# Patient Record
Sex: FEMALE | Race: BLACK OR AFRICAN AMERICAN | NOT HISPANIC OR LATINO | Employment: PART TIME | URBAN - METROPOLITAN AREA
[De-identification: names, ages, dates, MRNs, and addresses within clinical notes are randomized per-mention and may not be internally consistent; named-entity substitution may affect disease eponyms.]

---

## 2023-04-19 ENCOUNTER — LAB (OUTPATIENT)
Dept: LAB | Facility: LAB | Age: 53
End: 2023-04-19
Payer: COMMERCIAL

## 2023-04-19 LAB — TOBACCO SCREEN, URINE: NEGATIVE

## 2023-07-13 ENCOUNTER — TELEPHONE (OUTPATIENT)
Dept: PRIMARY CARE | Facility: CLINIC | Age: 53
End: 2023-07-13

## 2023-07-13 ENCOUNTER — OFFICE VISIT (OUTPATIENT)
Dept: PRIMARY CARE | Facility: CLINIC | Age: 53
End: 2023-07-13
Payer: COMMERCIAL

## 2023-07-13 VITALS
BODY MASS INDEX: 30.56 KG/M2 | OXYGEN SATURATION: 98 % | HEIGHT: 64 IN | RESPIRATION RATE: 14 BRPM | WEIGHT: 179 LBS | TEMPERATURE: 97 F | DIASTOLIC BLOOD PRESSURE: 70 MMHG | SYSTOLIC BLOOD PRESSURE: 130 MMHG | HEART RATE: 99 BPM

## 2023-07-13 DIAGNOSIS — Z12.31 ENCOUNTER FOR SCREENING MAMMOGRAM FOR MALIGNANT NEOPLASM OF BREAST: ICD-10-CM

## 2023-07-13 DIAGNOSIS — N60.99 ATYPICAL DUCTAL HYPERPLASIA OF BREAST: Primary | ICD-10-CM

## 2023-07-13 DIAGNOSIS — E04.1 THYROID NODULE: ICD-10-CM

## 2023-07-13 DIAGNOSIS — Z86.69 HX OF MIGRAINES: ICD-10-CM

## 2023-07-13 DIAGNOSIS — D64.9 ANEMIA, UNSPECIFIED TYPE: ICD-10-CM

## 2023-07-13 DIAGNOSIS — D56.9 THALASSEMIA, UNSPECIFIED TYPE: ICD-10-CM

## 2023-07-13 DIAGNOSIS — M31.10 THROMBOTIC MICROANGIOPATHY (MULTI): ICD-10-CM

## 2023-07-13 DIAGNOSIS — F41.9 ANXIETY: ICD-10-CM

## 2023-07-13 DIAGNOSIS — R00.2 PALPITATION: ICD-10-CM

## 2023-07-13 PROBLEM — G43.909 MIGRAINES: Status: ACTIVE | Noted: 2023-07-13

## 2023-07-13 PROBLEM — D57.3 SICKLE CELL TRAIT (CMS-HCC): Status: ACTIVE | Noted: 2023-07-13

## 2023-07-13 PROCEDURE — 99204 OFFICE O/P NEW MOD 45 MIN: CPT | Performed by: INTERNAL MEDICINE

## 2023-07-13 PROCEDURE — 1036F TOBACCO NON-USER: CPT | Performed by: INTERNAL MEDICINE

## 2023-07-13 RX ORDER — METOPROLOL SUCCINATE 25 MG/1
25 TABLET, EXTENDED RELEASE ORAL DAILY
Qty: 90 TABLET | Refills: 3 | Status: SHIPPED | OUTPATIENT
Start: 2023-07-13 | End: 2024-04-18 | Stop reason: SDUPTHER

## 2023-07-13 RX ORDER — SERTRALINE HYDROCHLORIDE 50 MG/1
50 TABLET, FILM COATED ORAL DAILY
Qty: 90 TABLET | Refills: 3 | Status: SHIPPED | OUTPATIENT
Start: 2023-07-13 | End: 2024-07-12

## 2023-07-13 NOTE — PROGRESS NOTES
"Subjective    Tere Carrion is a 53 y.o. female who presents for New Patient Visit.  HPI  To establish, new insurance   Wants to restart zoloft she has hx of anxiety .she has perimenopausal sx   She will laugh then cry.   Mammogram 1/2022  Colonoscopy 2020 ([facility]) she gets one every 5 years.   Pap 2020 ([facility])  She was having regular periods. She was on ocp and she stopped the pill in December of 2022. Her last period was February. She has hot flashes.   She has a hx of palpitations she saw a cardiologist. Had  sinus tachy.   She was on toprol xl 25 and that helped.       Review of Systems   All other systems reviewed and are negative.        Objective     /70 (BP Location: Left arm, Patient Position: Sitting, BP Cuff Size: Adult)   Pulse 99   Temp 36.1 °C (97 °F) (Skin)   Resp 14   Ht 1.626 m (5' 4\")   Wt 81.2 kg (179 lb)   LMP 02/16/2023   SpO2 98%   BMI 30.73 kg/m²    Physical Exam  Vitals reviewed.   Constitutional:       General: She is not in acute distress.     Appearance: Normal appearance.   Cardiovascular:      Rate and Rhythm: Normal rate and regular rhythm.      Pulses: Normal pulses.      Heart sounds: Normal heart sounds.   Pulmonary:      Effort: Pulmonary effort is normal.      Breath sounds: Normal breath sounds.   Abdominal:      Tenderness: There is no abdominal tenderness.   Musculoskeletal:         General: No swelling.   Skin:     General: Skin is warm and dry.   Neurological:      Mental Status: She is alert.       Health Maintenance Due   Topic Date Due    Yearly Adult Physical  Never done    Lipid Panel  Never done    HIV Screening  Never done    Colorectal Cancer Screening  Never done    HIB Vaccines (1 of 1 - Risk 1-dose series) Never done    Meningococcal Vaccine (1 - Risk 2-dose series) Never done    Meningococcal B Vaccine (1 of 4 - Increased Risk) Never done    Hepatitis C Screening  Never done    Cervical Cancer Screening  Never done    Hepatitis B Vaccines (3 of 3 - " 19+ 3-dose series) 12/12/2016    COVID-19 Vaccine (3 - Booster for Pfizer series) 02/02/2022    Zoster Vaccines (2 of 2) 05/12/2022    Mammogram  01/13/2023    Diabetes Screening  04/25/2023          Assessment/Plan   Problem List Items Addressed This Visit       Hx of migraines    Palpitation    Relevant Medications    metoprolol succinate XL (Toprol-XL) 25 mg 24 hr tablet    Other Relevant Orders    Comprehensive Metabolic Panel    Lipid Panel    Anxiety    Relevant Medications    sertraline (Zoloft) 50 mg tablet    Atypical ductal hyperplasia of breast - Primary    Relevant Orders    Referral to Breast Surgery    Anemia    Relevant Orders    CBC    Thalassemia    Relevant Orders    CBC    Iron and TIBC    Lactate dehydrogenase    Thrombotic microangiopathy (CMS/HCC)    Relevant Orders    CBC     Other Visit Diagnoses       Encounter for screening mammogram for malignant neoplasm of breast        Relevant Orders    BI mammo bilateral screening tomosynthesis    Thyroid nodule        Relevant Orders    TSH with reflex to Free T4 if abnormal    US thyroid        Has hx of thyroid nodule.   Check mamm and labs  Restart metoprolol and sertraline. Pt has been on these in the past and tolerated will  She need high risk breast cancer screening. Refer to breast surgeon

## 2023-07-17 ENCOUNTER — TELEPHONE (OUTPATIENT)
Dept: PRIMARY CARE | Facility: CLINIC | Age: 53
End: 2023-07-17
Payer: COMMERCIAL

## 2023-07-17 NOTE — RESULT ENCOUNTER NOTE
Her us shows multiple thyroid nodules the largest is 1 cm. They recommend continue yearly us for 5 years. Please make a reminder card.

## 2023-07-17 NOTE — TELEPHONE ENCOUNTER
----- Message from Modesta Gallego DO sent at 7/17/2023 12:29 PM EDT -----  Her us shows multiple thyroid nodules the largest is 1 cm. They recommend continue yearly us for 5 years. Please make a reminder card.

## 2023-07-18 ENCOUNTER — LAB (OUTPATIENT)
Dept: LAB | Facility: LAB | Age: 53
End: 2023-07-18
Payer: COMMERCIAL

## 2023-07-18 DIAGNOSIS — E04.1 THYROID NODULE: ICD-10-CM

## 2023-07-18 DIAGNOSIS — R00.2 PALPITATION: ICD-10-CM

## 2023-07-18 DIAGNOSIS — M31.10 THROMBOTIC MICROANGIOPATHY (MULTI): ICD-10-CM

## 2023-07-18 DIAGNOSIS — D56.9 THALASSEMIA, UNSPECIFIED TYPE: ICD-10-CM

## 2023-07-18 DIAGNOSIS — D64.9 ANEMIA, UNSPECIFIED TYPE: ICD-10-CM

## 2023-07-18 LAB
ALANINE AMINOTRANSFERASE (SGPT) (U/L) IN SER/PLAS: 11 U/L (ref 7–45)
ALBUMIN (G/DL) IN SER/PLAS: 4 G/DL (ref 3.4–5)
ALKALINE PHOSPHATASE (U/L) IN SER/PLAS: 55 U/L (ref 33–110)
ANION GAP IN SER/PLAS: 12 MMOL/L (ref 10–20)
ASPARTATE AMINOTRANSFERASE (SGOT) (U/L) IN SER/PLAS: 13 U/L (ref 9–39)
BILIRUBIN TOTAL (MG/DL) IN SER/PLAS: 0.5 MG/DL (ref 0–1.2)
CALCIUM (MG/DL) IN SER/PLAS: 9.1 MG/DL (ref 8.6–10.3)
CARBON DIOXIDE, TOTAL (MMOL/L) IN SER/PLAS: 26 MMOL/L (ref 21–32)
CHLORIDE (MMOL/L) IN SER/PLAS: 105 MMOL/L (ref 98–107)
CHOLESTEROL (MG/DL) IN SER/PLAS: 225 MG/DL (ref 0–199)
CHOLESTEROL IN HDL (MG/DL) IN SER/PLAS: 65.1 MG/DL
CHOLESTEROL/HDL RATIO: 3.5
CREATININE (MG/DL) IN SER/PLAS: 0.86 MG/DL (ref 0.5–1.05)
ERYTHROCYTE DISTRIBUTION WIDTH (RATIO) BY AUTOMATED COUNT: 13.2 % (ref 11.5–14.5)
ERYTHROCYTE MEAN CORPUSCULAR HEMOGLOBIN CONCENTRATION (G/DL) BY AUTOMATED: 33.1 G/DL (ref 32–36)
ERYTHROCYTE MEAN CORPUSCULAR VOLUME (FL) BY AUTOMATED COUNT: 75 FL (ref 80–100)
ERYTHROCYTES (10*6/UL) IN BLOOD BY AUTOMATED COUNT: 4.2 X10E12/L (ref 4–5.2)
GFR FEMALE: 80 ML/MIN/1.73M2
GLUCOSE (MG/DL) IN SER/PLAS: 82 MG/DL (ref 74–99)
HEMATOCRIT (%) IN BLOOD BY AUTOMATED COUNT: 31.7 % (ref 36–46)
HEMOGLOBIN (G/DL) IN BLOOD: 10.5 G/DL (ref 12–16)
IRON (UG/DL) IN SER/PLAS: 101 UG/DL (ref 35–150)
IRON BINDING CAPACITY (UG/DL) IN SER/PLAS: 361 UG/DL (ref 240–445)
IRON SATURATION (%) IN SER/PLAS: 28 % (ref 25–45)
LACTATE DEHYDROGENASE (U/L) IN SER/PLAS BY LAC->PYR RXN: 139 U/L (ref 84–246)
LDL: 147 MG/DL (ref 0–99)
LEUKOCYTES (10*3/UL) IN BLOOD BY AUTOMATED COUNT: 7 X10E9/L (ref 4.4–11.3)
NRBC (PER 100 WBCS) BY AUTOMATED COUNT: 0 /100 WBC (ref 0–0)
PLATELETS (10*3/UL) IN BLOOD AUTOMATED COUNT: 336 X10E9/L (ref 150–450)
POTASSIUM (MMOL/L) IN SER/PLAS: 4.1 MMOL/L (ref 3.5–5.3)
PROTEIN TOTAL: 7.3 G/DL (ref 6.4–8.2)
SODIUM (MMOL/L) IN SER/PLAS: 139 MMOL/L (ref 136–145)
THYROTROPIN (MIU/L) IN SER/PLAS BY DETECTION LIMIT <= 0.05 MIU/L: 1.44 MIU/L (ref 0.44–3.98)
TRIGLYCERIDE (MG/DL) IN SER/PLAS: 67 MG/DL (ref 0–149)
UREA NITROGEN (MG/DL) IN SER/PLAS: 11 MG/DL (ref 6–23)
VLDL: 13 MG/DL (ref 0–40)

## 2023-07-18 PROCEDURE — 80053 COMPREHEN METABOLIC PANEL: CPT

## 2023-07-18 PROCEDURE — 83540 ASSAY OF IRON: CPT

## 2023-07-18 PROCEDURE — 36415 COLL VENOUS BLD VENIPUNCTURE: CPT

## 2023-07-18 PROCEDURE — 83615 LACTATE (LD) (LDH) ENZYME: CPT

## 2023-07-18 PROCEDURE — 85027 COMPLETE CBC AUTOMATED: CPT

## 2023-07-18 PROCEDURE — 83550 IRON BINDING TEST: CPT

## 2023-07-18 PROCEDURE — 80061 LIPID PANEL: CPT

## 2023-07-18 PROCEDURE — 84443 ASSAY THYROID STIM HORMONE: CPT

## 2023-11-25 ENCOUNTER — HOSPITAL ENCOUNTER (OUTPATIENT)
Dept: RADIOLOGY | Facility: HOSPITAL | Age: 53
Discharge: HOME | End: 2023-11-25
Payer: COMMERCIAL

## 2023-11-25 VITALS — BODY MASS INDEX: 30.56 KG/M2 | WEIGHT: 179.01 LBS | HEIGHT: 64 IN

## 2023-11-25 DIAGNOSIS — Z12.39 ENCOUNTER FOR OTHER SCREENING FOR MALIGNANT NEOPLASM OF BREAST: ICD-10-CM

## 2023-11-25 PROCEDURE — A9575 INJ GADOTERATE MEGLUMI 0.1ML: HCPCS | Performed by: NURSE PRACTITIONER

## 2023-11-25 PROCEDURE — 2550000001 HC RX 255 CONTRASTS: Performed by: NURSE PRACTITIONER

## 2023-11-25 PROCEDURE — 77049 MRI BREAST C-+ W/CAD BI: CPT | Mod: BILATERAL PROCEDURE | Performed by: STUDENT IN AN ORGANIZED HEALTH CARE EDUCATION/TRAINING PROGRAM

## 2023-11-25 PROCEDURE — 77049 MRI BREAST C-+ W/CAD BI: CPT

## 2023-11-25 RX ORDER — GADOTERATE MEGLUMINE 376.9 MG/ML
14 INJECTION INTRAVENOUS
Status: COMPLETED | OUTPATIENT
Start: 2023-11-25 | End: 2023-11-25

## 2023-11-25 RX ADMIN — GADOTERATE MEGLUMINE 14 ML: 376.9 INJECTION INTRAVENOUS at 11:42

## 2024-01-18 ENCOUNTER — OFFICE VISIT (OUTPATIENT)
Dept: PRIMARY CARE | Facility: CLINIC | Age: 54
End: 2024-01-18
Payer: COMMERCIAL

## 2024-01-18 VITALS
TEMPERATURE: 97.3 F | DIASTOLIC BLOOD PRESSURE: 82 MMHG | HEART RATE: 72 BPM | RESPIRATION RATE: 14 BRPM | WEIGHT: 175 LBS | OXYGEN SATURATION: 99 % | HEIGHT: 64 IN | BODY MASS INDEX: 29.88 KG/M2 | SYSTOLIC BLOOD PRESSURE: 120 MMHG

## 2024-01-18 DIAGNOSIS — M31.19 TTP (THROMBOTIC THROMBOCYTOPENIC PURPURA) (MULTI): ICD-10-CM

## 2024-01-18 DIAGNOSIS — F41.9 ANXIETY: ICD-10-CM

## 2024-01-18 DIAGNOSIS — M77.8 TENDONITIS OF WRIST, RIGHT: ICD-10-CM

## 2024-01-18 DIAGNOSIS — N60.99 ATYPICAL DUCTAL HYPERPLASIA OF BREAST: ICD-10-CM

## 2024-01-18 DIAGNOSIS — N92.6 ABNORMAL MENSTRUAL PERIODS: Primary | ICD-10-CM

## 2024-01-18 DIAGNOSIS — R00.2 PALPITATION: ICD-10-CM

## 2024-01-18 PROBLEM — Z86.69 HX OF MIGRAINES: Status: RESOLVED | Noted: 2023-07-13 | Resolved: 2024-01-18

## 2024-01-18 PROCEDURE — 99214 OFFICE O/P EST MOD 30 MIN: CPT | Performed by: INTERNAL MEDICINE

## 2024-01-18 PROCEDURE — 1036F TOBACCO NON-USER: CPT | Performed by: INTERNAL MEDICINE

## 2024-01-18 RX ORDER — TAMOXIFEN CITRATE 10 MG/1
10 TABLET ORAL DAILY
COMMUNITY
Start: 2023-12-22

## 2024-01-18 NOTE — PROGRESS NOTES
"Subjective    Tere Carrion is a 53 y.o. female who presents for Follow-up.  HPI  6 month follow up   Utd on pap, colonoscopy, mammogram  Doing well on Sertraline.   Has been dropping objects  Went to [facility] 1/2-1/8/2024, has had left ear pain since.   She had a period last year for 3 months. Then stopped for 3 months.   She has a strong family hx  of colon cancer. In all her mothers sibs and  her mothers father  Had colonoscopy 202 and she is due 2025  Review of Systems   All other systems reviewed and are negative.        Objective     /82 (BP Location: Left arm, Patient Position: Sitting, BP Cuff Size: Adult)   Pulse 72   Temp 36.3 °C (97.3 °F) (Skin)   Resp 14   Ht 1.626 m (5' 4\")   Wt 79.4 kg (175 lb)   LMP 12/31/2023   SpO2 99%   BMI 30.04 kg/m²    Physical Exam  Vitals reviewed.   Constitutional:       General: She is not in acute distress.     Appearance: Normal appearance.   Cardiovascular:      Rate and Rhythm: Normal rate and regular rhythm.      Pulses: Normal pulses.      Heart sounds: Normal heart sounds.   Pulmonary:      Effort: Pulmonary effort is normal.      Breath sounds: Normal breath sounds.   Abdominal:      Tenderness: There is no abdominal tenderness.   Musculoskeletal:         General: No swelling or tenderness.      Comments: Neg tinnels  No laxity of tendons   Skin:     General: Skin is warm and dry.   Neurological:      Mental Status: She is alert.      Motor: No weakness.       Health Maintenance Due   Topic Date Due    Yearly Adult Physical  Never done    HIV Screening  Never done    HIB Vaccines (1 of 1 - Risk 1-dose series) Never done    Meningococcal Vaccine (1 - Risk 2-dose series) Never done    Meningococcal B Vaccine (1 of 4 - Increased Risk) Never done    Hepatitis C Screening  Never done    Cervical Cancer Screening  Never done    Hepatitis B Vaccines (3 of 3 - 19+ 3-dose series) 12/12/2016    COVID-19 Vaccine (3 - Pfizer series) 02/02/2022    Zoster Vaccines " (2 of 2) 05/12/2022    Diabetes Screening  04/25/2023    Influenza Vaccine (1) 09/01/2023          Assessment/Plan   Problem List Items Addressed This Visit       Palpitation    Anxiety    Atypical ductal hyperplasia of breast    Relevant Orders    Referral to Gynecology    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 Continue established treatment plan and follow up at least yearly    She is on tamoxifen and  is still having periods. Has scanned her abdomen and lining is getting thicker (from the tamoxifen)  Her exam of her right arm is normal. Likely developing mild tendonitis . Recommend splint   Call  with any problems or questions.   Follow up in 6 months or sooner if her sx get worse

## 2024-01-19 DIAGNOSIS — F41.9 ANXIETY: ICD-10-CM

## 2024-01-19 DIAGNOSIS — J30.2 SEASONAL ALLERGIES: ICD-10-CM

## 2024-01-19 RX ORDER — FLUTICASONE PROPIONATE 50 MCG
1 SPRAY, SUSPENSION (ML) NASAL DAILY
COMMUNITY
End: 2024-01-19 | Stop reason: SDUPTHER

## 2024-01-19 RX ORDER — FLUTICASONE PROPIONATE 50 MCG
1 SPRAY, SUSPENSION (ML) NASAL DAILY
Qty: 16 G | Refills: 3 | Status: SHIPPED | OUTPATIENT
Start: 2024-01-19

## 2024-02-05 ENCOUNTER — OFFICE VISIT (OUTPATIENT)
Dept: OBSTETRICS AND GYNECOLOGY | Facility: CLINIC | Age: 54
End: 2024-02-05
Payer: COMMERCIAL

## 2024-02-05 VITALS
SYSTOLIC BLOOD PRESSURE: 108 MMHG | WEIGHT: 176.2 LBS | BODY MASS INDEX: 30.08 KG/M2 | DIASTOLIC BLOOD PRESSURE: 60 MMHG | HEIGHT: 64 IN

## 2024-02-05 DIAGNOSIS — N92.6 ABNORMAL MENSTRUAL PERIODS: ICD-10-CM

## 2024-02-05 DIAGNOSIS — Z01.419 WELL WOMAN EXAM WITH ROUTINE GYNECOLOGICAL EXAM: Primary | ICD-10-CM

## 2024-02-05 DIAGNOSIS — N60.99 ATYPICAL DUCTAL HYPERPLASIA OF BREAST: ICD-10-CM

## 2024-02-05 PROCEDURE — 1036F TOBACCO NON-USER: CPT | Performed by: ADVANCED PRACTICE MIDWIFE

## 2024-02-05 PROCEDURE — 88142 CYTOPATH C/V THIN LAYER: CPT

## 2024-02-05 PROCEDURE — 88141 CYTOPATH C/V INTERPRET: CPT | Performed by: PATHOLOGY

## 2024-02-05 PROCEDURE — 99386 PREV VISIT NEW AGE 40-64: CPT | Performed by: ADVANCED PRACTICE MIDWIFE

## 2024-02-05 PROCEDURE — 87624 HPV HI-RISK TYP POOLED RSLT: CPT

## 2024-02-05 NOTE — PROGRESS NOTES
"GYNECOLOGY PROGRESS NOTE    CC:  see below. Patient gets mammogram order from . Patient on Tamoxifen. 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 Patient's menses are not regular all the time. Last pap 2020 wnl   Chief Complaint   Patient presents with    Annual Exam     Patient presents for Annual Exam.  Pap:12/31/2020:normal Never has had an abnormal pap  Amanda:07/26/2023 Cat 2 Benign  Is on Tamoifen and wants to dicuss        HPI:  Tere Carrion is here for a routine GYN examination.  No GYN c/o, no AUB.        Depression screen:  negative      ROS:  GI - no blood in BMs  URO - no hematuria  GYN - no AUB or vaginal discharge  PSYCH - mood OK        PHYSICAL EXAM:  /60 (BP Location: Left arm, Patient Position: Sitting, BP Cuff Size: Large adult)   Ht 1.626 m (5' 4\")   Wt 79.9 kg (176 lb 3.2 oz)   LMP 12/31/2023   BMI 30.24 kg/m²   GEN:  A&O, NAD  ABD:  NT/ND, soft, no palpable masses  URO:  normal urethra, no bladder TTP  GYN:  normal vulva and perineum w/o lesions or ulcers, normal vagina without discharge or lesions, normal cervix without lesions or discharge or CMT, uterus NT/NE, adnexa mobile and NT/NE  BREAST:  deferred  DERM:  no hirsutism or acne   PSYCH:  normal affect, non-anxious      IMPRESSION/PLAN:    A: normal exam.   Plan: 1. Pap if wnl repeat in 1-3 years. 2. Pelvic sonogram to check endometrial lining. - to view films-   Problem List Items Addressed This Visit       Atypical ductal hyperplasia of breast     Other Visit Diagnoses       Abnormal menstrual periods        Well woman exam with routine gynecological exam               ANGELIQUE Lewis-ROSEANNEM  "

## 2024-02-16 LAB
CYTOLOGY CMNT CVX/VAG CYTO-IMP: NORMAL
HPV HR 12 DNA GENITAL QL NAA+PROBE: POSITIVE
HPV HR GENOTYPES PNL CVX NAA+PROBE: POSITIVE
HPV16 DNA SPEC QL NAA+PROBE: NEGATIVE
HPV18 DNA SPEC QL NAA+PROBE: NEGATIVE
LAB AP HPV GENOTYPE QUESTION: YES
LAB AP HPV HR: NORMAL
LABORATORY COMMENT REPORT: NORMAL
LABORATORY COMMENT REPORT: NORMAL
PATH REPORT.TOTAL CANCER: NORMAL

## 2024-02-21 ENCOUNTER — HOSPITAL ENCOUNTER (OUTPATIENT)
Dept: RADIOLOGY | Facility: HOSPITAL | Age: 54
Discharge: HOME | End: 2024-02-21
Payer: COMMERCIAL

## 2024-02-21 DIAGNOSIS — N92.6 ABNORMAL MENSTRUAL PERIODS: ICD-10-CM

## 2024-02-21 DIAGNOSIS — Z01.419 WELL WOMAN EXAM WITH ROUTINE GYNECOLOGICAL EXAM: ICD-10-CM

## 2024-02-21 PROCEDURE — 76856 US EXAM PELVIC COMPLETE: CPT | Performed by: RADIOLOGY

## 2024-02-21 PROCEDURE — 76856 US EXAM PELVIC COMPLETE: CPT

## 2024-02-21 PROCEDURE — 76830 TRANSVAGINAL US NON-OB: CPT | Performed by: RADIOLOGY

## 2024-02-27 ENCOUNTER — TELEPHONE (OUTPATIENT)
Dept: OBSTETRICS AND GYNECOLOGY | Facility: CLINIC | Age: 54
End: 2024-02-27
Payer: COMMERCIAL

## 2024-02-27 NOTE — TELEPHONE ENCOUNTER
----- Message from AGUSTO Lewis sent at 2/27/2024  2:30 PM EST -----  Hello  I had  look at her sonogram and he recommended a hysteroscopic and Bx.  Due to multiple fibroids and thickened lining .92    Thanks  Nenita  ----- Message -----  From: Interface, Radiology Results In  Sent: 2/23/2024   3:04 AM EST  To: AGUSTO Lewis

## 2024-02-29 ENCOUNTER — TELEPHONE (OUTPATIENT)
Dept: OBSTETRICS AND GYNECOLOGY | Facility: CLINIC | Age: 54
End: 2024-02-29
Payer: COMMERCIAL

## 2024-02-29 NOTE — TELEPHONE ENCOUNTER
----- Message from AGUSTO Lewis sent at 2/27/2024  2:30 PM EST -----  Hello  I had  look at her sonogram and he recommended a hysteroscopic and Bx.  Due to multiple fibroids and thickened lining .92    Thanks  Nenita  ----- Message -----  From: Interface, Radiology Results In  Sent: 2/23/2024   3:04 AM EST  To: AGUSTO Lewis    I spoke with this patient and explained the procedures (colpo/hysteroscopy). Both were scheduled for the same day.   Results reviewed and questions were answered to the patients satisfaction.  Reviewed and approved by FUNMI AVERY on 2/29/24 at 1:36 PM.

## 2024-02-29 NOTE — TELEPHONE ENCOUNTER
----- Message from AGUSTO Lewis sent at 2/19/2024  8:57 AM EST -----  Her pap is ASCUS +HPV, she needs a colpo.   Her last pap was wnl.    Thanks  Nenita  ----- Message -----  From: Lab, Background User  Sent: 2/16/2024  10:20 AM EST  To: AGUSTO Lewis    Procedure scheduled; results given See other phone note for more details.  Reviewed and approved by FUNMI AVERY on 2/29/24 at 1:37 PM.

## 2024-04-18 ENCOUNTER — PROCEDURE VISIT (OUTPATIENT)
Dept: OBSTETRICS AND GYNECOLOGY | Facility: CLINIC | Age: 54
End: 2024-04-18
Payer: COMMERCIAL

## 2024-04-18 VITALS — SYSTOLIC BLOOD PRESSURE: 100 MMHG | DIASTOLIC BLOOD PRESSURE: 60 MMHG | WEIGHT: 174 LBS | BODY MASS INDEX: 29.87 KG/M2

## 2024-04-18 DIAGNOSIS — R93.89 ENDOMETRIAL THICKENING ON ULTRASOUND: ICD-10-CM

## 2024-04-18 DIAGNOSIS — R87.610 ASCUS WITH POSITIVE HIGH RISK HPV CERVICAL: ICD-10-CM

## 2024-04-18 DIAGNOSIS — R87.810 ASCUS WITH POSITIVE HIGH RISK HPV CERVICAL: ICD-10-CM

## 2024-04-18 DIAGNOSIS — R00.2 PALPITATION: ICD-10-CM

## 2024-04-18 PROCEDURE — 57454 BX/CURETT OF CERVIX W/SCOPE: CPT | Performed by: OBSTETRICS & GYNECOLOGY

## 2024-04-18 PROCEDURE — 58558 HYSTEROSCOPY BIOPSY: CPT | Performed by: OBSTETRICS & GYNECOLOGY

## 2024-04-18 PROCEDURE — 88305 TISSUE EXAM BY PATHOLOGIST: CPT | Performed by: PATHOLOGY

## 2024-04-18 PROCEDURE — 88305 TISSUE EXAM BY PATHOLOGIST: CPT

## 2024-04-18 PROCEDURE — RXMED WILLOW AMBULATORY MEDICATION CHARGE

## 2024-04-18 RX ORDER — METOPROLOL SUCCINATE 25 MG/1
25 TABLET, EXTENDED RELEASE ORAL DAILY
Qty: 90 TABLET | Refills: 3 | Status: SHIPPED | OUTPATIENT
Start: 2024-04-18 | End: 2025-04-18

## 2024-04-18 RX ORDER — BUPIVACAINE HYDROCHLORIDE 2.5 MG/ML
10 INJECTION, SOLUTION INFILTRATION; PERINEURAL ONCE
Status: COMPLETED | OUTPATIENT
Start: 2024-04-18 | End: 2024-04-18

## 2024-04-18 RX ADMIN — BUPIVACAINE HYDROCHLORIDE 25 MG: 2.5 INJECTION, SOLUTION INFILTRATION; PERINEURAL at 11:52

## 2024-04-18 ASSESSMENT — PAIN SCALES - GENERAL: PAINLEVEL: 0-NO PAIN

## 2024-04-18 NOTE — TELEPHONE ENCOUNTER
----- Message from Tere Carrion sent at 4/18/2024  3:33 PM EDT -----  Regarding: Prescription refill  Contact: [phone]  Hello, I have been trying to get my prescription for metoporol xl  from [facility]  without any luck. Finally I was informed that it is a maintenance medication and my insurance won't I'll it. So can my prescription be sent  to [facility]?

## 2024-04-19 ENCOUNTER — PHARMACY VISIT (OUTPATIENT)
Dept: PHARMACY | Facility: CLINIC | Age: 54
End: 2024-04-19
Payer: COMMERCIAL

## 2024-04-22 NOTE — PROGRESS NOTES
GYN PROGRESS NOTE          CC:   AUB    HPI:  Patient answers are not available for this visit.  HPI    Tere is here today for both a hysteroscopy and colposcopy.  Her last pap smear was ASCUS HPV OTHER POS.  She is also perimenopausal, and had an US that showed fibroids with a thickened lining.   Today, the procedure was explained by both the RN and MD.  Time Out was completed and verified; (2 patient identifiers, Correct procedure, Correct site, Correct position, Correct equipment)  Pathology obtained.  The patients questions were answered to her satisfaction.  She has no concerns at this time.  Discharge instructions were given.  The patient was encouraged to call the office with any questions or concerns.  Chaperone: MAICOL Lorenzo  Last edited by Liv Quintero RN on 2024 10:40 AM.        Abnormal uterine bleeding and dysmenorrhea    Discussed evaluation including hysteroscopy and biopsy.    Discussed management including IUD ablation or hysterectomy.    Discussed risk of each procedure all questions answered to the best of my ability    ROS:  GEN - no fevers or chills  RESP - no SOB or cough  GYN - see HPI      HISTORY:  Past Medical History:   Diagnosis Date    Anemia     Anxiety     Clotting disorder (Multi)     Headache     Sickle cell anemia (Multi) Sickle cell trait    Urinary tract infection      Past Surgical History:   Procedure Laterality Date    BREAST SURGERY       SECTION, LOW TRANSVERSE  2004    TUBAL LIGATION       Social History     Socioeconomic History    Marital status:      Spouse name: Not on file    Number of children: Not on file    Years of education: Not on file    Highest education level: Not on file   Occupational History    Not on file   Tobacco Use    Smoking status: Never    Smokeless tobacco: Never   Vaping Use    Vaping status: Never Used   Substance and Sexual Activity    Alcohol use: Never    Drug use: Never    Sexual activity: Yes     Partners: Male      Birth control/protection: Female Sterilization   Other Topics Concern    Not on file   Social History Narrative    Not on file     Social Determinants of Health     Financial Resource Strain: Unknown (1/13/2022)    Received from [employer]    Overall Financial Resource Strain (CARDIA)     Difficulty of Paying Living Expenses: Patient refused   Food Insecurity: Unknown (1/13/2022)    Received from [employer]    Hunger Vital Sign     Worried About Running Out of Food in the Last Year: Patient refused     Ran Out of Food in the Last Year: Patient refused   Transportation Needs: Unknown (1/13/2022)    Received from [employer]    PRAPARE - Transportation     Lack of Transportation (Medical): Patient refused     Lack of Transportation (Non-Medical): Patient refused   Physical Activity: Unknown (1/13/2022)    Received from [employer]    Exercise Vital Sign     Days of Exercise per Week: Patient refused     Minutes of Exercise per Session: Patient refused   Stress: Unknown (1/13/2022)    Received from [employer]    [employer] [place] [employer] - Occupational Stress Questionnaire     Feeling of Stress : Patient refused   Social Connections: Unknown (1/13/2022)    Received from [employer]    Social Connection and Isolation Panel [NHANES]     Frequency of Communication with Friends and Family: Patient refused     Frequency of Social Gatherings with Friends and Family: Patient refused     Attends Nondenominational Services: Patient refused     Active Member of Clubs or Organizations: Patient refused     Attends Club or Organization Meetings: Patient refused     Marital Status: Patient refused   Intimate Partner Violence: Unknown (1/13/2022)    Received from [employer]    Humiliation, Afraid, Rape, and Kick questionnaire     Fear of Current or Ex-Partner: Patient refused     Emotionally Abused: Patient refused     Physically Abused: Patient refused     Sexually Abused: Patient refused   Housing Stability: Unknown  (1/13/2022)    Received from [employer]    Housing Stability Vital Sign     Unable to Pay for Housing in the Last Year: Patient refused     Number of Places Lived in the Last Year: Not on file     Unstable Housing in the Last Year: Patient refused     Cancer-related family history includes Cancer in her maternal grandfather, mother's brother, mother's brother, mother's sister, and mother's sister; Colon cancer in her maternal grandfather.       PHYSICAL EXAM:  /60 (BP Location: Left arm, Patient Position: Sitting, BP Cuff Size: Adult)   Wt 78.9 kg (174 lb)   LMP 12/31/2023   BMI 29.87 kg/m²     General: No distress  Neck: No masses  Respiratory: No respiratory distress  Abdomen: soft nontender no hernias  GYN: Normal vulvar skin normal clitoral [place] and clitoris labia majora and minora normal pink vaginal mucosa no lesions cervix normal uterine body not enlarged no enlargement or pain and bilateral adnexa   perianal area: without lesions    Procedure  Procedure office hysteroscopy  Dx: Uterine bleeding  Risks benefits alternatives discussed with the patient possible complications including bleeding pain and cramping infection perforation.  Consent was obtained.  Prior to the start of the procedure a timeout was performed.  The patient was confirmed using her name and date of birth.  The correct procedure to be performed was confirmed.  Positioning and equipment was verified.  Pregnancy test if under 55 was performed and found to be negative.  Patient was placed in dorsolithotomy position a bimanual exam was performed.  A speculum was placed in the vagina and the anterior cervix is identified this was grasped with a single-tooth tenaculum.  Paracervical block was placed injection of 10 mL quarter percent Marcaine without epinephrine.  After preparation with Betadine the cervix was dilated  a hysteroscope was placed under direct visualization through the endocervical and endometrial canal to the fundus  survey visually was performed upon exit all surfaces were systematically inspected.     Resectoscope was placed to the identified anatomy.  Tissue including endocervix and endometrium was sampled complete resection was performed.    The instrumentation was removed bleeding was noted to be scant patient tolerated the procedure well patient instructions postoperatively were given    Colposcopy procedure colposcopy with ECC  Patient was consented, timeout was performed. Patient was placed in lithotomy position.  Cervix was fully visualized visual inspection was performed squamocolumnar junction was fully visualized acetic acid placed findings include minimal change acetowhite no atypical lesions.  Strong iodine solution placed no nonstaining in atypical areas. Biopsy performed at 3 6 and 9 ECC performed cytology collected with Cytobrush.  No hemostasis required patient tolerated the procedure well no complications  IMPRESSION/PLAN:    54 y.o. abnormal uterine bleeding status post hysteroscopy endometrial biopsy and colposcopy    Follow-up to discuss results and planning      Risks benefits alternatives discussed with the patient risks including bleeding infection or damage to surrounding tissues.  Bleeding requiring blood transfusion transfusion reaction or infection.  Infection of the superficial or deep spaces.  Damage to bladder bowel ureters vascular structures abdominal wall.  Temporary or severe complications are possible. requiring further surgery acutely or with prolonged hospitalization including a return to the operating room.  All questions answered to the best my ability.    Osmar Chandra MD

## 2024-04-25 LAB
LABORATORY COMMENT REPORT: NORMAL
PATH REPORT.FINAL DX SPEC: NORMAL
PATH REPORT.GROSS SPEC: NORMAL
PATH REPORT.RELEVANT HX SPEC: NORMAL
PATH REPORT.TOTAL CANCER: NORMAL

## 2024-05-09 ENCOUNTER — TELEMEDICINE (OUTPATIENT)
Dept: OBSTETRICS AND GYNECOLOGY | Facility: CLINIC | Age: 54
End: 2024-05-09
Payer: COMMERCIAL

## 2024-05-09 DIAGNOSIS — N93.9 ABNORMAL UTERINE BLEEDING (AUB): Primary | ICD-10-CM

## 2024-05-09 PROCEDURE — 99442 PR PHYS/QHP TELEPHONE EVALUATION 11-20 MIN: CPT | Performed by: OBSTETRICS & GYNECOLOGY

## 2024-05-09 NOTE — PROGRESS NOTES
GYN PROGRESS NOTE    Virtual or Telephone Consent    A telephone visit (audio only) between the patient (at the originating site) and the provider (at the distant site) was utilized to provide this telehealth service.   Verbal consent was requested and obtained from Tere Carrion on this date, 24 for a telehealth visit.    11 minute visit    Chief complaint: follow up    HPI:  Patient answers are not available for this visit.  - Her spotting has discontinued.    Reviewed case with patient, reviewed plans.    Discussed biopsy results which were benign.    Recommend follow up pap in 1 year.    Reviewed treatment options which include endometrial ablation and hysterectomy.      HISTORY:  Past Medical History:   Diagnosis Date    Anemia     Anxiety     Clotting disorder (Multi)     Headache     Sickle cell anemia (Multi) Sickle cell trait    Urinary tract infection      Past Surgical History:   Procedure Laterality Date    BREAST SURGERY       SECTION, LOW TRANSVERSE  2004    TUBAL LIGATION       Social History     Socioeconomic History    Marital status:      Spouse name: Not on file    Number of children: Not on file    Years of education: Not on file    Highest education level: Not on file   Occupational History    Not on file   Tobacco Use    Smoking status: Never    Smokeless tobacco: Never   Vaping Use    Vaping status: Never Used   Substance and Sexual Activity    Alcohol use: Never    Drug use: Never    Sexual activity: Yes     Partners: Male     Birth control/protection: Female Sterilization   Other Topics Concern    Not on file   Social History Narrative    Not on file     Social Determinants of Health     Financial Resource Strain: Unknown (2022)    Received from [employer]    Overall Financial Resource Strain (CARDIA)     Difficulty of Paying Living Expenses: Patient refused   Food Insecurity: Unknown (2022)    Received from [employer]    Hunger Vital Sign     Worried  About Running Out of Food in the Last Year: Patient refused     Ran Out of Food in the Last Year: Patient refused   Transportation Needs: Unknown (1/13/2022)    Received from [employer]    PRAPARE - Transportation     Lack of Transportation (Medical): Patient refused     Lack of Transportation (Non-Medical): Patient refused   Physical Activity: Unknown (1/13/2022)    Received from [employer]    Exercise Vital Sign     Days of Exercise per Week: Patient refused     Minutes of Exercise per Session: Patient refused   Stress: Unknown (1/13/2022)    Received from [employer]    [employer] [place] [employer] - Occupational Stress Questionnaire     Feeling of Stress : Patient refused   Social Connections: Unknown (1/13/2022)    Received from [employer]    Social Connection and Isolation Panel [NHANES]     Frequency of Communication with Friends and Family: Patient refused     Frequency of Social Gatherings with Friends and Family: Patient refused     Attends Scientologist Services: Patient refused     Active Member of Clubs or Organizations: Patient refused     Attends Club or Organization Meetings: Patient refused     Marital Status: Patient refused   Intimate Partner Violence: Unknown (1/13/2022)    Received from [employer]    Humiliation, Afraid, Rape, and Kick questionnaire     Fear of Current or Ex-Partner: Patient refused     Emotionally Abused: Patient refused     Physically Abused: Patient refused     Sexually Abused: Patient refused   Housing Stability: Unknown (1/13/2022)    Received from [employer]    Housing Stability Vital Sign     Unable to Pay for Housing in the Last Year: Patient refused     Number of Places Lived in the Last Year: Not on file     Unstable Housing in the Last Year: Patient refused     Cancer-related family history includes Cancer in her maternal grandfather, mother's brother, mother's brother, mother's sister, and mother's sister; Colon cancer in her maternal grandfather.        ESSION/PLAN:  54-year-old abnormal uterine bleeding and HPV positive.    - Follow up pap in 1 year    Follow up as scheduled    Librado ULRICH am scribing for virtually, and in the presence of Dr. Osmar Chandra on  05/09/24 at 3:53 PM  Osmar Chandra MD

## 2024-06-18 ENCOUNTER — CLINICAL SUPPORT (OUTPATIENT)
Dept: EMERGENCY MEDICINE | Facility: HOSPITAL | Age: 54
End: 2024-06-18
Payer: COMMERCIAL

## 2024-06-18 ENCOUNTER — APPOINTMENT (OUTPATIENT)
Dept: RADIOLOGY | Facility: HOSPITAL | Age: 54
End: 2024-06-18
Payer: COMMERCIAL

## 2024-06-18 ENCOUNTER — APPOINTMENT (OUTPATIENT)
Dept: CARDIOLOGY | Facility: HOSPITAL | Age: 54
End: 2024-06-18
Payer: COMMERCIAL

## 2024-06-18 ENCOUNTER — HOSPITAL ENCOUNTER (INPATIENT)
Facility: HOSPITAL | Age: 54
End: 2024-06-18
Attending: EMERGENCY MEDICINE | Admitting: INTERNAL MEDICINE
Payer: COMMERCIAL

## 2024-06-18 ENCOUNTER — APPOINTMENT (OUTPATIENT)
Dept: PRIMARY CARE | Facility: CLINIC | Age: 54
End: 2024-06-18
Payer: COMMERCIAL

## 2024-06-18 ENCOUNTER — HOSPITAL ENCOUNTER (EMERGENCY)
Facility: HOSPITAL | Age: 54
Discharge: OTHER INSTITUTION | End: 2024-06-18
Attending: STUDENT IN AN ORGANIZED HEALTH CARE EDUCATION/TRAINING PROGRAM
Payer: COMMERCIAL

## 2024-06-18 VITALS
WEIGHT: 175 LBS | HEIGHT: 64 IN | SYSTOLIC BLOOD PRESSURE: 105 MMHG | RESPIRATION RATE: 18 BRPM | BODY MASS INDEX: 29.88 KG/M2 | HEART RATE: 103 BPM | TEMPERATURE: 97.3 F | DIASTOLIC BLOOD PRESSURE: 60 MMHG | OXYGEN SATURATION: 100 %

## 2024-06-18 VITALS
TEMPERATURE: 97.9 F | SYSTOLIC BLOOD PRESSURE: 120 MMHG | WEIGHT: 175 LBS | DIASTOLIC BLOOD PRESSURE: 60 MMHG | RESPIRATION RATE: 14 BRPM | BODY MASS INDEX: 29.88 KG/M2 | HEART RATE: 86 BPM | OXYGEN SATURATION: 95 % | HEIGHT: 64 IN

## 2024-06-18 DIAGNOSIS — M31.19 ACQUIRED TTP (MULTI): ICD-10-CM

## 2024-06-18 DIAGNOSIS — Z86.2 HISTORY OF TTP (THROMBOTIC THROMBOCYTOPENIC PURPURA): Primary | ICD-10-CM

## 2024-06-18 DIAGNOSIS — M31.19 TTP (THROMBOTIC THROMBOCYTOPENIC PURPURA) (MULTI): Primary | ICD-10-CM

## 2024-06-18 DIAGNOSIS — D64.9 ANEMIA, UNSPECIFIED TYPE: ICD-10-CM

## 2024-06-18 DIAGNOSIS — R31.9 HEMATURIA, UNSPECIFIED TYPE: ICD-10-CM

## 2024-06-18 DIAGNOSIS — R30.0 DYSURIA: ICD-10-CM

## 2024-06-18 DIAGNOSIS — D56.9 THALASSEMIA, UNSPECIFIED TYPE: ICD-10-CM

## 2024-06-18 LAB
ABO GROUP (TYPE) IN BLOOD: NORMAL
ABO GROUP (TYPE) IN BLOOD: NORMAL
ALBUMIN SERPL BCP-MCNC: 3.7 G/DL (ref 3.4–5)
ALP SERPL-CCNC: 50 U/L (ref 33–110)
ALT SERPL W P-5'-P-CCNC: 13 U/L (ref 7–45)
ANION GAP SERPL CALC-SCNC: 11 MMOL/L (ref 10–20)
ANTIBODY SCREEN: NORMAL
APTT PPP: 29 SECONDS (ref 27–38)
AST SERPL W P-5'-P-CCNC: 34 U/L (ref 9–39)
BASOPHILS # BLD AUTO: 0.04 X10*3/UL (ref 0–0.1)
BASOPHILS NFR BLD AUTO: 0.3 %
BILIRUB SERPL-MCNC: 1.1 MG/DL (ref 0–1.2)
BUN SERPL-MCNC: 13 MG/DL (ref 6–23)
CALCIUM SERPL-MCNC: 8.5 MG/DL (ref 8.6–10.3)
CARDIAC TROPONIN I PNL SERPL HS: 256 NG/L (ref 0–13)
CARDIAC TROPONIN I PNL SERPL HS: 257 NG/L (ref 0–13)
CHLORIDE SERPL-SCNC: 102 MMOL/L (ref 98–107)
CO2 SERPL-SCNC: 28 MMOL/L (ref 21–32)
CREAT SERPL-MCNC: 0.99 MG/DL (ref 0.5–1.05)
D DIMER PPP FEU-MCNC: 6631 NG/ML FEU
EGFRCR SERPLBLD CKD-EPI 2021: 67 ML/MIN/1.73M*2
EOSINOPHIL # BLD AUTO: 0.13 X10*3/UL (ref 0–0.7)
EOSINOPHIL NFR BLD AUTO: 1.1 %
ERYTHROCYTE [DISTWIDTH] IN BLOOD BY AUTOMATED COUNT: 23.6 % (ref 11.5–14.5)
FIBRINOGEN PPP-MCNC: 226 MG/DL (ref 200–400)
GLUCOSE SERPL-MCNC: 94 MG/DL (ref 74–99)
HCT VFR BLD AUTO: 18.5 % (ref 36–46)
HGB BLD-MCNC: 5.8 G/DL (ref 12–16)
HYPOCHROMIA BLD QL SMEAR: NORMAL
IMM GRANULOCYTES # BLD AUTO: 0.12 X10*3/UL (ref 0–0.7)
IMM GRANULOCYTES NFR BLD AUTO: 1 % (ref 0–0.9)
INR PPP: 1.1 (ref 0.9–1.1)
LDH SERPL L TO P-CCNC: 690 U/L (ref 84–246)
LYMPHOCYTES # BLD AUTO: 2.27 X10*3/UL (ref 1.2–4.8)
LYMPHOCYTES NFR BLD AUTO: 18.6 %
MAGNESIUM SERPL-MCNC: 1.86 MG/DL (ref 1.6–2.4)
MCH RBC QN AUTO: 25.1 PG (ref 26–34)
MCHC RBC AUTO-ENTMCNC: 31.4 G/DL (ref 32–36)
MCV RBC AUTO: 80 FL (ref 80–100)
MONOCYTES # BLD AUTO: 0.92 X10*3/UL (ref 0.1–1)
MONOCYTES NFR BLD AUTO: 7.5 %
NEUTROPHILS # BLD AUTO: 8.75 X10*3/UL (ref 1.2–7.7)
NEUTROPHILS NFR BLD AUTO: 71.5 %
NRBC BLD-RTO: 35.1 /100 WBCS (ref 0–0)
OVALOCYTES BLD QL SMEAR: NORMAL
PLATELET # BLD AUTO: 21 X10*3/UL (ref 150–450)
POC APPEARANCE, URINE: ABNORMAL
POC BILIRUBIN, URINE: NEGATIVE
POC BLOOD, URINE: ABNORMAL
POC COLOR, URINE: YELLOW
POC GLUCOSE, URINE: NEGATIVE MG/DL
POC HEMOGLOBIN: 6.2 G/DL (ref 12–16)
POC KETONES, URINE: NEGATIVE MG/DL
POC LEUKOCYTES, URINE: ABNORMAL
POC NITRITE,URINE: NEGATIVE
POC PH, URINE: 5 PH
POC PROTEIN, URINE: ABNORMAL MG/DL
POC SPECIFIC GRAVITY, URINE: 1.01
POC UROBILINOGEN, URINE: 1 EU/DL
POLYCHROMASIA BLD QL SMEAR: NORMAL
POTASSIUM SERPL-SCNC: 3.5 MMOL/L (ref 3.5–5.3)
PROT SERPL-MCNC: 7.2 G/DL (ref 6.4–8.2)
PROTHROMBIN TIME: 12.7 SECONDS (ref 9.8–12.8)
RBC # BLD AUTO: 2.31 X10*6/UL (ref 4–5.2)
RBC MORPH BLD: NORMAL
RH FACTOR (ANTIGEN D): NORMAL
RH FACTOR (ANTIGEN D): NORMAL
SCHISTOCYTES BLD QL SMEAR: NORMAL
SODIUM SERPL-SCNC: 137 MMOL/L (ref 136–145)
SPHEROCYTES BLD QL SMEAR: NORMAL
TARGETS BLD QL SMEAR: NORMAL
WBC # BLD AUTO: 12.2 X10*3/UL (ref 4.4–11.3)

## 2024-06-18 PROCEDURE — 85610 PROTHROMBIN TIME: CPT | Performed by: STUDENT IN AN ORGANIZED HEALTH CARE EDUCATION/TRAINING PROGRAM

## 2024-06-18 PROCEDURE — 84484 ASSAY OF TROPONIN QUANT: CPT | Performed by: STUDENT IN AN ORGANIZED HEALTH CARE EDUCATION/TRAINING PROGRAM

## 2024-06-18 PROCEDURE — 85018 HEMOGLOBIN: CPT | Performed by: INTERNAL MEDICINE

## 2024-06-18 PROCEDURE — 36415 COLL VENOUS BLD VENIPUNCTURE: CPT | Performed by: STUDENT IN AN ORGANIZED HEALTH CARE EDUCATION/TRAINING PROGRAM

## 2024-06-18 PROCEDURE — 85384 FIBRINOGEN ACTIVITY: CPT | Performed by: STUDENT IN AN ORGANIZED HEALTH CARE EDUCATION/TRAINING PROGRAM

## 2024-06-18 PROCEDURE — 70450 CT HEAD/BRAIN W/O DYE: CPT | Performed by: RADIOLOGY

## 2024-06-18 PROCEDURE — 85379 FIBRIN DEGRADATION QUANT: CPT | Performed by: STUDENT IN AN ORGANIZED HEALTH CARE EDUCATION/TRAINING PROGRAM

## 2024-06-18 PROCEDURE — 2500000004 HC RX 250 GENERAL PHARMACY W/ HCPCS (ALT 636 FOR OP/ED): Performed by: STUDENT IN AN ORGANIZED HEALTH CARE EDUCATION/TRAINING PROGRAM

## 2024-06-18 PROCEDURE — 84075 ASSAY ALKALINE PHOSPHATASE: CPT | Performed by: STUDENT IN AN ORGANIZED HEALTH CARE EDUCATION/TRAINING PROGRAM

## 2024-06-18 PROCEDURE — 2500000004 HC RX 250 GENERAL PHARMACY W/ HCPCS (ALT 636 FOR OP/ED)

## 2024-06-18 PROCEDURE — 96366 THER/PROPH/DIAG IV INF ADDON: CPT | Mod: 59

## 2024-06-18 PROCEDURE — 83735 ASSAY OF MAGNESIUM: CPT | Performed by: STUDENT IN AN ORGANIZED HEALTH CARE EDUCATION/TRAINING PROGRAM

## 2024-06-18 PROCEDURE — 85025 COMPLETE CBC W/AUTO DIFF WBC: CPT | Performed by: STUDENT IN AN ORGANIZED HEALTH CARE EDUCATION/TRAINING PROGRAM

## 2024-06-18 PROCEDURE — 99285 EMERGENCY DEPT VISIT HI MDM: CPT

## 2024-06-18 PROCEDURE — 70450 CT HEAD/BRAIN W/O DYE: CPT

## 2024-06-18 PROCEDURE — 93005 ELECTROCARDIOGRAM TRACING: CPT | Mod: 59

## 2024-06-18 PROCEDURE — 99285 EMERGENCY DEPT VISIT HI MDM: CPT | Mod: 25

## 2024-06-18 PROCEDURE — 86901 BLOOD TYPING SEROLOGIC RH(D): CPT | Performed by: STUDENT IN AN ORGANIZED HEALTH CARE EDUCATION/TRAINING PROGRAM

## 2024-06-18 PROCEDURE — 36556 INSERT NON-TUNNEL CV CATH: CPT

## 2024-06-18 PROCEDURE — 99213 OFFICE O/P EST LOW 20 MIN: CPT | Performed by: INTERNAL MEDICINE

## 2024-06-18 PROCEDURE — 83615 LACTATE (LD) (LDH) ENZYME: CPT | Performed by: STUDENT IN AN ORGANIZED HEALTH CARE EDUCATION/TRAINING PROGRAM

## 2024-06-18 PROCEDURE — 96365 THER/PROPH/DIAG IV INF INIT: CPT | Mod: 59

## 2024-06-18 PROCEDURE — 93005 ELECTROCARDIOGRAM TRACING: CPT

## 2024-06-18 PROCEDURE — 86920 COMPATIBILITY TEST SPIN: CPT

## 2024-06-18 PROCEDURE — 1036F TOBACCO NON-USER: CPT | Performed by: INTERNAL MEDICINE

## 2024-06-18 PROCEDURE — 81002 URINALYSIS NONAUTO W/O SCOPE: CPT | Performed by: INTERNAL MEDICINE

## 2024-06-18 PROCEDURE — 83010 ASSAY OF HAPTOGLOBIN QUANT: CPT | Performed by: STUDENT IN AN ORGANIZED HEALTH CARE EDUCATION/TRAINING PROGRAM

## 2024-06-18 PROCEDURE — 96375 TX/PRO/DX INJ NEW DRUG ADDON: CPT | Mod: 59

## 2024-06-18 RX ORDER — MAGNESIUM SULFATE HEPTAHYDRATE 40 MG/ML
2 INJECTION, SOLUTION INTRAVENOUS ONCE
Status: COMPLETED | OUTPATIENT
Start: 2024-06-18 | End: 2024-06-18

## 2024-06-18 RX ORDER — MORPHINE SULFATE 4 MG/ML
INJECTION, SOLUTION INTRAMUSCULAR; INTRAVENOUS
Status: COMPLETED
Start: 2024-06-18 | End: 2024-06-18

## 2024-06-18 RX ORDER — MORPHINE SULFATE 4 MG/ML
4 INJECTION, SOLUTION INTRAMUSCULAR; INTRAVENOUS ONCE
Status: COMPLETED | OUTPATIENT
Start: 2024-06-18 | End: 2024-06-18

## 2024-06-18 RX ORDER — METOCLOPRAMIDE HYDROCHLORIDE 5 MG/ML
5 INJECTION INTRAMUSCULAR; INTRAVENOUS ONCE
Status: COMPLETED | OUTPATIENT
Start: 2024-06-18 | End: 2024-06-18

## 2024-06-18 RX ORDER — NITROFURANTOIN 25; 75 MG/1; MG/1
100 CAPSULE ORAL 2 TIMES DAILY
Qty: 14 CAPSULE | Refills: 0 | Status: ON HOLD | OUTPATIENT
Start: 2024-06-18 | End: 2024-06-25

## 2024-06-18 RX ORDER — DIPHENHYDRAMINE HYDROCHLORIDE 50 MG/ML
25 INJECTION INTRAMUSCULAR; INTRAVENOUS ONCE
Status: COMPLETED | OUTPATIENT
Start: 2024-06-18 | End: 2024-06-18

## 2024-06-18 ASSESSMENT — LIFESTYLE VARIABLES
HAVE YOU EVER FELT YOU SHOULD CUT DOWN ON YOUR DRINKING: NO
EVER HAD A DRINK FIRST THING IN THE MORNING TO STEADY YOUR NERVES TO GET RID OF A HANGOVER: NO
TOTAL SCORE: 0
HAVE PEOPLE ANNOYED YOU BY CRITICIZING YOUR DRINKING: NO
EVER FELT BAD OR GUILTY ABOUT YOUR DRINKING: NO

## 2024-06-18 ASSESSMENT — COLUMBIA-SUICIDE SEVERITY RATING SCALE - C-SSRS
2. HAVE YOU ACTUALLY HAD ANY THOUGHTS OF KILLING YOURSELF?: NO
6. HAVE YOU EVER DONE ANYTHING, STARTED TO DO ANYTHING, OR PREPARED TO DO ANYTHING TO END YOUR LIFE?: NO
2. HAVE YOU ACTUALLY HAD ANY THOUGHTS OF KILLING YOURSELF?: NO
6. HAVE YOU EVER DONE ANYTHING, STARTED TO DO ANYTHING, OR PREPARED TO DO ANYTHING TO END YOUR LIFE?: NO
1. IN THE PAST MONTH, HAVE YOU WISHED YOU WERE DEAD OR WISHED YOU COULD GO TO SLEEP AND NOT WAKE UP?: NO
1. IN THE PAST MONTH, HAVE YOU WISHED YOU WERE DEAD OR WISHED YOU COULD GO TO SLEEP AND NOT WAKE UP?: NO

## 2024-06-18 ASSESSMENT — PAIN DESCRIPTION - LOCATION: LOCATION: HEAD

## 2024-06-18 ASSESSMENT — PAIN SCALES - GENERAL
PAINLEVEL_OUTOF10: 4
PAINLEVEL_OUTOF10: 0 - NO PAIN

## 2024-06-18 ASSESSMENT — PAIN - FUNCTIONAL ASSESSMENT
PAIN_FUNCTIONAL_ASSESSMENT: 0-10
PAIN_FUNCTIONAL_ASSESSMENT: 0-10

## 2024-06-18 NOTE — ED PROVIDER NOTES
HPI   Chief Complaint   Patient presents with    hgb 6       54-year-old female with past medical history of TTP presenting to the ED for anemia.  Patient states for the past 2 weeks she has had progressively worsening generalized weakness and fatigue.  She has been experiencing persistent migraines despite taking Excedrin and Motrin, dyspnea with exertion, chest pain with exertion, and new bruising with petechiae.  She also noticed large amounts of hematuria without bloody or tarry stools.  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  She had an appointment with her PCP today and POCT hemoglobin was 6 so they sent her to the ED for management.                          [place] Coma Scale Score: 15                     Patient History   Past Medical History:   Diagnosis Date    Anemia     Anxiety     Clotting disorder (Multi)     Headache     Sickle cell anemia (Multi) Sickle cell trait    Urinary tract infection      Past Surgical History:   Procedure Laterality Date    BREAST SURGERY       SECTION, LOW TRANSVERSE  2004    TUBAL LIGATION       Family History   Problem Relation Name Age of Onset    Asthma Mother Bruna     Diabetes Mother Bruna     Hypertension Mother Bruna     Cancer Maternal Grandfather Gavin     Colon cancer Maternal Grandfather Gavin     Hypertension Maternal Grandmother Ana     Cancer Mother's Sister Stacey     Cancer Mother's Brother Javier     Cancer Mother's Sister Pat     Cancer Mother's Brother Naeem     Diabetes Brother Teneuss     Hypertension Brother Teneuss     Hypertension Brother Martin     Learning disabilities Son Sanchez      Social History     Tobacco Use    Smoking status: Never    Smokeless tobacco: Never   Vaping Use    Vaping status: Never Used   Substance Use Topics    Alcohol use: Never    Drug use: Never       Physical Exam   ED Triage Vitals [24 1517]   Temperature Heart Rate Respirations BP    36.3 °C (97.3 °F) 89 14 113/58      Pulse Ox Temp Source Heart Rate Source Patient Position   100 % Temporal -- --      BP Location FiO2 (%)     -- --       Physical Exam  Vitals and nursing note reviewed.   Constitutional:       General: She is not in acute distress.     Appearance: She is not ill-appearing or toxic-appearing.   HENT:      Head: Normocephalic and atraumatic.      Nose: Nose normal.      Mouth/Throat:      Mouth: Mucous membranes are moist.   Eyes:      Extraocular Movements: Extraocular movements intact.      Comments: Conjunctiva pale   Cardiovascular:      Rate and Rhythm: Normal rate and regular rhythm.      Pulses: Normal pulses.      Heart sounds: Normal heart sounds.   Pulmonary:      Effort: Pulmonary effort is normal.      Breath sounds: Normal breath sounds.   Abdominal:      General: There is no distension.      Palpations: Abdomen is soft.      Tenderness: There is no abdominal tenderness.   Musculoskeletal:         General: Normal range of motion.      Cervical back: Normal range of motion and neck supple.      Right lower leg: No edema.      Left lower leg: No edema.   Skin:     General: Skin is warm and dry.      Capillary Refill: Capillary refill takes less than 2 seconds.      Findings: Bruising and rash (small amount of scattered petechiae) present.   Neurological:      General: No focal deficit present.      Mental Status: She is alert and oriented to person, place, and time. Mental status is at baseline.      Sensory: No sensory deficit.      Motor: No weakness.         ED Course & MDM   Diagnoses as of 06/18/24 5570   TTP (thrombotic thrombocytopenic purpura) (Multi)       Medical Decision Making  54-year-old female with history of TTP presenting to the ED for anemia.  Patient does have bruising on exam with a few scattered areas of petechiae.  No active signs of bleeding.  She is hemodynamically stable.  She was treated for her headache with IV fluids, Reglan, Benadryl and  magnesium.  Given that she has had a persistent migraine for 10 days and has platelets of 21, head CT was obtained to rule out intracranial bleeding and this was unremarkable.  Does have anemia of 5.8 and PRBCs were ordered however will hold on administration until case is discussed with hematology due to concerns for TTP with elevated LDH.  No renal dysfunction.  She does also have elevated troponin of 200s without injury pattern on EKG.    Case Discussed with Hematology, Dr. Castellanos.  Recommendation for Transfer to [facility] as we do not have plasmapheresis capabilities at our facility.  Patient was accepted by hematology to the [facility] ED by Dr. Du.  Requested to place a trialysis line to start plasmapheresis upon arrival to the ED.  Patient was consented on risks and benefits including increased risk of bleeding with her thrombocytopenia.  She is agreeable with trialysis line.  We will place a femoral line due to high risk of bleeding and this is a compressible site.    Patient transferred to [facility] for plasmapheresis and management of TTP.    Amount and/or Complexity of Data Reviewed  ECG/medicine tests: ordered and independent interpretation performed. Decision-making details documented in ED Course.     Details: Normal sinus rhythm with rate of 89.  QTc 423.  Normal axis.  No STEMI pattern.        Procedure  Central Line    Performed by: Lotus Anton DO  Authorized by: Carlos Norris DO    Consent:     Consent obtained:  Written    Consent given by:  Patient    Risks discussed:  Arterial puncture, incorrect placement, infection and bleeding    Alternatives discussed:  No treatment  Universal protocol:     Patient identity confirmed:  Arm band  Pre-procedure details:     Indication(s): central venous access      Hand hygiene: Hand hygiene performed prior to insertion      Sterile barrier technique: All elements of maximal sterile technique followed      Skin preparation:  Chlorhexidine    Skin preparation agent: Skin  preparation agent completely dried prior to procedure    Sedation:     Sedation type:  None  Anesthesia:     Anesthesia method:  Local infiltration    Local anesthetic:  Lidocaine 1% w/o epi  Procedure details:     Location:  L femoral    Site selection rationale:  Compressible with high risk of bleeding    Patient position:  Supine    Procedural supplies:  Triple lumen    Catheter size:  13 Fr    Landmarks identified: yes      Ultrasound guidance: yes      Ultrasound guidance timing: prior to insertion and real time      Sterile ultrasound techniques: Sterile gel and sterile probe covers were used      Number of attempts:  1    Successful placement: yes    Post-procedure details:     Post-procedure:  Dressing applied and line sutured    Procedure completion:  Tolerated well, no immediate complications  Comments:      No blood return via purple port, but blood return from side ports.        Lotus Anton DO  Resident  06/18/24 9868

## 2024-06-18 NOTE — PROGRESS NOTES
"cSubjective    Tere Carrion is a 54 y.o. female who presents for Blood in Urine.  HPI  Reports hematuria, urgency, frequency over the last couple weeks.   Lmp 12/31/2023  Had bad smell, she had chills, dis  She had some petechia in and she has noticed a lot of brusing. She does not have any bleeding when she brushes her teeth. \  Sob and lightheaded with standing or walking.  No black stool or blood in stool    Review of Systems   All other systems reviewed and are negative.        Objective     /60 (BP Location: Left arm, Patient Position: Sitting, BP Cuff Size: Adult)   Pulse 86   Temp 36.6 °C (97.9 °F) (Skin)   Resp 14   Ht 1.626 m (5' 4\")   Wt 79.4 kg (175 lb)   LMP 12/31/2023   SpO2 95%   BMI 30.04 kg/m²    Physical Exam  Vitals reviewed.   Constitutional:       General: She is not in acute distress.     Appearance: Normal appearance.   HENT:      Head:      Comments: Pale conjunciva.   Cardiovascular:      Rate and Rhythm: Normal rate and regular rhythm.      Pulses: Normal pulses.      Heart sounds: Normal heart sounds.   Pulmonary:      Effort: Pulmonary effort is normal.      Breath sounds: Normal breath sounds.   Abdominal:      General: Abdomen is flat.      Palpations: Abdomen is soft.      Tenderness: There is no abdominal tenderness. There is no right CVA tenderness or left CVA tenderness.   Musculoskeletal:         General: No swelling.   Skin:     General: Skin is warm and dry.   Neurological:      Mental Status: She is alert.       Health Maintenance Due   Topic Date Due    HIB Vaccines (1 of 1 - Risk 1-dose series) Never done    Meningococcal Vaccine (1 - Risk 2-dose series) Never done    Meningococcal B Vaccine (1 of 4 - Increased Risk) Never done    Hepatitis B Vaccines (3 of 3 - 19+ 3-dose series) 12/12/2016    Zoster Vaccines (2 of 2) 05/12/2022    Diabetes Screening  04/25/2023    COVID-19 Vaccine (3 - 2023-24 season) 09/01/2023    Mammogram  07/26/2024    "       Assessment/Plan   Problem List Items Addressed This Visit       Anemia    Relevant Orders    POCT hemoglobin manually resulted (Completed)    Thalassemia     Other Visit Diagnoses       History of TTP (thrombotic thrombocytopenic purpura)    -  Primary    Hematuria, unspecified type        Relevant Orders    POCT UA (nonautomated) manually resulted (Completed)    CBC    Comprehensive Metabolic Panel    Iron and TIBC    Urine Culture    Dysuria        Relevant Medications    nitrofurantoin, macrocrystal-monohydrate, (Macrobid) 100 mg capsule        Anemia with sx of lightheaded upon standing and sob with walking.  Her vitals are stable but she does need to go to er for poss transfusion. She feels well enough to drive.  Declines ambulance  Call  with any problems or questions.   Follow up after dc.

## 2024-06-19 ENCOUNTER — APPOINTMENT (OUTPATIENT)
Dept: OTHER | Facility: HOSPITAL | Age: 54
End: 2024-06-19
Payer: COMMERCIAL

## 2024-06-19 LAB
ABO GROUP (TYPE) IN BLOOD: NORMAL
ABO GROUP (TYPE) IN BLOOD: NORMAL
ALBUMIN SERPL BCP-MCNC: 3.6 G/DL (ref 3.4–5)
ALP SERPL-CCNC: 55 U/L (ref 33–110)
ALT SERPL W P-5'-P-CCNC: 11 U/L (ref 7–45)
ANION GAP SERPL CALC-SCNC: 12 MMOL/L (ref 10–20)
ANION GAP SERPL CALC-SCNC: 13 MMOL/L (ref 10–20)
ANTIBODY SCREEN: NORMAL
APPEARANCE UR: ABNORMAL
APTT PPP: 21 SECONDS (ref 27–38)
APTT PPP: 26 SECONDS (ref 27–38)
AST SERPL W P-5'-P-CCNC: 27 U/L (ref 9–39)
BACTERIA UR CULT: NORMAL
BASOPHILS # BLD AUTO: 0.06 X10*3/UL (ref 0–0.1)
BASOPHILS NFR BLD AUTO: 0.6 %
BILIRUB DIRECT SERPL-MCNC: 0.2 MG/DL (ref 0–0.3)
BILIRUB SERPL-MCNC: 0.9 MG/DL (ref 0–1.2)
BILIRUB UR STRIP.AUTO-MCNC: NEGATIVE MG/DL
BLOOD EXPIRATION DATE: NORMAL
BUN SERPL-MCNC: 11 MG/DL (ref 6–23)
BUN SERPL-MCNC: 9 MG/DL (ref 6–23)
CA-I BLD-SCNC: 1.02 MMOL/L (ref 1.1–1.33)
CA-I BLD-SCNC: 1.17 MMOL/L (ref 1.1–1.33)
CALCIUM SERPL-MCNC: 10.2 MG/DL (ref 8.6–10.6)
CALCIUM SERPL-MCNC: 8.6 MG/DL (ref 8.6–10.6)
CARDIAC TROPONIN I PNL SERPL HS: 255 NG/L (ref 0–34)
CARDIAC TROPONIN I PNL SERPL HS: 410 NG/L (ref 0–34)
CARDIAC TROPONIN I PNL SERPL HS: 428 NG/L (ref 0–34)
CHLORIDE SERPL-SCNC: 101 MMOL/L (ref 98–107)
CHLORIDE SERPL-SCNC: 106 MMOL/L (ref 98–107)
CO2 SERPL-SCNC: 27 MMOL/L (ref 21–32)
CO2 SERPL-SCNC: 32 MMOL/L (ref 21–32)
COLOR UR: YELLOW
COMPONENT CODE: NORMAL
CREAT SERPL-MCNC: 0.9 MG/DL (ref 0.5–1.05)
CREAT SERPL-MCNC: 1.03 MG/DL (ref 0.5–1.05)
D DIMER PPP FEU-MCNC: 5962 NG/ML FEU
DAT-POLYSPECIFIC: NORMAL
DISPENSE STATUS: NORMAL
EGFRCR SERPLBLD CKD-EPI 2021: 64 ML/MIN/1.73M*2
EGFRCR SERPLBLD CKD-EPI 2021: 76 ML/MIN/1.73M*2
EOSINOPHIL # BLD AUTO: 0.14 X10*3/UL (ref 0–0.7)
EOSINOPHIL NFR BLD AUTO: 1.4 %
ERYTHROCYTE [DISTWIDTH] IN BLOOD BY AUTOMATED COUNT: 21 % (ref 11.5–14.5)
ERYTHROCYTE [DISTWIDTH] IN BLOOD BY AUTOMATED COUNT: 22.7 % (ref 11.5–14.5)
FERRITIN SERPL-MCNC: 686 NG/ML (ref 8–150)
FIBRINOGEN PPP-MCNC: 212 MG/DL (ref 200–400)
FIBRINOGEN PPP-MCNC: 254 MG/DL (ref 200–400)
FOLATE SERPL-MCNC: 20.9 NG/ML
G6PD RBC QL: NORMAL
GLUCOSE SERPL-MCNC: 106 MG/DL (ref 74–99)
GLUCOSE SERPL-MCNC: 97 MG/DL (ref 74–99)
GLUCOSE UR STRIP.AUTO-MCNC: NORMAL MG/DL
HAV IGM SER QL: NONREACTIVE
HBV CORE IGM SER QL: NONREACTIVE
HBV SURFACE AG SERPL QL IA: NONREACTIVE
HCT VFR BLD AUTO: 17.4 % (ref 36–46)
HCT VFR BLD AUTO: 19.2 % (ref 36–46)
HCV AB SER QL: NONREACTIVE
HGB BLD-MCNC: 5.9 G/DL (ref 12–16)
HGB BLD-MCNC: 6.3 G/DL (ref 12–16)
HGB RETIC QN: 30 PG (ref 28–38)
HIV 1+2 AB+HIV1 P24 AG SERPL QL IA: NONREACTIVE
HYPOCHROMIA BLD QL SMEAR: NORMAL
IGA SERPL-MCNC: 325 MG/DL (ref 70–400)
IGG SERPL-MCNC: 1850 MG/DL (ref 700–1600)
IGM SERPL-MCNC: 84 MG/DL (ref 40–230)
IMM GRANULOCYTES # BLD AUTO: 0.12 X10*3/UL (ref 0–0.7)
IMM GRANULOCYTES NFR BLD AUTO: 1.2 % (ref 0–0.9)
IMMATURE RETIC FRACTION: 51.7 %
INR PPP: 1 (ref 0.9–1.1)
INR PPP: 1 (ref 0.9–1.1)
IRON SATN MFR SERPL: 41 % (ref 25–45)
IRON SERPL-MCNC: 117 UG/DL (ref 35–150)
KAPPA LC SERPL-MCNC: 2.91 MG/DL (ref 0.33–1.94)
KAPPA LC/LAMBDA SER: 1.29 {RATIO} (ref 0.26–1.65)
KETONES UR STRIP.AUTO-MCNC: NEGATIVE MG/DL
LAMBDA LC SERPL-MCNC: 2.26 MG/DL (ref 0.57–2.63)
LDH SERPL L TO P-CCNC: 622 U/L (ref 84–246)
LEUKOCYTE ESTERASE UR QL STRIP.AUTO: ABNORMAL
LYMPHOCYTES # BLD AUTO: 2.25 X10*3/UL (ref 1.2–4.8)
LYMPHOCYTES NFR BLD AUTO: 22.2 %
MAGNESIUM SERPL-MCNC: 2 MG/DL (ref 1.6–2.4)
MCH RBC QN AUTO: 25.8 PG (ref 26–34)
MCH RBC QN AUTO: 25.8 PG (ref 26–34)
MCHC RBC AUTO-ENTMCNC: 32.8 G/DL (ref 32–36)
MCHC RBC AUTO-ENTMCNC: 33.9 G/DL (ref 32–36)
MCV RBC AUTO: 76 FL (ref 80–100)
MCV RBC AUTO: 79 FL (ref 80–100)
MONOCYTES # BLD AUTO: 0.82 X10*3/UL (ref 0.1–1)
MONOCYTES NFR BLD AUTO: 8.1 %
MUCOUS THREADS #/AREA URNS AUTO: ABNORMAL /LPF
NEUTROPHILS # BLD AUTO: 6.75 X10*3/UL (ref 1.2–7.7)
NEUTROPHILS NFR BLD AUTO: 66.5 %
NITRITE UR QL STRIP.AUTO: NEGATIVE
NRBC BLD-RTO: 38.9 /100 WBCS (ref 0–0)
NRBC BLD-RTO: 43.2 /100 WBCS (ref 0–0)
PATH REVIEW-CBC DIFFERENTIAL: NORMAL
PH UR STRIP.AUTO: 6 [PH]
PHOSPHATE SERPL-MCNC: 3.7 MG/DL (ref 2.5–4.9)
PLATELET # BLD AUTO: 18 X10*3/UL (ref 150–450)
PLATELET # BLD AUTO: 21 X10*3/UL (ref 150–450)
PLATELETS.RETICULATED NFR BLD AUTO: 9.3 % (ref 1–6)
POLYCHROMASIA BLD QL SMEAR: NORMAL
POTASSIUM SERPL-SCNC: 3.3 MMOL/L (ref 3.5–5.3)
POTASSIUM SERPL-SCNC: 3.4 MMOL/L (ref 3.5–5.3)
PRODUCT BLOOD TYPE: 5100
PRODUCT BLOOD TYPE: 7300
PRODUCT BLOOD TYPE: 9500
PRODUCT CODE: NORMAL
PROT SERPL-MCNC: 7 G/DL (ref 6.4–8.2)
PROT SERPL-MCNC: 7.2 G/DL (ref 6.4–8.2)
PROT UR STRIP.AUTO-MCNC: ABNORMAL MG/DL
PROT UR-ACNC: 28 MG/DL (ref 5–25)
PROTHROMBIN TIME: 10.9 SECONDS (ref 9.8–12.8)
PROTHROMBIN TIME: 11.6 SECONDS (ref 9.8–12.8)
RBC # BLD AUTO: 2.29 X10*6/UL (ref 4–5.2)
RBC # BLD AUTO: 2.44 X10*6/UL (ref 4–5.2)
RBC # UR STRIP.AUTO: ABNORMAL /UL
RBC #/AREA URNS AUTO: ABNORMAL /HPF
RBC MORPH BLD: NORMAL
RETICS #: 0.3 X10*6/UL (ref 0.02–0.08)
RETICS/RBC NFR AUTO: 13 % (ref 0.5–2)
RH FACTOR (ANTIGEN D): NORMAL
RH FACTOR (ANTIGEN D): NORMAL
SCHISTOCYTES BLD QL SMEAR: NORMAL
SODIUM SERPL-SCNC: 142 MMOL/L (ref 136–145)
SODIUM SERPL-SCNC: 143 MMOL/L (ref 136–145)
SP GR UR STRIP.AUTO: 1.01
SQUAMOUS #/AREA URNS AUTO: ABNORMAL /HPF
TARGETS BLD QL SMEAR: NORMAL
TIBC SERPL-MCNC: 286 UG/DL (ref 240–445)
TRANS CELLS #/AREA UR COMP ASSIST: ABNORMAL /HPF
UIBC SERPL-MCNC: 169 UG/DL (ref 110–370)
UNIT ABO: NORMAL
UNIT NUMBER: NORMAL
UNIT RH: NORMAL
UNIT VOLUME: 201
UNIT VOLUME: 202
UNIT VOLUME: 203
UNIT VOLUME: 204
UNIT VOLUME: 207
UNIT VOLUME: 286
UNIT VOLUME: 305
UNIT VOLUME: 311
UNIT VOLUME: 316
UNIT VOLUME: 323
UNIT VOLUME: 324
UNIT VOLUME: 327
UNIT VOLUME: 330
UNIT VOLUME: 331
UNIT VOLUME: 346
UNIT VOLUME: 350
UROBILINOGEN UR STRIP.AUTO-MCNC: ABNORMAL MG/DL
VIT B12 SERPL-MCNC: 404 PG/ML (ref 211–911)
WBC # BLD AUTO: 10.1 X10*3/UL (ref 4.4–11.3)
WBC # BLD AUTO: 9.4 X10*3/UL (ref 4.4–11.3)
WBC #/AREA URNS AUTO: ABNORMAL /HPF
XM INTEP: NORMAL

## 2024-06-19 PROCEDURE — P9017 PLASMA 1 DONOR FRZ W/IN 8 HR: HCPCS

## 2024-06-19 PROCEDURE — P9016 RBC LEUKOCYTES REDUCED: HCPCS

## 2024-06-19 PROCEDURE — 82374 ASSAY BLOOD CARBON DIOXIDE: CPT | Performed by: STUDENT IN AN ORGANIZED HEALTH CARE EDUCATION/TRAINING PROGRAM

## 2024-06-19 PROCEDURE — 36415 COLL VENOUS BLD VENIPUNCTURE: CPT | Performed by: EMERGENCY MEDICINE

## 2024-06-19 PROCEDURE — 83735 ASSAY OF MAGNESIUM: CPT

## 2024-06-19 PROCEDURE — 82330 ASSAY OF CALCIUM: CPT

## 2024-06-19 PROCEDURE — 85384 FIBRINOGEN ACTIVITY: CPT | Performed by: STUDENT IN AN ORGANIZED HEALTH CARE EDUCATION/TRAINING PROGRAM

## 2024-06-19 PROCEDURE — 2500000001 HC RX 250 WO HCPCS SELF ADMINISTERED DRUGS (ALT 637 FOR MEDICARE OP)

## 2024-06-19 PROCEDURE — 85055 RETICULATED PLATELET ASSAY: CPT

## 2024-06-19 PROCEDURE — 82728 ASSAY OF FERRITIN: CPT | Performed by: STUDENT IN AN ORGANIZED HEALTH CARE EDUCATION/TRAINING PROGRAM

## 2024-06-19 PROCEDURE — 82960 TEST FOR G6PD ENZYME: CPT

## 2024-06-19 PROCEDURE — 2500000004 HC RX 250 GENERAL PHARMACY W/ HCPCS (ALT 636 FOR OP/ED): Performed by: STUDENT IN AN ORGANIZED HEALTH CARE EDUCATION/TRAINING PROGRAM

## 2024-06-19 PROCEDURE — 36415 COLL VENOUS BLD VENIPUNCTURE: CPT | Performed by: STUDENT IN AN ORGANIZED HEALTH CARE EDUCATION/TRAINING PROGRAM

## 2024-06-19 PROCEDURE — 84155 ASSAY OF PROTEIN SERUM: CPT | Performed by: EMERGENCY MEDICINE

## 2024-06-19 PROCEDURE — 84484 ASSAY OF TROPONIN QUANT: CPT | Performed by: STUDENT IN AN ORGANIZED HEALTH CARE EDUCATION/TRAINING PROGRAM

## 2024-06-19 PROCEDURE — 6A551Z3 PHERESIS OF PLASMA, MULTIPLE: ICD-10-PCS

## 2024-06-19 PROCEDURE — 2500000004 HC RX 250 GENERAL PHARMACY W/ HCPCS (ALT 636 FOR OP/ED)

## 2024-06-19 PROCEDURE — 86901 BLOOD TYPING SEROLOGIC RH(D): CPT | Performed by: STUDENT IN AN ORGANIZED HEALTH CARE EDUCATION/TRAINING PROGRAM

## 2024-06-19 PROCEDURE — 87086 URINE CULTURE/COLONY COUNT: CPT

## 2024-06-19 PROCEDURE — 82374 ASSAY BLOOD CARBON DIOXIDE: CPT

## 2024-06-19 PROCEDURE — 2500000002 HC RX 250 W HCPCS SELF ADMINISTERED DRUGS (ALT 637 FOR MEDICARE OP, ALT 636 FOR OP/ED)

## 2024-06-19 PROCEDURE — 2500000001 HC RX 250 WO HCPCS SELF ADMINISTERED DRUGS (ALT 637 FOR MEDICARE OP): Performed by: STUDENT IN AN ORGANIZED HEALTH CARE EDUCATION/TRAINING PROGRAM

## 2024-06-19 PROCEDURE — 86078 PHYS BLOOD BANK SERV REACTJ: CPT

## 2024-06-19 PROCEDURE — 85384 FIBRINOGEN ACTIVITY: CPT

## 2024-06-19 PROCEDURE — 85025 COMPLETE CBC W/AUTO DIFF WBC: CPT | Performed by: STUDENT IN AN ORGANIZED HEALTH CARE EDUCATION/TRAINING PROGRAM

## 2024-06-19 PROCEDURE — 85610 PROTHROMBIN TIME: CPT | Performed by: STUDENT IN AN ORGANIZED HEALTH CARE EDUCATION/TRAINING PROGRAM

## 2024-06-19 PROCEDURE — 85055 RETICULATED PLATELET ASSAY: CPT | Performed by: STUDENT IN AN ORGANIZED HEALTH CARE EDUCATION/TRAINING PROGRAM

## 2024-06-19 PROCEDURE — 86481 TB AG RESPONSE T-CELL SUSP: CPT | Performed by: STUDENT IN AN ORGANIZED HEALTH CARE EDUCATION/TRAINING PROGRAM

## 2024-06-19 PROCEDURE — 85379 FIBRIN DEGRADATION QUANT: CPT | Performed by: STUDENT IN AN ORGANIZED HEALTH CARE EDUCATION/TRAINING PROGRAM

## 2024-06-19 PROCEDURE — 83021 HEMOGLOBIN CHROMOTOGRAPHY: CPT | Performed by: STUDENT IN AN ORGANIZED HEALTH CARE EDUCATION/TRAINING PROGRAM

## 2024-06-19 PROCEDURE — 85730 THROMBOPLASTIN TIME PARTIAL: CPT | Performed by: STUDENT IN AN ORGANIZED HEALTH CARE EDUCATION/TRAINING PROGRAM

## 2024-06-19 PROCEDURE — 85027 COMPLETE CBC AUTOMATED: CPT

## 2024-06-19 PROCEDURE — 85045 AUTOMATED RETICULOCYTE COUNT: CPT | Performed by: STUDENT IN AN ORGANIZED HEALTH CARE EDUCATION/TRAINING PROGRAM

## 2024-06-19 PROCEDURE — 85610 PROTHROMBIN TIME: CPT

## 2024-06-19 PROCEDURE — 82746 ASSAY OF FOLIC ACID SERUM: CPT | Performed by: STUDENT IN AN ORGANIZED HEALTH CARE EDUCATION/TRAINING PROGRAM

## 2024-06-19 PROCEDURE — 82607 VITAMIN B-12: CPT | Performed by: STUDENT IN AN ORGANIZED HEALTH CARE EDUCATION/TRAINING PROGRAM

## 2024-06-19 PROCEDURE — 86923 COMPATIBILITY TEST ELECTRIC: CPT

## 2024-06-19 PROCEDURE — 84166 PROTEIN E-PHORESIS/URINE/CSF: CPT | Performed by: STUDENT IN AN ORGANIZED HEALTH CARE EDUCATION/TRAINING PROGRAM

## 2024-06-19 PROCEDURE — 84100 ASSAY OF PHOSPHORUS: CPT

## 2024-06-19 PROCEDURE — 83521 IG LIGHT CHAINS FREE EACH: CPT | Performed by: EMERGENCY MEDICINE

## 2024-06-19 PROCEDURE — 83615 LACTATE (LD) (LDH) ENZYME: CPT | Performed by: STUDENT IN AN ORGANIZED HEALTH CARE EDUCATION/TRAINING PROGRAM

## 2024-06-19 PROCEDURE — 80074 ACUTE HEPATITIS PANEL: CPT | Performed by: STUDENT IN AN ORGANIZED HEALTH CARE EDUCATION/TRAINING PROGRAM

## 2024-06-19 PROCEDURE — 85397 CLOTTING FUNCT ACTIVITY: CPT | Performed by: STUDENT IN AN ORGANIZED HEALTH CARE EDUCATION/TRAINING PROGRAM

## 2024-06-19 PROCEDURE — 86880 COOMBS TEST DIRECT: CPT

## 2024-06-19 PROCEDURE — 86334 IMMUNOFIX E-PHORESIS SERUM: CPT | Performed by: EMERGENCY MEDICINE

## 2024-06-19 PROCEDURE — 82330 ASSAY OF CALCIUM: CPT | Performed by: STUDENT IN AN ORGANIZED HEALTH CARE EDUCATION/TRAINING PROGRAM

## 2024-06-19 PROCEDURE — 1210000001 HC SEMI-PRIVATE ROOM DAILY

## 2024-06-19 PROCEDURE — 87389 HIV-1 AG W/HIV-1&-2 AB AG IA: CPT | Performed by: STUDENT IN AN ORGANIZED HEALTH CARE EDUCATION/TRAINING PROGRAM

## 2024-06-19 PROCEDURE — 36514 APHERESIS PLASMA: CPT

## 2024-06-19 PROCEDURE — 86038 ANTINUCLEAR ANTIBODIES: CPT | Performed by: EMERGENCY MEDICINE

## 2024-06-19 PROCEDURE — 84075 ASSAY ALKALINE PHOSPHATASE: CPT | Performed by: STUDENT IN AN ORGANIZED HEALTH CARE EDUCATION/TRAINING PROGRAM

## 2024-06-19 PROCEDURE — 81003 URINALYSIS AUTO W/O SCOPE: CPT

## 2024-06-19 PROCEDURE — 83540 ASSAY OF IRON: CPT | Performed by: STUDENT IN AN ORGANIZED HEALTH CARE EDUCATION/TRAINING PROGRAM

## 2024-06-19 PROCEDURE — 36430 TRANSFUSION BLD/BLD COMPNT: CPT

## 2024-06-19 PROCEDURE — 84484 ASSAY OF TROPONIN QUANT: CPT

## 2024-06-19 PROCEDURE — 82565 ASSAY OF CREATININE: CPT

## 2024-06-19 PROCEDURE — 83020 HEMOGLOBIN ELECTROPHORESIS: CPT | Performed by: STUDENT IN AN ORGANIZED HEALTH CARE EDUCATION/TRAINING PROGRAM

## 2024-06-19 PROCEDURE — 82784 ASSAY IGA/IGD/IGG/IGM EACH: CPT | Performed by: EMERGENCY MEDICINE

## 2024-06-19 RX ORDER — CALCIUM GLUCONATE 20 MG/ML
5379 INJECTION, SOLUTION INTRAVENOUS ONCE
Status: COMPLETED | OUTPATIENT
Start: 2024-06-19 | End: 2024-06-19

## 2024-06-19 RX ORDER — PREDNISONE 20 MG/1
80 TABLET ORAL DAILY
Status: DISCONTINUED | OUTPATIENT
Start: 2024-06-19 | End: 2024-06-22

## 2024-06-19 RX ORDER — ACETAMINOPHEN 325 MG/1
650 TABLET ORAL ONCE
Status: COMPLETED | OUTPATIENT
Start: 2024-06-19 | End: 2024-06-19

## 2024-06-19 RX ORDER — PANTOPRAZOLE SODIUM 40 MG/1
40 TABLET, DELAYED RELEASE ORAL
Status: DISCONTINUED | OUTPATIENT
Start: 2024-06-19 | End: 2024-06-26

## 2024-06-19 RX ORDER — DIPHENHYDRAMINE HYDROCHLORIDE 50 MG/ML
25 INJECTION INTRAMUSCULAR; INTRAVENOUS EVERY 5 MIN PRN
Status: COMPLETED | OUTPATIENT
Start: 2024-06-19 | End: 2024-06-19

## 2024-06-19 RX ORDER — DIPHENHYDRAMINE HCL 25 MG
25 CAPSULE ORAL ONCE
Status: DISCONTINUED | OUTPATIENT
Start: 2024-06-19 | End: 2024-06-19 | Stop reason: HOSPADM

## 2024-06-19 RX ORDER — IBUPROFEN 200 MG
600 TABLET ORAL EVERY 6 HOURS PRN
COMMUNITY

## 2024-06-19 RX ORDER — HEPARIN SODIUM 1000 [USP'U]/ML
1400 INJECTION, SOLUTION INTRAVENOUS; SUBCUTANEOUS ONCE
Status: COMPLETED | OUTPATIENT
Start: 2024-06-19 | End: 2024-06-19

## 2024-06-19 RX ORDER — SERTRALINE HYDROCHLORIDE 50 MG/1
50 TABLET, FILM COATED ORAL DAILY
Status: DISCONTINUED | OUTPATIENT
Start: 2024-06-19 | End: 2024-06-28 | Stop reason: HOSPADM

## 2024-06-19 RX ORDER — DIPHENHYDRAMINE HCL 25 MG
50 CAPSULE ORAL ONCE
Status: COMPLETED | OUTPATIENT
Start: 2024-06-19 | End: 2024-06-19

## 2024-06-19 RX ORDER — CEFTRIAXONE 1 G/50ML
1 INJECTION, SOLUTION INTRAVENOUS EVERY 24 HOURS
Status: DISCONTINUED | OUTPATIENT
Start: 2024-06-19 | End: 2024-06-20

## 2024-06-19 RX ORDER — CALCIUM CARBONATE 200(500)MG
1500 TABLET,CHEWABLE ORAL EVERY 5 MIN PRN
Status: DISCONTINUED | OUTPATIENT
Start: 2024-06-19 | End: 2024-06-19 | Stop reason: HOSPADM

## 2024-06-19 NOTE — POST-PROCEDURE NOTE
This is a 54 y.o. female with suspected Thrombotic Thrombocytopenic Purpura (TTP) who underwent therapeutic plasma exchange (TPE) with  1.1 volume exchange with  100% plasma as replacement fluid.     I saw and evaluated the patient during the TPE.  The patient was resting in bed.  The vascular access functioned well.     Pre-procedure labs:  WBC   Date/Time Value Ref Range Status   06/19/2024 12:24 AM 10.1 4.4 - 11.3 x10*3/uL Final     POC Hemoglobin   Date/Time Value Ref Range Status   06/18/2024 01:49 PM 6.2 (A) 12 - 16 g/dL Final     Hemoglobin   Date/Time Value Ref Range Status   06/19/2024 12:24 AM 5.9 (LL) 12.0 - 16.0 g/dL Final     Hematocrit   Date/Time Value Ref Range Status   06/19/2024 12:24 AM 17.4 (L) 36.0 - 46.0 % Final     Platelets   Date/Time Value Ref Range Status   06/19/2024 12:24 AM 21 (LL) 150 - 450 x10*3/uL Final     Comment:     Previous result verified on 6/18/2024 1652 on specimen/case [id number] called with component PLT for procedure CBC and Auto Differential with value 21 x10*3/uL.        POCT Calcium, Ionized   Date/Time Value Ref Range Status   06/19/2024 12:24 AM 1.17 1.1 - 1.33 mmol/L Final     Comment:     The performance characteristics of ionized calcium tested  in heparinized plasma or serum have been validated by the  individual  laboratory site where testing is performed.   Testing on heparinized plasma or serum is not approved by   the FDA; however, such approval is not necessary.        Protime   Date/Time Value Ref Range Status   06/19/2024 12:24 AM 10.9 9.8 - 12.8 seconds Final     INR   Date/Time Value Ref Range Status   06/19/2024 12:24 AM 1.0 0.9 - 1.1 Final     aPTT   Date/Time Value Ref Range Status   06/19/2024 12:24 AM 26 (L) 27 - 38 seconds Final     Fibrinogen   Date/Time Value Ref Range Status   06/19/2024 12:24  200 - 400 mg/dL Final        Pre-procedure vital signs at 6:50:  T: 37 C, HR: 80, RR: 18, /68  Pulse oximetry: 98% on room  air    Post-procedure vital signs at 10:59:  T: 36.3 C, HR: 75, RR: 16, BP: 106/72     Pulse oximetry:  99 % on room air    Outcome: TPE completed.   Adverse Reaction: Yes. Patient developed hives during the transfusion of 2nd and 8th unit of plasma at 8:00 and 9:40 am. Procedure was paused. 25 mg IV benadryl was given each time and symptoms subsided. Procedure was resumed.    Assessment and Plan:  54 y.o. female with suspected Thrombotic Thrombocytopenic Purpura (TTP) who underwent TPE #1.  Draw post-procedure labs.  Vascular access to be managed by clinical team.  Next TPE #2 is schedule for 6/20/2024.

## 2024-06-19 NOTE — POST-PROCEDURE NOTE
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    HPI:   Tere Carrion is a 54 y.o. female with with PMH of TTP in  (treated with prolonged plasmapheresis, steroids, and immunosuppressants ), sickle cell trait, alpha thalassemia trait, presented on 24 for 2 weeks of worsening generalized weakness, fatigue, migraine, hematuria, and new bruising with petechiae. She is transferred from [facility] and currently in [facility] ED.     Transfusion Medicine was consulted for a series of TPE procedures to help with further management.      Past medical history:   Past Medical History:   Diagnosis Date    Anemia     Anxiety     Clotting disorder (Multi)     Headache     Sickle cell anemia (Multi) Sickle cell trait    Urinary tract infection         Past surgical history:  Past Surgical History:   Procedure Laterality Date    BREAST SURGERY       SECTION, LOW TRANSVERSE      TUBAL LIGATION          Allergies:   Allergies   Allergen Reactions    Shellfish Derived Anaphylaxis    Latex Hives    Vancomycin Hives       ROS (limited):  ROS per chart.    Medications:    Current Facility-Administered Medications:     pantoprazole (ProtoNix) EC tablet 40 mg, 40 mg, oral, Daily before breakfast, Rico Brady MD    predniSONE (Deltasone) tablet 80 mg, 80 mg, oral, Daily, Rico Brady MD    Current Outpatient Medications:     aspirin-acetaminophen-caffeine (Excedrin Migraine) 250-250-65 mg tablet, Take 2 tablets by mouth every 6 hours if needed (migraines)., Disp: , Rfl:     fluticasone (Flonase) 50 mcg/actuation nasal spray, Administer 1 spray into each nostril once daily. Shake gently. Before first use, prime pump. After use, clean tip and replace cap. (Patient taking differently: Administer 1 spray into each nostril if needed. Shake gently. Before first use, prime  "pump. After use, clean tip and replace cap.), Disp: 16 g, Rfl: 3    ibuprofen 200 mg tablet, Take 3 tablets (600 mg) by mouth every 6 hours if needed (migraines)., Disp: , Rfl:     metoprolol succinate XL (Toprol-XL) 25 mg 24 hr tablet, Take 1 tablet (25 mg) by mouth once daily. Do not crush or chew., Disp: 90 tablet, Rfl: 3    nitrofurantoin, macrocrystal-monohydrate, (Macrobid) 100 mg capsule, Take 1 capsule (100 mg) by mouth 2 times a day for 7 days., Disp: 14 capsule, Rfl: 0    sertraline (Zoloft) 50 mg tablet, Take 1 tablet (50 mg) by mouth once daily., Disp: 90 tablet, Rfl: 3    tamoxifen (Nolvadex) 10 mg tablet, Take 1 tablet (10 mg total) by mouth once daily., Disp: , Rfl:      Vitals:  Visit Vitals  BP 98/59   Pulse 89   Temp 36.6 °C (97.8 °F)   Resp 16   Ht 1.626 m (5' 4\")   Wt 79.4 kg (175 lb)   LMP 12/31/2023   SpO2 98%   BMI 30.04 kg/m²   OB Status Postmenopausal   Smoking Status Never   BSA 1.89 m²        Labs:  WBC   Date/Time Value Ref Range Status   06/19/2024 12:24 AM 10.1 4.4 - 11.3 x10*3/uL Final     POC Hemoglobin   Date/Time Value Ref Range Status   06/18/2024 01:49 PM 6.2 (A) 12 - 16 g/dL Final     Hemoglobin   Date/Time Value Ref Range Status   06/19/2024 12:24 AM 5.9 (LL) 12.0 - 16.0 g/dL Final     Hematocrit   Date/Time Value Ref Range Status   06/19/2024 12:24 AM 17.4 (L) 36.0 - 46.0 % Final     Platelets   Date/Time Value Ref Range Status   06/19/2024 12:24 AM 21 (LL) 150 - 450 x10*3/uL Final     Comment:     Previous result verified on 6/18/2024 1652 on specimen/case [id number] called with component PLT for procedure CBC and Auto Differential with value 21 x10*3/uL.        Protime   Date/Time Value Ref Range Status   06/19/2024 12:24 AM 10.9 9.8 - 12.8 seconds Final     INR   Date/Time Value Ref Range Status   06/19/2024 12:24 AM 1.0 0.9 - 1.1 Final     aPTT   Date/Time Value Ref Range Status   06/19/2024 12:24 AM 26 (L) 27 - 38 seconds Final     Fibrinogen   Date/Time Value Ref " Range Status   06/19/2024 12:24  200 - 400 mg/dL Final        POCT Calcium, Ionized   Date/Time Value Ref Range Status   06/19/2024 12:24 AM 1.17 1.1 - 1.33 mmol/L Final     Comment:     The performance characteristics of ionized calcium tested  in heparinized plasma or serum have been validated by the  individual  laboratory site where testing is performed.   Testing on heparinized plasma or serum is not approved by   the FDA; however, such approval is not necessary.        Assessment/Plan:    54 y.o. 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 Explained the procedure and obtained consent from the patient.  2. Vascular access to be maintained by clinical team.  3. First TPE 1 tentatively scheduled for 6/19/2024.

## 2024-06-19 NOTE — SIGNIFICANT EVENT
DAILY MED[facility] UPDATE    Tere Carrion is a 54 y.o. 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 6/19 plasmapheresis was completed in the ED. She is on daily prednisone 80 mg. Plan for PLEX #2 6/20. She was having urinary frequency, urgency, and hematuria prior to admission. UA 6/19 (+) leuk and blood, holding off on Abx until urine culture is resulted as urinary symptoms have improved. She has CP and SOB with activity continue to trend troponin.     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  -heme on board, they recommended hemoglobin electrophoresis, HIV, HCV, HBV labs, stool pathogen PCR, B12, iron, ferritin, plasma cell dyscrasia workup  -Prednisone 1mg/kg (80mg daily)   -Hgb on admission 5.8 (baseline unknown); s/p 2u pRBCs 6/19  -PPI Ppx   -G6PD screen pending  -HIV, HCV, HBV not detected (6/19).  -maintain left femoral CVC, if having back pain, consider RP bleed   -s/p PLEX 6/19, during transfusion developed hives and was given 2 doses of benadryl.  -Plan for PLEX 6/20  -avoid platelet transfusion, in case of emergency, contact hematology fellow     #Hematuria on POC UA   #Possible UTI  -maybe  related to underlying hemolysis   -UA (6/19) + Leuk and blood; - nitrite  -continue to monitor, consider urology consult if not resolving   -holding off on Abx, patient currently asx follow urine culture    #Troponin elevation  #Type II MI   -non specific TWI in V1 and V3  -likely 2/2 anemia, no chest pain at rest  -keep Hgb >8  -trend troponin: (6/18) 256 --> 257 --> (6/19) 410 --> 428  -monitor patient's symptoms     #Headaches  - Hx of headaches, at home takes motrin and Excedrin; will hold in setting of thrombocytopenia  -CT head (6/18): No evidence of acute cortical infarct or intracranial hemorrhage.   - s/p Mg and morphine at [facility]    #Reported Hx of Sinus tachycardia   -reports being on home metoprolol 25 succ daily   -hold with anemia      #Anxiety  -continue home zoloft 50mg daily     #High risk for breast cancer  -hold tamoxifen as inpatient     Dispo:  - Code status: full code  - DC pending anemia w/up   - Surrogate decision maker: Daughter Zara [phone]  - FUV: 7/23 Modesta Gallego (PCP); 7/31 Hillary Johnson (surg onc)    Assessment and plan were discussed with attending physician Dr. Caleb Monte PA-C

## 2024-06-19 NOTE — ED TRIAGE NOTES
Patient presents to the ED as a transfer from [facility] for anemia. Patient has had worsening generalized weakness, fatigue, dyspnea on exertion, chest pain with exertion, and new bruising throughout her body. Patient was seen by her PCP today and her hemoglobin was 6. She has previously been diagnosed with TTP and she was transferred here to see hematology and receive plasmapheresis.    PROVIDER:[TOKEN:[[id number]],SCHEDULEDAPPT:[05/14/2021],SCHEDULEDAPPTTIME:[11:00 AM],ESTABLISHEDPATIENT:[T]]

## 2024-06-19 NOTE — H&P
NIGHT FLOAT ADMISSION NOTE      Subjective   HPI:  Tere Carrion is a 54 y.o. 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 for potential plasmapheresis     Patient reports that her initial symptoms were blood in urine when she wiped for a few weeks, ignored it. Then noticed petechiae on her arms. On Friday, she noticed bruises on her leg. She was on a flight, and she would notice that she would become short of breath and have chest pain (radiating to the neck) when walking in the airport. Her HR was also elevated to the 130s with exertion (on her FitBit). Her symptoms have been progressively worse, mainly bruises. Also reports inability to finish her meals due to fatigue, belly pain, nausea, and loss of appetite with oral intake. On Monday, her appetite went back to normal.     Reports having frothy urine, urinary urgency & frequency . Over the counter UTI test kit was negative, so patient felt reassured and did not seek medical attention at the time     No falls, no loss of consciousness. No recent Abx use (was prescribed macrobid, but has not yet taken). No chest pain at rest. No SOB at rest. No fevers, chills. Has been having issues maintaining a warm body temperature. No recent diarrheal illness. In fact has chronic constipation. No recent flu like illness. No blood in the stool. No tarry stools. Denies herbal medications. Takes excedrin and motrin for migraines, last use 2 days ago.     No Hx of heart failure. No Hx of blood clots. No unintentional weight loss.     Reports baseline SBP in the high 90s, low 100s     Travel History:   [place], arrived back to [place] 2-3 days ago  January went to the  [facility] [place]     Reports having a C-scope every 5 years, last in , no concerns per patient    TTP Hx:   Please see Hematology consult note from 2024       ED Course:  Vitals:   T 37.1 °C (98.8 °F)  HR 97  /71  RR 18  O2 96 % None (Room air)    Relevant studies:  CBC: WBC 10.1 , HGB 5.9, (baseline 10 ~11 months ago) PLT 21  BMP: , K 3.4, Cl 106, HCO3 27, BUN 11, CR 1.03, Glu 106  LFTS: AST 27 , ALT 11, ALKPHOS 55 , TBILI 0.9 , DBILI 0.2  TROP: 410  COAGS: PT 10.9 , PTT 26  , INR 1.0  UA:   Results from last 7 days   Lab Units 24  1330   POC COLOR, URINE ME  Yellow   POC PH, URINE ME PH 5.0   POC SPECIFIC GRAVITY, URINE ME  1.010   POC PROTEIN, URINE ME mg/dl >=300 (3+)*   POC BLOOD, URINE ME  LARGE (3+)*   POC NITRITE, URINE ME  NEGATIVE     CALCIUM 8.6 ALB 3.6     CT head: No evidence of acute cortical infarct or intracranial hemorrhage     Interventions:  Magnesium 2g, Benadryl 25mg IV, Reglan 5mg IV, Morphine 4mg IV, T+S, 2U of blood (pRBCs) ordered       Medical History:  PMH: as above    Medications prior to admission:   Did not take any medicine today   Metoprolol succinate 25mg daily   Tamoxifen 10 mg oral   Flonase as needed  Zoloft 50mg daily   Excedrin Migraine aspirin-acetaminophen-caffeine 250-250-65 mg 2 tablets q6hr PRN  Ibuprofen 600mg q6hr PRN     Allergies:  Allergies   Allergen Reactions    Shellfish Derived Anaphylaxis    Latex Hives    Vancomycin Hives       PSH:   Past Surgical History:   Procedure Laterality Date    BREAST SURGERY       SECTION, LOW TRANSVERSE  2004    TUBAL LIGATION         FamHx:   No FHx of TTP  Colon cancer in Grandfather and Two uncles and 1 uncle     SocHx:  Home: lives with 3 sons   Functional status: independent in ADLs and iADLs   Education/work: US technician    Substance use:   -Alcohol: denies  -Tobacco: never  -Recreational drugs: denies      Objective   General: alert, oriented to time, place, self, daughter, not in  acute distress  Eyes: no jaundice  Chest: CTAB   CVS: ?systolic murmur, regular rate and rhythm   Abdomen: soft, non-tender   MSK: left femoral line with evidence of prior minimal bleeding on dressing, no active bleeding   LE: no edema        Assessment/Plan   Tere Carrion is a 54 y.o. 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     DDX includes TTP but given subacute symptoms, other causes of anemia should be considered. Patient planned for PLEX in the AM     Peripheral smear (from heme note):   WBC: no circulating blasts seen, mature lymphocytes, mature neutrophils  RBC: several schistocytes, no dacrocytes, no spherocytes, RBCs with increased pallor, microcytosis, several target cells, some reticulocytes, occasional nucleated RBC  Plt: no platelet clumping, no enlarged platelets, true thrombocytopenia    #Anemia & Thrombocytopenia   -heme on board, appreciate recs, they recommended hemoglobin electrophoresis, HIV, HCV, HBV labs, stool pathogen PCR, B12, iron, ferritin, plasma cell dyscrasia workup  -Prednisone 1mg/kg (80mg daily)   -PPI Ppx   -G6PD screen  -maintain left femoral CVC, if having back pain, consider RP bleed   -check post transfusion CBC  -Transfusion medicine to initiate PLEX in the AM once hemoglobin above 7  -avoid platelet transfusion, in case of emergency, contact hematology fellow    #Hematuria on POC UA   #Possible UTI  ::maybe related to underlying hemolysis   -repeat formal UA with microscopy   -continue to monitor, consider urology consult if not resolving   -holding off on Abx, consider in the AM, follow urine culture    #Troponin elevation  #Type II MI   ::non  specific TWI in V1 and V3  ::likely 2/2 anemia, no chest pain at rest  -keep Hgb >8  -trend troponin  -monitor patient's symptoms     #Reported Hx of Sinus tachycardia   ::reports being on home metoprolol 25 succ daily   -hold with anemia     #Anxiety  -continue home zoloft 50mg daily     #High risk for breast cancer  -hold tamoxifen as inpatient     F: PRN  E: PRN  Diet: regular  DVT PPx: None, thrombocytopenic, reassess daily   CODE STATUS: FULL (confirmed with patient on admission)  Surrogate decision maker: Daughter Zara [phone]    Patient was admitted by the  Night Float service and is to be staffed with the attending physician in the AM

## 2024-06-19 NOTE — ED PROVIDER NOTES
"HPI   Chief Complaint   Patient presents with    Abnormal Labs       HPI     Patient is a 54-year-old female with a past medical history of TTP in , sickle cell trait, alpha thalassemia trait, prior splenectomy presenting to the emergency department with abnormal labs.  Patient has been complaining of around 2 weeks of subacute hematuria as well as new bruising especially over her tibias and petechiae on all 4 of her extremities which have improved somewhat over the past couple of days.  Had outpatient labs that showed a decreased hemoglobin level.  Initially presented to the outside hospital, see their note for further details.  In brief, showed thrombocytopenia, anemia, elevated LDH and stably elevated troponin all consistent with TTP.  Did have a transient headache treated with morphine with subsequent resolution.  CTA head at the outside hospital showed no evidence of intracranial hemorrhage.  After outside hospital contacted hematology, a trialysis line was placed in the left femoral vein and she was [place] [facility] [place].  At presentation, patient states that she is asymptomatic denies any headache, chest pain, abdominal pain, back pain, numbness, tingling while at rest.  Does state that when she \"moves too much\" she gets whole body pain including chest, abdomen, arms, and legs, and feels generally achy and fatigued.  Denies any fever, chills, nausea, vomiting, pain with urination.               Puneet Coma Scale Score: 15                     Patient History   Past Medical History:   Diagnosis Date    Anemia     Anxiety     Clotting disorder (Multi)     Headache     Sickle cell anemia (Multi) Sickle cell trait    Urinary tract infection      Past Surgical History:   Procedure Laterality Date    BREAST SURGERY       SECTION, LOW TRANSVERSE      TUBAL LIGATION       Family History   Problem Relation Name Age of Onset    Asthma Mother Bruna     Diabetes Mother Bruna     " Hypertension Mother Bruna     Cancer Maternal Grandfather Gavin     Colon cancer Maternal Grandfather Gavin     Hypertension Maternal Grandmother Ana     Cancer Mother's Sister Stacey     Cancer Mother's Brother Javier     Cancer Mother's Sister Pat     Cancer Mother's Brother Naeem     Diabetes Brother Teneubrayan     Hypertension Brother Teneubrayan     Hypertension Brother Martin     Learning disabilities Son Sanchez      Social History     Tobacco Use    Smoking status: Never    Smokeless tobacco: Never   Vaping Use    Vaping status: Never Used   Substance Use Topics    Alcohol use: Never    Drug use: Never       Physical Exam   ED Triage Vitals [06/18/24 2357]   Temperature Heart Rate Respirations BP   37.1 °C (98.8 °F) 97 18 123/71      Pulse Ox Temp Source Heart Rate Source Patient Position   96 % Oral -- Sitting      BP Location FiO2 (%)     -- --       Physical Exam  Constitutional:       Appearance: She is not ill-appearing, toxic-appearing or diaphoretic.   HENT:      Head: Normocephalic and atraumatic.      Nose: Nose normal.   Eyes:      General: No scleral icterus.        Right eye: No discharge.         Left eye: No discharge.      Conjunctiva/sclera: Conjunctivae normal.   Cardiovascular:      Rate and Rhythm: Normal rate and regular rhythm.      Heart sounds: No murmur heard.     No friction rub. No gallop.   Pulmonary:      Effort: No respiratory distress.      Breath sounds: Normal breath sounds.   Abdominal:      General: There is no distension.      Palpations: Abdomen is soft.      Comments: Nontender to palpation in all 4 quadrants, surgical scar in the left upper quadrant consistent with known prior splenectomy   Musculoskeletal:         General: No deformity or signs of injury.      Cervical back: Neck supple. No rigidity.   Skin:     General: Skin is warm and dry.      Comments: Petechiae over the bilateral upper and lower extremities, bruising over the right tibia present   Neurological:       Mental Status: She is alert.   Psychiatric:         Mood and Affect: Mood normal.         Behavior: Behavior normal.         ED Course & MDM   Diagnoses as of 06/19/24 0125   TTP (thrombotic thrombocytopenic purpura) (Multi)   EKG showing normal sinus rate and rhythm, normal axis, normal intervals, no signs of acute ST elevation or depression    Medical Decision Making  Patient is a 54-year-old female presenting to the emergency department with abnormal lab values.  Clinical signs and symptoms as well as lab values are consistent with TTP.  Consulted hematology who recommended Plavix.  Consulted transfusion medicine to arrange for Plex.  After extensive discussion with both hematology and transplant medicine, given patient's overall hemodynamic stability and current asymptomatic status with no active or ongoing pain or signs of new hypotension or tachycardia, recommended admission to the floor.  I did extensively discuss the indications with hematology and ordered the first round of steroids as well as a PPI.  Discussed with transplant medicine and ordered all of the labs necessary to allow for plasmapheresis to occur.  Order to the first unit of packed red blood cells per request from hematology which was transfused prior to initiation of apheresis.  Transfusion medicine asked that we hold the second unit of packed red blood cells to allow for apheresis to happen on a more expeditious basis.  Patient admitted in stable condition for further Plex and treatment of TTP.    Procedure  Procedures     Rico Brady MD  Resident  06/19/24 4917

## 2024-06-19 NOTE — PROGRESS NOTES
Pharmacy Medication History Review    Tere Carrion is a 54 y.o. female admitted for No Principal Problem currently listed. Pharmacy reviewed the patient's lyleh-vy-bygxyptwj medications and allergies for accuracy.    The list below reflects the updated PTA list. Comments regarding how patient may be taking medications differently can be found in the Admit Orders Activity  Prior to Admission Medications   Prescriptions Informant Patient Reported? Taking?   aspirin-acetaminophen-caffeine (Excedrin Migraine) 250-250-65 mg tablet  Sig: Take 2 tablets by mouth every 6 hours if needed (migraines) Self Yes PRN         fluticasone (Flonase) 50 mcg/actuation nasal spray Self Yes PRN   Sig: Administer 1 spray into each nostril once daily if needed.  Shake gently. Before first use, prime pump. After use, clean   tip and replace cap.         Ibuprofen 200 mg tablet Self Yes  PRN   Sig: Take 3 tablets (600 mg) by mouth every 6 hours if needed (migraines)            metoprolol succinate XL (Toprol-XL) 25 mg 24 hr tablet Self Yes Yes   Sig: Take 1 tablet (25 mg) by mouth once daily.   Do not crush or chew.   nitrofurantoin, macrocrystal-monohydrate, (Macrobid) 100 mg capsule Self Yes No   Sig: Take 1 capsule (100 mg) by mouth 2 times a day for 7 days.  **Patient states she hasn't started this medication yet,   Last Filled 6/18/24 x 7 DS**   sertraline (Zoloft) 50 mg tablet Self Yes Yes   Sig: Take 1 tablet (50 mg) by mouth once daily.   tamoxifen (Nolvadex) 10 mg tablet Self Yes Yes   Sig: Take 1 tablet (10 mg total) by mouth once daily.      Facility-Administered Medications: None        The list below reflects the updated allergy list. Please review each documented allergy for additional clarification and justification.  Allergies  Reviewed by Josh Basurto on 6/19/2024        Severity Reactions Comments    Shellfish Derived High Anaphylaxis     Latex Not Specified Hives     Vancomycin Not Specified Hives             Patient  accepts M2B at discharge. Pharmacy has been updated to [facility].    Sources used to complete the med history include:  Patient interviewed about medications with visitor at bedside. Patient alert, cooperative, pleasant, knew her medications and doses  Epic Dispense Report reviewed  Care Everywhere, OHIP Summarization of Episode Note reviewed  6/18/24  Office Visit Progress Notes & AVS reviewed      Below are additional concerns with the patient's PTA list.  **Patient request that all medications less than 90 DS be sent to [facility] [address] [phone] in the case that the prescription is not paid for at [facility]**    ---------------------------------  Josh Basurto CPhT  Transitions of Care Technician  Medication reconciliation complete  Please reach out via [employer] Secure Chat for questions,   or if no response call [employer] or [employer].   Meds Ambulatory and Retail Services

## 2024-06-19 NOTE — PROGRESS NOTES
Tere Carrion [id number]       Procedure type: Plasma Exchange    Replacement Fluids: 100% Plasma     Procedure Completed Transfusion reaction and/or adverse event noted.    Appropriate actions taken.   Attending notified of completion status. See flow sheet(s) for additional details.  Post-Vitals listed below.    Post-procedure vitals:  Vitals @ 1059  BP: 106/72  Temperature: 36.3 °C (97.3 °F)  Heart Rate: 75  Respirations: 16  Pulse Ox: 99 %  on RA       Rosie Delgado RN

## 2024-06-20 ENCOUNTER — APPOINTMENT (OUTPATIENT)
Dept: OTHER | Facility: HOSPITAL | Age: 54
End: 2024-06-20
Payer: COMMERCIAL

## 2024-06-20 LAB
ALBUMIN MFR UR ELPH: 44.7 %
ALBUMIN SERPL BCP-MCNC: 3.5 G/DL (ref 3.4–5)
ALBUMIN: 3.6 G/DL (ref 3.4–5)
ALP SERPL-CCNC: 58 U/L (ref 33–110)
ALPHA 1 GLOBULIN: 0.4 G/DL (ref 0.2–0.6)
ALPHA 2 GLOBULIN: 0.5 G/DL (ref 0.4–1.1)
ALPHA1 GLOB MFR UR ELPH: 4.7 %
ALPHA2 GLOB MFR UR ELPH: 11.5 %
ALT SERPL W P-5'-P-CCNC: 13 U/L (ref 7–45)
ANA SER QL HEP2 SUBST: NEGATIVE
ANION GAP SERPL CALC-SCNC: 15 MMOL/L (ref 10–20)
APTT PPP: 20 SECONDS (ref 27–38)
AST SERPL W P-5'-P-CCNC: 22 U/L (ref 9–39)
ATRIAL RATE: 93 BPM
B-GLOBULIN MFR UR ELPH: 20.2 %
BACTERIA UR CULT: NORMAL
BASOPHILS # BLD AUTO: 0.03 X10*3/UL (ref 0–0.1)
BASOPHILS NFR BLD AUTO: 0.2 %
BETA GLOBULIN: 1 G/DL (ref 0.5–1.2)
BILIRUB SERPL-MCNC: 0.4 MG/DL (ref 0–1.2)
BLOOD EXPIRATION DATE: NORMAL
BUN SERPL-MCNC: 11 MG/DL (ref 6–23)
CA-I BLD-SCNC: 1.21 MMOL/L (ref 1.1–1.33)
CA-I BLD-SCNC: 1.23 MMOL/L (ref 1.1–1.33)
CALCIUM SERPL-MCNC: 8.8 MG/DL (ref 8.6–10.6)
CHLORIDE SERPL-SCNC: 105 MMOL/L (ref 98–107)
CO2 SERPL-SCNC: 26 MMOL/L (ref 21–32)
CREAT SERPL-MCNC: 0.93 MG/DL (ref 0.5–1.05)
D DIMER PPP FEU-MCNC: 6130 NG/ML FEU
DISPENSE STATUS: NORMAL
EGFRCR SERPLBLD CKD-EPI 2021: 73 ML/MIN/1.73M*2
EOSINOPHIL # BLD AUTO: 0 X10*3/UL (ref 0–0.7)
EOSINOPHIL NFR BLD AUTO: 0 %
ERYTHROCYTE [DISTWIDTH] IN BLOOD BY AUTOMATED COUNT: 19.9 % (ref 11.5–14.5)
ERYTHROCYTE [DISTWIDTH] IN BLOOD BY AUTOMATED COUNT: 21.4 % (ref 11.5–14.5)
FIBRINOGEN PPP-MCNC: 216 MG/DL (ref 200–400)
GAMMA GLOB MFR UR ELPH: 18.9 %
GAMMA GLOBULIN: 1.6 G/DL (ref 0.5–1.4)
GLUCOSE SERPL-MCNC: 106 MG/DL (ref 74–99)
HAPTOGLOB SERPL-MCNC: <10 MG/DL (ref 37–246)
HCT VFR BLD AUTO: 21.9 % (ref 36–46)
HCT VFR BLD AUTO: 24.9 % (ref 36–46)
HGB BLD-MCNC: 7.6 G/DL (ref 12–16)
HGB BLD-MCNC: 7.9 G/DL (ref 12–16)
HGB RETIC QN: 29 PG (ref 28–38)
HYPOCHROMIA BLD QL SMEAR: NORMAL
IMM GRANULOCYTES # BLD AUTO: 0.13 X10*3/UL (ref 0–0.7)
IMM GRANULOCYTES NFR BLD AUTO: 1 % (ref 0–0.9)
IMMATURE RETIC FRACTION: 47 %
IMMUNOFIXATION COMMENT: ABNORMAL
INR PPP: 1 (ref 0.9–1.1)
LDH SERPL L TO P-CCNC: 320 U/L (ref 84–246)
LYMPHOCYTES # BLD AUTO: 0.85 X10*3/UL (ref 1.2–4.8)
LYMPHOCYTES NFR BLD AUTO: 6.3 %
MCH RBC QN AUTO: 26.1 PG (ref 26–34)
MCH RBC QN AUTO: 26.6 PG (ref 26–34)
MCHC RBC AUTO-ENTMCNC: 31.7 G/DL (ref 32–36)
MCHC RBC AUTO-ENTMCNC: 34.7 G/DL (ref 32–36)
MCV RBC AUTO: 77 FL (ref 80–100)
MCV RBC AUTO: 82 FL (ref 80–100)
MONOCYTES # BLD AUTO: 1.09 X10*3/UL (ref 0.1–1)
MONOCYTES NFR BLD AUTO: 8 %
NEUTROPHILS # BLD AUTO: 11.46 X10*3/UL (ref 1.2–7.7)
NEUTROPHILS NFR BLD AUTO: 84.5 %
NRBC BLD-RTO: 17 /100 WBCS (ref 0–0)
NRBC BLD-RTO: 19.9 /100 WBCS (ref 0–0)
P AXIS: 49 DEGREES
P OFFSET: 197 MS
P ONSET: 152 MS
PATH REVIEW - SERUM IMMUNOFIXATION: ABNORMAL
PATH REVIEW-SERUM PROTEIN ELECTROPHORESIS: ABNORMAL
PATH REVIEW-URINE PROTEIN ELECTROPHORESIS: NORMAL
PLATELET # BLD AUTO: 53 X10*3/UL (ref 150–450)
PLATELET # BLD AUTO: 85 X10*3/UL (ref 150–450)
PLATELETS.RETICULATED NFR BLD AUTO: 14.9 % (ref 1–6)
POLYCHROMASIA BLD QL SMEAR: NORMAL
POTASSIUM SERPL-SCNC: 3.7 MMOL/L (ref 3.5–5.3)
PR INTERVAL: 146 MS
PRODUCT BLOOD TYPE: 5100
PRODUCT BLOOD TYPE: 600
PRODUCT BLOOD TYPE: 600
PRODUCT BLOOD TYPE: 6200
PRODUCT CODE: NORMAL
PROT SERPL-MCNC: 6.1 G/DL (ref 6.4–8.2)
PROTEIN ELECTROPHORESIS COMMENT: ABNORMAL
PROTHROMBIN TIME: 11 SECONDS (ref 9.8–12.8)
Q ONSET: 225 MS
QRS COUNT: 15 BEATS
QRS DURATION: 64 MS
QT INTERVAL: 360 MS
QTC CALCULATION(BAZETT): 447 MS
QTC FREDERICIA: 417 MS
R AXIS: 27 DEGREES
RBC # BLD AUTO: 2.86 X10*6/UL (ref 4–5.2)
RBC # BLD AUTO: 3.03 X10*6/UL (ref 4–5.2)
RBC MORPH BLD: NORMAL
RETICS #: 0.28 X10*6/UL (ref 0.02–0.08)
RETICS/RBC NFR AUTO: 9.8 % (ref 0.5–2)
SCHISTOCYTES BLD QL SMEAR: NORMAL
SODIUM SERPL-SCNC: 142 MMOL/L (ref 136–145)
T AXIS: 30 DEGREES
T OFFSET: 405 MS
TARGETS BLD QL SMEAR: NORMAL
UNIT ABO: NORMAL
UNIT NUMBER: NORMAL
UNIT RH: NORMAL
UNIT VOLUME: 196
UNIT VOLUME: 209
UNIT VOLUME: 214
UNIT VOLUME: 218
UNIT VOLUME: 225
UNIT VOLUME: 262
UNIT VOLUME: 268
UNIT VOLUME: 268
UNIT VOLUME: 271
UNIT VOLUME: 274
UNIT VOLUME: 275
UNIT VOLUME: 284
UNIT VOLUME: 295
UNIT VOLUME: 311
UNIT VOLUME: 311
UNIT VOLUME: 319
UNIT VOLUME: 322
URINE ELECTROPHORESIS COMMENT: NORMAL
VENTRICULAR RATE: 93 BPM
WBC # BLD AUTO: 13.6 X10*3/UL (ref 4.4–11.3)
WBC # BLD AUTO: 13.7 X10*3/UL (ref 4.4–11.3)

## 2024-06-20 PROCEDURE — 84443 ASSAY THYROID STIM HORMONE: CPT | Performed by: INTERNAL MEDICINE

## 2024-06-20 PROCEDURE — 1200000003 HC ONCOLOGY  ROOM WITH TELEMETRY DAILY

## 2024-06-20 PROCEDURE — 82330 ASSAY OF CALCIUM: CPT

## 2024-06-20 PROCEDURE — 36415 COLL VENOUS BLD VENIPUNCTURE: CPT

## 2024-06-20 PROCEDURE — 2500000004 HC RX 250 GENERAL PHARMACY W/ HCPCS (ALT 636 FOR OP/ED): Performed by: STUDENT IN AN ORGANIZED HEALTH CARE EDUCATION/TRAINING PROGRAM

## 2024-06-20 PROCEDURE — 83615 LACTATE (LD) (LDH) ENZYME: CPT

## 2024-06-20 PROCEDURE — 36514 APHERESIS PLASMA: CPT

## 2024-06-20 PROCEDURE — 86078 PHYS BLOOD BANK SERV REACTJ: CPT

## 2024-06-20 PROCEDURE — 84075 ASSAY ALKALINE PHOSPHATASE: CPT | Performed by: PHYSICIAN ASSISTANT

## 2024-06-20 PROCEDURE — 2500000004 HC RX 250 GENERAL PHARMACY W/ HCPCS (ALT 636 FOR OP/ED)

## 2024-06-20 PROCEDURE — 85045 AUTOMATED RETICULOCYTE COUNT: CPT

## 2024-06-20 PROCEDURE — P9017 PLASMA 1 DONOR FRZ W/IN 8 HR: HCPCS

## 2024-06-20 PROCEDURE — 2500000001 HC RX 250 WO HCPCS SELF ADMINISTERED DRUGS (ALT 637 FOR MEDICARE OP)

## 2024-06-20 PROCEDURE — 85384 FIBRINOGEN ACTIVITY: CPT

## 2024-06-20 PROCEDURE — 85025 COMPLETE CBC W/AUTO DIFF WBC: CPT

## 2024-06-20 PROCEDURE — 99232 SBSQ HOSP IP/OBS MODERATE 35: CPT | Performed by: INTERNAL MEDICINE

## 2024-06-20 PROCEDURE — 85027 COMPLETE CBC AUTOMATED: CPT

## 2024-06-20 PROCEDURE — 2500000002 HC RX 250 W HCPCS SELF ADMINISTERED DRUGS (ALT 637 FOR MEDICARE OP, ALT 636 FOR OP/ED)

## 2024-06-20 PROCEDURE — 85610 PROTHROMBIN TIME: CPT

## 2024-06-20 PROCEDURE — 85379 FIBRIN DEGRADATION QUANT: CPT

## 2024-06-20 PROCEDURE — 2500000001 HC RX 250 WO HCPCS SELF ADMINISTERED DRUGS (ALT 637 FOR MEDICARE OP): Performed by: STUDENT IN AN ORGANIZED HEALTH CARE EDUCATION/TRAINING PROGRAM

## 2024-06-20 RX ORDER — HEPARIN SODIUM 1000 [USP'U]/ML
1400 INJECTION, SOLUTION INTRAVENOUS; SUBCUTANEOUS ONCE
Status: COMPLETED | OUTPATIENT
Start: 2024-06-20 | End: 2024-06-20

## 2024-06-20 RX ORDER — DIPHENHYDRAMINE HCL 25 MG
50 CAPSULE ORAL ONCE
Status: COMPLETED | OUTPATIENT
Start: 2024-06-20 | End: 2024-06-20

## 2024-06-20 RX ORDER — DIPHENHYDRAMINE HYDROCHLORIDE 50 MG/ML
25 INJECTION INTRAMUSCULAR; INTRAVENOUS EVERY 5 MIN PRN
Status: COMPLETED | OUTPATIENT
Start: 2024-06-20 | End: 2024-06-20

## 2024-06-20 RX ORDER — FAMOTIDINE 10 MG/ML
20 INJECTION INTRAVENOUS ONCE
Status: COMPLETED | OUTPATIENT
Start: 2024-06-20 | End: 2024-06-20

## 2024-06-20 RX ORDER — ACETAMINOPHEN 325 MG/1
650 TABLET ORAL ONCE
Status: COMPLETED | OUTPATIENT
Start: 2024-06-20 | End: 2024-06-20

## 2024-06-20 RX ORDER — CALCIUM CARBONATE 200(500)MG
1500 TABLET,CHEWABLE ORAL EVERY 5 MIN PRN
Status: DISCONTINUED | OUTPATIENT
Start: 2024-06-20 | End: 2024-06-21 | Stop reason: HOSPADM

## 2024-06-20 RX ORDER — CALCIUM GLUCONATE 20 MG/ML
5086 INJECTION, SOLUTION INTRAVENOUS ONCE
Status: COMPLETED | OUTPATIENT
Start: 2024-06-20 | End: 2024-06-20

## 2024-06-20 RX ORDER — DIPHENHYDRAMINE HCL 25 MG
25 CAPSULE ORAL EVERY 6 HOURS PRN
Status: DISCONTINUED | OUTPATIENT
Start: 2024-06-20 | End: 2024-06-28 | Stop reason: HOSPADM

## 2024-06-20 SDOH — SOCIAL STABILITY: SOCIAL INSECURITY: DOES ANYONE TRY TO KEEP YOU FROM HAVING/CONTACTING OTHER FRIENDS OR DOING THINGS OUTSIDE YOUR HOME?: NO

## 2024-06-20 SDOH — SOCIAL STABILITY: SOCIAL INSECURITY: ABUSE: ADULT

## 2024-06-20 SDOH — SOCIAL STABILITY: SOCIAL INSECURITY: HAVE YOU HAD THOUGHTS OF HARMING ANYONE ELSE?: NO

## 2024-06-20 SDOH — SOCIAL STABILITY: SOCIAL INSECURITY: ARE THERE ANY APPARENT SIGNS OF INJURIES/BEHAVIORS THAT COULD BE RELATED TO ABUSE/NEGLECT?: NO

## 2024-06-20 SDOH — SOCIAL STABILITY: SOCIAL INSECURITY: ARE YOU OR HAVE YOU BEEN THREATENED OR ABUSED PHYSICALLY, EMOTIONALLY, OR SEXUALLY BY ANYONE?: NO

## 2024-06-20 SDOH — SOCIAL STABILITY: SOCIAL INSECURITY: DO YOU FEEL ANYONE HAS EXPLOITED OR TAKEN ADVANTAGE OF YOU FINANCIALLY OR OF YOUR PERSONAL PROPERTY?: NO

## 2024-06-20 SDOH — SOCIAL STABILITY: SOCIAL INSECURITY: DO YOU FEEL UNSAFE GOING BACK TO THE PLACE WHERE YOU ARE LIVING?: NO

## 2024-06-20 SDOH — SOCIAL STABILITY: SOCIAL INSECURITY: HAVE YOU HAD ANY THOUGHTS OF HARMING ANYONE ELSE?: NO

## 2024-06-20 SDOH — SOCIAL STABILITY: SOCIAL INSECURITY: HAS ANYONE EVER THREATENED TO HURT YOUR FAMILY OR YOUR PETS?: NO

## 2024-06-20 SDOH — SOCIAL STABILITY: SOCIAL INSECURITY: WERE YOU ABLE TO COMPLETE ALL THE BEHAVIORAL HEALTH SCREENINGS?: YES

## 2024-06-20 ASSESSMENT — PAIN SCALES - GENERAL
PAINLEVEL_OUTOF10: 2
PAINLEVEL_OUTOF10: 3

## 2024-06-20 ASSESSMENT — COGNITIVE AND FUNCTIONAL STATUS - GENERAL
DAILY ACTIVITIY SCORE: 24
PATIENT BASELINE BEDBOUND: NO
MOBILITY SCORE: 24

## 2024-06-20 ASSESSMENT — ACTIVITIES OF DAILY LIVING (ADL)
JUDGMENT_ADEQUATE_SAFELY_COMPLETE_DAILY_ACTIVITIES: YES
TOILETING: INDEPENDENT
LACK_OF_TRANSPORTATION: NO
GROOMING: INDEPENDENT
BATHING: INDEPENDENT
HEARING - RIGHT EAR: FUNCTIONAL
FEEDING YOURSELF: INDEPENDENT
HEARING - LEFT EAR: FUNCTIONAL
ADEQUATE_TO_COMPLETE_ADL: YES
WALKS IN HOME: INDEPENDENT
PATIENT'S MEMORY ADEQUATE TO SAFELY COMPLETE DAILY ACTIVITIES?: YES
DRESSING YOURSELF: INDEPENDENT

## 2024-06-20 ASSESSMENT — LIFESTYLE VARIABLES
AUDIT-C TOTAL SCORE: 0
HOW OFTEN DO YOU HAVE A DRINK CONTAINING ALCOHOL: NEVER
SKIP TO QUESTIONS 9-10: 1
HOW MANY STANDARD DRINKS CONTAINING ALCOHOL DO YOU HAVE ON A TYPICAL DAY: PATIENT DOES NOT DRINK
HOW OFTEN DO YOU HAVE 6 OR MORE DRINKS ON ONE OCCASION: NEVER
AUDIT-C TOTAL SCORE: 0

## 2024-06-20 ASSESSMENT — PATIENT HEALTH QUESTIONNAIRE - PHQ9
SUM OF ALL RESPONSES TO PHQ9 QUESTIONS 1 & 2: 0
1. LITTLE INTEREST OR PLEASURE IN DOING THINGS: NOT AT ALL
2. FEELING DOWN, DEPRESSED OR HOPELESS: NOT AT ALL

## 2024-06-20 ASSESSMENT — PAIN DESCRIPTION - LOCATION: LOCATION: BACK

## 2024-06-20 ASSESSMENT — PAIN DESCRIPTION - PAIN TYPE: TYPE: ACUTE PAIN

## 2024-06-20 ASSESSMENT — PAIN - FUNCTIONAL ASSESSMENT: PAIN_FUNCTIONAL_ASSESSMENT: 0-10

## 2024-06-20 NOTE — PROGRESS NOTES
"Tere Carrion is a 54 y.o. female on day 1 of admission presenting with TTP (thrombotic thrombocytopenic purpura) (Multi).    Subjective   Completed exchange day 2 today. No signs of bleeding.     Objective     Physical Exam  General: very pleasant, awake, alert, no acute distress  CV: tachycardic rate, regular rhythm, no MRG  Resp: CTAB, no labored breathing  Abdominal: soft, non-tender, non-distended, active bowel sounds  Extremities: full ROM, no lower extremity swelling    Last Recorded Vitals  Blood pressure 107/67, pulse 80, temperature 36.3 °C (97.3 °F), temperature source Temporal, resp. rate 18, height 1.626 m (5' 4\"), weight 79.4 kg (175 lb), last menstrual period 12/31/2023, SpO2 98%.  Intake/Output last 3 Shifts:  I/O last 3 completed shifts:  In: 5704 (71.9 mL/kg) [Blood:1622; Other:4082]  Out: 3833 (48.3 mL/kg) [Other:3833]  Weight: 79.4 kg     Relevant Results  Results for orders placed or performed during the hospital encounter of 06/18/24 (from the past 24 hour(s))   Calcium, Ionized   Result Value Ref Range    POCT Calcium, Ionized 1.21 1.1 - 1.33 mmol/L   CBC and Auto Differential   Result Value Ref Range    WBC 13.6 (H) 4.4 - 11.3 x10*3/uL    nRBC 19.9 (H) 0.0 - 0.0 /100 WBCs    RBC 2.86 (L) 4.00 - 5.20 x10*6/uL    Hemoglobin 7.6 (L) 12.0 - 16.0 g/dL    Hematocrit 21.9 (L) 36.0 - 46.0 %    MCV 77 (L) 80 - 100 fL    MCH 26.6 26.0 - 34.0 pg    MCHC 34.7 32.0 - 36.0 g/dL    RDW 19.9 (H) 11.5 - 14.5 %    Platelets 53 (L) 150 - 450 x10*3/uL    Neutrophils % 84.5 40.0 - 80.0 %    Immature Granulocytes %, Automated 1.0 (H) 0.0 - 0.9 %    Lymphocytes % 6.3 13.0 - 44.0 %    Monocytes % 8.0 2.0 - 10.0 %    Eosinophils % 0.0 0.0 - 6.0 %    Basophils % 0.2 0.0 - 2.0 %    Neutrophils Absolute 11.46 (H) 1.20 - 7.70 x10*3/uL    Immature Granulocytes Absolute, Automated 0.13 0.00 - 0.70 x10*3/uL    Lymphocytes Absolute 0.85 (L) 1.20 - 4.80 x10*3/uL    Monocytes Absolute 1.09 (H) 0.10 - 1.00 x10*3/uL    " Eosinophils Absolute 0.00 0.00 - 0.70 x10*3/uL    Basophils Absolute 0.03 0.00 - 0.10 x10*3/uL   Coagulation Screen   Result Value Ref Range    Protime 11.0 9.8 - 12.8 seconds    INR 1.0 0.9 - 1.1    aPTT 20 (L) 27 - 38 seconds   D-Dimer, VTE Exclusion   Result Value Ref Range    D-Dimer, Quantitative VTE Exclusion 6,130 (H) <=500 ng/mL FEU   Fibrinogen   Result Value Ref Range    Fibrinogen 216 200 - 400 mg/dL   Reticulocytes   Result Value Ref Range    Retic % 9.8 (H) 0.5 - 2.0 %    Retic Absolute 0.279 (H) 0.018 - 0.083 x10*6/uL    Reticulocyte Hemoglobin 29 28 - 38 pg    Immature Retic fraction 47.0 (H) <=16.0 %   Lactate dehydrogenase   Result Value Ref Range     (H) 84 - 246 U/L   Comprehensive Metabolic Panel   Result Value Ref Range    Glucose 106 (H) 74 - 99 mg/dL    Sodium 142 136 - 145 mmol/L    Potassium 3.7 3.5 - 5.3 mmol/L    Chloride 105 98 - 107 mmol/L    Bicarbonate 26 21 - 32 mmol/L    Anion Gap 15 10 - 20 mmol/L    Urea Nitrogen 11 6 - 23 mg/dL    Creatinine 0.93 0.50 - 1.05 mg/dL    eGFR 73 >60 mL/min/1.73m*2    Calcium 8.8 8.6 - 10.6 mg/dL    Albumin 3.5 3.4 - 5.0 g/dL    Alkaline Phosphatase 58 33 - 110 U/L    Total Protein 6.1 (L) 6.4 - 8.2 g/dL    AST 22 9 - 39 U/L    Bilirubin, Total 0.4 0.0 - 1.2 mg/dL    ALT 13 7 - 45 U/L   Morphology   Result Value Ref Range    RBC Morphology See Below     Polychromasia Mild     Hypochromia Mild     RBC Fragments Many     Target Cells Few           Assessment/Plan   Principal Problem:    TTP (thrombotic thrombocytopenic purpura) (Multi)      Tere Carrion is a 54 y.o. female with a notable history of TTP in 1998/1999 (treated with prolonged plasmapheresis, steroids, and immunosuppressants including vincristine and azathioprine, and splenectomy March 1999), reported sickle cell trait and alpha thalassemia trait presented on 6/18/24 for 2 weeks of worsening generalized weakness, fatigue, migraines, hematuria, and new bruising with petechiae, with  significant concern for TTP.     Although we have high suspicion for TTP given the patient's history, she has been having hematuria and is at high risk for bleeding with caplacizumab. Therefore, we will not prescribe caplacizumab just yet.     Transfusion medicine has been consulted by hematology for urgent plasma exchange. PLEX will begin after patient's hematocrit has incremented appropriately from transfusion of RBCs.     Patient's anemia is multi-factorial at this time - could be a component of the TMA, bleeding, iron deficiency, sickle cell, alpha thalassemia. Because sickle cell traits and alpha thalassemia traits are reported and have no records, repeat testing is appropriate.    HIV, Hepatitis panel: Non-reactive  B12, folate, iron profile: Normal     Recommendations:  Please follow up [id number] send out lab.  Please continue prednisone 80 mg (1 mg/kg) daily (and steroid precautions with PPI, glucose control).  Please trend daily CBC, coags (PT/PTT/INR/D-dimer/fibrinogen), LDH, and reticulocyte count daily.  Please do NOT transfuse platelets - this increases risk of stroke and myocardial infarction. Please discuss with hematology beforehand if considering (such as active bleeding).  Please follow up T-spot (required for potential rituximab).  Please follow up stool PCR (Shiga toxin) for hemolytic uremic syndrome.  For anemia workup, please follow up plasma cell dyscrasia workup, hemoglobin electrophoresis.     The patient was seen, examined and discussed with Dr. Walker.    Suni Ramírez MD  Hematology-Oncology Fellow, PGY IV

## 2024-06-20 NOTE — POST-PROCEDURE NOTE
This is a 54 y.o. female with suspected Thrombotic Thrombocytopenic Purpura (TTP) who underwent therapeutic plasma exchange (TPE) with 100% plasma as replacement fluid.     I saw and evaluated the patient during the TPE.  The patient was resting in bed.  The vascular access functioned well.     Pre-procedure labs:  WBC   Date/Time Value Ref Range Status   06/20/2024 05:16 AM 13.6 (H) 4.4 - 11.3 x10*3/uL Final     POC Hemoglobin   Date/Time Value Ref Range Status   06/18/2024 01:49 PM 6.2 (A) 12 - 16 g/dL Final     Hemoglobin   Date/Time Value Ref Range Status   06/20/2024 05:16 AM 7.6 (L) 12.0 - 16.0 g/dL Final     Hematocrit   Date/Time Value Ref Range Status   06/20/2024 05:16 AM 21.9 (L) 36.0 - 46.0 % Final     Platelets   Date/Time Value Ref Range Status   06/20/2024 05:16 AM 53 (L) 150 - 450 x10*3/uL Final     Comment:     Platelet count verified by smear review.        POCT Calcium, Ionized   Date/Time Value Ref Range Status   06/20/2024 05:16 AM 1.21 1.1 - 1.33 mmol/L Final     Comment:     The performance characteristics of ionized calcium tested  in heparinized plasma or serum have been validated by the  individual  laboratory site where testing is performed.   Testing on heparinized plasma or serum is not approved by   the FDA; however, such approval is not necessary.        Protime   Date/Time Value Ref Range Status   06/20/2024 05:16 AM 11.0 9.8 - 12.8 seconds Final     INR   Date/Time Value Ref Range Status   06/20/2024 05:16 AM 1.0 0.9 - 1.1 Final     aPTT   Date/Time Value Ref Range Status   06/20/2024 05:16 AM 20 (L) 27 - 38 seconds Final     Fibrinogen   Date/Time Value Ref Range Status   06/20/2024 05:16  200 - 400 mg/dL Final        Pre-procedure vital signs at 09:12:  T: 36.2 C, HR: 89, RR: 18, /70  Pulse oximetry: 100% on room air    Post-procedure vital signs at 14:57:  T: 36.5 C, HR: 75, RR: 18, BP: 117/73     Pulse oximetry: 100% on room air    Outcome: TPE completed.   Adverse  Reaction: Yes  patient developed hives and itching during the transfusion of 8th and 9th units of plasma. 25 mg IV benadryl twice was given for 1st reaction and 125 mg IV solumedrol was given for the  2nd reaction. Symptoms subsided.  Pulse oximetry: 100% on room air.    Assessment and Plan:  54 y.o. female with suspected Thrombotic Thrombocytopenic Purpura (TTP) who underwent TPE #2.  Draw post-procedure labs.  Vascular access to be managed by clinical team.  Next TPE #3 is schedule for 6/21/2024.    - - -

## 2024-06-20 NOTE — POST-PROCEDURE NOTE
Tere Carrion [id number]       Procedure type: Plasma Exchange    Replacement Fluids: 100% Plasma     Procedure Completed Transfusion reaction and/or adverse event noted.    Attending notified of completion status. See flow sheet(s) for additional details.  Post-Vitals listed below.    Post-procedure vitals:  Vitals  BP: 117/73  Temperature: 36.5 °C (97.7 °F)  Temp Source: Temporal [Temporal]  Heart Rate: 75  Respirations: 18  Pulse Ox: 100 %        Giovanna Shepherd RN

## 2024-06-20 NOTE — PROGRESS NOTES
Tere Carrion is a 54 y.o. female on day 1 of admission presenting with TTP (thrombotic thrombocytopenic purpura) (Multi).    Subjective   Afebrile. VSS. She is overall feeling much better today. She is no longer having chest pain or shortness of breath with exertion. She does states that when she's been up and walking and when she sits back down she has some pain on her left side. She hasn't noticed any new bruising. She is happy her labs are improving.  All other ROS otherwise negative.     Objective   Physical Exam  Vitals reviewed.   Constitutional:       General: She is not in acute distress.     Appearance: Normal appearance. She is not ill-appearing.   HENT:      Head: Normocephalic and atraumatic.      Nose: Nose normal.      Mouth/Throat:      Mouth: Mucous membranes are moist.   Eyes:      General: No scleral icterus.     Extraocular Movements: Extraocular movements intact.      Conjunctiva/sclera: Conjunctivae normal.      Pupils: Pupils are equal, round, and reactive to light.   Cardiovascular:      Rate and Rhythm: Normal rate and regular rhythm.      Pulses: Normal pulses.      Heart sounds: Normal heart sounds.   Pulmonary:      Effort: Pulmonary effort is normal. No respiratory distress.      Breath sounds: Normal breath sounds. No wheezing.   Abdominal:      General: Abdomen is flat. Bowel sounds are normal. There is no distension.      Palpations: Abdomen is soft.   Musculoskeletal:         General: Normal range of motion.      Cervical back: Normal range of motion.   Skin:     General: Skin is warm.      Coloration: Skin is not jaundiced or pale.      Findings: Bruising present.   Neurological:      General: No focal deficit present.      Mental Status: She is alert and oriented to person, place, and time.      Cranial Nerves: No cranial nerve deficit.      Sensory: No sensory deficit.      Motor: No weakness.   Psychiatric:         Mood and Affect: Mood normal.         Behavior: Behavior  "normal.         Thought Content: Thought content normal.         Judgment: Judgment normal.     Last Recorded Vitals  Blood pressure 107/70, pulse 89, temperature 36.2 °C (97.2 °F), resp. rate 18, height 1.626 m (5' 4\"), weight 79.4 kg (175 lb), last menstrual period 12/31/2023, SpO2 100%.  Intake/Output last 3 Shifts:  I/O last 3 completed shifts:  In: 5704 (71.9 mL/kg) [Blood:1622; Other:4082]  Out: 3833 (48.3 mL/kg) [Other:3833]  Weight: 79.4 kg     Relevant Results  Scheduled medications  calcium gluconate in NS, 5,086 mg, intravenous, Once  cefTRIAXone, 1 g, intravenous, q24h  heparin, 1,400 Units, intravenous, Once  heparin, 1,400 Units, intravenous, Once  pantoprazole, 40 mg, oral, Daily before breakfast  predniSONE, 80 mg, oral, Daily  sertraline, 50 mg, oral, Daily    Continuous medications     PRN medications  PRN medications: calcium carbonate, diphenhydrAMINE, methylPREDNISolone sodium succinate (PF), sodium chloride  ECG 12 lead  Result Date: 6/20/2024  Normal sinus rhythm Normal ECG No previous ECGs available    ECG 12 lead  Result Date: 6/19/2024  Normal sinus rhythm Nonspecific T wave abnormality Abnormal ECG When compared with ECG of 18-JUN-2024 15:40, (unconfirmed) T wave inversion now evident in Anterior leads    CT head wo IV contrast  Result Date: 6/18/2024  No evidence of acute cortical infarct or intracranial hemorrhage.           Results for orders placed or performed during the hospital encounter of 06/18/24 (from the past 24 hour(s))   Prepare Plasma Exchange: 14 Units   Result Value Ref Range    PRODUCT CODE E6463H18     Unit Number [id number]     Unit ABO O     Unit RH POS     Dispense Status IS     Blood Expiration Date June 24, 2024 11:59 EDT     PRODUCT BLOOD TYPE 5100     UNIT VOLUME 214     PRODUCT CODE [id number]     Unit Number [id number]     Unit ABO O     Unit RH POS     Dispense Status IS     Blood Expiration Date June 24, 2024 11:59 EDT     PRODUCT BLOOD TYPE 5100     " UNIT VOLUME 271     PRODUCT CODE [id number]     Unit Number [id number]     Unit ABO O     Unit RH POS     Dispense Status IS     Blood Expiration Date June 24, 2024 11:59 EDT     PRODUCT BLOOD TYPE 5100     UNIT VOLUME 274     PRODUCT CODE [id number]     Unit Number [id number]     Unit ABO O     Unit RH POS     Dispense Status IS     Blood Expiration Date June 24, 2024 11:59 EDT     PRODUCT BLOOD TYPE 5100     UNIT VOLUME 225     PRODUCT CODE [id number]     Unit Number [id number]     Unit ABO O     Unit RH POS     Dispense Status IS     Blood Expiration Date June 24, 2024 11:59 EDT     PRODUCT BLOOD TYPE 5100     UNIT VOLUME 295     PRODUCT CODE [id number]     Unit Number [id number]     Unit ABO A     Unit RH POS     Dispense Status IS     Blood Expiration Date June 22, 2024 10:44 EDT     PRODUCT BLOOD TYPE 6200     UNIT VOLUME 218     PRODUCT CODE [id number]     Unit Number [id number]     Unit ABO A     Unit RH POS     Dispense Status IS     Blood Expiration Date June 23, 2024 17:22 EDT     PRODUCT BLOOD TYPE 6200     UNIT VOLUME 209     PRODUCT CODE X0739R91     Unit Number [id number]     Unit ABO A     Unit RH POS     Dispense Status IS     Blood Expiration Date June 24, 2024 01:06 EDT     PRODUCT BLOOD TYPE 6200     UNIT VOLUME 196     PRODUCT CODE J8544Q54     Unit Number [id number]     Unit ABO A     Unit RH POS     Dispense Status IS     Blood Expiration Date June 23, 2024 17:22 EDT     PRODUCT BLOOD TYPE 6200     UNIT VOLUME 262     PRODUCT CODE [id number]     Unit Number [id number]     Unit ABO A     Unit RH POS     Dispense Status IS     Blood Expiration Date June 24, 2024 01:06 EDT     PRODUCT BLOOD TYPE 6200     UNIT VOLUME 268     PRODUCT CODE P2141V18     Unit Number [id number]     Unit ABO A     Unit RH NEG     Dispense Status IS     Blood Expiration Date June 23, 2024 17:22 EDT     PRODUCT BLOOD TYPE 0600     UNIT VOLUME 311     PRODUCT CODE O1513N19     Unit Number  [id number]     Unit ABO A     Unit RH NEG     Dispense Status IS     Blood Expiration Date June 24, 2024 01:06 EDT     PRODUCT BLOOD TYPE 0600     UNIT VOLUME 322     PRODUCT CODE D9216N63     Unit Number [id number]     Unit ABO A     Unit RH POS     Dispense Status IS     Blood Expiration Date June 24, 2024 01:06 EDT     PRODUCT BLOOD TYPE 6200     UNIT VOLUME 311     PRODUCT CODE U5732Y14     Unit Number [id number]     Unit ABO A     Unit RH POS     Dispense Status IS     Blood Expiration Date June 24, 2024 01:06 EDT     PRODUCT BLOOD TYPE 6200     UNIT VOLUME 268    Coagulation Screen   Result Value Ref Range    Protime 11.6 9.8 - 12.8 seconds    INR 1.0 0.9 - 1.1    aPTT 21 (L) 27 - 38 seconds   Magnesium   Result Value Ref Range    Magnesium 2.00 1.60 - 2.40 mg/dL   Troponin I, High Sensitivity   Result Value Ref Range    Troponin I, High Sensitivity 255 (HH) 0 - 34 ng/L   Fibrinogen   Result Value Ref Range    Fibrinogen 212 200 - 400 mg/dL   Phosphorus   Result Value Ref Range    Phosphorus 3.7 2.5 - 4.9 mg/dL   Basic Metabolic Panel   Result Value Ref Range    Glucose 97 74 - 99 mg/dL    Sodium 143 136 - 145 mmol/L    Potassium 3.3 (L) 3.5 - 5.3 mmol/L    Chloride 101 98 - 107 mmol/L    Bicarbonate 32 21 - 32 mmol/L    Anion Gap 13 10 - 20 mmol/L    Urea Nitrogen 9 6 - 23 mg/dL    Creatinine 0.90 0.50 - 1.05 mg/dL    eGFR 76 >60 mL/min/1.73m*2    Calcium 10.2 8.6 - 10.6 mg/dL   Urticaria Transfusion Reaction Evaluation   Result Value Ref Range    Unit Number [id number]/ [id number]     COMPONENT CODE FFP    CBC   Result Value Ref Range    WBC 9.4 4.4 - 11.3 x10*3/uL    nRBC 38.9 (H) 0.0 - 0.0 /100 WBCs    RBC 2.44 (L) 4.00 - 5.20 x10*6/uL    Hemoglobin 6.3 (LL) 12.0 - 16.0 g/dL    Hematocrit 19.2 (L) 36.0 - 46.0 %    MCV 79 (L) 80 - 100 fL    MCH 25.8 (L) 26.0 - 34.0 pg    MCHC 32.8 32.0 - 36.0 g/dL    RDW 21.0 (H) 11.5 - 14.5 %    Platelets 18 (LL) 150 - 450 x10*3/uL   Calcium,  Ionized   Result Value Ref Range    POCT Calcium, Ionized 1.02 (L) 1.1 - 1.33 mmol/L   Calcium, Ionized   Result Value Ref Range    POCT Calcium, Ionized 1.21 1.1 - 1.33 mmol/L   CBC and Auto Differential   Result Value Ref Range    WBC 13.6 (H) 4.4 - 11.3 x10*3/uL    nRBC 19.9 (H) 0.0 - 0.0 /100 WBCs    RBC 2.86 (L) 4.00 - 5.20 x10*6/uL    Hemoglobin 7.6 (L) 12.0 - 16.0 g/dL    Hematocrit 21.9 (L) 36.0 - 46.0 %    MCV 77 (L) 80 - 100 fL    MCH 26.6 26.0 - 34.0 pg    MCHC 34.7 32.0 - 36.0 g/dL    RDW 19.9 (H) 11.5 - 14.5 %    Platelets 53 (L) 150 - 450 x10*3/uL    Neutrophils % 84.5 40.0 - 80.0 %    Immature Granulocytes %, Automated 1.0 (H) 0.0 - 0.9 %    Lymphocytes % 6.3 13.0 - 44.0 %    Monocytes % 8.0 2.0 - 10.0 %    Eosinophils % 0.0 0.0 - 6.0 %    Basophils % 0.2 0.0 - 2.0 %    Neutrophils Absolute 11.46 (H) 1.20 - 7.70 x10*3/uL    Immature Granulocytes Absolute, Automated 0.13 0.00 - 0.70 x10*3/uL    Lymphocytes Absolute 0.85 (L) 1.20 - 4.80 x10*3/uL    Monocytes Absolute 1.09 (H) 0.10 - 1.00 x10*3/uL    Eosinophils Absolute 0.00 0.00 - 0.70 x10*3/uL    Basophils Absolute 0.03 0.00 - 0.10 x10*3/uL   Coagulation Screen   Result Value Ref Range    Protime 11.0 9.8 - 12.8 seconds    INR 1.0 0.9 - 1.1    aPTT 20 (L) 27 - 38 seconds   D-Dimer, VTE Exclusion   Result Value Ref Range    D-Dimer, Quantitative VTE Exclusion 6,130 (H) <=500 ng/mL FEU   Fibrinogen   Result Value Ref Range    Fibrinogen 216 200 - 400 mg/dL   Reticulocytes   Result Value Ref Range    Retic % 9.8 (H) 0.5 - 2.0 %    Retic Absolute 0.279 (H) 0.018 - 0.083 x10*6/uL    Reticulocyte Hemoglobin 29 28 - 38 pg    Immature Retic fraction 47.0 (H) <=16.0 %   Lactate dehydrogenase   Result Value Ref Range     (H) 84 - 246 U/L   Comprehensive Metabolic Panel   Result Value Ref Range    Glucose 106 (H) 74 - 99 mg/dL    Sodium 142 136 - 145 mmol/L    Potassium 3.7 3.5 - 5.3 mmol/L    Chloride 105 98 - 107 mmol/L    Bicarbonate 26 21 - 32 mmol/L     Anion Gap 15 10 - 20 mmol/L    Urea Nitrogen 11 6 - 23 mg/dL    Creatinine 0.93 0.50 - 1.05 mg/dL    eGFR 73 >60 mL/min/1.73m*2    Calcium 8.8 8.6 - 10.6 mg/dL    Albumin 3.5 3.4 - 5.0 g/dL    Alkaline Phosphatase 58 33 - 110 U/L    Total Protein 6.1 (L) 6.4 - 8.2 g/dL    AST 22 9 - 39 U/L    Bilirubin, Total 0.4 0.0 - 1.2 mg/dL    ALT 13 7 - 45 U/L   Morphology   Result Value Ref Range    RBC Morphology See Below     Polychromasia Mild     Hypochromia Mild     RBC Fragments Many     Target Cells Few      Assessment/Plan   Principal Problem:    TTP (thrombotic thrombocytopenic purpura) (Multi)    Tere Carrion is a 54 y.o. female with a PMHx significant for TTP in 1999 ((treated with prolonged plasmapheresis, steroids, and immunosuppressants including vincristine and azathioprine, splenectomy March 1999), reported sickle cell trait, alpha thalassemia trait, who presented to [facility] ED after POC hemoglobin was 6 at her PCP's office and was transferred to [facility] for potential plasmapheresis. She is on daily prednisone 80 mg. She is s/p plasmapheresis (6/19, 6/20). She was having urinary frequency, urgency, and hematuria prior to admission. UA 6/19 (+) leuk and blood, s/p ceftriaxone 6/19; urine cultures showed now growth and symptoms resolved so antibiotics were discontinued. She initially had CP and SOB with activity, troponin were trended and began to down trend on 6/20 from 428 to 255 so troponin trending was discontinued. She had a resolution to this CP and SOB as her blood counts improved. Discharge is pending heme recs and anemia w/up.    Peripheral smear (from heme note):   WBC: no circulating blasts seen, mature lymphocytes, mature neutrophils  RBC: several schistocytes, no dacrocytes, no spherocytes, RBCs with increased pallor, microcytosis, several target cells, some reticulocytes, occasional nucleated RBC  Plt: no platelet clumping, no enlarged platelets, true thrombocytopenia     #Thrombocytopenia    #TTP  - Hx of TTP in 1999 s/p prolonged plasmapheresis, steroids, and immunosuppressants including vincristine and azathioprine, splenectomy March 1999.  - heme on board, they recommended hemoglobin electrophoresis, HIV, HCV, HBV labs, stool pathogen PCR, plasma cell dyscrasia workup  - Prednisone 1mg/kg (80mg daily)   - PPI Ppx   - HIV, HCV, HBV not detected (6/19).  - 6/19 IgG 1,850; IgM 84; IgA 325; kappa FLC 2.91; ryanne FLC 2.26; K/L 1.29  - [id number] pending  - maintain left femoral CVC, if having back pain, consider RP bleed   - s/p PLEX 6/19, during transfusion developed hives and was given 2 doses of benadryl.  - s/p PLEX 6/20  - avoid platelet transfusion, in case of emergency, contact hematology fellow     #Anemia  - Likely secondary to TTP  - anemia w/up (6/19): iron 117, ferritin 686, B12 404  - Hgb on admission 5.8 (baseline unknown); s/p 2u pRBCs 6/19; 6/20 hgb 7.6  - G6PD screen negative    #Hematuria on POC UA   #Possible UTI  - Maybe related to underlying hemolysis   - UA (6/19) + Leuk and blood; - nitrite  - Ucx (6/19) NGTD  - s/p Ceftriaxone (6/19), discontinued with resolution of symptoms and no growth on urine culture.    #Troponin elevation  #Type II MI   - non specific TWI in V1 and V3  - likely 2/2 anemia, no chest pain at rest  - keep Hgb >8  - trend troponin: (6/18) 256 --> 257 --> (6/19) 410 --> 428 -->255  - resolution of chest pain and shortness of breath with count incrementing.    #Headaches  - Hx of headaches, at home takes motrin and Excedrin; will hold in setting of thrombocytopenia  - CT head (6/18): No evidence of acute cortical infarct or intracranial hemorrhage.   - s/p Mg and morphine at [facility]     #Reported Hx of Sinus tachycardia   - reports being on home metoprolol 25 succ daily   - hold with anemia      #Anxiety  - continue home zoloft 50mg daily     #High risk for breast cancer  - s/p right breast excisional biopsy (2013) showing atypical ductal hyperplasia  - s/p left  breast core biopsy (2013) which showed benign sclerosing adenosis and apocrine metaplasia.  - Due to high risk of breast cancer due to personal and family history was started on Tamoxifen in 9/2023 and plan to continue for 3 years for risk reduction.  - hold tamoxifen as inpatient     Dispo:  - Code status: full code  - DC pending anemia w/up   - Surrogate decision maker: Daughter Zara [phone]  - FUV: 7/23 Modesta Gallego (PCP); 7/31 Hillary Johnson (surg onc)     Assessment and plan were discussed with attending physician Dr. Caleb Monte PA-C

## 2024-06-21 ENCOUNTER — APPOINTMENT (OUTPATIENT)
Dept: OTHER | Facility: HOSPITAL | Age: 54
End: 2024-06-21
Payer: COMMERCIAL

## 2024-06-21 LAB
ALBUMIN SERPL BCP-MCNC: 3.6 G/DL (ref 3.4–5)
ALP SERPL-CCNC: 59 U/L (ref 33–110)
ALT SERPL W P-5'-P-CCNC: 20 U/L (ref 7–45)
ANION GAP SERPL CALC-SCNC: 14 MMOL/L (ref 10–20)
APTT PPP: 23 SECONDS (ref 27–38)
AST SERPL W P-5'-P-CCNC: 17 U/L (ref 9–39)
ATRIAL RATE: 89 BPM
BASOPHILS # BLD AUTO: 0.03 X10*3/UL (ref 0–0.1)
BASOPHILS NFR BLD AUTO: 0.2 %
BILIRUB SERPL-MCNC: 0.3 MG/DL (ref 0–1.2)
BLOOD EXPIRATION DATE: NORMAL
BUN SERPL-MCNC: 14 MG/DL (ref 6–23)
CA-I BLD-SCNC: 0.99 MMOL/L (ref 1.1–1.33)
CA-I BLD-SCNC: 1.21 MMOL/L (ref 1.1–1.33)
CALCIUM SERPL-MCNC: 9.3 MG/DL (ref 8.6–10.6)
CHLORIDE SERPL-SCNC: 103 MMOL/L (ref 98–107)
CO2 SERPL-SCNC: 31 MMOL/L (ref 21–32)
COMPONENT CODE: NORMAL
COMPONENT CODE: NORMAL
CREAT SERPL-MCNC: 0.92 MG/DL (ref 0.5–1.05)
D DIMER PPP FEU-MCNC: 3392 NG/ML FEU
DIAGNOSIS-BB-PR33: NORMAL
DIAGNOSIS-BB-PR33: NORMAL
DISPENSE STATUS: NORMAL
EGFRCR SERPLBLD CKD-EPI 2021: 74 ML/MIN/1.73M*2
EOSINOPHIL # BLD AUTO: 0 X10*3/UL (ref 0–0.7)
EOSINOPHIL NFR BLD AUTO: 0 %
ERYTHROCYTE [DISTWIDTH] IN BLOOD BY AUTOMATED COUNT: 21.7 % (ref 11.5–14.5)
ERYTHROCYTE [DISTWIDTH] IN BLOOD BY AUTOMATED COUNT: 21.9 % (ref 11.5–14.5)
FIBRINOGEN PPP-MCNC: 236 MG/DL (ref 200–400)
GLUCOSE SERPL-MCNC: 110 MG/DL (ref 74–99)
HCT VFR BLD AUTO: 24.8 % (ref 36–46)
HCT VFR BLD AUTO: 26.4 % (ref 36–46)
HGB A MFR BLD ELPH: 69.4 % (ref 95.8–98)
HGB A2 MFR BLD ELPH: 3 % (ref 2–3.3)
HGB BLD-MCNC: 7.8 G/DL (ref 12–16)
HGB BLD-MCNC: 8.1 G/DL (ref 12–16)
HGB F MFR BLD ELPH: 0 % (ref 0–0.9)
HGB FRACT BLD CE-IMP: ABNORMAL
HGB RETIC QN: 28 PG (ref 28–38)
HGB XXX MFR BLD ELPH: ABNORMAL %
HOWELL-JOLLY BOD BLD QL SMEAR: PRESENT
IMM GRANULOCYTES # BLD AUTO: 0.15 X10*3/UL (ref 0–0.7)
IMM GRANULOCYTES NFR BLD AUTO: 0.9 % (ref 0–0.9)
IMMATURE RETIC FRACTION: 42.8 %
INR PPP: 0.9 (ref 0.9–1.1)
LDH SERPL L TO P-CCNC: 191 U/L (ref 84–246)
LYMPHOCYTES # BLD AUTO: 0.29 X10*3/UL (ref 1.2–4.8)
LYMPHOCYTES NFR BLD AUTO: 1.7 %
MCH RBC QN AUTO: 26.4 PG (ref 26–34)
MCH RBC QN AUTO: 26.8 PG (ref 26–34)
MCHC RBC AUTO-ENTMCNC: 30.7 G/DL (ref 32–36)
MCHC RBC AUTO-ENTMCNC: 31.5 G/DL (ref 32–36)
MCV RBC AUTO: 85 FL (ref 80–100)
MCV RBC AUTO: 86 FL (ref 80–100)
MONOCYTES # BLD AUTO: 0.8 X10*3/UL (ref 0.1–1)
MONOCYTES NFR BLD AUTO: 4.7 %
NEUTROPHILS # BLD AUTO: 15.85 X10*3/UL (ref 1.2–7.7)
NEUTROPHILS NFR BLD AUTO: 92.5 %
NIL(NEG) CONTROL SPOT COUNT: NORMAL
NRBC BLD-RTO: 5.1 /100 WBCS (ref 0–0)
NRBC BLD-RTO: 9.4 /100 WBCS (ref 0–0)
P AXIS: 45 DEGREES
P OFFSET: 199 MS
P ONSET: 153 MS
PANEL A SPOT COUNT: 1
PANEL B SPOT COUNT: 0
PAPPENHEIMER BOD BLD QL SMEAR: PRESENT
PATH REVIEW-TRANSFUSION REACTION: NORMAL
PATH REVIEW-TRANSFUSION REACTION: NORMAL
PLATELET # BLD AUTO: 148 X10*3/UL (ref 150–450)
PLATELET # BLD AUTO: 206 X10*3/UL (ref 150–450)
PLATELETS.RETICULATED NFR BLD AUTO: 9.3 % (ref 1–6)
POLYCHROMASIA BLD QL SMEAR: NORMAL
POS CONTROL SPOT COUNT: NORMAL
POTASSIUM SERPL-SCNC: 3.6 MMOL/L (ref 3.5–5.3)
PR INTERVAL: 144 MS
PRODUCT BLOOD TYPE: 5100
PRODUCT BLOOD TYPE: 9500
PRODUCT CODE: NORMAL
PROT SERPL-MCNC: 6.3 G/DL (ref 6.4–8.2)
PROTHROMBIN TIME: 10.5 SECONDS (ref 9.8–12.8)
Q ONSET: 225 MS
QRS COUNT: 15 BEATS
QRS DURATION: 64 MS
QT INTERVAL: 348 MS
QTC CALCULATION(BAZETT): 423 MS
QTC FREDERICIA: 396 MS
R AXIS: 20 DEGREES
RBC # BLD AUTO: 2.91 X10*6/UL (ref 4–5.2)
RBC # BLD AUTO: 3.07 X10*6/UL (ref 4–5.2)
RBC MORPH BLD: NORMAL
RESIDENT REVIEW-BB: NORMAL
RESIDENT REVIEW-BB: NORMAL
RETICS #: 0.32 X10*6/UL (ref 0.02–0.08)
RETICS/RBC NFR AUTO: 11.1 % (ref 0.5–2)
SCHISTOCYTES BLD QL SMEAR: NORMAL
SICKLE CELLS BLD QL SMEAR: NORMAL
SODIUM SERPL-SCNC: 144 MMOL/L (ref 136–145)
T AXIS: 35 DEGREES
T OFFSET: 399 MS
T-SPOT. TB INTERPRETATION: NEGATIVE
TARGETS BLD QL SMEAR: NORMAL
TYPE OF REACTION: NORMAL
TYPE OF REACTION: NORMAL
UNIT ABO: NORMAL
UNIT NUMBER: NORMAL
UNIT RH: NORMAL
UNIT VOLUME: 206
UNIT VOLUME: 210
UNIT VOLUME: 290
UNIT VOLUME: 298
UNIT VOLUME: 302
UNIT VOLUME: 315
UNIT VOLUME: 326
UNIT VOLUME: 334
UNIT VOLUME: 341
UNIT VOLUME: 352
UNIT VOLUME: 369
VENTRICULAR RATE: 89 BPM
WBC # BLD AUTO: 17.1 X10*3/UL (ref 4.4–11.3)
WBC # BLD AUTO: 18.1 X10*3/UL (ref 4.4–11.3)

## 2024-06-21 PROCEDURE — 2500000001 HC RX 250 WO HCPCS SELF ADMINISTERED DRUGS (ALT 637 FOR MEDICARE OP): Performed by: STUDENT IN AN ORGANIZED HEALTH CARE EDUCATION/TRAINING PROGRAM

## 2024-06-21 PROCEDURE — 85025 COMPLETE CBC W/AUTO DIFF WBC: CPT

## 2024-06-21 PROCEDURE — 99233 SBSQ HOSP IP/OBS HIGH 50: CPT | Performed by: INTERNAL MEDICINE

## 2024-06-21 PROCEDURE — 82330 ASSAY OF CALCIUM: CPT

## 2024-06-21 PROCEDURE — 1200000003 HC ONCOLOGY  ROOM WITH TELEMETRY DAILY

## 2024-06-21 PROCEDURE — 2500000004 HC RX 250 GENERAL PHARMACY W/ HCPCS (ALT 636 FOR OP/ED): Performed by: STUDENT IN AN ORGANIZED HEALTH CARE EDUCATION/TRAINING PROGRAM

## 2024-06-21 PROCEDURE — 2500000002 HC RX 250 W HCPCS SELF ADMINISTERED DRUGS (ALT 637 FOR MEDICARE OP, ALT 636 FOR OP/ED)

## 2024-06-21 PROCEDURE — 36415 COLL VENOUS BLD VENIPUNCTURE: CPT

## 2024-06-21 PROCEDURE — C9047 INJECTION, CAPLACIZUMAB-YHDP: HCPCS | Mod: JZ | Performed by: INTERNAL MEDICINE

## 2024-06-21 PROCEDURE — 85379 FIBRIN DEGRADATION QUANT: CPT

## 2024-06-21 PROCEDURE — 2500000004 HC RX 250 GENERAL PHARMACY W/ HCPCS (ALT 636 FOR OP/ED)

## 2024-06-21 PROCEDURE — 36514 APHERESIS PLASMA: CPT

## 2024-06-21 PROCEDURE — 85384 FIBRINOGEN ACTIVITY: CPT

## 2024-06-21 PROCEDURE — 80053 COMPREHEN METABOLIC PANEL: CPT | Performed by: PHYSICIAN ASSISTANT

## 2024-06-21 PROCEDURE — 85055 RETICULATED PLATELET ASSAY: CPT

## 2024-06-21 PROCEDURE — 85027 COMPLETE CBC AUTOMATED: CPT

## 2024-06-21 PROCEDURE — 85610 PROTHROMBIN TIME: CPT

## 2024-06-21 PROCEDURE — 2500000004 HC RX 250 GENERAL PHARMACY W/ HCPCS (ALT 636 FOR OP/ED): Mod: JZ | Performed by: INTERNAL MEDICINE

## 2024-06-21 PROCEDURE — 83615 LACTATE (LD) (LDH) ENZYME: CPT

## 2024-06-21 PROCEDURE — 86078 PHYS BLOOD BANK SERV REACTJ: CPT

## 2024-06-21 PROCEDURE — 85045 AUTOMATED RETICULOCYTE COUNT: CPT

## 2024-06-21 PROCEDURE — P9017 PLASMA 1 DONOR FRZ W/IN 8 HR: HCPCS

## 2024-06-21 RX ORDER — DIPHENHYDRAMINE HYDROCHLORIDE 50 MG/ML
25 INJECTION INTRAMUSCULAR; INTRAVENOUS EVERY 5 MIN PRN
Status: DISCONTINUED | OUTPATIENT
Start: 2024-06-21 | End: 2024-06-21 | Stop reason: HOSPADM

## 2024-06-21 RX ORDER — ONDANSETRON HYDROCHLORIDE 2 MG/ML
4 INJECTION, SOLUTION INTRAVENOUS ONCE
Status: COMPLETED | OUTPATIENT
Start: 2024-06-21 | End: 2024-06-21

## 2024-06-21 RX ORDER — ALBUTEROL SULFATE 0.83 MG/ML
2.5 SOLUTION RESPIRATORY (INHALATION) ONCE AS NEEDED
Status: DISCONTINUED | OUTPATIENT
Start: 2024-06-21 | End: 2024-06-28 | Stop reason: HOSPADM

## 2024-06-21 RX ORDER — CALCIUM CARBONATE 200(500)MG
1500 TABLET,CHEWABLE ORAL EVERY 5 MIN PRN
Status: DISCONTINUED | OUTPATIENT
Start: 2024-06-21 | End: 2024-06-21 | Stop reason: HOSPADM

## 2024-06-21 RX ORDER — DIPHENHYDRAMINE HYDROCHLORIDE 50 MG/ML
50 INJECTION INTRAMUSCULAR; INTRAVENOUS ONCE AS NEEDED
Status: DISCONTINUED | OUTPATIENT
Start: 2024-06-21 | End: 2024-06-28 | Stop reason: HOSPADM

## 2024-06-21 RX ORDER — CALCIUM GLUCONATE 20 MG/ML
4897 INJECTION, SOLUTION INTRAVENOUS ONCE
Status: COMPLETED | OUTPATIENT
Start: 2024-06-21 | End: 2024-06-21

## 2024-06-21 RX ORDER — HEPARIN SODIUM 1000 [USP'U]/ML
1400 INJECTION, SOLUTION INTRAVENOUS; SUBCUTANEOUS ONCE
Status: COMPLETED | OUTPATIENT
Start: 2024-06-21 | End: 2024-06-21

## 2024-06-21 RX ORDER — DIPHENHYDRAMINE HYDROCHLORIDE 50 MG/ML
50 INJECTION INTRAMUSCULAR; INTRAVENOUS ONCE
Status: COMPLETED | OUTPATIENT
Start: 2024-06-21 | End: 2024-06-21

## 2024-06-21 RX ORDER — DIPHENHYDRAMINE HCL 25 MG
25 CAPSULE ORAL ONCE
Status: COMPLETED | OUTPATIENT
Start: 2024-06-21 | End: 2024-06-21

## 2024-06-21 RX ORDER — DIPHENHYDRAMINE HCL 25 MG
25 CAPSULE ORAL ONCE
Status: DISCONTINUED | OUTPATIENT
Start: 2024-06-21 | End: 2024-06-21 | Stop reason: HOSPADM

## 2024-06-21 RX ORDER — EPINEPHRINE 1 MG/ML
0.3 INJECTION, SOLUTION, CONCENTRATE INTRAVENOUS EVERY 5 MIN PRN
Status: DISCONTINUED | OUTPATIENT
Start: 2024-06-21 | End: 2024-06-28 | Stop reason: HOSPADM

## 2024-06-21 RX ORDER — ACETAMINOPHEN 325 MG/1
650 TABLET ORAL ONCE
Status: COMPLETED | OUTPATIENT
Start: 2024-06-21 | End: 2024-06-21

## 2024-06-21 RX ORDER — FAMOTIDINE 10 MG/ML
20 INJECTION INTRAVENOUS ONCE AS NEEDED
Status: DISCONTINUED | OUTPATIENT
Start: 2024-06-21 | End: 2024-06-28 | Stop reason: HOSPADM

## 2024-06-21 NOTE — CONSULTS
"Tere Carrion is a 54 y.o. female on day 1 of admission presenting with TTP (thrombotic thrombocytopenic purpura) (Multi).     Subjective   Completed exchange day 3 today. No signs of bleeding.      Objective      Physical Exam  General: very pleasant, awake, alert, no acute distress  CV: tachycardic rate, regular rhythm, no MRG  Resp: CTAB, no labored breathing  Abdominal: soft, non-tender, non-distended, active bowel sounds  Extremities: full ROM, no lower extremity swelling     Last Recorded Vitals  Blood pressure 107/67, pulse 80, temperature 36.3 °C (97.3 °F), temperature source Temporal, resp. rate 18, height 1.626 m (5' 4\"), weight 79.4 kg (175 lb), last menstrual period 12/31/2023, SpO2 98%.  Intake/Output last 3 Shifts:  I/O last 3 completed shifts:  In: 5704 (71.9 mL/kg) [Blood:1622; Other:4082]  Out: 3833 (48.3 mL/kg) [Other:3833]  Weight: 79.4 kg      Relevant Results        Results for orders placed or performed during the hospital encounter of 06/18/24 (from the past 24 hour(s))   Calcium, Ionized   Result Value Ref Range     POCT Calcium, Ionized 1.21 1.1 - 1.33 mmol/L   CBC and Auto Differential   Result Value Ref Range     WBC 13.6 (H) 4.4 - 11.3 x10*3/uL     nRBC 19.9 (H) 0.0 - 0.0 /100 WBCs     RBC 2.86 (L) 4.00 - 5.20 x10*6/uL     Hemoglobin 7.6 (L) 12.0 - 16.0 g/dL     Hematocrit 21.9 (L) 36.0 - 46.0 %     MCV 77 (L) 80 - 100 fL     MCH 26.6 26.0 - 34.0 pg     MCHC 34.7 32.0 - 36.0 g/dL     RDW 19.9 (H) 11.5 - 14.5 %     Platelets 53 (L) 150 - 450 x10*3/uL     Neutrophils % 84.5 40.0 - 80.0 %     Immature Granulocytes %, Automated 1.0 (H) 0.0 - 0.9 %     Lymphocytes % 6.3 13.0 - 44.0 %     Monocytes % 8.0 2.0 - 10.0 %     Eosinophils % 0.0 0.0 - 6.0 %     Basophils % 0.2 0.0 - 2.0 %     Neutrophils Absolute 11.46 (H) 1.20 - 7.70 x10*3/uL     Immature Granulocytes Absolute, Automated 0.13 0.00 - 0.70 x10*3/uL     Lymphocytes Absolute 0.85 (L) 1.20 - 4.80 x10*3/uL     Monocytes Absolute 1.09 (H) " 0.10 - 1.00 x10*3/uL     Eosinophils Absolute 0.00 0.00 - 0.70 x10*3/uL     Basophils Absolute 0.03 0.00 - 0.10 x10*3/uL   Coagulation Screen   Result Value Ref Range     Protime 11.0 9.8 - 12.8 seconds     INR 1.0 0.9 - 1.1     aPTT 20 (L) 27 - 38 seconds   D-Dimer, VTE Exclusion   Result Value Ref Range     D-Dimer, Quantitative VTE Exclusion 6,130 (H) <=500 ng/mL FEU   Fibrinogen   Result Value Ref Range     Fibrinogen 216 200 - 400 mg/dL   Reticulocytes   Result Value Ref Range     Retic % 9.8 (H) 0.5 - 2.0 %     Retic Absolute 0.279 (H) 0.018 - 0.083 x10*6/uL     Reticulocyte Hemoglobin 29 28 - 38 pg     Immature Retic fraction 47.0 (H) <=16.0 %   Lactate dehydrogenase   Result Value Ref Range      (H) 84 - 246 U/L   Comprehensive Metabolic Panel   Result Value Ref Range     Glucose 106 (H) 74 - 99 mg/dL     Sodium 142 136 - 145 mmol/L     Potassium 3.7 3.5 - 5.3 mmol/L     Chloride 105 98 - 107 mmol/L     Bicarbonate 26 21 - 32 mmol/L     Anion Gap 15 10 - 20 mmol/L     Urea Nitrogen 11 6 - 23 mg/dL     Creatinine 0.93 0.50 - 1.05 mg/dL     eGFR 73 >60 mL/min/1.73m*2     Calcium 8.8 8.6 - 10.6 mg/dL     Albumin 3.5 3.4 - 5.0 g/dL     Alkaline Phosphatase 58 33 - 110 U/L     Total Protein 6.1 (L) 6.4 - 8.2 g/dL     AST 22 9 - 39 U/L     Bilirubin, Total 0.4 0.0 - 1.2 mg/dL     ALT 13 7 - 45 U/L   Morphology   Result Value Ref Range     RBC Morphology See Below       Polychromasia Mild       Hypochromia Mild       RBC Fragments Many       Target Cells Few              Assessment/Plan   Principal Problem:    TTP (thrombotic thrombocytopenic purpura) (Multi)        Tere Carrion is a 54 y.o. female with a notable history of TTP in 1998/1999 (treated with prolonged plasmapheresis, steroids, and immunosuppressants including vincristine and azathioprine, and splenectomy March 1999), reported sickle cell trait and alpha thalassemia trait presented on 6/18/24 for 2 weeks of worsening generalized weakness,  fatigue, migraines, hematuria, and new bruising with petechiae, with TTP.     Hematuria has completely resolved over the last 3 days.     Transfusion medicine was consulted and she received PLEX x 3 (last on 6/21/24).     HIV, Hepatitis panel: Non-reactive  B12, folate, iron profile: Normal  T spot: Negative  ADAMTS <5     Recommendations:  -Please follow up [id number] inhibitor level  -Please continue prednisone 80 mg (1 mg/kg) daily (and steroid precautions with PPI, glucose control) to complete a total of 7 days and then start taper  -Please trend daily CBC, coags (PT/PTT/INR/D-dimer/fibrinogen), LDH, and reticulocyte count daily.  -Please do NOT transfuse platelets - this increases risk of stroke and myocardial infarction. Please discuss with hematology beforehand if considering (such as active bleeding).  -Please follow up stool PCR (Shiga toxin) for hemolytic uremic syndrome.  -Consented for rituximab, to be started 6/21/24  -Paperwork completed for caplacizumab to be started today on 6/21/24     The patient was seen, examined and discussed with Dr. Walker.     Suni Ramírez MD  Hematology-Oncology Fellow, PGY IV

## 2024-06-21 NOTE — PROGRESS NOTES
"Tere Carrion is a 54 y.o. female on day 1 of admission presenting with TTP (thrombotic thrombocytopenic purpura) (Multi).     Subjective   Completed exchange day 3 today. No signs of bleeding.      Objective      Physical Exam  General: very pleasant, awake, alert, no acute distress  CV: tachycardic rate, regular rhythm, no MRG  Resp: CTAB, no labored breathing  Abdominal: soft, non-tender, non-distended, active bowel sounds  Extremities: full ROM, no lower extremity swelling     Last Recorded Vitals  Blood pressure 107/67, pulse 80, temperature 36.3 °C (97.3 °F), temperature source Temporal, resp. rate 18, height 1.626 m (5' 4\"), weight 79.4 kg (175 lb), last menstrual period 12/31/2023, SpO2 98%.  Intake/Output last 3 Shifts:  I/O last 3 completed shifts:  In: 5704 (71.9 mL/kg) [Blood:1622; Other:4082]  Out: 3833 (48.3 mL/kg) [Other:3833]  Weight: 79.4 kg      Relevant Results        Results for orders placed or performed during the hospital encounter of 06/18/24 (from the past 24 hour(s))   Calcium, Ionized   Result Value Ref Range     POCT Calcium, Ionized 1.21 1.1 - 1.33 mmol/L   CBC and Auto Differential   Result Value Ref Range     WBC 13.6 (H) 4.4 - 11.3 x10*3/uL     nRBC 19.9 (H) 0.0 - 0.0 /100 WBCs     RBC 2.86 (L) 4.00 - 5.20 x10*6/uL     Hemoglobin 7.6 (L) 12.0 - 16.0 g/dL     Hematocrit 21.9 (L) 36.0 - 46.0 %     MCV 77 (L) 80 - 100 fL     MCH 26.6 26.0 - 34.0 pg     MCHC 34.7 32.0 - 36.0 g/dL     RDW 19.9 (H) 11.5 - 14.5 %     Platelets 53 (L) 150 - 450 x10*3/uL     Neutrophils % 84.5 40.0 - 80.0 %     Immature Granulocytes %, Automated 1.0 (H) 0.0 - 0.9 %     Lymphocytes % 6.3 13.0 - 44.0 %     Monocytes % 8.0 2.0 - 10.0 %     Eosinophils % 0.0 0.0 - 6.0 %     Basophils % 0.2 0.0 - 2.0 %     Neutrophils Absolute 11.46 (H) 1.20 - 7.70 x10*3/uL     Immature Granulocytes Absolute, Automated 0.13 0.00 - 0.70 x10*3/uL     Lymphocytes Absolute 0.85 (L) 1.20 - 4.80 x10*3/uL     Monocytes Absolute 1.09 (H) " 0.10 - 1.00 x10*3/uL     Eosinophils Absolute 0.00 0.00 - 0.70 x10*3/uL     Basophils Absolute 0.03 0.00 - 0.10 x10*3/uL   Coagulation Screen   Result Value Ref Range     Protime 11.0 9.8 - 12.8 seconds     INR 1.0 0.9 - 1.1     aPTT 20 (L) 27 - 38 seconds   D-Dimer, VTE Exclusion   Result Value Ref Range     D-Dimer, Quantitative VTE Exclusion 6,130 (H) <=500 ng/mL FEU   Fibrinogen   Result Value Ref Range     Fibrinogen 216 200 - 400 mg/dL   Reticulocytes   Result Value Ref Range     Retic % 9.8 (H) 0.5 - 2.0 %     Retic Absolute 0.279 (H) 0.018 - 0.083 x10*6/uL     Reticulocyte Hemoglobin 29 28 - 38 pg     Immature Retic fraction 47.0 (H) <=16.0 %   Lactate dehydrogenase   Result Value Ref Range      (H) 84 - 246 U/L   Comprehensive Metabolic Panel   Result Value Ref Range     Glucose 106 (H) 74 - 99 mg/dL     Sodium 142 136 - 145 mmol/L     Potassium 3.7 3.5 - 5.3 mmol/L     Chloride 105 98 - 107 mmol/L     Bicarbonate 26 21 - 32 mmol/L     Anion Gap 15 10 - 20 mmol/L     Urea Nitrogen 11 6 - 23 mg/dL     Creatinine 0.93 0.50 - 1.05 mg/dL     eGFR 73 >60 mL/min/1.73m*2     Calcium 8.8 8.6 - 10.6 mg/dL     Albumin 3.5 3.4 - 5.0 g/dL     Alkaline Phosphatase 58 33 - 110 U/L     Total Protein 6.1 (L) 6.4 - 8.2 g/dL     AST 22 9 - 39 U/L     Bilirubin, Total 0.4 0.0 - 1.2 mg/dL     ALT 13 7 - 45 U/L   Morphology   Result Value Ref Range     RBC Morphology See Below       Polychromasia Mild       Hypochromia Mild       RBC Fragments Many       Target Cells Few              Assessment/Plan   Principal Problem:    TTP (thrombotic thrombocytopenic purpura) (Multi)        Tere Carrion is a 54 y.o. female with a notable history of TTP in 1998/1999 (treated with prolonged plasmapheresis, steroids, and immunosuppressants including vincristine and azathioprine, and splenectomy March 1999), reported sickle cell trait and alpha thalassemia trait presented on 6/18/24 for 2 weeks of worsening generalized weakness,  fatigue, migraines, hematuria, and new bruising with petechiae, with TTP.     Hematuria has completely resolved over the last 3 days.     Transfusion medicine was consulted and she received PLEX x 3 (last on 6/21/24).     HIV, Hepatitis panel: Non-reactive  B12, folate, iron profile: Normal  T spot: Negative  ADAMTS <5     Recommendations:  -Please follow up [id number] inhibitor level  -Please continue prednisone 80 mg (1 mg/kg) daily (and steroid precautions with PPI, glucose control) to complete a total of 7 days and then start taper, 10mg every 5 days until 20mg, then hold at that dose.   -Please trend daily CBC, coags (PT/PTT/INR/D-dimer/fibrinogen), LDH, and reticulocyte count daily.  -Please do NOT transfuse platelets - this increases risk of stroke and myocardial infarction. Please discuss with hematology beforehand if considering (such as active bleeding).  -Please follow up stool PCR (Shiga toxin) for hemolytic uremic syndrome.  -Consented for rituximab, to be started 6/21/24  -Paperwork completed for caplacizumab to be started today on 6/21/24     The patient was seen, examined and discussed with Dr. Walker.     Suni Ramírez MD  Hematology-Oncology Fellow, PGY IV

## 2024-06-21 NOTE — PROGRESS NOTES
Tere Carrion is a 54 y.o. female on day 2 of admission presenting with TTP (thrombotic thrombocytopenic purpura) (Multi).    Subjective   Afebrile. VSS. She is continuing to feel much better. She is having more energy and her hands are feeling more warm. She denies any CP, SOB, DENISE, palpitations, or tachycardia. She is having some left flank pain when she is laying in bed. She denies any urinary symptoms. She is no longer noticing any blood when she is wiping. She hasn't noticed any new bruises, besides around where her BP cuff was yesterday.  All other ROS otherwise negative.     Objective   Physical Exam  Vitals reviewed.   Constitutional:       General: She is not in acute distress.     Appearance: Normal appearance. She is not ill-appearing.   HENT:      Head: Normocephalic and atraumatic.      Nose: Nose normal.      Mouth/Throat:      Mouth: Mucous membranes are moist.   Eyes:      General: No scleral icterus.     Extraocular Movements: Extraocular movements intact.      Conjunctiva/sclera: Conjunctivae normal.      Pupils: Pupils are equal, round, and reactive to light.   Cardiovascular:      Rate and Rhythm: Normal rate and regular rhythm.      Pulses: Normal pulses.      Heart sounds: Normal heart sounds.   Pulmonary:      Effort: Pulmonary effort is normal. No respiratory distress.      Breath sounds: Normal breath sounds. No wheezing.   Abdominal:      General: Abdomen is flat. There is no distension.      Palpations: Abdomen is soft.   Musculoskeletal:         General: Normal range of motion.      Cervical back: Normal range of motion.   Skin:     General: Skin is warm.      Coloration: Skin is not jaundiced or pale.      Findings: Bruising present.   Neurological:      General: No focal deficit present.      Mental Status: She is alert and oriented to person, place, and time.      Cranial Nerves: No cranial nerve deficit.      Sensory: No sensory deficit.      Motor: No weakness.   Psychiatric:        "  Mood and Affect: Mood normal.         Behavior: Behavior normal.         Thought Content: Thought content normal.         Judgment: Judgment normal.     Last Recorded Vitals  Blood pressure 114/71, pulse 82, temperature 36.4 °C (97.5 °F), temperature source Temporal, resp. rate 18, height 1.626 m (5' 4\"), weight 79.4 kg (175 lb), last menstrual period 12/31/2023, SpO2 97%.  Intake/Output last 3 Shifts:  I/O last 3 completed shifts:  In: 3734 (47 mL/kg) [P.O.:240; Other:3494]  Out: 3247 (40.9 mL/kg) [Other:3247]  Weight: 79.4 kg     Relevant Results  Scheduled medications  calcium gluconate in NS, , intravenous, Once  pantoprazole, 40 mg, oral, Daily before breakfast  predniSONE, 80 mg, oral, Daily  sertraline, 50 mg, oral, Daily    Continuous medications     PRN medications  PRN medications: calcium carbonate, diphenhydrAMINE, sodium chloride  ECG 12 lead  Result Date: 6/20/2024  Normal sinus rhythm Normal ECG No previous ECGs available    ECG 12 lead  Result Date: 6/19/2024  Normal sinus rhythm Nonspecific T wave abnormality Abnormal ECG When compared with ECG of 18-JUN-2024 15:40, (unconfirmed) T wave inversion now evident in Anterior leads    CT head wo IV contrast  Result Date: 6/18/2024  No evidence of acute cortical infarct or intracranial hemorrhage.           Results for orders placed or performed during the hospital encounter of 06/18/24 (from the past 24 hour(s))   Prepare Plasma Exchange: 13 Units   Result Value Ref Range    PRODUCT CODE I6306KW8     Unit Number [id number]     Unit ABO O     Unit RH POS     Dispense Status IS     Blood Expiration Date June 25, 2024 12:49 EDT     PRODUCT BLOOD TYPE 5100     UNIT VOLUME 210     PRODUCT CODE E0185OI0     Unit Number [id number]     Unit ABO O     Unit RH POS     Dispense Status IS     Blood Expiration Date June 25, 2024 12:49 EDT     PRODUCT BLOOD TYPE 5100     UNIT VOLUME 206     PRODUCT CODE L6342E71     Unit Number [id number]     Unit ABO " O     Unit RH POS     Dispense Status IS     Blood Expiration Date June 25, 2024 12:49 EDT     PRODUCT BLOOD TYPE 5100     UNIT VOLUME 315     PRODUCT CODE E6989M37     Unit Number [id number]     Unit ABO O     Unit RH POS     Dispense Status IS     Blood Expiration Date June 25, 2024 12:49 EDT     PRODUCT BLOOD TYPE 5100     UNIT VOLUME 302     PRODUCT CODE X5989I71     Unit Number [id number]     Unit ABO O     Unit RH POS     Dispense Status IS     Blood Expiration Date June 25, 2024 12:49 EDT     PRODUCT BLOOD TYPE 5100     UNIT VOLUME 369     PRODUCT CODE R7508N98     Unit Number [id number]     Unit ABO O     Unit RH POS     Dispense Status IS     Blood Expiration Date June 25, 2024 12:49 EDT     PRODUCT BLOOD TYPE 5100     UNIT VOLUME 326     PRODUCT CODE J7670Q03     Unit Number [id number]     Unit ABO O     Unit RH POS     Dispense Status IS     Blood Expiration Date June 25, 2024 12:49 EDT     PRODUCT BLOOD TYPE 5100     UNIT VOLUME 341     PRODUCT CODE B8870D95     Unit Number [id number]     Unit ABO O     Unit RH POS     Dispense Status IS     Blood Expiration Date June 25, 2024 12:49 EDT     PRODUCT BLOOD TYPE 5100     UNIT VOLUME 352     PRODUCT CODE Y0089N84     Unit Number [id number]     Unit ABO O     Unit RH POS     Dispense Status IS     Blood Expiration Date June 25, 2024 12:49 EDT     PRODUCT BLOOD TYPE 5100     UNIT VOLUME 334     PRODUCT CODE Y1391D18     Unit Number [id number]     Unit ABO O     Unit RH POS     Dispense Status IS     Blood Expiration Date June 25, 2024 12:49 EDT     PRODUCT BLOOD TYPE 5100     UNIT VOLUME 290     PRODUCT CODE U4919N89     Unit Number [id number]     Unit ABO O     Unit RH NEG     Dispense Status IS     Blood Expiration Date June 25, 2024 12:49 EDT     PRODUCT BLOOD TYPE 9500     UNIT VOLUME 298    Urticaria Transfusion Reaction Evaluation   Result Value Ref Range    Unit Number Plasma Exchange     COMPONENT CODE Plasma     CBC   Result Value Ref Range    WBC 13.7 (H) 4.4 - 11.3 x10*3/uL    nRBC 17.0 (H) 0.0 - 0.0 /100 WBCs    RBC 3.03 (L) 4.00 - 5.20 x10*6/uL    Hemoglobin 7.9 (L) 12.0 - 16.0 g/dL    Hematocrit 24.9 (L) 36.0 - 46.0 %    MCV 82 80 - 100 fL    MCH 26.1 26.0 - 34.0 pg    MCHC 31.7 (L) 32.0 - 36.0 g/dL    RDW 21.4 (H) 11.5 - 14.5 %    Platelets 85 (L) 150 - 450 x10*3/uL   Calcium, Ionized   Result Value Ref Range    POCT Calcium, Ionized 1.23 1.1 - 1.33 mmol/L   CBC and Auto Differential   Result Value Ref Range    WBC 17.1 (H) 4.4 - 11.3 x10*3/uL    nRBC 9.4 (H) 0.0 - 0.0 /100 WBCs    RBC 2.91 (L) 4.00 - 5.20 x10*6/uL    Hemoglobin 7.8 (L) 12.0 - 16.0 g/dL    Hematocrit 24.8 (L) 36.0 - 46.0 %    MCV 85 80 - 100 fL    MCH 26.8 26.0 - 34.0 pg    MCHC 31.5 (L) 32.0 - 36.0 g/dL    RDW 21.7 (H) 11.5 - 14.5 %    Platelets 148 (L) 150 - 450 x10*3/uL    Neutrophils % 92.5 40.0 - 80.0 %    Immature Granulocytes %, Automated 0.9 0.0 - 0.9 %    Lymphocytes % 1.7 13.0 - 44.0 %    Monocytes % 4.7 2.0 - 10.0 %    Eosinophils % 0.0 0.0 - 6.0 %    Basophils % 0.2 0.0 - 2.0 %    Neutrophils Absolute 15.85 (H) 1.20 - 7.70 x10*3/uL    Immature Granulocytes Absolute, Automated 0.15 0.00 - 0.70 x10*3/uL    Lymphocytes Absolute 0.29 (L) 1.20 - 4.80 x10*3/uL    Monocytes Absolute 0.80 0.10 - 1.00 x10*3/uL    Eosinophils Absolute 0.00 0.00 - 0.70 x10*3/uL    Basophils Absolute 0.03 0.00 - 0.10 x10*3/uL   Coagulation Screen   Result Value Ref Range    Protime 10.5 9.8 - 12.8 seconds    INR 0.9 0.9 - 1.1    aPTT 23 (L) 27 - 38 seconds   D-Dimer, VTE Exclusion   Result Value Ref Range    D-Dimer, Quantitative VTE Exclusion 3,392 (H) <=500 ng/mL FEU   Fibrinogen   Result Value Ref Range    Fibrinogen 236 200 - 400 mg/dL   Reticulocytes   Result Value Ref Range    Retic % 11.1 (H) 0.5 - 2.0 %    Retic Absolute 0.323 (H) 0.018 - 0.083 x10*6/uL    Reticulocyte Hemoglobin 28 28 - 38 pg    Immature Retic fraction 42.8 (H) <=16.0 %   Lactate  dehydrogenase   Result Value Ref Range     84 - 246 U/L   Comprehensive Metabolic Panel   Result Value Ref Range    Glucose 110 (H) 74 - 99 mg/dL    Sodium 144 136 - 145 mmol/L    Potassium 3.6 3.5 - 5.3 mmol/L    Chloride 103 98 - 107 mmol/L    Bicarbonate 31 21 - 32 mmol/L    Anion Gap 14 10 - 20 mmol/L    Urea Nitrogen 14 6 - 23 mg/dL    Creatinine 0.92 0.50 - 1.05 mg/dL    eGFR 74 >60 mL/min/1.73m*2    Calcium 9.3 8.6 - 10.6 mg/dL    Albumin 3.6 3.4 - 5.0 g/dL    Alkaline Phosphatase 59 33 - 110 U/L    Total Protein 6.3 (L) 6.4 - 8.2 g/dL    AST 17 9 - 39 U/L    Bilirubin, Total 0.3 0.0 - 1.2 mg/dL    ALT 20 7 - 45 U/L   Calcium, Ionized   Result Value Ref Range    POCT Calcium, Ionized 1.21 1.1 - 1.33 mmol/L   Platelet Fraction, Immature   Result Value Ref Range    Platelet Fraction, Immature 9.3 (H) 1.0 - 6.0 %   Morphology   Result Value Ref Range    RBC Morphology See Below     Polychromasia Marked     RBC Fragments Few     Sickle Cells Few     Target Cells Few     Hernandez-Madeline Bodies Present     Pappenheimer Bodies Present      Assessment/Plan   Principal Problem:    TTP (thrombotic thrombocytopenic purpura) (Multi)    Tere Carrion is a 54 y.o. female with a PMHx significant for TTP in 1999 ((treated with prolonged plasmapheresis, steroids, and immunosuppressants including vincristine and azathioprine, splenectomy March 1999), reported sickle cell trait, alpha thalassemia trait, who presented to [facility] ED after POC hemoglobin was 6 at her PCP's office and was transferred to [facility] for potential plasmapheresis. She is on daily prednisone 80 mg. She is s/p plasmapheresis (6/19, 6/20, 6/21). She was having urinary frequency, urgency, and hematuria prior to admission. UA 6/19 (+) leuk and blood, s/p ceftriaxone 6/19; urine cultures showed now growth and symptoms resolved so antibiotics were discontinued. She initially had CP and SOB with activity, troponin were trended and began to down trend on  6/20 from 428 to 255 so troponin trending was discontinued. She had a resolution to this CP and SOB as her blood counts improved. Discharge is pending heme recs and anemia w/up.    Peripheral smear (from heme note):   WBC: no circulating blasts seen, mature lymphocytes, mature neutrophils  RBC: several schistocytes, no dacrocytes, no spherocytes, RBCs with increased pallor, microcytosis, several target cells, some reticulocytes, occasional nucleated RBC  Plt: no platelet clumping, no enlarged platelets, true thrombocytopenia     #Thrombocytopenia   #TTP  - Hx of TTP in 1999 s/p prolonged plasmapheresis, steroids, and immunosuppressants including vincristine and azathioprine, splenectomy March 1999.  - heme on board, they recommended hemoglobin electrophoresis, HIV, HCV, HBV labs, stool pathogen PCR, plasma cell dyscrasia workup  - Prednisone 1mg/kg (80mg daily)   - PPI Ppx   - HIV, HCV, HBV not detected (6/19).  - 6/19 IgG 1,850; IgM 84; IgA 325; kappa FLC 2.91; ryanne FLC 2.26; K/L 1.29  - [id number] <5  - maintain left femoral CVC, if having back pain, consider RP bleed   - s/p PLEX 6/19, during transfusion developed hives and was given 2 doses of benadryl.  - s/p PLEX 6/20, during transfusion developed hives and was given 2 doses of benadryl, 125 mg solumedrol, and Pepcid.  - s/p PLEX 6/21, plt prior to PLEX was 148 so plan to hold PLEX and observe counts to ensure plt stays about 150 per transfusion medicine.  - Heme recommended to start Rituximab and Caplacizumab 6/21/24  - avoid platelet transfusion, in case of emergency, contact hematology fellow     #Anemia  - Likely secondary to TTP  - anemia w/up (6/19): iron 117, ferritin 686, B12 404  - Hgb on admission 5.8 (baseline unknown); s/p 2u pRBCs 6/19; 6/20 hgb 7.6; 6/21 7.8  - G6PD screen negative    #Hematuria on POC UA   #Possible UTI  - Maybe related to underlying hemolysis   - UA (6/19) + Leuk and blood; - nitrite  - Ucx (6/19) NGTD  - s/p Ceftriaxone (6/19),  discontinued with resolution of symptoms and no growth on urine culture.    #Troponin elevation  #Type II MI   - non specific TWI in V1 and V3  - likely 2/2 anemia, no chest pain at rest  - keep Hgb >8  - trend troponin: (6/18) 256 --> 257 --> (6/19) 410 --> 428 -->255  - resolution of chest pain and shortness of breath with count incrementing.    #Headaches  - Hx of headaches, at home takes motrin and Excedrin; will hold in setting of thrombocytopenia  - CT head (6/18): No evidence of acute cortical infarct or intracranial hemorrhage.   - s/p Mg and morphine at [facility]     #Reported Hx of Sinus tachycardia   - reports being on home metoprolol 25 succ daily   - hold with anemia      #Anxiety  - continue home zoloft 50mg daily     #High risk for breast cancer  - s/p right breast excisional biopsy (2013) showing atypical ductal hyperplasia  - s/p left breast core biopsy (2013) which showed benign sclerosing adenosis and apocrine metaplasia.  - Due to high risk of breast cancer due to personal and family history was started on Tamoxifen in 9/2023 and plan to continue for 3 years for risk reduction.  - hold tamoxifen as inpatient     Dispo:  - Code status: full code  - DC pending anemia w/up   - Surrogate decision maker: Daughter Zara [phone]  - FUV: 7/23 Modesta Gallego (PCP); 7/31 Hillary Johnson (surg onc)     Assessment and plan were discussed with attending physician Dr. Caleb Monte PA-C

## 2024-06-21 NOTE — CARE PLAN
The patient's goals for the shift include      Problem: Pain - Adult  Goal: Verbalizes/displays adequate comfort level or baseline comfort level  Outcome: Progressing     Problem: Safety - Adult  Goal: Free from fall injury  Outcome: Progressing     Problem: Skin  Goal: Decreased wound size/increased tissue granulation at next dressing change  Outcome: Progressing  Goal: Participates in plan/prevention/treatment measures  Outcome: Progressing  Goal: Prevent/manage excess moisture  Outcome: Progressing  Goal: Prevent/minimize sheer/friction injuries  Outcome: Progressing  Goal: Promote/optimize nutrition  Outcome: Progressing  Goal: Promote skin healing  Outcome: Progressing       The clinical goals for the shift include Patient will remain HDS and free of falls/injury through end of shift 6/21 0700

## 2024-06-21 NOTE — POST-PROCEDURE NOTE
This is a 54 y.o. female with relapsed Thrombotic Thrombocytopenic Purpura (TTP) who underwent therapeutic plasma exchange (TPE) with 100% plasma as replacement fluid.     I saw and evaluated the patient during the TPE.  The patient was resting in bed.  The vascular access functioned well.     Pre-procedure labs:  WBC   Date/Time Value Ref Range Status   06/21/2024 04:59 AM 17.1 (H) 4.4 - 11.3 x10*3/uL Final     POC Hemoglobin   Date/Time Value Ref Range Status   06/18/2024 01:49 PM 6.2 (A) 12 - 16 g/dL Final     Hemoglobin   Date/Time Value Ref Range Status   06/21/2024 04:59 AM 7.8 (L) 12.0 - 16.0 g/dL Final     Hematocrit   Date/Time Value Ref Range Status   06/21/2024 04:59 AM 24.8 (L) 36.0 - 46.0 % Final     Platelets   Date/Time Value Ref Range Status   06/21/2024 04:59  (L) 150 - 450 x10*3/uL Final        POCT Calcium, Ionized   Date/Time Value Ref Range Status   06/21/2024 04:59 AM 1.21 1.1 - 1.33 mmol/L Final     Comment:     The performance characteristics of ionized calcium tested  in heparinized plasma or serum have been validated by the  individual  laboratory site where testing is performed.   Testing on heparinized plasma or serum is not approved by   the FDA; however, such approval is not necessary.        Protime   Date/Time Value Ref Range Status   06/21/2024 04:59 AM 10.5 9.8 - 12.8 seconds Final     INR   Date/Time Value Ref Range Status   06/21/2024 04:59 AM 0.9 0.9 - 1.1 Final     aPTT   Date/Time Value Ref Range Status   06/21/2024 04:59 AM 23 (L) 27 - 38 seconds Final     Fibrinogen   Date/Time Value Ref Range Status   06/21/2024 04:59  200 - 400 mg/dL Final        Pre-procedure vital signs at 09:13:  T: 36.9 C, HR: 67, RR: 16, BP: 122/79  Pulse oximetry: 97% on room air    Post-procedure vital signs at 12:48:  T: 36.5 C, HR: 62, RR: 16, BP: 121/75     Pulse oximetry: 97% on room air    Outcome: TPE completed.   Adverse Reaction: Yes Patient developed pruritic reaction custodial  through procedure during the transfusion of  4th unit of plasma. 25 mg IV diphenhydramine and procedure paused for 5 min and restarted once reaction resolved without any further complications. This qualified as a mild allergic reaction.    Assessment and Plan:  54 y.o. female with Thrombotic Thrombocytopenic Purpura (TTP) who underwent TPE #3.  Draw post-procedure labs  Vascular access to be managed by clinical team.  3.   TPE is paused after today and planned to observe the patient during weekend. Patient will receive first dose of caplacizumab after today's TPEr  4.   No further TPE is scheduled at this time.

## 2024-06-21 NOTE — PROGRESS NOTES
Tere Carrion [id number]       Procedure type: Plasma Exchange    Replacement Fluids: 100% Plasma     Procedure Completed Transfusion reaction and/or adverse event noted.    Appropriate actions taken.   Attending notified of completion status. See flow sheet(s) for additional details.  Post-Vitals listed below.    Post-procedure vitals:  Vitals @ 1248  BP: (P) 121/75  Temp: (P) 36.5 °C (97.7 °F)  Heart Rate: (P) 62  Resp: (P) 16  SpO2: (P) 97 % on RA       Rosie Delgado RN

## 2024-06-22 LAB
ALBUMIN SERPL BCP-MCNC: 3.3 G/DL (ref 3.4–5)
ALP SERPL-CCNC: 64 U/L (ref 33–110)
ALT SERPL W P-5'-P-CCNC: 14 U/L (ref 7–45)
ANION GAP SERPL CALC-SCNC: 14 MMOL/L (ref 10–20)
APTT PPP: 25 SECONDS (ref 27–38)
AST SERPL W P-5'-P-CCNC: 16 U/L (ref 9–39)
BASOPHILS # BLD AUTO: 0.03 X10*3/UL (ref 0–0.1)
BASOPHILS NFR BLD AUTO: 0.2 %
BILIRUB SERPL-MCNC: 0.3 MG/DL (ref 0–1.2)
BUN SERPL-MCNC: 16 MG/DL (ref 6–23)
CALCIUM SERPL-MCNC: 8.7 MG/DL (ref 8.6–10.6)
CHLORIDE SERPL-SCNC: 103 MMOL/L (ref 98–107)
CO2 SERPL-SCNC: 29 MMOL/L (ref 21–32)
CREAT SERPL-MCNC: 0.91 MG/DL (ref 0.5–1.05)
D DIMER PPP FEU-MCNC: 4707 NG/ML FEU
EGFRCR SERPLBLD CKD-EPI 2021: 75 ML/MIN/1.73M*2
EOSINOPHIL # BLD AUTO: 0 X10*3/UL (ref 0–0.7)
EOSINOPHIL NFR BLD AUTO: 0 %
ERYTHROCYTE [DISTWIDTH] IN BLOOD BY AUTOMATED COUNT: 20.5 % (ref 11.5–14.5)
FIBRINOGEN PPP-MCNC: 231 MG/DL (ref 200–400)
GLUCOSE SERPL-MCNC: 91 MG/DL (ref 74–99)
HCT VFR BLD AUTO: 27.4 % (ref 36–46)
HGB BLD-MCNC: 8.5 G/DL (ref 12–16)
HGB RETIC QN: 28 PG (ref 28–38)
IMM GRANULOCYTES # BLD AUTO: 0.25 X10*3/UL (ref 0–0.7)
IMM GRANULOCYTES NFR BLD AUTO: 1.3 % (ref 0–0.9)
IMMATURE RETIC FRACTION: 42.5 %
INR PPP: 1 (ref 0.9–1.1)
LDH SERPL L TO P-CCNC: 201 U/L (ref 84–246)
LYMPHOCYTES # BLD AUTO: 0.66 X10*3/UL (ref 1.2–4.8)
LYMPHOCYTES NFR BLD AUTO: 3.5 %
MCH RBC QN AUTO: 26.5 PG (ref 26–34)
MCHC RBC AUTO-ENTMCNC: 31 G/DL (ref 32–36)
MCV RBC AUTO: 85 FL (ref 80–100)
MONOCYTES # BLD AUTO: 0.84 X10*3/UL (ref 0.1–1)
MONOCYTES NFR BLD AUTO: 4.4 %
NEUTROPHILS # BLD AUTO: 17.33 X10*3/UL (ref 1.2–7.7)
NEUTROPHILS NFR BLD AUTO: 90.6 %
NRBC BLD-RTO: 3.7 /100 WBCS (ref 0–0)
PLATELET # BLD AUTO: 221 X10*3/UL (ref 150–450)
POLYCHROMASIA BLD QL SMEAR: NORMAL
POTASSIUM SERPL-SCNC: 3.4 MMOL/L (ref 3.5–5.3)
PROT SERPL-MCNC: 5.8 G/DL (ref 6.4–8.2)
PROTHROMBIN TIME: 10.8 SECONDS (ref 9.8–12.8)
RBC # BLD AUTO: 3.21 X10*6/UL (ref 4–5.2)
RBC MORPH BLD: NORMAL
RETICS #: 0.39 X10*6/UL (ref 0.02–0.08)
RETICS/RBC NFR AUTO: 12.1 % (ref 0.5–2)
SCAN RESULT: ABNORMAL
SCHISTOCYTES BLD QL SMEAR: NORMAL
SODIUM SERPL-SCNC: 143 MMOL/L (ref 136–145)
TARGETS BLD QL SMEAR: NORMAL
VWF CP ACT/NOR PPP CHRO: <5 %
VWF CP INHIB PPP-ACNC: 2.4 INHIBITOR UNITS
WBC # BLD AUTO: 19.1 X10*3/UL (ref 4.4–11.3)

## 2024-06-22 PROCEDURE — C9047 INJECTION, CAPLACIZUMAB-YHDP: HCPCS | Mod: JZ | Performed by: INTERNAL MEDICINE

## 2024-06-22 PROCEDURE — 85045 AUTOMATED RETICULOCYTE COUNT: CPT

## 2024-06-22 PROCEDURE — 85379 FIBRIN DEGRADATION QUANT: CPT

## 2024-06-22 PROCEDURE — 2500000002 HC RX 250 W HCPCS SELF ADMINISTERED DRUGS (ALT 637 FOR MEDICARE OP, ALT 636 FOR OP/ED)

## 2024-06-22 PROCEDURE — 2500000004 HC RX 250 GENERAL PHARMACY W/ HCPCS (ALT 636 FOR OP/ED): Performed by: STUDENT IN AN ORGANIZED HEALTH CARE EDUCATION/TRAINING PROGRAM

## 2024-06-22 PROCEDURE — 2500000004 HC RX 250 GENERAL PHARMACY W/ HCPCS (ALT 636 FOR OP/ED): Mod: JZ | Performed by: INTERNAL MEDICINE

## 2024-06-22 PROCEDURE — 83615 LACTATE (LD) (LDH) ENZYME: CPT

## 2024-06-22 PROCEDURE — 80053 COMPREHEN METABOLIC PANEL: CPT | Performed by: PHYSICIAN ASSISTANT

## 2024-06-22 PROCEDURE — 85025 COMPLETE CBC W/AUTO DIFF WBC: CPT

## 2024-06-22 PROCEDURE — 1200000003 HC ONCOLOGY  ROOM WITH TELEMETRY DAILY

## 2024-06-22 PROCEDURE — 85610 PROTHROMBIN TIME: CPT

## 2024-06-22 PROCEDURE — 85384 FIBRINOGEN ACTIVITY: CPT

## 2024-06-22 PROCEDURE — 2500000001 HC RX 250 WO HCPCS SELF ADMINISTERED DRUGS (ALT 637 FOR MEDICARE OP)

## 2024-06-22 PROCEDURE — 2500000001 HC RX 250 WO HCPCS SELF ADMINISTERED DRUGS (ALT 637 FOR MEDICARE OP): Performed by: STUDENT IN AN ORGANIZED HEALTH CARE EDUCATION/TRAINING PROGRAM

## 2024-06-22 PROCEDURE — 36415 COLL VENOUS BLD VENIPUNCTURE: CPT

## 2024-06-22 PROCEDURE — RXMED WILLOW AMBULATORY MEDICATION CHARGE

## 2024-06-22 PROCEDURE — 99233 SBSQ HOSP IP/OBS HIGH 50: CPT

## 2024-06-22 RX ORDER — PREDNISONE 20 MG/1
80 TABLET ORAL DAILY
Status: DISCONTINUED | OUTPATIENT
Start: 2024-06-19 | End: 2024-06-24

## 2024-06-22 RX ORDER — PREDNISONE 10 MG/1
30 TABLET ORAL DAILY
Qty: 15 TABLET | Refills: 0 | Status: SHIPPED | OUTPATIENT
Start: 2024-07-16 | End: 2024-06-24 | Stop reason: HOSPADM

## 2024-06-22 RX ORDER — POTASSIUM CHLORIDE 1.5 G/1.58G
40 POWDER, FOR SOLUTION ORAL ONCE
Status: COMPLETED | OUTPATIENT
Start: 2024-06-22 | End: 2024-06-22

## 2024-06-22 RX ORDER — PREDNISONE 20 MG/1
TABLET ORAL
Qty: 55 TABLET | Refills: 0 | Status: SHIPPED | OUTPATIENT
Start: 2024-06-26 | End: 2024-06-24 | Stop reason: HOSPADM

## 2024-06-22 RX ORDER — PREDNISONE 20 MG/1
40 TABLET ORAL DAILY
Status: DISCONTINUED | OUTPATIENT
Start: 2024-07-11 | End: 2024-06-24

## 2024-06-22 RX ORDER — PANTOPRAZOLE SODIUM 40 MG/1
40 TABLET, DELAYED RELEASE ORAL
Qty: 30 TABLET | Refills: 11 | Status: SHIPPED | OUTPATIENT
Start: 2024-06-23 | End: 2025-06-23

## 2024-06-22 RX ORDER — PREDNISONE 20 MG/1
20 TABLET ORAL DAILY
Qty: 30 TABLET | Refills: 1 | Status: SHIPPED | OUTPATIENT
Start: 2024-07-21 | End: 2024-06-24 | Stop reason: HOSPADM

## 2024-06-22 RX ORDER — PREDNISONE 20 MG/1
60 TABLET ORAL DAILY
Status: DISCONTINUED | OUTPATIENT
Start: 2024-07-01 | End: 2024-06-24

## 2024-06-22 RX ORDER — PREDNISONE 20 MG/1
20 TABLET ORAL DAILY
Status: DISCONTINUED | OUTPATIENT
Start: 2024-07-21 | End: 2024-06-24

## 2024-06-22 ASSESSMENT — COGNITIVE AND FUNCTIONAL STATUS - GENERAL
DAILY ACTIVITIY SCORE: 24
MOBILITY SCORE: 24

## 2024-06-22 ASSESSMENT — PAIN SCALES - GENERAL: PAINLEVEL_OUTOF10: 0 - NO PAIN

## 2024-06-22 NOTE — CARE PLAN
The patient's goals for the shift include      The clinical goals for the shift include pt will remain HDs and VSS through end of shift 6/22/2024 1900      Problem: Pain - Adult  Goal: Verbalizes/displays adequate comfort level or baseline comfort level  Outcome: Progressing     Problem: Safety - Adult  Goal: Free from fall injury  Outcome: Progressing     Problem: Skin  Goal: Decreased wound size/increased tissue granulation at next dressing change  Outcome: Progressing  Goal: Participates in plan/prevention/treatment measures  Outcome: Progressing  Goal: Prevent/manage excess moisture  Outcome: Progressing  Goal: Prevent/minimize sheer/friction injuries  Outcome: Progressing  Goal: Promote/optimize nutrition  Outcome: Progressing  Goal: Promote skin healing  Outcome: Progressing

## 2024-06-22 NOTE — PROGRESS NOTES
"Tere Carrion is a 54 y.o. female on day 3 of admission presenting with TTP (thrombotic thrombocytopenic purpura) (Multi).    Subjective   Seen at bedside, reports feeling well. Discussed plan per Heme recs and plan to remove central line. Denies N/V/D/C, chest pain, cough, shortness of breath. Appetite is good and having BM per usual schedule.     Notified of infusion appointments. Plan to DC Tuesday after med delivery.        Objective     Physical Exam  Constitutional:       General: She is not in acute distress.     Appearance: She is not toxic-appearing.   HENT:      Head: Normocephalic and atraumatic.   Eyes:      General: No scleral icterus.     Extraocular Movements: Extraocular movements intact.   Cardiovascular:      Rate and Rhythm: Normal rate and regular rhythm.   Pulmonary:      Effort: No respiratory distress.   Abdominal:      General: Abdomen is flat.      Palpations: Abdomen is soft.      Tenderness: There is no abdominal tenderness.   Musculoskeletal:         General: No swelling or tenderness.   Skin:     Coloration: Skin is not jaundiced.      Findings: No bruising or erythema.   Neurological:      Mental Status: She is oriented to person, place, and time. Mental status is at baseline.         Last Recorded Vitals  Blood pressure 109/72, pulse 73, temperature 36.7 °C (98.1 °F), temperature source Temporal, resp. rate 16, height 1.626 m (5' 4\"), weight 79.4 kg (175 lb), last menstrual period 12/31/2023, SpO2 97%.  Intake/Output last 3 Shifts:  I/O last 3 completed shifts:  In: 4300 (54.2 mL/kg) [P.O.:240; Other:3683; IV Piggyback:377]  Out: 3450 (43.5 mL/kg) [Other:3450]  Weight: 79.4 kg            Assessment/Plan   Principal Problem:    TTP (thrombotic thrombocytopenic purpura) (Multi)    Tere Carrion is a 54 y.o. female with a PMHx significant for TTP in 1999 ((treated with prolonged plasmapheresis, steroids, and immunosuppressants including vincristine and azathioprine, splenectomy March " 1999), reported sickle cell trait, alpha thalassemia trait, who presented to [facility] ED after POC hemoglobin was 6 at her PCP's office and was transferred to [facility] for potential plasmapheresis. She is on daily prednisone 80 mg. She is s/p plasmapheresis (6/19, 6/20, 6/21). She was having urinary frequency, urgency, and hematuria prior to admission. UA 6/19 (+) leuk and blood, s/p ceftriaxone 6/19; urine cultures showed now growth and symptoms resolved so antibiotics were discontinued. She initially had CP and SOB with activity, troponin were trended and began to down trend on 6/20 from 428 to 255 so troponin trend was discontinued. She had resolution of CP and SOB as her blood counts improved. 6/21 heme rec Rituximab and Caplacizumab, s/p first doses 6/21/24. Plan for weekly rituximab infusions and daily caplacizumab subcutaneous, taper steroid by 10mg q5d until maintenance dose of 20mg daily. Plan DC after caplacizumab delievered to bedside on 6/25.     #Thrombocytopenia   #TTP  - Hx of TTP in 1999 s/p prolonged plasmapheresis, steroids, and immunosuppressants including vincristine and azathioprine, splenectomy March 1999.  - heme on board, they recommended hemoglobin electrophoresis, HIV, HCV, HBV labs, stool pathogen PCR, plasma cell dyscrasia workup  - Peripheral smear (from heme note):   WBC: no circulating blasts seen, mature lymphocytes, mature neutrophils  RBC: several schistocytes, no dacrocytes, no spherocytes, RBCs with increased pallor, microcytosis, several target cells, some reticulocytes, occasional nucleated RBC  Plt: no platelet clumping, no enlarged platelets, true thrombocytopenia  - HIV, HCV, HBV not detected (6/19)  - 6/19 IgG 1,850; IgM 84; IgA 325; kappa FLC 2.91; ryanne FLC 2.26; K/L 1.29  - [id number] <5  - s/p PLEX 6/19, during transfusion developed hives and was given 2 doses of benadryl.  - s/p PLEX 6/20, during transfusion developed hives and was given 2 doses of benadryl, 125 mg  solumedrol, and Pepcid.  - s/p PLEX 6/21, plt prior to PLEX was 148 so plan to hold PLEX and observe counts to ensure plt stays about 150 per transfusion medicine.  - Heme recs:   - start Rituximab and Caplacizumab, s/p first doses 6/21/24  - plan for weekly rituximab infusions and daily caplacizumab subcutaneous  - continue Prednisone 1mg/kg (80mg daily) x 7 days, then taper by 10mg every 5 days with a final maintenance dose of 20mg daily (ordered)  - PPI prophy while in steroids  - avoid platelet transfusion, in case of emergency, contact hematology fellow     #Anemia  - Likely secondary to TTP  - anemia w/up (6/19): iron 117, ferritin 686, B12 404  - Hgb on admission 5.8 (baseline unknown); s/p 2u pRBCs 6/19; 6/20 hgb 7.6; 6/21 7.8  - G6PD screen negative     #Hematuria on POC UA   #r/o UTI  - Maybe related to underlying hemolysis   - UA (6/19) + Leuk and blood; - nitrite  - Ucx (6/19) NGTD  - s/p Ceftriaxone (6/19), discontinued with resolution of symptoms and no growth on urine culture.    #Troponin elevation  #Type II MI   - non specific TWI in V1 and V3  - likely 2/2 anemia, no chest pain at rest  - keep Hgb >8  - trend troponin: (6/18) 256 --> 257 --> (6/19) 410 --> 428 -->255  - resolution of chest pain and shortness of breath with count incrementing.    #Headaches  - Hx of headaches, at home takes motrin and Excedrin; will hold in setting of thrombocytopenia  - CT head (6/18): No evidence of acute cortical infarct or intracranial hemorrhage.   - s/p Mg and morphine at [facility]     #Reported Hx of Sinus tachycardia   - reports being on home metoprolol 25 succ daily   - hold with anemia      #Anxiety  - continue home zoloft 50mg daily     #High risk for breast cancer  - s/p right breast excisional biopsy (2013) showing atypical ductal hyperplasia  - s/p left breast core biopsy (2013) which showed benign sclerosing adenosis and apocrine metaplasia.  - Due to high risk of breast cancer due to personal and family  history was started on Tamoxifen in 9/2023 and plan to continue for 3 years for risk reduction.  - hold tamoxifen as inpatient     Dispo:  - Code status: full code  - DC pending anemia w/up   - Surrogate decision maker: Daughter Zara [phone]  - FUV: 7/23 Modesta Gallego (PCP); 7/31 Hillary Johnson (surg onc)       I spent >60 minutes in the professional and overall care of this patient.      Susy Bernard, APRN-CNP       Length To Time In Minutes Device Was In Place: 10

## 2024-06-22 NOTE — PROGRESS NOTES
"Tere Carrion is a 54 y.o. female on day 1 of admission presenting with TTP (thrombotic thrombocytopenic purpura) (Multi).     Subjective   Received and tolerated Ritux yesterday. Tolerated first dose of caplacizumab. No signs of bleeding.      Objective      Physical Exam  General: very pleasant, awake, alert, no acute distress  CV: tachycardic rate, regular rhythm, no MRG  Resp: CTAB, no labored breathing  Abdominal: soft, non-tender, non-distended, active bowel sounds  Extremities: full ROM, no lower extremity swelling     Last Recorded Vitals  Blood pressure 107/67, pulse 80, temperature 36.3 °C (97.3 °F), temperature source Temporal, resp. rate 18, height 1.626 m (5' 4\"), weight 79.4 kg (175 lb), last menstrual period 12/31/2023, SpO2 98%.  Intake/Output last 3 Shifts:  I/O last 3 completed shifts:  In: 5704 (71.9 mL/kg) [Blood:1622; Other:4082]  Out: 3833 (48.3 mL/kg) [Other:3833]  Weight: 79.4 kg      Relevant Results        Results for orders placed or performed during the hospital encounter of 06/18/24 (from the past 24 hour(s))   Calcium, Ionized   Result Value Ref Range     POCT Calcium, Ionized 1.21 1.1 - 1.33 mmol/L   CBC and Auto Differential   Result Value Ref Range     WBC 13.6 (H) 4.4 - 11.3 x10*3/uL     nRBC 19.9 (H) 0.0 - 0.0 /100 WBCs     RBC 2.86 (L) 4.00 - 5.20 x10*6/uL     Hemoglobin 7.6 (L) 12.0 - 16.0 g/dL     Hematocrit 21.9 (L) 36.0 - 46.0 %     MCV 77 (L) 80 - 100 fL     MCH 26.6 26.0 - 34.0 pg     MCHC 34.7 32.0 - 36.0 g/dL     RDW 19.9 (H) 11.5 - 14.5 %     Platelets 53 (L) 150 - 450 x10*3/uL     Neutrophils % 84.5 40.0 - 80.0 %     Immature Granulocytes %, Automated 1.0 (H) 0.0 - 0.9 %     Lymphocytes % 6.3 13.0 - 44.0 %     Monocytes % 8.0 2.0 - 10.0 %     Eosinophils % 0.0 0.0 - 6.0 %     Basophils % 0.2 0.0 - 2.0 %     Neutrophils Absolute 11.46 (H) 1.20 - 7.70 x10*3/uL     Immature Granulocytes Absolute, Automated 0.13 0.00 - 0.70 x10*3/uL     Lymphocytes Absolute 0.85 (L) 1.20 - " 4.80 x10*3/uL     Monocytes Absolute 1.09 (H) 0.10 - 1.00 x10*3/uL     Eosinophils Absolute 0.00 0.00 - 0.70 x10*3/uL     Basophils Absolute 0.03 0.00 - 0.10 x10*3/uL   Coagulation Screen   Result Value Ref Range     Protime 11.0 9.8 - 12.8 seconds     INR 1.0 0.9 - 1.1     aPTT 20 (L) 27 - 38 seconds   D-Dimer, VTE Exclusion   Result Value Ref Range     D-Dimer, Quantitative VTE Exclusion 6,130 (H) <=500 ng/mL FEU   Fibrinogen   Result Value Ref Range     Fibrinogen 216 200 - 400 mg/dL   Reticulocytes   Result Value Ref Range     Retic % 9.8 (H) 0.5 - 2.0 %     Retic Absolute 0.279 (H) 0.018 - 0.083 x10*6/uL     Reticulocyte Hemoglobin 29 28 - 38 pg     Immature Retic fraction 47.0 (H) <=16.0 %   Lactate dehydrogenase   Result Value Ref Range      (H) 84 - 246 U/L   Comprehensive Metabolic Panel   Result Value Ref Range     Glucose 106 (H) 74 - 99 mg/dL     Sodium 142 136 - 145 mmol/L     Potassium 3.7 3.5 - 5.3 mmol/L     Chloride 105 98 - 107 mmol/L     Bicarbonate 26 21 - 32 mmol/L     Anion Gap 15 10 - 20 mmol/L     Urea Nitrogen 11 6 - 23 mg/dL     Creatinine 0.93 0.50 - 1.05 mg/dL     eGFR 73 >60 mL/min/1.73m*2     Calcium 8.8 8.6 - 10.6 mg/dL     Albumin 3.5 3.4 - 5.0 g/dL     Alkaline Phosphatase 58 33 - 110 U/L     Total Protein 6.1 (L) 6.4 - 8.2 g/dL     AST 22 9 - 39 U/L     Bilirubin, Total 0.4 0.0 - 1.2 mg/dL     ALT 13 7 - 45 U/L   Morphology   Result Value Ref Range     RBC Morphology See Below       Polychromasia Mild       Hypochromia Mild       RBC Fragments Many       Target Cells Few              Assessment/Plan   Principal Problem:    TTP (thrombotic thrombocytopenic purpura) (Multi)        Tere Carrion is a 54 y.o. female with a notable history of TTP in 1998/1999 (treated with prolonged plasmapheresis, steroids, and immunosuppressants including vincristine and azathioprine, and splenectomy March 1999), reported sickle cell trait and alpha thalassemia trait presented on 6/18/24 for 2  weeks of worsening generalized weakness, fatigue, migraines, hematuria, and new bruising with petechiae, with TTP.     Hematuria has completely resolved over the last 3 days.     Transfusion medicine was consulted and she received PLEX x 3 (last on 6/21/24).     HIV, Hepatitis panel: Non-reactive  B12, folate, iron profile: Normal  T spot: Negative  ADAMTS <5, inhibitor level 2.4     Recommendations:  -Please follow up ZBSDAC19 inhibitor level  -Please continue prednisone 80 mg (1 mg/kg) daily (and steroid precautions with PPI, glucose control) to complete a total of 7 days and then start taper, 10mg every 5 days until 20mg, then hold at that dose.   -Please trend daily CBC   -Please do NOT transfuse platelets - this increases risk of stroke and myocardial infarction. Please discuss with hematology beforehand if considering (such as active bleeding).  -Please follow up stool PCR (Shiga toxin) for hemolytic uremic syndrome.  -Received rituximab on 6/21/24, planned weekly doses  -Paperwork completed for caplacizumab outpatient, inpatient treatment started on 6/21/24  -Will receive home going caplacizumab on Tuesday, 6/25/24     The patient was seen, examined and discussed with Dr. Walker.     Suni Ramírez MD  Hematology-Oncology Fellow, PGY IV

## 2024-06-23 VITALS
BODY MASS INDEX: 29.88 KG/M2 | RESPIRATION RATE: 18 BRPM | HEIGHT: 64 IN | TEMPERATURE: 99.7 F | OXYGEN SATURATION: 99 % | WEIGHT: 175 LBS | SYSTOLIC BLOOD PRESSURE: 139 MMHG | DIASTOLIC BLOOD PRESSURE: 81 MMHG | HEART RATE: 111 BPM

## 2024-06-23 LAB
ALBUMIN SERPL BCP-MCNC: 3.3 G/DL (ref 3.4–5)
ALP SERPL-CCNC: 63 U/L (ref 33–110)
ALT SERPL W P-5'-P-CCNC: 18 U/L (ref 7–45)
ANION GAP SERPL CALC-SCNC: 12 MMOL/L (ref 10–20)
APTT PPP: 25 SECONDS (ref 27–38)
APTT PPP: >200 SECONDS (ref 27–38)
AST SERPL W P-5'-P-CCNC: 26 U/L (ref 9–39)
BASOPHILS # BLD AUTO: 0.06 X10*3/UL (ref 0–0.1)
BASOPHILS NFR BLD AUTO: 0.4 %
BILIRUB SERPL-MCNC: 0.3 MG/DL (ref 0–1.2)
BUN SERPL-MCNC: 18 MG/DL (ref 6–23)
CALCIUM SERPL-MCNC: 8.8 MG/DL (ref 8.6–10.6)
CHLORIDE SERPL-SCNC: 104 MMOL/L (ref 98–107)
CO2 SERPL-SCNC: 28 MMOL/L (ref 21–32)
CREAT SERPL-MCNC: 0.92 MG/DL (ref 0.5–1.05)
D DIMER PPP FEU-MCNC: 5885 NG/ML FEU
EGFRCR SERPLBLD CKD-EPI 2021: 74 ML/MIN/1.73M*2
EOSINOPHIL # BLD AUTO: 0 X10*3/UL (ref 0–0.7)
EOSINOPHIL NFR BLD AUTO: 0 %
ERYTHROCYTE [DISTWIDTH] IN BLOOD BY AUTOMATED COUNT: 19.7 % (ref 11.5–14.5)
FIBRINOGEN PPP-MCNC: 192 MG/DL (ref 200–400)
GLUCOSE SERPL-MCNC: 69 MG/DL (ref 74–99)
HCT VFR BLD AUTO: 29.3 % (ref 36–46)
HGB BLD-MCNC: 8.9 G/DL (ref 12–16)
HGB RETIC QN: 28 PG (ref 28–38)
IMM GRANULOCYTES # BLD AUTO: 0.31 X10*3/UL (ref 0–0.7)
IMM GRANULOCYTES NFR BLD AUTO: 2 % (ref 0–0.9)
IMMATURE RETIC FRACTION: 29.3 %
INR PPP: 0.9 (ref 0.9–1.1)
INR PPP: ABNORMAL
LDH SERPL L TO P-CCNC: 253 U/L (ref 84–246)
LYMPHOCYTES # BLD AUTO: 1.9 X10*3/UL (ref 1.2–4.8)
LYMPHOCYTES NFR BLD AUTO: 12 %
MCH RBC QN AUTO: 27.1 PG (ref 26–34)
MCHC RBC AUTO-ENTMCNC: 30.4 G/DL (ref 32–36)
MCV RBC AUTO: 89 FL (ref 80–100)
MONOCYTES # BLD AUTO: 1.01 X10*3/UL (ref 0.1–1)
MONOCYTES NFR BLD AUTO: 6.4 %
NEUTROPHILS # BLD AUTO: 12.53 X10*3/UL (ref 1.2–7.7)
NEUTROPHILS NFR BLD AUTO: 79.2 %
NRBC BLD-RTO: 2.7 /100 WBCS (ref 0–0)
PAPPENHEIMER BOD BLD QL SMEAR: PRESENT
PLATELET # BLD AUTO: 268 X10*3/UL (ref 150–450)
POLYCHROMASIA BLD QL SMEAR: NORMAL
POTASSIUM SERPL-SCNC: 4 MMOL/L (ref 3.5–5.3)
PROT SERPL-MCNC: 6 G/DL (ref 6.4–8.2)
PROTHROMBIN TIME: 10.6 SECONDS (ref 9.8–12.8)
PROTHROMBIN TIME: >100 SECONDS (ref 9.8–12.8)
RBC # BLD AUTO: 3.29 X10*6/UL (ref 4–5.2)
RBC MORPH BLD: NORMAL
RETICS #: 0.38 X10*6/UL (ref 0.02–0.08)
RETICS/RBC NFR AUTO: 11.4 % (ref 0.5–2)
SCHISTOCYTES BLD QL SMEAR: NORMAL
SODIUM SERPL-SCNC: 140 MMOL/L (ref 136–145)
WBC # BLD AUTO: 15.8 X10*3/UL (ref 4.4–11.3)

## 2024-06-23 PROCEDURE — 80053 COMPREHEN METABOLIC PANEL: CPT | Performed by: PHYSICIAN ASSISTANT

## 2024-06-23 PROCEDURE — 83615 LACTATE (LD) (LDH) ENZYME: CPT

## 2024-06-23 PROCEDURE — 36415 COLL VENOUS BLD VENIPUNCTURE: CPT

## 2024-06-23 PROCEDURE — C9047 INJECTION, CAPLACIZUMAB-YHDP: HCPCS | Mod: JZ | Performed by: INTERNAL MEDICINE

## 2024-06-23 PROCEDURE — 1200000003 HC ONCOLOGY  ROOM WITH TELEMETRY DAILY

## 2024-06-23 PROCEDURE — 85379 FIBRIN DEGRADATION QUANT: CPT

## 2024-06-23 PROCEDURE — 85384 FIBRINOGEN ACTIVITY: CPT

## 2024-06-23 PROCEDURE — 85610 PROTHROMBIN TIME: CPT

## 2024-06-23 PROCEDURE — 2500000004 HC RX 250 GENERAL PHARMACY W/ HCPCS (ALT 636 FOR OP/ED): Mod: JZ | Performed by: INTERNAL MEDICINE

## 2024-06-23 PROCEDURE — 2500000002 HC RX 250 W HCPCS SELF ADMINISTERED DRUGS (ALT 637 FOR MEDICARE OP, ALT 636 FOR OP/ED)

## 2024-06-23 PROCEDURE — 2500000001 HC RX 250 WO HCPCS SELF ADMINISTERED DRUGS (ALT 637 FOR MEDICARE OP)

## 2024-06-23 PROCEDURE — 99233 SBSQ HOSP IP/OBS HIGH 50: CPT

## 2024-06-23 PROCEDURE — 2500000004 HC RX 250 GENERAL PHARMACY W/ HCPCS (ALT 636 FOR OP/ED)

## 2024-06-23 PROCEDURE — 85045 AUTOMATED RETICULOCYTE COUNT: CPT

## 2024-06-23 PROCEDURE — 85025 COMPLETE CBC W/AUTO DIFF WBC: CPT

## 2024-06-23 PROCEDURE — 2500000001 HC RX 250 WO HCPCS SELF ADMINISTERED DRUGS (ALT 637 FOR MEDICARE OP): Performed by: STUDENT IN AN ORGANIZED HEALTH CARE EDUCATION/TRAINING PROGRAM

## 2024-06-23 RX ORDER — POTASSIUM CHLORIDE 1.5 G/1.58G
40 POWDER, FOR SOLUTION ORAL ONCE
Status: COMPLETED | OUTPATIENT
Start: 2024-06-23 | End: 2024-06-23

## 2024-06-23 ASSESSMENT — COGNITIVE AND FUNCTIONAL STATUS - GENERAL
MOBILITY SCORE: 24
DAILY ACTIVITIY SCORE: 24

## 2024-06-23 ASSESSMENT — PAIN SCALES - GENERAL
PAINLEVEL_OUTOF10: 0 - NO PAIN
PAINLEVEL_OUTOF10: 0 - NO PAIN

## 2024-06-23 ASSESSMENT — PAIN - FUNCTIONAL ASSESSMENT: PAIN_FUNCTIONAL_ASSESSMENT: 0-10

## 2024-06-23 NOTE — PROGRESS NOTES
"Tere Carrion is a 54 y.o. female on day 4 of admission presenting with TTP (thrombotic thrombocytopenic purpura) (Multi).    Subjective   Seen at bedside, central line removed. Denies N/V/D/C, chest pain, cough, shortness of breath.  Discussed plan for rituximab and prednisone, she would like her infusions on Monday's since she is off work. Will change in DC orders.        Objective     Physical Exam  Constitutional:       General: She is not in acute distress.     Appearance: She is not toxic-appearing.   HENT:      Head: Normocephalic and atraumatic.   Eyes:      General: No scleral icterus.     Extraocular Movements: Extraocular movements intact.   Cardiovascular:      Rate and Rhythm: Normal rate and regular rhythm.   Pulmonary:      Effort: No respiratory distress.   Abdominal:      General: Abdomen is flat.      Palpations: Abdomen is soft.      Tenderness: There is no abdominal tenderness.   Musculoskeletal:         General: No swelling or tenderness.   Skin:     Coloration: Skin is not jaundiced.      Findings: No bruising or erythema.   Neurological:      Mental Status: She is oriented to person, place, and time. Mental status is at baseline.         Last Recorded Vitals  Blood pressure 111/70, pulse 66, temperature 36.7 °C (98.1 °F), temperature source Temporal, resp. rate 18, height 1.626 m (5' 4\"), weight 79.4 kg (175 lb), last menstrual period 12/31/2023, SpO2 94%.  Intake/Output last 3 Shifts:  I/O last 3 completed shifts:  In: 377 (4.7 mL/kg) [IV Piggyback:377]  Out: - (0 mL/kg)   Weight: 79.4 kg         Assessment/Plan   Principal Problem:    TTP (thrombotic thrombocytopenic purpura) (Multi)    Tere Carrion is a 54 y.o. female with a PMHx significant for TTP in 1999 ((treated with prolonged plasmapheresis, steroids, and immunosuppressants including vincristine and azathioprine, splenectomy March 1999), reported sickle cell trait, alpha thalassemia trait, who presented to [facility] ED after POC " hemoglobin was 6 at her PCP's office and was transferred to [facility] for potential plasmapheresis. She is on daily prednisone 80 mg. She is s/p plasmapheresis (6/19, 6/20, 6/21). She was having urinary frequency, urgency, and hematuria prior to admission. UA 6/19 (+) leuk and blood, s/p ceftriaxone 6/19; urine cultures showed now growth and symptoms resolved so antibiotics were discontinued. She initially had CP and SOB with activity, troponin were trended and began to down trend on 6/20 from 428 to 255 so troponin trend was discontinued. She had resolution of CP and SOB as her blood counts improved. 6/21 heme rec Rituximab and Caplacizumab, s/p first doses 6/21/24. Plan for weekly rituximab infusions and daily caplacizumab subcutaneous, taper steroid by 10mg q5d until maintenance dose of 20mg daily. Plan DC after caplacizumab delievered to bedside on 6/25.      #Thrombocytopenia   #TTP  - Hx of TTP in 1999 s/p prolonged plasmapheresis, steroids, and immunosuppressants including vincristine and azathioprine, splenectomy March 1999.  - heme on board, they recommended hemoglobin electrophoresis, HIV, HCV, HBV labs, stool pathogen PCR, plasma cell dyscrasia workup  - Peripheral smear (from heme note):   WBC: no circulating blasts seen, mature lymphocytes, mature neutrophils  RBC: several schistocytes, no dacrocytes, no spherocytes, RBCs with increased pallor, microcytosis, several target cells, some reticulocytes, occasional nucleated RBC  Plt: no platelet clumping, no enlarged platelets, true thrombocytopenia  - HIV, HCV, HBV not detected (6/19)  - 6/19 IgG 1,850; IgM 84; IgA 325; kappa FLC 2.91; ryanne FLC 2.26; K/L 1.29  - [id number] <5  - s/p PLEX 6/19, during transfusion developed hives and was given 2 doses of benadryl.  - s/p PLEX 6/20, during transfusion developed hives and was given 2 doses of benadryl, 125 mg solumedrol, and Pepcid.  - s/p PLEX 6/21, plt prior to PLEX was 148 so plan to hold PLEX and observe counts  to ensure plt stays about 150 per transfusion medicine.  - Heme recs:   - start Rituximab and Caplacizumab, s/p first doses 6/21/24  - plan for weekly rituximab infusions (patient prefers Mondays) and daily caplacizumab subcutaneous  - continue Prednisone 1mg/kg (80mg daily) x 7 days, then taper by 10mg every 5 days with a final maintenance dose of 20mg daily (ordered)  - PPI prophy while in steroids  - avoid platelet transfusion, in case of emergency, contact hematology fellow     #Anemia  - Likely secondary to TTP  - anemia w/up (6/19): iron 117, ferritin 686, B12 404  - Hgb on admission 5.8 (baseline unknown); s/p 2u pRBCs 6/19; 6/20 hgb 7.6; 6/21 7.8  - G6PD screen negative     #Hematuria on POC UA   #r/o UTI  - Maybe related to underlying hemolysis   - UA (6/19) + Leuk and blood; - nitrite  - Ucx (6/19) NGTD  - s/p Ceftriaxone (6/19), discontinued with resolution of symptoms and no growth on urine culture.    #Troponin elevation  #Type II MI   - non specific TWI in V1 and V3  - likely 2/2 anemia, no chest pain at rest  - keep Hgb >8  - trend troponin: (6/18) 256 --> 257 --> (6/19) 410 --> 428 -->255  - resolution of chest pain and shortness of breath with count incrementing.    #Headaches  - Hx of headaches, at home takes motrin and Excedrin; will hold in setting of thrombocytopenia  - CT head (6/18): No evidence of acute cortical infarct or intracranial hemorrhage.   - s/p Mg and morphine at [facility]     #Reported Hx of Sinus tachycardia   - reports being on home metoprolol 25 succ daily   - hold with anemia      #Anxiety  - continue home zoloft 50mg daily     #High risk for breast cancer  - s/p right breast excisional biopsy (2013) showing atypical ductal hyperplasia  - s/p left breast core biopsy (2013) which showed benign sclerosing adenosis and apocrine metaplasia.  - Due to high risk of breast cancer due to personal and family history was started on Tamoxifen in 9/2023 and plan to continue for 3 years for  risk reduction.  - hold tamoxifen as inpatient     Dispo:  - Code status: full code  - DC pending anemia w/up   - Surrogate decision maker: Daughter Zara [phone]  - FUV: 7/23 Modesta Gallego (PCP); 7/31 Hillary Johnson (surg onc)  - 2nd dose rituximab 6/28, will schedule future infusions on Monday per request       I spent >60 minutes in the professional and overall care of this patient.      Susy Bernard, APRN-CNP

## 2024-06-23 NOTE — PROGRESS NOTES
"Tere Carrion is a 54 y.o. female on day 1 of admission presenting with TTP (thrombotic thrombocytopenic purpura) (Multi).     Subjective   No signs of bleeding. No complaints.      Objective      Physical Exam  General: very pleasant, awake, alert, no acute distress  CV: tachycardic rate, regular rhythm, no MRG  Resp: CTAB, no labored breathing  Abdominal: soft, non-tender, non-distended, active bowel sounds  Extremities: full ROM, no lower extremity swelling     Last Recorded Vitals  Blood pressure 107/67, pulse 80, temperature 36.3 °C (97.3 °F), temperature source Temporal, resp. rate 18, height 1.626 m (5' 4\"), weight 79.4 kg (175 lb), last menstrual period 12/31/2023, SpO2 98%.  Intake/Output last 3 Shifts:  I/O last 3 completed shifts:  In: 5704 (71.9 mL/kg) [Blood:1622; Other:4082]  Out: 3833 (48.3 mL/kg) [Other:3833]  Weight: 79.4 kg      Relevant Results        Results for orders placed or performed during the hospital encounter of 06/18/24 (from the past 24 hour(s))   Calcium, Ionized   Result Value Ref Range     POCT Calcium, Ionized 1.21 1.1 - 1.33 mmol/L   CBC and Auto Differential   Result Value Ref Range     WBC 13.6 (H) 4.4 - 11.3 x10*3/uL     nRBC 19.9 (H) 0.0 - 0.0 /100 WBCs     RBC 2.86 (L) 4.00 - 5.20 x10*6/uL     Hemoglobin 7.6 (L) 12.0 - 16.0 g/dL     Hematocrit 21.9 (L) 36.0 - 46.0 %     MCV 77 (L) 80 - 100 fL     MCH 26.6 26.0 - 34.0 pg     MCHC 34.7 32.0 - 36.0 g/dL     RDW 19.9 (H) 11.5 - 14.5 %     Platelets 53 (L) 150 - 450 x10*3/uL     Neutrophils % 84.5 40.0 - 80.0 %     Immature Granulocytes %, Automated 1.0 (H) 0.0 - 0.9 %     Lymphocytes % 6.3 13.0 - 44.0 %     Monocytes % 8.0 2.0 - 10.0 %     Eosinophils % 0.0 0.0 - 6.0 %     Basophils % 0.2 0.0 - 2.0 %     Neutrophils Absolute 11.46 (H) 1.20 - 7.70 x10*3/uL     Immature Granulocytes Absolute, Automated 0.13 0.00 - 0.70 x10*3/uL     Lymphocytes Absolute 0.85 (L) 1.20 - 4.80 x10*3/uL     Monocytes Absolute 1.09 (H) 0.10 - 1.00 " x10*3/uL     Eosinophils Absolute 0.00 0.00 - 0.70 x10*3/uL     Basophils Absolute 0.03 0.00 - 0.10 x10*3/uL   Coagulation Screen   Result Value Ref Range     Protime 11.0 9.8 - 12.8 seconds     INR 1.0 0.9 - 1.1     aPTT 20 (L) 27 - 38 seconds   D-Dimer, VTE Exclusion   Result Value Ref Range     D-Dimer, Quantitative VTE Exclusion 6,130 (H) <=500 ng/mL FEU   Fibrinogen   Result Value Ref Range     Fibrinogen 216 200 - 400 mg/dL   Reticulocytes   Result Value Ref Range     Retic % 9.8 (H) 0.5 - 2.0 %     Retic Absolute 0.279 (H) 0.018 - 0.083 x10*6/uL     Reticulocyte Hemoglobin 29 28 - 38 pg     Immature Retic fraction 47.0 (H) <=16.0 %   Lactate dehydrogenase   Result Value Ref Range      (H) 84 - 246 U/L   Comprehensive Metabolic Panel   Result Value Ref Range     Glucose 106 (H) 74 - 99 mg/dL     Sodium 142 136 - 145 mmol/L     Potassium 3.7 3.5 - 5.3 mmol/L     Chloride 105 98 - 107 mmol/L     Bicarbonate 26 21 - 32 mmol/L     Anion Gap 15 10 - 20 mmol/L     Urea Nitrogen 11 6 - 23 mg/dL     Creatinine 0.93 0.50 - 1.05 mg/dL     eGFR 73 >60 mL/min/1.73m*2     Calcium 8.8 8.6 - 10.6 mg/dL     Albumin 3.5 3.4 - 5.0 g/dL     Alkaline Phosphatase 58 33 - 110 U/L     Total Protein 6.1 (L) 6.4 - 8.2 g/dL     AST 22 9 - 39 U/L     Bilirubin, Total 0.4 0.0 - 1.2 mg/dL     ALT 13 7 - 45 U/L   Morphology   Result Value Ref Range     RBC Morphology See Below       Polychromasia Mild       Hypochromia Mild       RBC Fragments Many       Target Cells Few              Assessment/Plan   Principal Problem:    TTP (thrombotic thrombocytopenic purpura) (Multi)        Tere Carrion is a 54 y.o. female with a notable history of TTP in 1998/1999 (treated with prolonged plasmapheresis, steroids, and immunosuppressants including vincristine and azathioprine, and splenectomy March 1999), reported sickle cell trait and alpha thalassemia trait presented on 6/18/24 for 2 weeks of worsening generalized weakness, fatigue,  migraines, hematuria, and new bruising with petechiae, with TTP.     Hematuria has completely resolved over the last 3 days.     Transfusion medicine was consulted and she received PLEX x 3 (last on 6/21/24).     HIV, Hepatitis panel: Non-reactive  B12, folate, iron profile: Normal  T spot: Negative  ADAMTS <5, inhibitor level 2.4     Recommendations:  -Please continue prednisone 80 mg (1 mg/kg) for today (and steroid precautions with PPI, glucose control), start taper tomorrow with 60 mg, then taper 10mg every 5 days until 20mg, then hold at that dose.   -Please trend daily CBC   -Please do NOT transfuse platelets - this increases risk of stroke and myocardial infarction. Please discuss with hematology beforehand if considering (such as active bleeding).  -Please follow up stool PCR (Shiga toxin) for hemolytic uremic syndrome.  -Received rituximab on 6/21/24, planned weekly doses, infusion appts arranged by primary team  -Paperwork completed for caplacizumab outpatient, inpatient treatment started on 6/21/24  -Will receive home going caplacizumab on Tuesday, 6/25/24     The patient was discussed with Dr. Walker.     Suni Ramírez MD  Hematology-Oncology Fellow, PGY IV

## 2024-06-23 NOTE — CARE PLAN
The patient's goals for the shift include      The clinical goals for the shift include PT will remain HDS and VSS through end of shift 6/23/2024 1900      Problem: Pain - Adult  Goal: Verbalizes/displays adequate comfort level or baseline comfort level  Outcome: Progressing     Problem: Safety - Adult  Goal: Free from fall injury  Outcome: Progressing     Problem: Skin  Goal: Decreased wound size/increased tissue granulation at next dressing change  Outcome: Progressing  Goal: Participates in plan/prevention/treatment measures  Outcome: Progressing  Goal: Prevent/manage excess moisture  Outcome: Progressing  Goal: Prevent/minimize sheer/friction injuries  Outcome: Progressing  Goal: Promote/optimize nutrition  Outcome: Progressing  Goal: Promote skin healing  Outcome: Progressing

## 2024-06-24 ENCOUNTER — OFFICE VISIT (OUTPATIENT)
Dept: HEMATOLOGY/ONCOLOGY | Facility: HOSPITAL | Age: 54
End: 2024-06-24
Payer: COMMERCIAL

## 2024-06-24 LAB
ALBUMIN SERPL BCP-MCNC: 3.9 G/DL (ref 3.4–5)
ALP SERPL-CCNC: 63 U/L (ref 33–110)
ALT SERPL W P-5'-P-CCNC: 29 U/L (ref 7–45)
ANION GAP SERPL CALC-SCNC: 17 MMOL/L (ref 10–20)
APTT PPP: 26 SECONDS (ref 27–38)
AST SERPL W P-5'-P-CCNC: 26 U/L (ref 9–39)
BASOPHILS # BLD AUTO: 0.03 X10*3/UL (ref 0–0.1)
BASOPHILS NFR BLD AUTO: 0.2 %
BILIRUB SERPL-MCNC: 0.3 MG/DL (ref 0–1.2)
BUN SERPL-MCNC: 17 MG/DL (ref 6–23)
CALCIUM SERPL-MCNC: 9 MG/DL (ref 8.6–10.6)
CHLORIDE SERPL-SCNC: 104 MMOL/L (ref 98–107)
CO2 SERPL-SCNC: 24 MMOL/L (ref 21–32)
CREAT SERPL-MCNC: 0.87 MG/DL (ref 0.5–1.05)
D DIMER PPP FEU-MCNC: 4268 NG/ML FEU
EGFRCR SERPLBLD CKD-EPI 2021: 79 ML/MIN/1.73M*2
EOSINOPHIL # BLD AUTO: 0 X10*3/UL (ref 0–0.7)
EOSINOPHIL NFR BLD AUTO: 0 %
ERYTHROCYTE [DISTWIDTH] IN BLOOD BY AUTOMATED COUNT: 18.1 % (ref 11.5–14.5)
FIBRINOGEN PPP-MCNC: 229 MG/DL (ref 200–400)
GLUCOSE SERPL-MCNC: 162 MG/DL (ref 74–99)
HCT VFR BLD AUTO: 30.5 % (ref 36–46)
HEMOGLOBIN A2: 3.3 % (ref 2–3.5)
HEMOGLOBIN A: 66.9 % (ref 95.8–98)
HEMOGLOBIN F: 0.2 % (ref 0–2)
HEMOGLOBIN IDENTIFICATION INTERPRETATION: ABNORMAL
HEMOGLOBIN S: 29.6 %
HGB BLD-MCNC: 9.6 G/DL (ref 12–16)
HGB RETIC QN: 28 PG (ref 28–38)
IMM GRANULOCYTES # BLD AUTO: 0.13 X10*3/UL (ref 0–0.7)
IMM GRANULOCYTES NFR BLD AUTO: 0.8 % (ref 0–0.9)
IMMATURE RETIC FRACTION: 25.3 %
INR PPP: 0.9 (ref 0.9–1.1)
LDH SERPL L TO P-CCNC: 215 U/L (ref 84–246)
LYMPHOCYTES # BLD AUTO: 0.38 X10*3/UL (ref 1.2–4.8)
LYMPHOCYTES NFR BLD AUTO: 2.3 %
MCH RBC QN AUTO: 26.7 PG (ref 26–34)
MCHC RBC AUTO-ENTMCNC: 31.5 G/DL (ref 32–36)
MCV RBC AUTO: 85 FL (ref 80–100)
MONOCYTES # BLD AUTO: 0.22 X10*3/UL (ref 0.1–1)
MONOCYTES NFR BLD AUTO: 1.3 %
NEUTROPHILS # BLD AUTO: 15.82 X10*3/UL (ref 1.2–7.7)
NEUTROPHILS NFR BLD AUTO: 95.4 %
NRBC BLD-RTO: 1.4 /100 WBCS (ref 0–0)
PATH REVIEW-HGB IDENTIFICATION: ABNORMAL
PLATELET # BLD AUTO: 425 X10*3/UL (ref 150–450)
POTASSIUM SERPL-SCNC: 3.9 MMOL/L (ref 3.5–5.3)
PROT SERPL-MCNC: 6.8 G/DL (ref 6.4–8.2)
PROTHROMBIN TIME: 10.2 SECONDS (ref 9.8–12.8)
RBC # BLD AUTO: 3.6 X10*6/UL (ref 4–5.2)
RETICS #: 0.27 X10*6/UL (ref 0.02–0.08)
RETICS/RBC NFR AUTO: 7.6 % (ref 0.5–2)
SODIUM SERPL-SCNC: 141 MMOL/L (ref 136–145)
TSH SERPL-ACNC: 0.49 MIU/L (ref 0.44–3.98)
WBC # BLD AUTO: 16.6 X10*3/UL (ref 4.4–11.3)

## 2024-06-24 PROCEDURE — 85025 COMPLETE CBC W/AUTO DIFF WBC: CPT

## 2024-06-24 PROCEDURE — 85384 FIBRINOGEN ACTIVITY: CPT

## 2024-06-24 PROCEDURE — 99232 SBSQ HOSP IP/OBS MODERATE 35: CPT | Performed by: INTERNAL MEDICINE

## 2024-06-24 PROCEDURE — 2500000002 HC RX 250 W HCPCS SELF ADMINISTERED DRUGS (ALT 637 FOR MEDICARE OP, ALT 636 FOR OP/ED)

## 2024-06-24 PROCEDURE — 84075 ASSAY ALKALINE PHOSPHATASE: CPT | Performed by: PHYSICIAN ASSISTANT

## 2024-06-24 PROCEDURE — 83615 LACTATE (LD) (LDH) ENZYME: CPT

## 2024-06-24 PROCEDURE — 93005 ELECTROCARDIOGRAM TRACING: CPT

## 2024-06-24 PROCEDURE — 85045 AUTOMATED RETICULOCYTE COUNT: CPT

## 2024-06-24 PROCEDURE — RXMED WILLOW AMBULATORY MEDICATION CHARGE

## 2024-06-24 PROCEDURE — 2500000004 HC RX 250 GENERAL PHARMACY W/ HCPCS (ALT 636 FOR OP/ED): Mod: JZ | Performed by: INTERNAL MEDICINE

## 2024-06-24 PROCEDURE — 36415 COLL VENOUS BLD VENIPUNCTURE: CPT

## 2024-06-24 PROCEDURE — 2500000004 HC RX 250 GENERAL PHARMACY W/ HCPCS (ALT 636 FOR OP/ED)

## 2024-06-24 PROCEDURE — C9047 INJECTION, CAPLACIZUMAB-YHDP: HCPCS | Mod: JZ | Performed by: INTERNAL MEDICINE

## 2024-06-24 PROCEDURE — 85379 FIBRIN DEGRADATION QUANT: CPT

## 2024-06-24 PROCEDURE — 2500000001 HC RX 250 WO HCPCS SELF ADMINISTERED DRUGS (ALT 637 FOR MEDICARE OP): Performed by: STUDENT IN AN ORGANIZED HEALTH CARE EDUCATION/TRAINING PROGRAM

## 2024-06-24 PROCEDURE — 85610 PROTHROMBIN TIME: CPT

## 2024-06-24 PROCEDURE — 1200000003 HC ONCOLOGY  ROOM WITH TELEMETRY DAILY

## 2024-06-24 PROCEDURE — 87506 IADNA-DNA/RNA PROBE TQ 6-11: CPT | Performed by: STUDENT IN AN ORGANIZED HEALTH CARE EDUCATION/TRAINING PROGRAM

## 2024-06-24 RX ORDER — PREDNISONE 20 MG/1
20 TABLET ORAL DAILY
Status: DISCONTINUED | OUTPATIENT
Start: 2024-07-15 | End: 2024-06-28 | Stop reason: HOSPADM

## 2024-06-24 RX ORDER — PREDNISONE 20 MG/1
40 TABLET ORAL DAILY
Status: DISCONTINUED | OUTPATIENT
Start: 2024-07-05 | End: 2024-06-28 | Stop reason: HOSPADM

## 2024-06-24 RX ORDER — PREDNISONE 10 MG/1
TABLET ORAL
Qty: 100 TABLET | Refills: 0 | Status: SHIPPED | OUTPATIENT
Start: 2024-06-25 | End: 2024-07-20

## 2024-06-24 RX ORDER — PREDNISONE 20 MG/1
20 TABLET ORAL DAILY
Qty: 5 TABLET | Refills: 0 | Status: SHIPPED | OUTPATIENT
Start: 2024-07-15 | End: 2024-06-24

## 2024-06-24 RX ORDER — METOPROLOL SUCCINATE 25 MG/1
25 TABLET, EXTENDED RELEASE ORAL DAILY
Status: DISCONTINUED | OUTPATIENT
Start: 2024-06-24 | End: 2024-06-28 | Stop reason: HOSPADM

## 2024-06-24 RX ORDER — PREDNISONE 20 MG/1
60 TABLET ORAL DAILY
Status: DISCONTINUED | OUTPATIENT
Start: 2024-06-25 | End: 2024-06-28 | Stop reason: HOSPADM

## 2024-06-24 ASSESSMENT — COGNITIVE AND FUNCTIONAL STATUS - GENERAL
DAILY ACTIVITIY SCORE: 24
MOBILITY SCORE: 24
DAILY ACTIVITIY SCORE: 24
MOBILITY SCORE: 24

## 2024-06-24 ASSESSMENT — PAIN SCALES - GENERAL
PAINLEVEL_OUTOF10: 0 - NO PAIN
PAINLEVEL_OUTOF10: 0 - NO PAIN

## 2024-06-24 ASSESSMENT — PAIN - FUNCTIONAL ASSESSMENT: PAIN_FUNCTIONAL_ASSESSMENT: 0-10

## 2024-06-24 NOTE — PROGRESS NOTES
"Tere Carrion is a 54 y.o. female on day 5 of admission presenting with TTP (thrombotic thrombocytopenic purpura) (Multi).    Subjective   Afebrile. VSS. She is feeling okay today. We discussed her medications plans for home including her Rituximab infusions, caplacizumab, and prednisone taper. She is going to follow-up for her Rituximab infusion Friday and then will switch to Monday appointments. All questions were answered.  All other ROS otherwise negative.     Objective     Physical Exam  Constitutional:       General: She is not in acute distress.     Appearance: She is not toxic-appearing.   HENT:      Head: Normocephalic and atraumatic.   Eyes:      General: No scleral icterus.     Extraocular Movements: Extraocular movements intact.   Cardiovascular:      Rate and Rhythm: Normal rate and regular rhythm.   Pulmonary:      Effort: Pulmonary effort is normal. No respiratory distress.      Breath sounds: Normal breath sounds. No wheezing.   Abdominal:      General: Abdomen is flat.      Palpations: Abdomen is soft.      Tenderness: There is no abdominal tenderness.   Musculoskeletal:         General: No swelling or tenderness.   Skin:     Coloration: Skin is not jaundiced.      Findings: No bruising or erythema.   Neurological:      Mental Status: She is oriented to person, place, and time. Mental status is at baseline.         Last Recorded Vitals  Blood pressure 117/72, pulse 61, temperature 36 °C (96.8 °F), resp. rate 18, height 1.626 m (5' 4\"), weight 79.4 kg (175 lb), last menstrual period 12/31/2023, SpO2 95%.  Intake/Output last 3 Shifts:  No intake/output data recorded.    Assessment/Plan   Principal Problem:    TTP (thrombotic thrombocytopenic purpura) (Multi)    Tere Carrion is a 54 y.o. female with a PMHx significant for TTP in 1999 ((treated with prolonged plasmapheresis, steroids, and immunosuppressants including vincristine and azathioprine, splenectomy March 1999), reported sickle cell trait, " alpha thalassemia trait, who presented to [facility] ED after POC hemoglobin was 6 at her PCP's office and was transferred to [facility] for potential plasmapheresis. She is on daily prednisone 80 mg. She is s/p plasmapheresis (6/19, 6/20, 6/21). She was having urinary frequency, urgency, and hematuria prior to admission. UA 6/19 (+) leuk and blood, s/p ceftriaxone 6/19; urine cultures showed now growth and symptoms resolved so antibiotics were discontinued. She initially had CP and SOB with activity, troponin were trended and began to down trend on 6/20 from 428 to 255 so troponin trend was discontinued. She had resolution of CP and SOB as her blood counts improved. 6/21 heme rec Rituximab and Caplacizumab, s/p first doses 6/21/24. Plan for weekly rituximab infusions and daily caplacizumab subcutaneous, taper steroid by 10mg q5d until maintenance dose of 20mg daily. Plan DC after caplacizumab delievered to bedside on 6/25.      #Thrombocytopenia   #TTP  - Hx of TTP in 1999 s/p prolonged plasmapheresis, steroids, and immunosuppressants including vincristine and azathioprine, splenectomy March 1999.  - heme on board, they recommended hemoglobin electrophoresis, HIV, HCV, HBV labs, stool pathogen PCR, plasma cell dyscrasia workup  - Peripheral smear (from heme note):   WBC: no circulating blasts seen, mature lymphocytes, mature neutrophils  RBC: several schistocytes, no dacrocytes, no spherocytes, RBCs with increased pallor, microcytosis, several target cells, some reticulocytes, occasional nucleated RBC  Plt: no platelet clumping, no enlarged platelets, true thrombocytopenia  - HIV, HCV, HBV not detected (6/19)  - 6/19 IgG 1,850; IgM 84; IgA 325; kappa FLC 2.91; ryanne FLC 2.26; K/L 1.29  - [id number] <5  - s/p PLEX 6/19, during transfusion developed hives and was given 2 doses of benadryl.  - s/p PLEX 6/20, during transfusion developed hives and was given 2 doses of benadryl, 125 mg solumedrol, and Pepcid.  - s/p PLEX 6/21,  plt prior to PLEX was 148 so plan to hold PLEX and observe counts to ensure plt stays about 150 per transfusion medicine.  - Heme recs:   - start Rituximab and Caplacizumab, s/p first doses 6/21/24  - plan for weekly rituximab infusions (patient prefers Mondays) and daily caplacizumab subcutaneous  - Start prednisone taper at 60 mg (plan to start 6/25), then taper 10 mg every 5 days until 20 mg, then hold at that dose.  - PPI prophy while in steroids  - avoid platelet transfusion, in case of emergency, contact hematology fellow     #Anemia  - Likely secondary to TTP  - anemia w/up (6/19): iron 117, ferritin 686, B12 404  - Hgb on admission 5.8 (baseline unknown); s/p 2u pRBCs 6/19; 6/20 hgb 7.6; 6/21 7.8; 6/24 8.9  - G6PD screen negative     #Hematuria on POC UA   #r/o UTI  - Maybe related to underlying hemolysis   - UA (6/19) + Leuk and blood; - nitrite  - Ucx (6/19) NGTD  - s/p Ceftriaxone (6/19), discontinued with resolution of symptoms and no growth on urine culture.    #Troponin elevation  #Type II MI   - non specific TWI in V1 and V3  - likely 2/2 anemia, no chest pain at rest  - keep Hgb >8  - trend troponin: (6/18) 256 --> 257 --> (6/19) 410 --> 428 -->255  - resolution of chest pain and shortness of breath with count incrementing.    #Headaches  - Hx of headaches, at home takes motrin and Excedrin; will hold in setting of thrombocytopenia  - CT head (6/18): No evidence of acute cortical infarct or intracranial hemorrhage.   - s/p Mg and morphine at [facility]     #Reported Hx of Sinus tachycardia   - reports being on home metoprolol 25 succ daily   - hold with anemia      #Anxiety  - continue home zoloft 50mg daily     #High risk for breast cancer  - s/p right breast excisional biopsy (2013) showing atypical ductal hyperplasia  - s/p left breast core biopsy (2013) which showed benign sclerosing adenosis and apocrine metaplasia.  - Due to high risk of breast cancer due to personal and family history was started on  Tamoxifen in 9/2023 and plan to continue for 3 years for risk reduction.  - hold tamoxifen as inpatient     Dispo:  - Code status: full code  - DC pending anemia w/up   - Surrogate decision maker: Daughter Zara [phone]  - FUV: 7/23 Modesta Gallego (PCP); 7/31 Hillary Johnson (surg onc)  - 2nd dose rituximab 6/28, will schedule future infusions on Monday per request    Assessment and Plan discussed with attending physician Dr. Lavell PA-C

## 2024-06-24 NOTE — CARE PLAN
The clinical goals for the shift include Pt will remain HDS and VSS throughout shift 6/24/24 by 1900.      Problem: Pain - Adult  Goal: Verbalizes/displays adequate comfort level or baseline comfort level  Outcome: Progressing     Problem: Safety - Adult  Goal: Free from fall injury  Outcome: Progressing     Problem: Skin  Goal: Decreased wound size/increased tissue granulation at next dressing change  Outcome: Progressing  Goal: Participates in plan/prevention/treatment measures  Outcome: Progressing  Goal: Prevent/manage excess moisture  Outcome: Progressing  Goal: Prevent/minimize sheer/friction injuries  Outcome: Progressing  Goal: Promote/optimize nutrition  Outcome: Progressing  Goal: Promote skin healing  Outcome: Progressing

## 2024-06-25 ENCOUNTER — PHARMACY VISIT (OUTPATIENT)
Dept: PHARMACY | Facility: CLINIC | Age: 54
End: 2024-06-25
Payer: COMMERCIAL

## 2024-06-25 DIAGNOSIS — M31.19 ACQUIRED TTP (MULTI): Primary | ICD-10-CM

## 2024-06-25 LAB
ALBUMIN SERPL BCP-MCNC: 3.3 G/DL (ref 3.4–5)
ALP SERPL-CCNC: 55 U/L (ref 33–110)
ALT SERPL W P-5'-P-CCNC: 26 U/L (ref 7–45)
ANION GAP SERPL CALC-SCNC: 12 MMOL/L (ref 10–20)
APTT PPP: 26 SECONDS (ref 27–38)
AST SERPL W P-5'-P-CCNC: 24 U/L (ref 9–39)
BASOPHILS # BLD AUTO: 0.03 X10*3/UL (ref 0–0.1)
BASOPHILS NFR BLD AUTO: 0.2 %
BILIRUB SERPL-MCNC: 0.3 MG/DL (ref 0–1.2)
BUN SERPL-MCNC: 18 MG/DL (ref 6–23)
CALCIUM SERPL-MCNC: 8.8 MG/DL (ref 8.6–10.6)
CHLORIDE SERPL-SCNC: 103 MMOL/L (ref 98–107)
CO2 SERPL-SCNC: 30 MMOL/L (ref 21–32)
CREAT SERPL-MCNC: 0.73 MG/DL (ref 0.5–1.05)
D DIMER PPP FEU-MCNC: 5727 NG/ML FEU
DIAGNOSIS-BB-PR33: NORMAL
EGFRCR SERPLBLD CKD-EPI 2021: >90 ML/MIN/1.73M*2
EOSINOPHIL # BLD AUTO: 0 X10*3/UL (ref 0–0.7)
EOSINOPHIL NFR BLD AUTO: 0 %
ERYTHROCYTE [DISTWIDTH] IN BLOOD BY AUTOMATED COUNT: 17.4 % (ref 11.5–14.5)
FIBRINOGEN PPP-MCNC: 206 MG/DL (ref 200–400)
GLUCOSE SERPL-MCNC: 76 MG/DL (ref 74–99)
HCT VFR BLD AUTO: 27.8 % (ref 36–46)
HGB BLD-MCNC: 9.1 G/DL (ref 12–16)
HGB RETIC QN: 27 PG (ref 28–38)
IMM GRANULOCYTES # BLD AUTO: 0.1 X10*3/UL (ref 0–0.7)
IMM GRANULOCYTES NFR BLD AUTO: 0.8 % (ref 0–0.9)
IMMATURE RETIC FRACTION: 31.2 %
INR PPP: 0.9 (ref 0.9–1.1)
LDH SERPL L TO P-CCNC: 190 U/L (ref 84–246)
LYMPHOCYTES # BLD AUTO: 2 X10*3/UL (ref 1.2–4.8)
LYMPHOCYTES NFR BLD AUTO: 15.2 %
MCH RBC QN AUTO: 27.2 PG (ref 26–34)
MCHC RBC AUTO-ENTMCNC: 32.7 G/DL (ref 32–36)
MCV RBC AUTO: 83 FL (ref 80–100)
MONOCYTES # BLD AUTO: 0.93 X10*3/UL (ref 0.1–1)
MONOCYTES NFR BLD AUTO: 7.1 %
NEUTROPHILS # BLD AUTO: 10.1 X10*3/UL (ref 1.2–7.7)
NEUTROPHILS NFR BLD AUTO: 76.7 %
NRBC BLD-RTO: 1.6 /100 WBCS (ref 0–0)
PATH REVIEW-TRANSFUSION REACTION: NORMAL
PLATELET # BLD AUTO: 454 X10*3/UL (ref 150–450)
POTASSIUM SERPL-SCNC: 3.7 MMOL/L (ref 3.5–5.3)
PROT SERPL-MCNC: 6.2 G/DL (ref 6.4–8.2)
PROTHROMBIN TIME: 10.5 SECONDS (ref 9.8–12.8)
RBC # BLD AUTO: 3.35 X10*6/UL (ref 4–5.2)
RESIDENT REVIEW-BB: NORMAL
RETICS #: 0.25 X10*6/UL (ref 0.02–0.08)
RETICS/RBC NFR AUTO: 7.4 % (ref 0.5–2)
SODIUM SERPL-SCNC: 141 MMOL/L (ref 136–145)
TYPE OF REACTION: NORMAL
WBC # BLD AUTO: 13.2 X10*3/UL (ref 4.4–11.3)

## 2024-06-25 PROCEDURE — 2500000001 HC RX 250 WO HCPCS SELF ADMINISTERED DRUGS (ALT 637 FOR MEDICARE OP): Performed by: PHYSICIAN ASSISTANT

## 2024-06-25 PROCEDURE — 80053 COMPREHEN METABOLIC PANEL: CPT

## 2024-06-25 PROCEDURE — 99233 SBSQ HOSP IP/OBS HIGH 50: CPT | Performed by: PHYSICIAN ASSISTANT

## 2024-06-25 PROCEDURE — 85045 AUTOMATED RETICULOCYTE COUNT: CPT

## 2024-06-25 PROCEDURE — 2500000002 HC RX 250 W HCPCS SELF ADMINISTERED DRUGS (ALT 637 FOR MEDICARE OP, ALT 636 FOR OP/ED)

## 2024-06-25 PROCEDURE — 85025 COMPLETE CBC W/AUTO DIFF WBC: CPT

## 2024-06-25 PROCEDURE — 85379 FIBRIN DEGRADATION QUANT: CPT

## 2024-06-25 PROCEDURE — 36415 COLL VENOUS BLD VENIPUNCTURE: CPT

## 2024-06-25 PROCEDURE — 1170000001 HC PRIVATE ONCOLOGY ROOM DAILY

## 2024-06-25 PROCEDURE — 2500000004 HC RX 250 GENERAL PHARMACY W/ HCPCS (ALT 636 FOR OP/ED): Mod: JZ | Performed by: INTERNAL MEDICINE

## 2024-06-25 PROCEDURE — C9047 INJECTION, CAPLACIZUMAB-YHDP: HCPCS | Mod: JZ | Performed by: INTERNAL MEDICINE

## 2024-06-25 PROCEDURE — 83615 LACTATE (LD) (LDH) ENZYME: CPT

## 2024-06-25 PROCEDURE — 85384 FIBRINOGEN ACTIVITY: CPT

## 2024-06-25 PROCEDURE — 2500000004 HC RX 250 GENERAL PHARMACY W/ HCPCS (ALT 636 FOR OP/ED)

## 2024-06-25 PROCEDURE — 85610 PROTHROMBIN TIME: CPT

## 2024-06-25 PROCEDURE — 2500000001 HC RX 250 WO HCPCS SELF ADMINISTERED DRUGS (ALT 637 FOR MEDICARE OP): Performed by: STUDENT IN AN ORGANIZED HEALTH CARE EDUCATION/TRAINING PROGRAM

## 2024-06-25 RX ORDER — FAMOTIDINE 10 MG/ML
20 INJECTION INTRAVENOUS ONCE AS NEEDED
Status: CANCELLED | OUTPATIENT
Start: 2024-06-28

## 2024-06-25 RX ORDER — DIPHENHYDRAMINE HCL 50 MG
50 CAPSULE ORAL ONCE
Status: CANCELLED | OUTPATIENT
Start: 2024-06-28

## 2024-06-25 RX ORDER — HEPARIN 100 UNIT/ML
500 SYRINGE INTRAVENOUS AS NEEDED
OUTPATIENT
Start: 2024-06-28

## 2024-06-25 RX ORDER — EPINEPHRINE 0.3 MG/.3ML
0.3 INJECTION SUBCUTANEOUS EVERY 5 MIN PRN
Status: CANCELLED | OUTPATIENT
Start: 2024-06-28

## 2024-06-25 RX ORDER — HEPARIN SODIUM,PORCINE/PF 10 UNIT/ML
50 SYRINGE (ML) INTRAVENOUS AS NEEDED
OUTPATIENT
Start: 2024-06-28

## 2024-06-25 RX ORDER — PROCHLORPERAZINE MALEATE 10 MG
10 TABLET ORAL EVERY 6 HOURS PRN
Status: CANCELLED | OUTPATIENT
Start: 2024-06-28

## 2024-06-25 RX ORDER — DIPHENHYDRAMINE HYDROCHLORIDE 50 MG/ML
50 INJECTION INTRAMUSCULAR; INTRAVENOUS AS NEEDED
Status: CANCELLED | OUTPATIENT
Start: 2024-06-28

## 2024-06-25 RX ORDER — ACETAMINOPHEN 325 MG/1
650 TABLET ORAL ONCE
Status: CANCELLED | OUTPATIENT
Start: 2024-06-28

## 2024-06-25 RX ORDER — PROCHLORPERAZINE EDISYLATE 5 MG/ML
10 INJECTION INTRAMUSCULAR; INTRAVENOUS EVERY 6 HOURS PRN
Status: CANCELLED | OUTPATIENT
Start: 2024-06-28

## 2024-06-25 RX ORDER — ALBUTEROL SULFATE 0.83 MG/ML
3 SOLUTION RESPIRATORY (INHALATION) AS NEEDED
Status: CANCELLED | OUTPATIENT
Start: 2024-06-28

## 2024-06-25 ASSESSMENT — COGNITIVE AND FUNCTIONAL STATUS - GENERAL
DAILY ACTIVITIY SCORE: 24
MOBILITY SCORE: 24
MOBILITY SCORE: 24
DAILY ACTIVITIY SCORE: 24

## 2024-06-25 ASSESSMENT — PAIN SCALES - GENERAL
PAINLEVEL_OUTOF10: 0 - NO PAIN
PAINLEVEL_OUTOF10: 4

## 2024-06-25 ASSESSMENT — PAIN - FUNCTIONAL ASSESSMENT: PAIN_FUNCTIONAL_ASSESSMENT: 0-10

## 2024-06-25 NOTE — CARE PLAN
The patient's goals for the shift include      The clinical goals for the shift include pt will remain safe and free from injury through end of shift 6/25/24 @ 0700      Problem: Pain - Adult  Goal: Verbalizes/displays adequate comfort level or baseline comfort level  Outcome: Progressing     Problem: Safety - Adult  Goal: Free from fall injury  Outcome: Progressing

## 2024-06-25 NOTE — CARE PLAN
The clinical goals for the shift include pt will remain HDS and VSS throughout shift 6/25/24 by 1900.      Problem: Pain - Adult  Goal: Verbalizes/displays adequate comfort level or baseline comfort level  Outcome: Progressing     Problem: Safety - Adult  Goal: Free from fall injury  Outcome: Progressing     Problem: Skin  Goal: Decreased wound size/increased tissue granulation at next dressing change  Outcome: Progressing  Goal: Participates in plan/prevention/treatment measures  Outcome: Progressing  Goal: Prevent/manage excess moisture  Outcome: Progressing  Goal: Prevent/minimize sheer/friction injuries  Outcome: Progressing  Goal: Promote/optimize nutrition  Outcome: Progressing  Goal: Promote skin healing  Outcome: Progressing

## 2024-06-25 NOTE — PROGRESS NOTES
"Tere aCrrion is a 54 y.o. female on day 6 of admission presenting with TTP (thrombotic thrombocytopenic purpura) (Multi).    Subjective   Seen this morning at bedside, doing well, no active bleed. She is eating and drinking well. We discussed her medications plans for home including her Rituximab infusions, caplacizumab, and prednisone taper. She is going to follow-up for her Rituximab infusion Friday 6/28 and then will switch to Monday appointments.  Denies fever/chills, nausea/vomiting, abd pain, rectal bleed.        Objective     Physical Exam  Constitutional:       General: She is not in acute distress.     Appearance: She is not toxic-appearing.   HENT:      Head: Normocephalic and atraumatic.   Eyes:      General: No scleral icterus.     Extraocular Movements: Extraocular movements intact.   Cardiovascular:      Rate and Rhythm: Normal rate and regular rhythm.   Pulmonary:      Effort: Pulmonary effort is normal. No respiratory distress.      Breath sounds: Normal breath sounds. No wheezing.   Abdominal:      General: Abdomen is flat.      Palpations: Abdomen is soft.      Tenderness: There is no abdominal tenderness.   Musculoskeletal:         General: No swelling or tenderness.   Skin:     Coloration: Skin is not jaundiced.      Findings: No bruising or erythema.   Neurological:      Mental Status: She is oriented to person, place, and time. Mental status is at baseline.         Last Recorded Vitals  Blood pressure 112/66, pulse 71, temperature 36.1 °C (97 °F), temperature source Temporal, resp. rate 16, height 1.626 m (5' 4\"), weight 79.4 kg (175 lb), last menstrual period 12/31/2023, SpO2 97%.  Intake/Output last 3 Shifts:  No intake/output data recorded.    Assessment/Plan   Principal Problem:    TTP (thrombotic thrombocytopenic purpura) (Multi)    Tere Carrion is a 54 y.o. female with a PMHx significant for TTP in 1999 ((treated with prolonged plasmapheresis, steroids, and immunosuppressants " including vincristine and azathioprine, splenectomy March 1999), reported sickle cell trait, alpha thalassemia trait, who presented to [place]'s ED after POC hemoglobin was 6 at her PCP's office and was transferred to [facility] for potential plasmapheresis. She is on daily prednisone 80 mg. She is s/p plasmapheresis (6/19, 6/20, 6/21). She was having urinary frequency, urgency, and hematuria prior to admission. UA 6/19 (+) leuk and blood, s/p ceftriaxone 6/19; urine cultures showed now growth and symptoms resolved so antibiotics were discontinued. She initially had CP and SOB with activity, troponin were trended and began to down trend on 6/20 from 428 to 255 so troponin trend was discontinued. She had resolution of CP and SOB as her blood counts improved. 6/21 heme rec Rituximab and Caplacizumab, s/p first doses 6/21/24. Plan for weekly rituximab infusions and daily caplacizumab subcutaneous, taper steroid by 10mg q5d until maintenance dose of 20mg daily. Plan DC after caplacizumab delievered to bedside. If patient is still admitted on 6/28 then Rituxan next dose will have to be given inpatient.      # Thrombocytopenia   # TTP   - Hx of TTP in 1999 s/p prolonged plasmapheresis, steroids, and immunosuppressants including vincristine and azathioprine, splenectomy March 1999.  - heme on board, they recommended hemoglobin electrophoresis, HIV, HCV, HBV labs, stool pathogen PCR, plasma cell dyscrasia workup  - Peripheral smear (from heme note):   WBC: no circulating blasts seen, mature lymphocytes, mature neutrophils  RBC: several schistocytes, no dacrocytes, no spherocytes, RBCs with increased pallor, microcytosis, several target cells, some reticulocytes, occasional nucleated RBC  Plt: no platelet clumping, no enlarged platelets, true thrombocytopenia  - HIV, HCV, HBV not detected (6/19)  - 6/19 IgG 1,850; IgM 84; IgA 325; kappa FLC 2.91; ryanne FLC 2.26; K/L 1.29  - [id number] <5  - s/p PLEX 6/19, during transfusion  developed hives and was given 2 doses of benadryl.  - s/p PLEX 6/20, during transfusion developed hives and was given 2 doses of benadryl, 125 mg solumedrol, and Pepcid.  - s/p PLEX 6/21, plt prior to PLEX was 148 so plan to hold PLEX and observe counts to ensure plt stays about 150 per transfusion medicine.  - Heme recs:   - start Rituximab and Caplacizumab, s/p first doses 6/21/24  - plan for weekly rituximab infusions (patient prefers Mondays) and daily caplacizumab subcutaneous  - Start prednisone taper at 60 mg (plan to start 6/25), then taper 10 mg every 5 days until 20 mg, then hold at that dose.  - PPI prophy while in steroids  - avoid platelet transfusion, in case of emergency, contact hematology fellow     # Anemia  - Likely secondary to TTP  - anemia w/up (6/19): iron 117, ferritin 686, B12 404  - Hgb on admission 5.8 (baseline unknown); s/p 2u pRBCs 6/19; 6/20 hgb 7.6; 6/21 7.8; 6/24 8.9  - Hemoglobin ID: hgb A 66.9%, Hgb S 29.6%  - G6PD screen negative     # Hematuria on POC UA   # r/o UTI  - Maybe related to underlying hemolysis   - UA (6/19) + Leuk and blood; - nitrite  - Ucx (6/19) NGTD  - s/p Ceftriaxone (6/19), discontinued with resolution of symptoms and no growth on urine culture.    # Troponin elevation  # Type II MI   - non specific TWI in V1 and V3  - likely 2/2 anemia, no chest pain at rest  - keep Hgb >8  - trend troponin: (6/18) 256 --> 257 --> (6/19) 410 --> 428 -->255  - resolution of chest pain and shortness of breath with count incrementing.    # Headaches  - Hx of headaches, at home takes motrin and Excedrin; will hold in setting of thrombocytopenia  - CT head (6/18): No evidence of acute cortical infarct or intracranial hemorrhage.   - s/p Mg and morphine at [facility]     # Reported Hx of Sinus tachycardia   - reports being on home metoprolol 25 succ daily   - hold with anemia      # Anxiety  - continue home zoloft 50mg daily     # High risk for breast cancer  - s/p right breast excisional  biopsy (2013) showing atypical ductal hyperplasia  - s/p left breast core biopsy (2013) which showed benign sclerosing adenosis and apocrine metaplasia.  - Due to high risk of breast cancer due to personal and family history was started on Tamoxifen in 9/2023 and plan to continue for 3 years for risk reduction.  - hold tamoxifen as inpatient     # Dispo:  - Code status: full code  - DC pending anemia w/up   - Surrogate decision maker: Daughter Zara [phone]  - FUV: 7/23 Modesta Gallego (PCP); 7/31 Hillary Johnson (surg onc)  - 2nd dose rituximab 6/28, then 7/8, 7/15, 7/22 and 7/29. Rest to be determined by Dr. Walker. FUV requested for Dr. Walker on 7/8.     Yecenia Butts PA-C

## 2024-06-26 ENCOUNTER — SPECIALTY PHARMACY (OUTPATIENT)
Dept: PHARMACY | Facility: CLINIC | Age: 54
End: 2024-06-26

## 2024-06-26 LAB
ALBUMIN SERPL BCP-MCNC: 3.1 G/DL (ref 3.4–5)
ALP SERPL-CCNC: 48 U/L (ref 33–110)
ALT SERPL W P-5'-P-CCNC: 23 U/L (ref 7–45)
ANION GAP SERPL CALC-SCNC: 13 MMOL/L (ref 10–20)
APTT PPP: 25 SECONDS (ref 27–38)
AST SERPL W P-5'-P-CCNC: 18 U/L (ref 9–39)
ATRIAL RATE: 99 BPM
BASOPHILS # BLD AUTO: 0.03 X10*3/UL (ref 0–0.1)
BASOPHILS NFR BLD AUTO: 0.2 %
BILIRUB SERPL-MCNC: 0.3 MG/DL (ref 0–1.2)
BUN SERPL-MCNC: 16 MG/DL (ref 6–23)
C COLI+JEJ+UPSA DNA STL QL NAA+PROBE: NOT DETECTED
CALCIUM SERPL-MCNC: 8.4 MG/DL (ref 8.6–10.6)
CHLORIDE SERPL-SCNC: 104 MMOL/L (ref 98–107)
CO2 SERPL-SCNC: 28 MMOL/L (ref 21–32)
CREAT SERPL-MCNC: 0.71 MG/DL (ref 0.5–1.05)
D DIMER PPP FEU-MCNC: 4719 NG/ML FEU
EC STX1 GENE STL QL NAA+PROBE: NOT DETECTED
EC STX2 GENE STL QL NAA+PROBE: NOT DETECTED
EGFRCR SERPLBLD CKD-EPI 2021: >90 ML/MIN/1.73M*2
EOSINOPHIL # BLD AUTO: 0 X10*3/UL (ref 0–0.7)
EOSINOPHIL NFR BLD AUTO: 0 %
ERYTHROCYTE [DISTWIDTH] IN BLOOD BY AUTOMATED COUNT: 17.2 % (ref 11.5–14.5)
FERRITIN SERPL-MCNC: 214 NG/ML (ref 8–150)
FIBRINOGEN PPP-MCNC: 184 MG/DL (ref 200–400)
GLUCOSE SERPL-MCNC: 80 MG/DL (ref 74–99)
HCT VFR BLD AUTO: 27.1 % (ref 36–46)
HGB BLD-MCNC: 8.5 G/DL (ref 12–16)
HGB RETIC QN: 28 PG (ref 28–38)
IMM GRANULOCYTES # BLD AUTO: 0.08 X10*3/UL (ref 0–0.7)
IMM GRANULOCYTES NFR BLD AUTO: 0.6 % (ref 0–0.9)
IMMATURE RETIC FRACTION: 27.1 %
INR PPP: 0.9 (ref 0.9–1.1)
IRON SATN MFR SERPL: 23 % (ref 25–45)
IRON SERPL-MCNC: 60 UG/DL (ref 35–150)
LDH SERPL L TO P-CCNC: 172 U/L (ref 84–246)
LYMPHOCYTES # BLD AUTO: 2.17 X10*3/UL (ref 1.2–4.8)
LYMPHOCYTES NFR BLD AUTO: 16.4 %
MCH RBC QN AUTO: 26.3 PG (ref 26–34)
MCHC RBC AUTO-ENTMCNC: 31.4 G/DL (ref 32–36)
MCV RBC AUTO: 84 FL (ref 80–100)
MONOCYTES # BLD AUTO: 1.08 X10*3/UL (ref 0.1–1)
MONOCYTES NFR BLD AUTO: 8.2 %
NEUTROPHILS # BLD AUTO: 9.84 X10*3/UL (ref 1.2–7.7)
NEUTROPHILS NFR BLD AUTO: 74.6 %
NOROVIRUS GI + GII RNA STL NAA+PROBE: NOT DETECTED
NRBC BLD-RTO: 1.1 /100 WBCS (ref 0–0)
P AXIS: 41 DEGREES
P OFFSET: 204 MS
P ONSET: 150 MS
PLATELET # BLD AUTO: 473 X10*3/UL (ref 150–450)
POTASSIUM SERPL-SCNC: 3.8 MMOL/L (ref 3.5–5.3)
PR INTERVAL: 140 MS
PROT SERPL-MCNC: 5.7 G/DL (ref 6.4–8.2)
PROTHROMBIN TIME: 10.3 SECONDS (ref 9.8–12.8)
Q ONSET: 220 MS
QRS COUNT: 16 BEATS
QRS DURATION: 66 MS
QT INTERVAL: 320 MS
QTC CALCULATION(BAZETT): 410 MS
QTC FREDERICIA: 378 MS
R AXIS: 24 DEGREES
RBC # BLD AUTO: 3.23 X10*6/UL (ref 4–5.2)
RETICS #: 0.22 X10*6/UL (ref 0.02–0.08)
RETICS/RBC NFR AUTO: 6.8 % (ref 0.5–2)
RV RNA STL NAA+PROBE: NOT DETECTED
SALMONELLA DNA STL QL NAA+PROBE: NOT DETECTED
SHIGELLA DNA SPEC QL NAA+PROBE: NOT DETECTED
SODIUM SERPL-SCNC: 141 MMOL/L (ref 136–145)
T AXIS: 45 DEGREES
T OFFSET: 380 MS
TIBC SERPL-MCNC: 265 UG/DL (ref 240–445)
UIBC SERPL-MCNC: 205 UG/DL (ref 110–370)
V CHOLERAE DNA STL QL NAA+PROBE: NOT DETECTED
VENTRICULAR RATE: 99 BPM
WBC # BLD AUTO: 13.2 X10*3/UL (ref 4.4–11.3)
Y ENTEROCOL DNA STL QL NAA+PROBE: NOT DETECTED

## 2024-06-26 PROCEDURE — 83615 LACTATE (LD) (LDH) ENZYME: CPT

## 2024-06-26 PROCEDURE — 83540 ASSAY OF IRON: CPT | Performed by: STUDENT IN AN ORGANIZED HEALTH CARE EDUCATION/TRAINING PROGRAM

## 2024-06-26 PROCEDURE — 85045 AUTOMATED RETICULOCYTE COUNT: CPT

## 2024-06-26 PROCEDURE — 2500000002 HC RX 250 W HCPCS SELF ADMINISTERED DRUGS (ALT 637 FOR MEDICARE OP, ALT 636 FOR OP/ED)

## 2024-06-26 PROCEDURE — 2500000004 HC RX 250 GENERAL PHARMACY W/ HCPCS (ALT 636 FOR OP/ED)

## 2024-06-26 PROCEDURE — 2500000001 HC RX 250 WO HCPCS SELF ADMINISTERED DRUGS (ALT 637 FOR MEDICARE OP): Performed by: PHYSICIAN ASSISTANT

## 2024-06-26 PROCEDURE — 85379 FIBRIN DEGRADATION QUANT: CPT

## 2024-06-26 PROCEDURE — 85384 FIBRINOGEN ACTIVITY: CPT

## 2024-06-26 PROCEDURE — 85610 PROTHROMBIN TIME: CPT

## 2024-06-26 PROCEDURE — 36415 COLL VENOUS BLD VENIPUNCTURE: CPT

## 2024-06-26 PROCEDURE — 83010 ASSAY OF HAPTOGLOBIN QUANT: CPT | Performed by: STUDENT IN AN ORGANIZED HEALTH CARE EDUCATION/TRAINING PROGRAM

## 2024-06-26 PROCEDURE — 2500000001 HC RX 250 WO HCPCS SELF ADMINISTERED DRUGS (ALT 637 FOR MEDICARE OP): Performed by: STUDENT IN AN ORGANIZED HEALTH CARE EDUCATION/TRAINING PROGRAM

## 2024-06-26 PROCEDURE — 36415 COLL VENOUS BLD VENIPUNCTURE: CPT | Mod: WESLAB | Performed by: STUDENT IN AN ORGANIZED HEALTH CARE EDUCATION/TRAINING PROGRAM

## 2024-06-26 PROCEDURE — C9047 INJECTION, CAPLACIZUMAB-YHDP: HCPCS | Mod: JZ | Performed by: INTERNAL MEDICINE

## 2024-06-26 PROCEDURE — 84238 ASSAY NONENDOCRINE RECEPTOR: CPT | Performed by: STUDENT IN AN ORGANIZED HEALTH CARE EDUCATION/TRAINING PROGRAM

## 2024-06-26 PROCEDURE — 85025 COMPLETE CBC W/AUTO DIFF WBC: CPT

## 2024-06-26 PROCEDURE — 2500000004 HC RX 250 GENERAL PHARMACY W/ HCPCS (ALT 636 FOR OP/ED): Mod: JZ | Performed by: INTERNAL MEDICINE

## 2024-06-26 PROCEDURE — 84075 ASSAY ALKALINE PHOSPHATASE: CPT

## 2024-06-26 PROCEDURE — 1170000001 HC PRIVATE ONCOLOGY ROOM DAILY

## 2024-06-26 PROCEDURE — 82728 ASSAY OF FERRITIN: CPT | Performed by: STUDENT IN AN ORGANIZED HEALTH CARE EDUCATION/TRAINING PROGRAM

## 2024-06-26 RX ORDER — PANTOPRAZOLE SODIUM 40 MG/1
40 TABLET, DELAYED RELEASE ORAL
Status: DISCONTINUED | OUTPATIENT
Start: 2024-06-27 | End: 2024-06-28 | Stop reason: HOSPADM

## 2024-06-26 ASSESSMENT — COGNITIVE AND FUNCTIONAL STATUS - GENERAL
DAILY ACTIVITIY SCORE: 24
MOBILITY SCORE: 24
DAILY ACTIVITIY SCORE: 24
MOBILITY SCORE: 24

## 2024-06-26 ASSESSMENT — PAIN SCALES - GENERAL
PAINLEVEL_OUTOF10: 0 - NO PAIN
PAINLEVEL_OUTOF10: 0 - NO PAIN

## 2024-06-26 NOTE — CARE PLAN
Problem: Safety - Adult  Goal: Free from fall injury  6/26/2024 0627 by Patricia Echols RN  Outcome: Progressing  6/26/2024 0627 by Patricia Echols RN  Outcome: Progressing     Problem: Pain - Adult  Goal: Verbalizes/displays adequate comfort level or baseline comfort level  6/26/2024 0627 by Patricia Echols RN  Outcome: Progressing  6/26/2024 0627 by Patricia Echols RN  Outcome: Progressing         The clinical goals for the shift include pt will remain VSS throughout shift    Over the shift, the patient remained safe and free from injury. Minimal pain reported. Vitals stable throughout shift. No acute events overnight

## 2024-06-26 NOTE — PROGRESS NOTES
"Tere Carrion is a 54 y.o. female on day 7 of admission presenting with Acquired TTP (Multi).    Subjective   Afebrile. VSS overnight. She is still waiting on her caplacizumab to be delivered. She did hear an update on the medication and that they are waiting for the insurance company to approve it and then it will be delievered to her house. At this time there isn't an estimated delivery date. She is fine with staying until the medication is delivered, she wants to avoid having to be readmitted.  All other ROS are otherwise negative; no signs of bleeding.     Objective     Physical Exam  Constitutional:       General: She is not in acute distress.     Appearance: She is not toxic-appearing.   HENT:      Head: Normocephalic and atraumatic.   Eyes:      General: No scleral icterus.     Extraocular Movements: Extraocular movements intact.   Cardiovascular:      Rate and Rhythm: Normal rate and regular rhythm.   Pulmonary:      Effort: Pulmonary effort is normal. No respiratory distress.      Breath sounds: Normal breath sounds. No wheezing.   Abdominal:      General: Abdomen is flat.      Palpations: Abdomen is soft.      Tenderness: There is no abdominal tenderness.   Musculoskeletal:         General: No swelling or tenderness.   Skin:     Coloration: Skin is not jaundiced.      Findings: No bruising or erythema.   Neurological:      Mental Status: She is oriented to person, place, and time. Mental status is at baseline.       Last Recorded Vitals  Blood pressure 105/63, pulse 66, temperature 36.5 °C (97.7 °F), temperature source Temporal, resp. rate 18, height 1.626 m (5' 4\"), weight 79.4 kg (175 lb), last menstrual period 12/31/2023, SpO2 97%.  Intake/Output last 3 Shifts:  I/O last 3 completed shifts:  In: 480 (6 mL/kg) [P.O.:480]  Out: - (0 mL/kg)   Weight: 79.4 kg     Assessment/Plan   Principal Problem:    Acquired TTP (Multi)    Tere Carrion is a 54 y.o. female with a PMHx significant for TTP (1999 " (treated with prolonged plasmapheresis, steroids, and immunosuppressants including vincristine and azathioprine, s/p splenectomy March 1999)), reported sickle cell trait, alpha thalassemia trait, who presented to [place]'s ED 6/18 after POC hemoglobin was 6 at her PCP's office and was transferred to [facility] for potential plasmapheresis. She was on daily prednisone 80 mg (6/18-6/24). S/p plasmapheresis (6/19, 6/20, most recently 6/21). UA 6/19 (+) leuk and blood, s/p ceftriaxone 6/19; Ucx negative growth and symptoms resolved so antibiotics discontinued. 6/19 Hematology consulted, rec cont predinsone, avoid plt transfusions (must be cleared by Heme), discharge home with Caplacizumbab. Initially had CP and SOB with activity, troponin were trended and began to down trend, now resolved, of note she had resolution of CP and SOB as her blood counts improved. 6/21 s/p Rituximab and Caplacizumab, s/p first doses 6/21/24. Plan for weekly rituximab infusions and daily caplacizumab subcutaneous, taper steroid by 10mg q5d until maintenance dose of 20mg daily. (6/25- current) c/w 60mg Prednisone, remaining taper ordered. Plan DC after caplacizumab delievered to home (awaiting insurance approval). If patient is still admitted on 6/28 then Rituxan next dose will have to be given inpatient.      # TTP   - Hx of TTP in 1999 s/p prolonged plasmapheresis, steroids, and immunosuppressants including vincristine and azathioprine, splenectomy March 1999.  - heme on board, they recommended hemoglobin electrophoresis, HIV, HCV, HBV labs, stool pathogen PCR, plasma cell dyscrasia workup  - Peripheral smear (from heme note):   WBC: no circulating blasts seen, mature lymphocytes, mature neutrophils  RBC: several schistocytes, no dacrocytes, no spherocytes, RBCs with increased pallor, microcytosis, several target cells, some reticulocytes, occasional nucleated RBC  Plt: no platelet clumping, no enlarged platelets, true thrombocytopenia  - HIV,  HCV, HBV not detected (6/19)  - 6/19 IgG 1,850; IgM 84; IgA 325; kappa FLC 2.91; ryanne FLC 2.26; K/L 1.29  - [id number] <5  - s/p PLEX 6/19, during transfusion developed hives and was given 2 doses of benadryl.  - s/p PLEX 6/20, during transfusion developed hives and was given 2 doses of benadryl, 125 mg solumedrol, and Pepcid.  - s/p PLEX 6/21, plt prior to PLEX was 148 so plan to hold PLEX and observe counts to ensure plt stays about 150 per transfusion medicine.  - Heme recs:   - start Rituximab and Caplacizumab, s/p first doses 6/21/24  - plan for weekly rituximab infusions (patient prefers Mondays) and daily caplacizumab subcutaneous  - Start prednisone taper at 60 mg (6/25- current), then taper 10 mg every 5 days until 20 mg, then hold at that dose - taper already ordered inpt  - PPI prophy while in steroids  - Avoid platelet transfusion, in case of emergency, contact hematology fellow  - 6/25 Heme fellow was working on coordinating the caplacizumab delivery     # Anemia  - Likely secondary to TTP  - Anemia w/up (6/19): iron 117, ferritin 686, B12 404  - Hgb on admission 5.8 (baseline unknown); s/p 2u pRBCs 6/19; 6/20 hgb 7.6; 6/21 7.8; 6/24 8.9; 6/26 8.5  - Hemoglobin ID: hgb A 66.9%, Hgb S 29.6%  - G6PD screen negative     # Hematuria on POC UA   # r/o UTI  - Maybe related to underlying hemolysis   - UA (6/19) + Leuk and blood; - nitrite  - Ucx (6/19) NGTD  - s/p Ceftriaxone (6/19), discontinued with resolution of symptoms and no growth on urine culture.    # Troponin elevation  # Type II MI   # Reported Hx of Sinus tachycardia   - non specific TWI in V1 and V3  - likely 2/2 anemia, no chest pain at rest  - keep Hgb >8  - trend troponin: (6/18) 256 --> 257 --> (6/19) 410 --> 428 -->255  - resolution of chest pain and shortness of breath with count incrementing  - 6/24 Most recent EKG NSR  - c/w home metoprolol 25 succ daily (originally held on admit d/t anemia)     # Headaches, resolved   - Hx of headaches, at  home takes motrin and Excedrin; will hold in setting of thrombocytopenia  - CT head (6/18) No evidence of acute cortical infarct or intracranial hemorrhage  - s/p Mg and morphine at [facility]     # Anxiety  - continue home zoloft 50mg daily     # High risk for breast cancer  - Follows with Hillary Johnson CNP  - s/p right breast excisional biopsy (2013) showing atypical ductal hyperplasia  - s/p left breast core biopsy (2013) which showed benign sclerosing adenosis and apocrine metaplasia.  - Due to high risk of breast cancer due to personal and family history was started on Tamoxifen in 9/2023 and plan to continue for 3 years for risk reduction  - hold tamoxifen as inpatient     # Dispo:  - Code status: full code  - DC home pending delivery of caplacizumab to home   - Surrogate decision maker: Daughter Zara [phone] - updated bedside 6/19   - FUV 7/23 Modesta Gallego (PCP); 7/31 Hillary Johnson (surg onc), 2nd dose rituximab 6/28, then 7/8, 7/15, 7/22 and 7/29. Rest to be determined by Dr. Walker. FUV requested for Dr. Walker on 7/8    Assessment and Plan was discussed with Attending Physician Dr. Darrell Nunez PA-C

## 2024-06-26 NOTE — CARE PLAN
The patient's goals for the shift include      The clinical goals for the shift include Pt will remain Hds and VSS though end of shift 6/26/2024 1900    Problem: Pain - Adult  Goal: Verbalizes/displays adequate comfort level or baseline comfort level  Outcome: Progressing     Problem: Safety - Adult  Goal: Free from fall injury  Outcome: Progressing     Problem: Skin  Goal: Decreased wound size/increased tissue granulation at next dressing change  Outcome: Progressing  Goal: Participates in plan/prevention/treatment measures  Outcome: Progressing  Goal: Prevent/manage excess moisture  Outcome: Progressing  Goal: Prevent/minimize sheer/friction injuries  Outcome: Progressing  Goal: Promote/optimize nutrition  Outcome: Progressing  Goal: Promote skin healing  Outcome: Progressing

## 2024-06-27 LAB
ALBUMIN SERPL BCP-MCNC: 3.1 G/DL (ref 3.4–5)
ALP SERPL-CCNC: 47 U/L (ref 33–110)
ALT SERPL W P-5'-P-CCNC: 21 U/L (ref 7–45)
ANION GAP SERPL CALC-SCNC: 15 MMOL/L (ref 10–20)
APTT PPP: 25 SECONDS (ref 27–38)
AST SERPL W P-5'-P-CCNC: 20 U/L (ref 9–39)
BASOPHILS # BLD AUTO: 0.03 X10*3/UL (ref 0–0.1)
BASOPHILS NFR BLD AUTO: 0.2 %
BILIRUB SERPL-MCNC: 0.3 MG/DL (ref 0–1.2)
BUN SERPL-MCNC: 15 MG/DL (ref 6–23)
CALCIUM SERPL-MCNC: 8.9 MG/DL (ref 8.6–10.6)
CHLORIDE SERPL-SCNC: 104 MMOL/L (ref 98–107)
CO2 SERPL-SCNC: 25 MMOL/L (ref 21–32)
CREAT SERPL-MCNC: 0.75 MG/DL (ref 0.5–1.05)
D DIMER PPP FEU-MCNC: 5135 NG/ML FEU
EGFRCR SERPLBLD CKD-EPI 2021: >90 ML/MIN/1.73M*2
EOSINOPHIL # BLD AUTO: 0 X10*3/UL (ref 0–0.7)
EOSINOPHIL NFR BLD AUTO: 0 %
ERYTHROCYTE [DISTWIDTH] IN BLOOD BY AUTOMATED COUNT: 17 % (ref 11.5–14.5)
FIBRINOGEN PPP-MCNC: 204 MG/DL (ref 200–400)
GLUCOSE SERPL-MCNC: 77 MG/DL (ref 74–99)
HAPTOGLOB SERPL-MCNC: 109 MG/DL (ref 37–246)
HCT VFR BLD AUTO: 29.6 % (ref 36–46)
HGB BLD-MCNC: 9.1 G/DL (ref 12–16)
HGB RETIC QN: 28 PG (ref 28–38)
IMM GRANULOCYTES # BLD AUTO: 0.17 X10*3/UL (ref 0–0.7)
IMM GRANULOCYTES NFR BLD AUTO: 1.3 % (ref 0–0.9)
IMMATURE RETIC FRACTION: 21.8 %
INR PPP: 1 (ref 0.9–1.1)
LDH SERPL L TO P-CCNC: 219 U/L (ref 84–246)
LYMPHOCYTES # BLD AUTO: 2.14 X10*3/UL (ref 1.2–4.8)
LYMPHOCYTES NFR BLD AUTO: 16 %
MCH RBC QN AUTO: 26.1 PG (ref 26–34)
MCHC RBC AUTO-ENTMCNC: 30.7 G/DL (ref 32–36)
MCV RBC AUTO: 85 FL (ref 80–100)
MONOCYTES # BLD AUTO: 1.07 X10*3/UL (ref 0.1–1)
MONOCYTES NFR BLD AUTO: 8 %
NEUTROPHILS # BLD AUTO: 10 X10*3/UL (ref 1.2–7.7)
NEUTROPHILS NFR BLD AUTO: 74.5 %
NRBC BLD-RTO: 0.6 /100 WBCS (ref 0–0)
PLATELET # BLD AUTO: 471 X10*3/UL (ref 150–450)
POTASSIUM SERPL-SCNC: 3.9 MMOL/L (ref 3.5–5.3)
PROT SERPL-MCNC: 5.9 G/DL (ref 6.4–8.2)
PROTHROMBIN TIME: 10.8 SECONDS (ref 9.8–12.8)
RBC # BLD AUTO: 3.49 X10*6/UL (ref 4–5.2)
RETICS #: 0.23 X10*6/UL (ref 0.02–0.08)
RETICS/RBC NFR AUTO: 6.5 % (ref 0.5–2)
SODIUM SERPL-SCNC: 140 MMOL/L (ref 136–145)
WBC # BLD AUTO: 13.4 X10*3/UL (ref 4.4–11.3)

## 2024-06-27 PROCEDURE — 2500000004 HC RX 250 GENERAL PHARMACY W/ HCPCS (ALT 636 FOR OP/ED): Mod: JZ | Performed by: INTERNAL MEDICINE

## 2024-06-27 PROCEDURE — 2500000001 HC RX 250 WO HCPCS SELF ADMINISTERED DRUGS (ALT 637 FOR MEDICARE OP): Performed by: PHYSICIAN ASSISTANT

## 2024-06-27 PROCEDURE — 80053 COMPREHEN METABOLIC PANEL: CPT

## 2024-06-27 PROCEDURE — 2500000004 HC RX 250 GENERAL PHARMACY W/ HCPCS (ALT 636 FOR OP/ED)

## 2024-06-27 PROCEDURE — 1170000001 HC PRIVATE ONCOLOGY ROOM DAILY

## 2024-06-27 PROCEDURE — 2500000002 HC RX 250 W HCPCS SELF ADMINISTERED DRUGS (ALT 637 FOR MEDICARE OP, ALT 636 FOR OP/ED)

## 2024-06-27 PROCEDURE — 83615 LACTATE (LD) (LDH) ENZYME: CPT

## 2024-06-27 PROCEDURE — C9047 INJECTION, CAPLACIZUMAB-YHDP: HCPCS | Mod: JZ | Performed by: INTERNAL MEDICINE

## 2024-06-27 PROCEDURE — 85025 COMPLETE CBC W/AUTO DIFF WBC: CPT

## 2024-06-27 PROCEDURE — 36415 COLL VENOUS BLD VENIPUNCTURE: CPT

## 2024-06-27 PROCEDURE — 2500000001 HC RX 250 WO HCPCS SELF ADMINISTERED DRUGS (ALT 637 FOR MEDICARE OP)

## 2024-06-27 PROCEDURE — 99233 SBSQ HOSP IP/OBS HIGH 50: CPT

## 2024-06-27 PROCEDURE — 85610 PROTHROMBIN TIME: CPT

## 2024-06-27 PROCEDURE — 85379 FIBRIN DEGRADATION QUANT: CPT

## 2024-06-27 PROCEDURE — 85045 AUTOMATED RETICULOCYTE COUNT: CPT

## 2024-06-27 PROCEDURE — 85384 FIBRINOGEN ACTIVITY: CPT

## 2024-06-27 RX ORDER — CAPLACIZUMAB 11 MG
11 KIT INJECTION DAILY
Qty: 30 KIT | Refills: 0 | Status: SHIPPED | OUTPATIENT
Start: 2024-06-27

## 2024-06-27 ASSESSMENT — COGNITIVE AND FUNCTIONAL STATUS - GENERAL
MOBILITY SCORE: 24
DAILY ACTIVITIY SCORE: 24

## 2024-06-27 ASSESSMENT — PAIN SCALES - GENERAL
PAINLEVEL_OUTOF10: 0 - NO PAIN
PAINLEVEL_OUTOF10: 0 - NO PAIN

## 2024-06-27 NOTE — CARE PLAN
The patient's goals for the shift include      The clinical goals for the shift include Pt will remain Hds and VSS through end of shift 6/27/2024 1900      Problem: Pain - Adult  Goal: Verbalizes/displays adequate comfort level or baseline comfort level  Outcome: Progressing     Problem: Safety - Adult  Goal: Free from fall injury  Outcome: Progressing     Problem: Skin  Goal: Decreased wound size/increased tissue granulation at next dressing change  Outcome: Progressing  Goal: Participates in plan/prevention/treatment measures  Outcome: Progressing  Goal: Prevent/manage excess moisture  Outcome: Progressing  Goal: Prevent/minimize sheer/friction injuries  Outcome: Progressing  Goal: Promote/optimize nutrition  Outcome: Progressing  Goal: Promote skin healing  Outcome: Progressing

## 2024-06-27 NOTE — PROGRESS NOTES
"Tere Carrion is a 54 y.o. female on day 8 of admission presenting with Acquired TTP (Multi).    Subjective   Pt seen at bedside in afternoon. We discussed ongoing situation for delivery of caplazcizumab and the current facilitation with  Specialty pharmacy, our inpatient team/[facility], and [employer]. Both representatives from each have been contacted today and processes are underway. Otherwise doing well, no new issues.     Denies nausea, vomiting, fever, chills, chest pain, SOB, abd pain, constipation, diarrhea, bleeding, edema, or headaches.        Objective     Physical Exam  Constitutional:       Appearance: Normal appearance.   HENT:      Head: Normocephalic.      Right Ear: External ear normal.      Left Ear: External ear normal.      Nose: Nose normal.   Eyes:      Extraocular Movements: Extraocular movements intact.      Conjunctiva/sclera: Conjunctivae normal.      Pupils: Pupils are equal, round, and reactive to light.   Cardiovascular:      Rate and Rhythm: Normal rate and regular rhythm.   Pulmonary:      Effort: Pulmonary effort is normal.      Breath sounds: Normal breath sounds.   Abdominal:      General: Bowel sounds are normal.      Palpations: Abdomen is soft.   Musculoskeletal:         General: Normal range of motion.      Cervical back: Normal range of motion and neck supple.   Skin:     General: Skin is warm and dry.   Neurological:      General: No focal deficit present.      Mental Status: She is alert and oriented to person, place, and time. Mental status is at baseline.   Psychiatric:         Mood and Affect: Mood normal.         Behavior: Behavior normal.         Thought Content: Thought content normal.         Last Recorded Vitals  Blood pressure 125/61, pulse 67, temperature 36.7 °C (98.1 °F), temperature source Temporal, resp. rate 16, height 1.626 m (5' 4\"), weight 79.4 kg (175 lb), last menstrual period 12/31/2023, SpO2 99%.  Intake/Output last 3 Shifts:  No intake/output data " recorded.    Relevant Results    .  No current facility-administered medications on file prior to encounter.     Current Outpatient Medications on File Prior to Encounter   Medication Sig Dispense Refill    aspirin-acetaminophen-caffeine (Excedrin Migraine) 250-250-65 mg tablet Take 2 tablets by mouth every 6 hours if needed (migraines).      fluticasone (Flonase) 50 mcg/actuation nasal spray Administer 1 spray into each nostril once daily. Shake gently. Before first use, prime pump. After use, clean tip and replace cap. (Patient taking differently: Administer 1 spray into each nostril if needed. Shake gently. Before first use, prime pump. After use, clean tip and replace cap.) 16 g 3    ibuprofen 200 mg tablet Take 3 tablets (600 mg) by mouth every 6 hours if needed (migraines).      metoprolol succinate XL (Toprol-XL) 25 mg 24 hr tablet Take 1 tablet (25 mg) by mouth once daily. Do not crush or chew. 90 tablet 3    [] nitrofurantoin, macrocrystal-monohydrate, (Macrobid) 100 mg capsule Take 1 capsule (100 mg) by mouth 2 times a day for 7 days. 14 capsule 0    sertraline (Zoloft) 50 mg tablet Take 1 tablet (50 mg) by mouth once daily. 90 tablet 3    tamoxifen (Nolvadex) 10 mg tablet Take 1 tablet (10 mg total) by mouth once daily.       .  Results for orders placed or performed during the hospital encounter of 24 (from the past 24 hour(s))   CBC and Auto Differential   Result Value Ref Range    WBC 13.4 (H) 4.4 - 11.3 x10*3/uL    nRBC 0.6 (H) 0.0 - 0.0 /100 WBCs    RBC 3.49 (L) 4.00 - 5.20 x10*6/uL    Hemoglobin 9.1 (L) 12.0 - 16.0 g/dL    Hematocrit 29.6 (L) 36.0 - 46.0 %    MCV 85 80 - 100 fL    MCH 26.1 26.0 - 34.0 pg    MCHC 30.7 (L) 32.0 - 36.0 g/dL    RDW 17.0 (H) 11.5 - 14.5 %    Platelets 471 (H) 150 - 450 x10*3/uL    Neutrophils % 74.5 40.0 - 80.0 %    Immature Granulocytes %, Automated 1.3 (H) 0.0 - 0.9 %    Lymphocytes % 16.0 13.0 - 44.0 %    Monocytes % 8.0 2.0 - 10.0 %    Eosinophils % 0.0  0.0 - 6.0 %    Basophils % 0.2 0.0 - 2.0 %    Neutrophils Absolute 10.00 (H) 1.20 - 7.70 x10*3/uL    Immature Granulocytes Absolute, Automated 0.17 0.00 - 0.70 x10*3/uL    Lymphocytes Absolute 2.14 1.20 - 4.80 x10*3/uL    Monocytes Absolute 1.07 (H) 0.10 - 1.00 x10*3/uL    Eosinophils Absolute 0.00 0.00 - 0.70 x10*3/uL    Basophils Absolute 0.03 0.00 - 0.10 x10*3/uL   Coagulation Screen   Result Value Ref Range    Protime 10.8 9.8 - 12.8 seconds    INR 1.0 0.9 - 1.1    aPTT 25 (L) 27 - 38 seconds   D-Dimer, VTE Exclusion   Result Value Ref Range    D-Dimer, Quantitative VTE Exclusion 5,135 (H) <=500 ng/mL FEU   Fibrinogen   Result Value Ref Range    Fibrinogen 204 200 - 400 mg/dL   Reticulocytes   Result Value Ref Range    Retic % 6.5 (H) 0.5 - 2.0 %    Retic Absolute 0.227 (H) 0.018 - 0.083 x10*6/uL    Reticulocyte Hemoglobin 28 28 - 38 pg    Immature Retic fraction 21.8 (H) <=16.0 %   Lactate dehydrogenase   Result Value Ref Range     84 - 246 U/L   Comprehensive Metabolic Panel   Result Value Ref Range    Glucose 77 74 - 99 mg/dL    Sodium 140 136 - 145 mmol/L    Potassium 3.9 3.5 - 5.3 mmol/L    Chloride 104 98 - 107 mmol/L    Bicarbonate 25 21 - 32 mmol/L    Anion Gap 15 10 - 20 mmol/L    Urea Nitrogen 15 6 - 23 mg/dL    Creatinine 0.75 0.50 - 1.05 mg/dL    eGFR >90 >60 mL/min/1.73m*2    Calcium 8.9 8.6 - 10.6 mg/dL    Albumin 3.1 (L) 3.4 - 5.0 g/dL    Alkaline Phosphatase 47 33 - 110 U/L    Total Protein 5.9 (L) 6.4 - 8.2 g/dL    AST 20 9 - 39 U/L    Bilirubin, Total 0.3 0.0 - 1.2 mg/dL    ALT 21 7 - 45 U/L                  Assessment/Plan   Principal Problem:    Acquired TTP (Multi)    Tere M Murali is a 54 y.o. 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 She was on daily prednisone 80 mg (6/18-6/24). S/p plasmapheresis (6/19, 6/20, and most recently 6/21). UA 6/19 (+) leuk and blood, s/p ceftriaxone 6/19; Ucx negative growth , atbx discontinued. 6/19 Hematology consulted, rec cont predinsone, avoid plt transfusions (must be cleared by Heme first), discharge home with Caplacizumbab. Initially had CP and SOB with activity, troponin were trended and began to down trend, now resolved, of note she had resolution of CP and SOB as her blood counts improved. 6/21 s/p first dose of Rituximab and daily Caplacizumab (6/21- current)., Plan for weekly rituximab infusions and daily Caplacizumab, taper steroid by 10mg q5d until maintenance dose of 20mg daily, (6/25- 6/29) c/w 60mg Prednisone, remaining taper ordered. DC home has been pending delivery of caplacizumab home vs bedside pending  Specialty dispensation. 6/27 Per most recent updates, insurance coverage has been approved, but  Specialty pharmacy working on medication-assistance d/t high out-of-pocket cost ([employer] has issued copay assistance to  Specialty pharmacy), awaiting reply.   If patient is still admitted on Friday 6/28 then Rituxan next dose will have to be given inpatient.      # TTP   - Hx of TTP in 1999 s/p prolonged plasmapheresis, steroids, and immunosuppressants including vincristine and azathioprine, splenectomy March 1999.  - heme on board, they recommended hemoglobin electrophoresis, HIV, HCV, HBV labs, stool pathogen PCR, plasma cell dyscrasia workup  - Peripheral smear (from heme note):   WBC: no circulating blasts seen, mature lymphocytes, mature neutrophils  RBC: several schistocytes, no dacrocytes, no spherocytes, RBCs with increased pallor, microcytosis, several target cells, some reticulocytes, occasional nucleated RBC  Plt: no platelet clumping, no enlarged platelets, true thrombocytopenia  - HIV, HCV,  HBV not detected (6/19)  - 6/19 IgG 1,850; IgM 84; IgA 325; kappa FLC 2.91; ryanne FLC 2.26; K/L 1.29  - [id number] <5  - s/p PLEX 6/19, during transfusion developed hives and was given 2 doses of benadryl.  - s/p PLEX 6/20, during transfusion developed hives and was given 2 doses of benadryl, 125 mg solumedrol, and Pepcid.  - s/p PLEX 6/21, plt prior to PLEX was 148 so plan to hold PLEX and observe counts to ensure plt stays about 150 per transfusion medicine.  - Heme recs:   - start Rituximab and Caplacizumab, s/p first doses 6/21/24  - plan for weekly rituximab infusions (patient prefers Mondays) and daily caplacizumab subcutaneous  - Start prednisone taper at 60 mg (6/25- current), then taper 10 mg every 5 days until 20 mg, then hold at that dose - taper already ordered inpt  - PPI prophy while in steroids  - Avoid platelet transfusion, in case of emergency, contact hematology fellow  - 6/25 Heme fellow was working on coordinating the caplacizumab delivery  - On 6/27 Discussed with  Specialty Pharmacy, [employer] [facility], and Hematology team for updates as pt has been medically ready for discharge- ongoing process still underway for delivery of Cablivi, a new script sent to  Specialty pharmacy per their request and x2 copays issued by [employer] forwarded to  specialty pharmacy rep, awaiting reply, still possible chance for DC 6/27 vs 6/28 if medication able to be delivered   - If patient still inpatient Fri 6/28, plan for inpatient Ritux (Heme fellow aware) - will need orders placed      # Anemia  - Likely secondary to TTP  - Anemia w/up (6/19): iron 117, ferritin 686, B12 404  - Hgb on admission 5.8 (baseline unknown); s/p 2u pRBCs 6/19; 6/20 hgb 7.6; 6/21 7.8; 6/24 8.9; 6/26 8.5  - Hemoglobin ID: hgb A 66.9%, Hgb S 29.6%  - G6PD screen negative (6/25)      # Hematuria on POC UA   # r/o UTI  - Maybe related to underlying hemolysis   - UA (6/19) + Leuk and blood; - nitrite  - Ucx (6/19) NGTD  -  s/p Ceftriaxone (6/19), discontinued with resolution of symptoms and no growth on urine culture.    # Troponin elevation, improved  # Type II MI   # Reported Hx of Sinus tachycardia   - non specific TWI in V1 and V3  - likely 2/2 anemia, no chest pain at rest  - keep Hgb >8  - trend troponin: (6/18) 256 --> 257 --> (6/19) 410 --> 428 -->255  - resolution of chest pain and shortness of breath with count incrementing  - 6/24 Most recent EKG NSR  - c/w home metoprolol 25 succ daily (originally held on admit d/t anemia)     # Headaches, resolved   - Hx of headaches, at home takes motrin and Excedrin; will hold in setting of thrombocytopenia  - CT head (6/18) No evidence of acute cortical infarct or intracranial hemorrhage  - s/p Mg and morphine at [facility]     # Anxiety  - continue home zoloft 50mg daily     # High risk for breast cancer  - Follows with Hillary Johnson CNP  - s/p right breast excisional biopsy (2013) showing atypical ductal hyperplasia  - s/p left breast core biopsy (2013) which showed benign sclerosing adenosis and apocrine metaplasia.  - Due to high risk of breast cancer due to personal and family history was started on Tamoxifen in 9/2023 and plan to continue for 3 years for risk reduction  - hold tamoxifen as inpatient     # dispo  - Code status: full code  - DC home pending delivery of caplacizumab to home vs bedside  - Surrogate decision maker: Danielle Zuñiga [phone] - updated bedside 6/19   - FUV 7/23 Modesta Gallego (PCP); 7/31 Hillary Johnson (surg onc), 2nd dose rituximab 6/28, then 7/8, 7/15, 7/22 and 7/29. Rest to be determined by Dr. Walker. FUV requested for Dr. Walker on 7/8    I spent >90 minutes in the professional and overall care of this patient.    Assessment and plan discussed with attending physician Dr. Darrell Jean, ANGELIQUE-CNP

## 2024-06-27 NOTE — CARE PLAN
Problem: Pain - Adult  Goal: Verbalizes/displays adequate comfort level or baseline comfort level  Outcome: Progressing     Problem: Safety - Adult  Goal: Free from fall injury  Outcome: Progressing     Problem: Skin  Goal: Decreased wound size/increased tissue granulation at next dressing change  Outcome: Progressing  Goal: Participates in plan/prevention/treatment measures  Outcome: Progressing  Goal: Prevent/manage excess moisture  Outcome: Progressing  Goal: Prevent/minimize sheer/friction injuries  Outcome: Progressing  Goal: Promote/optimize nutrition  Outcome: Progressing  Goal: Promote skin healing  Outcome: Progressing       The clinical goals for the shift include pt will remain VSS throughout shift    Over the shift, the patient remained safe and free from injury. No complaints of pain throughout shift. Vitals stable throughout shift. No acute events overnight

## 2024-06-28 ENCOUNTER — SPECIALTY PHARMACY (OUTPATIENT)
Dept: PHARMACY | Facility: CLINIC | Age: 54
End: 2024-06-28

## 2024-06-28 ENCOUNTER — APPOINTMENT (OUTPATIENT)
Dept: HEMATOLOGY/ONCOLOGY | Facility: HOSPITAL | Age: 54
End: 2024-06-28
Payer: COMMERCIAL

## 2024-06-28 ENCOUNTER — PHARMACY VISIT (OUTPATIENT)
Dept: PHARMACY | Facility: CLINIC | Age: 54
End: 2024-06-28
Payer: COMMERCIAL

## 2024-06-28 VITALS
TEMPERATURE: 98.6 F | DIASTOLIC BLOOD PRESSURE: 69 MMHG | HEART RATE: 75 BPM | RESPIRATION RATE: 18 BRPM | HEIGHT: 64 IN | OXYGEN SATURATION: 96 % | BODY MASS INDEX: 30.6 KG/M2 | WEIGHT: 179.23 LBS | SYSTOLIC BLOOD PRESSURE: 108 MMHG

## 2024-06-28 LAB
ALBUMIN SERPL BCP-MCNC: 3.2 G/DL (ref 3.4–5)
ALP SERPL-CCNC: 51 U/L (ref 33–110)
ALT SERPL W P-5'-P-CCNC: 22 U/L (ref 7–45)
ANION GAP SERPL CALC-SCNC: 11 MMOL/L (ref 10–20)
APTT PPP: 25 SECONDS (ref 27–38)
AST SERPL W P-5'-P-CCNC: 15 U/L (ref 9–39)
BASOPHILS # BLD AUTO: 0.03 X10*3/UL (ref 0–0.1)
BASOPHILS NFR BLD AUTO: 0.2 %
BILIRUB SERPL-MCNC: 0.3 MG/DL (ref 0–1.2)
BUN SERPL-MCNC: 25 MG/DL (ref 6–23)
CALCIUM SERPL-MCNC: 8.9 MG/DL (ref 8.6–10.6)
CHLORIDE SERPL-SCNC: 105 MMOL/L (ref 98–107)
CO2 SERPL-SCNC: 29 MMOL/L (ref 21–32)
CREAT SERPL-MCNC: 0.76 MG/DL (ref 0.5–1.05)
D DIMER PPP FEU-MCNC: 3744 NG/ML FEU
EGFRCR SERPLBLD CKD-EPI 2021: >90 ML/MIN/1.73M*2
EOSINOPHIL # BLD AUTO: 0 X10*3/UL (ref 0–0.7)
EOSINOPHIL NFR BLD AUTO: 0 %
ERYTHROCYTE [DISTWIDTH] IN BLOOD BY AUTOMATED COUNT: 16.4 % (ref 11.5–14.5)
FIBRINOGEN PPP-MCNC: 206 MG/DL (ref 200–400)
GLUCOSE SERPL-MCNC: 91 MG/DL (ref 74–99)
HCT VFR BLD AUTO: 27.4 % (ref 36–46)
HGB BLD-MCNC: 8.8 G/DL (ref 12–16)
HGB RETIC QN: 28 PG (ref 28–38)
IMM GRANULOCYTES # BLD AUTO: 0.05 X10*3/UL (ref 0–0.7)
IMM GRANULOCYTES NFR BLD AUTO: 0.4 % (ref 0–0.9)
IMMATURE RETIC FRACTION: 12.7 %
INR PPP: 0.9 (ref 0.9–1.1)
LDH SERPL L TO P-CCNC: 157 U/L (ref 84–246)
LYMPHOCYTES # BLD AUTO: 2.39 X10*3/UL (ref 1.2–4.8)
LYMPHOCYTES NFR BLD AUTO: 18.2 %
MCH RBC QN AUTO: 26.5 PG (ref 26–34)
MCHC RBC AUTO-ENTMCNC: 32.1 G/DL (ref 32–36)
MCV RBC AUTO: 83 FL (ref 80–100)
MONOCYTES # BLD AUTO: 1.02 X10*3/UL (ref 0.1–1)
MONOCYTES NFR BLD AUTO: 7.8 %
NEUTROPHILS # BLD AUTO: 9.61 X10*3/UL (ref 1.2–7.7)
NEUTROPHILS NFR BLD AUTO: 73.4 %
NRBC BLD-RTO: 0.2 /100 WBCS (ref 0–0)
PLATELET # BLD AUTO: 524 X10*3/UL (ref 150–450)
POTASSIUM SERPL-SCNC: 3.8 MMOL/L (ref 3.5–5.3)
PROT SERPL-MCNC: 6 G/DL (ref 6.4–8.2)
PROTHROMBIN TIME: 10.3 SECONDS (ref 9.8–12.8)
RBC # BLD AUTO: 3.32 X10*6/UL (ref 4–5.2)
RETICS #: 0.17 X10*6/UL (ref 0.02–0.08)
RETICS/RBC NFR AUTO: 5.1 % (ref 0.5–2)
SCAN RESULT: ABNORMAL
SODIUM SERPL-SCNC: 141 MMOL/L (ref 136–145)
STFR SERPL-MCNC: 5.9 MG/L (ref 1.9–4.4)
VWF CP ACT/NOR PPP CHRO: <5 %
VWF CP INHIB PPP-ACNC: 2.2 INHIBITOR UNITS
WBC # BLD AUTO: 13.1 X10*3/UL (ref 4.4–11.3)

## 2024-06-28 PROCEDURE — 85025 COMPLETE CBC W/AUTO DIFF WBC: CPT

## 2024-06-28 PROCEDURE — 2500000004 HC RX 250 GENERAL PHARMACY W/ HCPCS (ALT 636 FOR OP/ED): Performed by: PHYSICIAN ASSISTANT

## 2024-06-28 PROCEDURE — 85384 FIBRINOGEN ACTIVITY: CPT

## 2024-06-28 PROCEDURE — 85045 AUTOMATED RETICULOCYTE COUNT: CPT

## 2024-06-28 PROCEDURE — 99239 HOSP IP/OBS DSCHRG MGMT >30: CPT | Performed by: NURSE PRACTITIONER

## 2024-06-28 PROCEDURE — RXMED WILLOW AMBULATORY MEDICATION CHARGE

## 2024-06-28 PROCEDURE — 85379 FIBRIN DEGRADATION QUANT: CPT

## 2024-06-28 PROCEDURE — 2500000001 HC RX 250 WO HCPCS SELF ADMINISTERED DRUGS (ALT 637 FOR MEDICARE OP): Performed by: PHYSICIAN ASSISTANT

## 2024-06-28 PROCEDURE — 2500000004 HC RX 250 GENERAL PHARMACY W/ HCPCS (ALT 636 FOR OP/ED): Mod: JZ | Performed by: INTERNAL MEDICINE

## 2024-06-28 PROCEDURE — 2500000001 HC RX 250 WO HCPCS SELF ADMINISTERED DRUGS (ALT 637 FOR MEDICARE OP)

## 2024-06-28 PROCEDURE — C9047 INJECTION, CAPLACIZUMAB-YHDP: HCPCS | Mod: JZ | Performed by: INTERNAL MEDICINE

## 2024-06-28 PROCEDURE — 36415 COLL VENOUS BLD VENIPUNCTURE: CPT

## 2024-06-28 PROCEDURE — 85610 PROTHROMBIN TIME: CPT

## 2024-06-28 PROCEDURE — 2500000004 HC RX 250 GENERAL PHARMACY W/ HCPCS (ALT 636 FOR OP/ED)

## 2024-06-28 PROCEDURE — 83615 LACTATE (LD) (LDH) ENZYME: CPT

## 2024-06-28 PROCEDURE — 84075 ASSAY ALKALINE PHOSPHATASE: CPT

## 2024-06-28 PROCEDURE — 2500000004 HC RX 250 GENERAL PHARMACY W/ HCPCS (ALT 636 FOR OP/ED): Mod: JZ | Performed by: STUDENT IN AN ORGANIZED HEALTH CARE EDUCATION/TRAINING PROGRAM

## 2024-06-28 PROCEDURE — 2500000002 HC RX 250 W HCPCS SELF ADMINISTERED DRUGS (ALT 637 FOR MEDICARE OP, ALT 636 FOR OP/ED)

## 2024-06-28 PROCEDURE — 99232 SBSQ HOSP IP/OBS MODERATE 35: CPT | Performed by: STUDENT IN AN ORGANIZED HEALTH CARE EDUCATION/TRAINING PROGRAM

## 2024-06-28 RX ORDER — PROCHLORPERAZINE MALEATE 10 MG
10 TABLET ORAL EVERY 6 HOURS PRN
Status: DISCONTINUED | OUTPATIENT
Start: 2024-06-28 | End: 2024-06-28 | Stop reason: HOSPADM

## 2024-06-28 RX ORDER — DIPHENHYDRAMINE HYDROCHLORIDE 50 MG/ML
50 INJECTION INTRAMUSCULAR; INTRAVENOUS AS NEEDED
OUTPATIENT
Start: 2024-07-05

## 2024-06-28 RX ORDER — DIPHENHYDRAMINE HYDROCHLORIDE 50 MG/ML
50 INJECTION INTRAMUSCULAR; INTRAVENOUS ONCE
Status: COMPLETED | OUTPATIENT
Start: 2024-06-28 | End: 2024-06-28

## 2024-06-28 RX ORDER — FAMOTIDINE 10 MG/ML
20 INJECTION INTRAVENOUS ONCE AS NEEDED
Status: DISCONTINUED | OUTPATIENT
Start: 2024-06-28 | End: 2024-06-28 | Stop reason: HOSPADM

## 2024-06-28 RX ORDER — EPINEPHRINE 0.3 MG/.3ML
0.3 INJECTION SUBCUTANEOUS EVERY 5 MIN PRN
OUTPATIENT
Start: 2024-07-05

## 2024-06-28 RX ORDER — DIPHENHYDRAMINE HYDROCHLORIDE 50 MG/ML
50 INJECTION INTRAMUSCULAR; INTRAVENOUS AS NEEDED
Status: DISCONTINUED | OUTPATIENT
Start: 2024-06-28 | End: 2024-06-28 | Stop reason: HOSPADM

## 2024-06-28 RX ORDER — PROCHLORPERAZINE EDISYLATE 5 MG/ML
10 INJECTION INTRAMUSCULAR; INTRAVENOUS EVERY 6 HOURS PRN
OUTPATIENT
Start: 2024-07-05

## 2024-06-28 RX ORDER — ALBUTEROL SULFATE 0.83 MG/ML
3 SOLUTION RESPIRATORY (INHALATION) AS NEEDED
Status: DISCONTINUED | OUTPATIENT
Start: 2024-06-28 | End: 2024-06-28 | Stop reason: HOSPADM

## 2024-06-28 RX ORDER — PROCHLORPERAZINE MALEATE 10 MG
10 TABLET ORAL EVERY 6 HOURS PRN
OUTPATIENT
Start: 2024-07-05

## 2024-06-28 RX ORDER — ACETAMINOPHEN 325 MG/1
650 TABLET ORAL ONCE
OUTPATIENT
Start: 2024-07-05

## 2024-06-28 RX ORDER — PROCHLORPERAZINE EDISYLATE 5 MG/ML
10 INJECTION INTRAMUSCULAR; INTRAVENOUS EVERY 6 HOURS PRN
Status: DISCONTINUED | OUTPATIENT
Start: 2024-06-28 | End: 2024-06-28 | Stop reason: HOSPADM

## 2024-06-28 RX ORDER — EPINEPHRINE 0.1 MG/ML
0.3 INJECTION INTRACARDIAC; INTRAVENOUS EVERY 5 MIN PRN
Status: DISCONTINUED | OUTPATIENT
Start: 2024-06-28 | End: 2024-06-28 | Stop reason: HOSPADM

## 2024-06-28 RX ORDER — ALBUTEROL SULFATE 0.83 MG/ML
3 SOLUTION RESPIRATORY (INHALATION) AS NEEDED
OUTPATIENT
Start: 2024-07-05

## 2024-06-28 RX ORDER — DIPHENHYDRAMINE HCL 50 MG
50 CAPSULE ORAL ONCE
Status: DISCONTINUED | OUTPATIENT
Start: 2024-06-28 | End: 2024-06-28 | Stop reason: HOSPADM

## 2024-06-28 RX ORDER — FAMOTIDINE 10 MG/ML
20 INJECTION INTRAVENOUS ONCE AS NEEDED
OUTPATIENT
Start: 2024-07-05

## 2024-06-28 RX ORDER — DIPHENHYDRAMINE HCL 50 MG
50 CAPSULE ORAL ONCE
OUTPATIENT
Start: 2024-07-05

## 2024-06-28 RX ORDER — ACETAMINOPHEN 325 MG/1
650 TABLET ORAL ONCE
Status: COMPLETED | OUTPATIENT
Start: 2024-06-28 | End: 2024-06-28

## 2024-06-28 ASSESSMENT — COGNITIVE AND FUNCTIONAL STATUS - GENERAL
DAILY ACTIVITIY SCORE: 24
MOBILITY SCORE: 24
MOBILITY SCORE: 24
DAILY ACTIVITIY SCORE: 24

## 2024-06-28 ASSESSMENT — PAIN SCALES - GENERAL
PAINLEVEL_OUTOF10: 0 - NO PAIN
PAINLEVEL_OUTOF10: 0 - NO PAIN

## 2024-06-28 ASSESSMENT — PAIN - FUNCTIONAL ASSESSMENT
PAIN_FUNCTIONAL_ASSESSMENT: 0-10
PAIN_FUNCTIONAL_ASSESSMENT: 0-10

## 2024-06-28 NOTE — PROGRESS NOTES
[facility] Clinical Note    Tere Carrion is a 54 y.o. female, who is on the specialty pharmacy service for management of:  Blood Modifiers Core.    Tere Carrion is taking: Cablivi.    Medication Receipt Date: Anticipated 6/28/24  Medication Start Date (planned or actual): Pt started while hospitalized.    Tere was contacted on 6/28/2024 at 3:18 PM for a virtual pharmacy visit with encounter number [id number] from:   [facility]  [address] [place] [id number]  Dept: [phone]  Dept Fax: [phone]    Tere was offered a Telephone visit.  Tere consented to a telephone visit, which was performed.    The most recent encounter visit with the referring prescriber Eula Jean on 6/27/24 was reviewed.  Pharmacy will continue to collaborate in the care of this patient with the referring prescriber Eual Jean.    General Assessment      Impression/Plan  IMPRESSION/PLAN:  Is patient high risk (potential patients:  pregnancy, geriatric, pediatric)?  no  Is laboratory follow-up needed? no  Is a clinical intervention needed? no  Next reassessment date? 9/26/24   Additional comments:     Refer to the encounter summary report for documentation details about patient counseling and education.      Medication Adherence    The importance of adherence was discussed with the patient and they were advised to take the medication as prescribed by their provider. Tere was encouraged to call her physician's office if they have a question regarding a missed dose.     QOL/Patient Satisfaction  Rate your quality of life on scale of 1-10: 8  Rate your satisfaction with  Specialty Pharmacy on scale of 1-10: 8      Patient was advised to contact the pharmacy if there are any changes to their medication list, including prescriptions, OTC medications, herbal products, or supplements. Patient was advised of [facility]'s  dispensing process, refill timeline, contact information ([phone]), and patient management follow up. Patient confirmed understanding of education conducted during assessment. All patient questions and concerns were addressed to the best of my ability. Patient was encouraged to contact the specialty pharmacy with any questions or concerns.    Confirmed follow-up outreaches are properly scheduled and reviewed goals of therapy with the patient.        Harry Triplett, PharmD

## 2024-06-28 NOTE — CARE PLAN
The patient's goals for the shift include      The clinical goals for the shift include patient will remain safe and free from injury this shift 6/28/24 0700      Problem: Safety - Adult  Goal: Free from fall injury  Outcome: Progressing     Problem: Pain - Adult  Goal: Verbalizes/displays adequate comfort level or baseline comfort level  Outcome: Progressing

## 2024-06-28 NOTE — PROGRESS NOTES
HEMATOLOGY AND MEDICAL ONCOLOGY  -----------------------------------------------------------------------------------  HEMATOLOGY    Tere Carrion is a 54 y.o. female with TTP (thrombotic thrombocytopenic purpura) (Multi).     Subjective   No signs of bleeding. No complaints. Planned to be discharged soon after Rituxan infusion. The infusion appointments for the next Rituxans have been set up as well as the weekly blood work.     Objective   Physical Exam  General: very pleasant, awake, alert, no acute distress  CV: tachycardic rate, regular rhythm, no MRG  Resp: CTAB, no labored breathing  Abdominal: soft, non-tender, non-distended, active bowel sounds  Extremities: full ROM, no lower extremity swelling     Vitals:    06/28/24 1300   BP: 105/66   Pulse: 85   Resp: 16   Temp: 37 °C (98.6 °F)   SpO2: 96%         Relevant Results    Results from last 7 days   Lab Units 06/28/24  0848 06/27/24 0728 06/26/24  0713   WBC AUTO x10*3/uL 13.1* 13.4* 13.2*   HEMOGLOBIN g/dL 8.8* 9.1* 8.5*   HEMATOCRIT % 27.4* 29.6* 27.1*   PLATELETS AUTO x10*3/uL 524* 471* 473*   NEUTROS PCT AUTO % 73.4 74.5 74.6   LYMPHS PCT AUTO % 18.2 16.0 16.4   MONOS PCT AUTO % 7.8 8.0 8.2   EOS PCT AUTO % 0.0 0.0 0.0     Results from last 7 days   Lab Units 06/28/24  0848 06/27/24 0728 06/26/24  0713   SODIUM mmol/L 141 140 141   POTASSIUM mmol/L 3.8 3.9 3.8   CHLORIDE mmol/L 105 104 104   CO2 mmol/L 29 25 28   BUN mg/dL 25* 15 16   CREATININE mg/dL 0.76 0.75 0.71   CALCIUM mg/dL 8.9 8.9 8.4*   PROTEIN TOTAL g/dL 6.0* 5.9* 5.7*   BILIRUBIN TOTAL mg/dL 0.3 0.3 0.3   ALK PHOS U/L 51 47 48   ALT U/L 22 21 23   AST U/L 15 20 18   GLUCOSE mg/dL 91 77 80     Results from last 7 days   Lab Units 06/28/24  0848 06/27/24  0728 06/26/24  0713   APTT seconds 25* 25* 25*   INR  0.9 1.0 0.9               Assessment/Plan   Principal Problem:    TTP (thrombotic thrombocytopenic purpura) (Multi)        Tere Carrion is a 54 y.o. female with a notable history  of TTP in 1998/1999 (treated with prolonged plasmapheresis, steroids, and immunosuppressants including vincristine and azathioprine, and splenectomy March 1999), reported sickle cell trait and alpha thalassemia trait presented on 6/18/24 for 2 weeks of worsening generalized weakness, fatigue, migraines, hematuria, and new bruising with petechiae, with TTP.     Hematuria has completely resolved over the last 3 days.     Transfusion medicine was consulted and she received PLEX x 3 (last on 6/21/24).     HIV, Hepatitis panel: Non-reactive  B12, folate, iron profile: Normal  T spot: Negative  ADAMTS <5, inhibitor level 2.4  Hemolysis labs Ok, ferritin improving, and there is not BALBIR. Iron profile consistent with anemia of chronic disease.    Recommendations:  -Taper down steroids as already scheduled  -Received rituximab on 6/28/24 (today) before go home. Planned weekly doses, infusion appts arranged by primary team, including next infusion appointments and weekly blood work.  -Paperwork completed for caplacizumab outpatient, inpatient treatment started on 6/21/24. Medication needs to be at home or in the patient hands before discharge  -Pharmacy to review with the patient the Cablivi's daily administration at home and if this is a syringe pre diluted.  -Appointment with Dr. Walker scheduled.     Patient seen, examined, and assessed with Dr. Aaron MD. The patient's questions, doubts and concerns were addressed and resolved apropriately.   A thorough and detailed explanation of the patient's case was provided. The patient demonstrated an understanding of the information and agreed with the proposed treatment plan and laboratory tests. The patient expressed gratitude towards our teams for the care provided.       -----------------------------------------------------------------------------------  Pelon Page MD  Hematology and Medical Oncology Fellow  Epic chat, [phone], [phone].

## 2024-06-28 NOTE — DISCHARGE SUMMARY
Discharge Diagnosis  Acquired TTP (Multi)    Issues Requiring Follow-Up      Test Results Pending At Discharge  Pending Labs       Order Current Status    Extra Urine Styles Tube Collected (06/19/24 0844)    Soluble Transferrin Receptor In process    Urinalysis with Reflex Culture and Microscopic In process          Hospital Course  Tere Carrion is a 54 y.o. 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 She was on daily prednisone 80 mg (6/18-6/24). S/p plasmapheresis (6/19, 6/20, and most recently 6/21). UA 6/19 (+) leuk and blood, s/p ceftriaxone 6/19; Ucx negative growth, atbx discontinued. 6/19 Hematology consulted, rec cont predinsone, avoid plt transfusions (must be cleared by Heme first), discharge home with Caplacizumbab. Initially had CP and SOB with activity, troponin were trended and began to down trend, now resolved, of note she had resolution of CP and SOB as her blood counts improved. 6/21 s/p first dose of Rituximab and daily Caplacizumab (6/21- current). Plan for weekly rituximab infusions and daily Caplacizumab, taper steroid by 10mg q5d until maintenance dose of 20mg daily. DC home was delayed for multiple days d/t insurance coverage process for Cablivi. Medication was approved 6/28 and pt was discharged with med delivered to her bedside. She will continue on Cablivi daily and weekly rituxan infusions. Apts below. She was discharged in stable condition.    Pertinent Physical Exam At Time of Discharge  Physical Exam  Vitals reviewed.   Constitutional:       Appearance: Normal appearance.   HENT:      Head: Normocephalic and atraumatic.      Nose: Nose normal.       Mouth/Throat:      Mouth: Mucous membranes are moist.      Pharynx: Oropharynx is clear.   Eyes:      Extraocular Movements: Extraocular movements intact.      Pupils: Pupils are equal, round, and reactive to light.   Cardiovascular:      Rate and Rhythm: Normal rate and regular rhythm.      Pulses: Normal pulses.      Heart sounds: Normal heart sounds.   Pulmonary:      Effort: Pulmonary effort is normal.      Breath sounds: Normal breath sounds.   Abdominal:      General: Bowel sounds are normal.      Palpations: Abdomen is soft.   Musculoskeletal:         General: Normal range of motion.   Skin:     General: Skin is warm.   Neurological:      General: No focal deficit present.      Mental Status: She is alert and oriented to person, place, and time. Mental status is at baseline.   Psychiatric:         Mood and Affect: Mood normal.         Behavior: Behavior normal.       Home Medications     Medication List      START taking these medications     * caplacizumab 11 mg injection; Commonly known as: Cablivi; Inject 11 mg   under the skin once every 24 hours.   * Cablivi 11 mg injection; Generic drug: caplacizumab; Inject 11 mg   under the skin once daily.   pantoprazole 40 mg EC tablet; Commonly known as: ProtoNix; Take 1 tablet   (40 mg) by mouth once daily in the morning. Take before meals. Do not   crush, chew, or split.   predniSONE 10 mg tablet; Commonly known as: Deltasone; Take 6 tablets   (60 mg) by mouth once daily for 5 days, THEN 5 tablets (50 mg) once daily   for 5 days, THEN 4 tablets (40 mg) once daily for 5 days, THEN 3 tablets   (30 mg) once daily for 5 days, Then take 2 tablets (20mg) once daily for 5   days.  Do not start before June 25, 2024.; Start taking on: June 25, 2024  * This list has 2 medication(s) that are the same as other medications   prescribed for you. Read the directions carefully, and ask your doctor or   other care provider to review them with you.     CONTINUE taking these  medications     aspirin-acetaminophen-caffeine 250-250-65 mg tablet; Commonly known as:   Excedrin Migraine   fluticasone 50 mcg/actuation nasal spray; Commonly known as: Flonase;   Administer 1 spray into each nostril once daily. Shake gently. Before   first use, prime pump. After use, clean tip and replace cap.   ibuprofen 200 mg tablet   metoprolol succinate XL 25 mg 24 hr tablet; Commonly known as:   Toprol-XL; Take 1 tablet (25 mg) by mouth once daily. Do not crush or   chew.   sertraline 50 mg tablet; Commonly known as: Zoloft; Take 1 tablet (50   mg) by mouth once daily.   tamoxifen 10 mg tablet; Commonly known as: Nolvadex     ASK your doctor about these medications     nitrofurantoin (macrocrystal-monohydrate) 100 mg capsule; Commonly known   as: Macrobid; Take 1 capsule (100 mg) by mouth 2 times a day for 7 days.;   Ask about: Should I take this medication?     Outpatient Follow-Up  Future Appointments   Date Time Provider Department Center   7/5/2024 11:15 AM INF 08 [facility]   7/8/2024 10:40 AM Tree Du MD [id number] [facility]   7/8/2024  1:30 PM INF 20 [facility]   7/12/2024 12:30 PM INF 04 [facility]   7/15/2024  2:40 PM INF 20 [facility]   7/22/2024  1:30 PM INF 20 [facility]   7/23/2024  1:00 PM Modesta Gallego DO DO[address]   7/29/2024  1:30 PM INF 20 [facility]   7/31/2024  1:15 PM [facility] 1 [facility]   7/31/2024  2:30 PM BERNARDINO [facility]     >30 minutes was spent on the assessment/discharge plan of this patient    Assessment and plan discussed with attending physician ANGELIQUE Livingston-CNP

## 2024-06-28 NOTE — SIGNIFICANT EVENT
06/28/24 1425   Prechemo Checklist   Has the patient been in the hospital, ED, or urgent care since last date of service Yes   Chemo/Immuno Consent Completed and Signed Yes   Protocol/Indications Verified Yes   Confirmed to previous date/time of medication Yes   Compared to previous dose Yes   All medications are dated accurately Yes   Pregnancy Test Negative Not applicable   Parameters Met Yes   Provider Name MD Darrell   BSA/Weight-Height Verified Yes   Dose Calculations Verified (current, total, cumulative) Yes

## 2024-07-01 ENCOUNTER — SPECIALTY PHARMACY (OUTPATIENT)
Dept: PHARMACY | Facility: CLINIC | Age: 54
End: 2024-07-01

## 2024-07-01 ENCOUNTER — PATIENT OUTREACH (OUTPATIENT)
Dept: CARE COORDINATION | Facility: CLINIC | Age: 54
End: 2024-07-01
Payer: COMMERCIAL

## 2024-07-01 PROCEDURE — RXMED WILLOW AMBULATORY MEDICATION CHARGE

## 2024-07-01 NOTE — PROGRESS NOTES
Medications  Medications reviewed with patient/caregiver?: Yes (7/1/2024 11:44 AM)  Is the patient having any side effects they believe may be caused by any medication additions or changes?: No (7/1/2024 11:44 AM)  Does the patient have all medications ordered at discharge?: Yes (7/1/2024 11:44 AM)  Care Management Interventions: No intervention needed (7/1/2024 11:44 AM)  Is the patient taking all medications as directed (includes completed medication regime)?: Yes (7/1/2024 11:44 AM)  Care Management Interventions: Provided patient education (7/1/2024 11:44 AM)    Appointments  Does the patient have a primary care provider?: Yes (7/1/2024 11:44 AM)    Patient Teaching  Does the patient have access to their discharge instructions?: Yes (7/1/2024 11:44 AM)  Care Management Interventions: Reviewed instructions with patient (7/1/2024 11:44 AM)  What is the patient's perception of their health status since discharge?: Improving (7/1/2024 11:44 AM)    Wrap Up  Wrap Up Additional Comments: Tere reports she is feeling much better, up and around the house, will be going out to drive today.  Has all of her prescriptions, her specialy med will be delivered later and she has been self administering this.  Tere has good supports and multiple appts scheduled.  Aware that a [employer]vers[employer] chat bot will be assigned and this CM will be following up again in 2 weeks and then again in 1 month (7/1/2024 11:44 AM)

## 2024-07-02 ENCOUNTER — PHARMACY VISIT (OUTPATIENT)
Dept: PHARMACY | Facility: CLINIC | Age: 54
End: 2024-07-02
Payer: COMMERCIAL

## 2024-07-05 ENCOUNTER — APPOINTMENT (OUTPATIENT)
Dept: HEMATOLOGY/ONCOLOGY | Facility: HOSPITAL | Age: 54
End: 2024-07-05
Payer: COMMERCIAL

## 2024-07-06 ENCOUNTER — SPECIALTY PHARMACY (OUTPATIENT)
Dept: PHARMACY | Facility: CLINIC | Age: 54
End: 2024-07-06

## 2024-07-06 PROCEDURE — RXMED WILLOW AMBULATORY MEDICATION CHARGE

## 2024-07-08 ENCOUNTER — PHARMACY VISIT (OUTPATIENT)
Dept: PHARMACY | Facility: CLINIC | Age: 54
End: 2024-07-08
Payer: COMMERCIAL

## 2024-07-08 ENCOUNTER — OFFICE VISIT (OUTPATIENT)
Dept: HEMATOLOGY/ONCOLOGY | Facility: HOSPITAL | Age: 54
End: 2024-07-08
Payer: COMMERCIAL

## 2024-07-08 ENCOUNTER — LAB (OUTPATIENT)
Dept: LAB | Facility: HOSPITAL | Age: 54
End: 2024-07-08
Payer: COMMERCIAL

## 2024-07-08 ENCOUNTER — INFUSION (OUTPATIENT)
Dept: HEMATOLOGY/ONCOLOGY | Facility: HOSPITAL | Age: 54
End: 2024-07-08
Payer: COMMERCIAL

## 2024-07-08 VITALS
HEART RATE: 82 BPM | TEMPERATURE: 98.6 F | SYSTOLIC BLOOD PRESSURE: 110 MMHG | DIASTOLIC BLOOD PRESSURE: 70 MMHG | OXYGEN SATURATION: 100 % | RESPIRATION RATE: 16 BRPM

## 2024-07-08 VITALS
BODY MASS INDEX: 30.68 KG/M2 | RESPIRATION RATE: 18 BRPM | OXYGEN SATURATION: 99 % | HEART RATE: 57 BPM | TEMPERATURE: 96.1 F | DIASTOLIC BLOOD PRESSURE: 67 MMHG | WEIGHT: 179.68 LBS | SYSTOLIC BLOOD PRESSURE: 111 MMHG

## 2024-07-08 DIAGNOSIS — M31.19 TTP (THROMBOTIC THROMBOCYTOPENIC PURPURA) (MULTI): ICD-10-CM

## 2024-07-08 DIAGNOSIS — M31.19 ACQUIRED TTP (MULTI): ICD-10-CM

## 2024-07-08 DIAGNOSIS — D57.3 SICKLE CELL TRAIT (CMS-HCC): Primary | ICD-10-CM

## 2024-07-08 DIAGNOSIS — D56.3 ALPHA THALASSEMIA TRAIT: ICD-10-CM

## 2024-07-08 DIAGNOSIS — R31.9 HEMATURIA, UNSPECIFIED TYPE: ICD-10-CM

## 2024-07-08 LAB
ALBUMIN SERPL BCP-MCNC: 3.8 G/DL (ref 3.4–5)
ALP SERPL-CCNC: 47 U/L (ref 33–110)
ALT SERPL W P-5'-P-CCNC: 22 U/L (ref 7–45)
ANION GAP SERPL CALC-SCNC: 14 MMOL/L (ref 10–20)
AST SERPL W P-5'-P-CCNC: 14 U/L (ref 9–39)
BASOPHILS # BLD AUTO: 0.02 X10*3/UL (ref 0–0.1)
BASOPHILS NFR BLD AUTO: 0.2 %
BILIRUB SERPL-MCNC: 0.3 MG/DL (ref 0–1.2)
BUN SERPL-MCNC: 17 MG/DL (ref 6–23)
CALCIUM SERPL-MCNC: 9 MG/DL (ref 8.6–10.3)
CARDIAC TROPONIN I PNL SERPL HS: 8 NG/L (ref 0–34)
CHLORIDE SERPL-SCNC: 103 MMOL/L (ref 98–107)
CO2 SERPL-SCNC: 30 MMOL/L (ref 21–32)
CREAT SERPL-MCNC: 0.98 MG/DL (ref 0.5–1.05)
EGFRCR SERPLBLD CKD-EPI 2021: 68 ML/MIN/1.73M*2
EOSINOPHIL # BLD AUTO: 0 X10*3/UL (ref 0–0.7)
EOSINOPHIL NFR BLD AUTO: 0 %
ERYTHROCYTE [DISTWIDTH] IN BLOOD BY AUTOMATED COUNT: 15.8 % (ref 11.5–14.5)
GLUCOSE SERPL-MCNC: 101 MG/DL (ref 74–99)
HCT VFR BLD AUTO: 32.5 % (ref 36–46)
HGB BLD-MCNC: 10.6 G/DL (ref 12–16)
IMM GRANULOCYTES # BLD AUTO: 0.02 X10*3/UL (ref 0–0.7)
IMM GRANULOCYTES NFR BLD AUTO: 0.2 % (ref 0–0.9)
IRON SATN MFR SERPL: 33 % (ref 25–45)
IRON SERPL-MCNC: 104 UG/DL (ref 35–150)
LYMPHOCYTES # BLD AUTO: 0.72 X10*3/UL (ref 1.2–4.8)
LYMPHOCYTES NFR BLD AUTO: 8.1 %
MCH RBC QN AUTO: 26.6 PG (ref 26–34)
MCHC RBC AUTO-ENTMCNC: 32.6 G/DL (ref 32–36)
MCV RBC AUTO: 82 FL (ref 80–100)
MONOCYTES # BLD AUTO: 0.21 X10*3/UL (ref 0.1–1)
MONOCYTES NFR BLD AUTO: 2.4 %
NEUTROPHILS # BLD AUTO: 7.94 X10*3/UL (ref 1.2–7.7)
NEUTROPHILS NFR BLD AUTO: 89.1 %
NRBC BLD-RTO: 0 /100 WBCS (ref 0–0)
PLATELET # BLD AUTO: 347 X10*3/UL (ref 150–450)
POTASSIUM SERPL-SCNC: 3.7 MMOL/L (ref 3.5–5.3)
PROT SERPL-MCNC: 6.8 G/DL (ref 6.4–8.2)
RBC # BLD AUTO: 3.99 X10*6/UL (ref 4–5.2)
SODIUM SERPL-SCNC: 143 MMOL/L (ref 136–145)
TIBC SERPL-MCNC: 316 UG/DL (ref 240–445)
UIBC SERPL-MCNC: 212 UG/DL (ref 110–370)
WBC # BLD AUTO: 8.9 X10*3/UL (ref 4.4–11.3)

## 2024-07-08 PROCEDURE — 99214 OFFICE O/P EST MOD 30 MIN: CPT | Performed by: STUDENT IN AN ORGANIZED HEALTH CARE EDUCATION/TRAINING PROGRAM

## 2024-07-08 PROCEDURE — 80053 COMPREHEN METABOLIC PANEL: CPT

## 2024-07-08 PROCEDURE — 84484 ASSAY OF TROPONIN QUANT: CPT

## 2024-07-08 PROCEDURE — 36415 COLL VENOUS BLD VENIPUNCTURE: CPT

## 2024-07-08 PROCEDURE — 83540 ASSAY OF IRON: CPT

## 2024-07-08 PROCEDURE — 2500000004 HC RX 250 GENERAL PHARMACY W/ HCPCS (ALT 636 FOR OP/ED): Mod: JZ | Performed by: STUDENT IN AN ORGANIZED HEALTH CARE EDUCATION/TRAINING PROGRAM

## 2024-07-08 PROCEDURE — 96409 CHEMO IV PUSH SNGL DRUG: CPT

## 2024-07-08 PROCEDURE — 2500000001 HC RX 250 WO HCPCS SELF ADMINISTERED DRUGS (ALT 637 FOR MEDICARE OP): Performed by: STUDENT IN AN ORGANIZED HEALTH CARE EDUCATION/TRAINING PROGRAM

## 2024-07-08 PROCEDURE — 85025 COMPLETE CBC W/AUTO DIFF WBC: CPT

## 2024-07-08 RX ORDER — PROCHLORPERAZINE MALEATE 10 MG
10 TABLET ORAL EVERY 6 HOURS PRN
Status: DISCONTINUED | OUTPATIENT
Start: 2024-07-08 | End: 2024-07-08 | Stop reason: HOSPADM

## 2024-07-08 RX ORDER — ACETAMINOPHEN 325 MG/1
650 TABLET ORAL ONCE
OUTPATIENT
Start: 2024-07-12

## 2024-07-08 RX ORDER — FAMOTIDINE 10 MG/ML
20 INJECTION INTRAVENOUS ONCE AS NEEDED
OUTPATIENT
Start: 2024-07-12

## 2024-07-08 RX ORDER — DIPHENHYDRAMINE HYDROCHLORIDE 50 MG/ML
50 INJECTION INTRAMUSCULAR; INTRAVENOUS AS NEEDED
OUTPATIENT
Start: 2024-07-12

## 2024-07-08 RX ORDER — HEPARIN SODIUM,PORCINE/PF 10 UNIT/ML
50 SYRINGE (ML) INTRAVENOUS AS NEEDED
OUTPATIENT
Start: 2024-07-08

## 2024-07-08 RX ORDER — HEPARIN 100 UNIT/ML
500 SYRINGE INTRAVENOUS AS NEEDED
OUTPATIENT
Start: 2024-07-08

## 2024-07-08 RX ORDER — PROCHLORPERAZINE MALEATE 10 MG
10 TABLET ORAL EVERY 6 HOURS PRN
OUTPATIENT
Start: 2024-07-12

## 2024-07-08 RX ORDER — PROCHLORPERAZINE EDISYLATE 5 MG/ML
10 INJECTION INTRAMUSCULAR; INTRAVENOUS EVERY 6 HOURS PRN
OUTPATIENT
Start: 2024-07-12

## 2024-07-08 RX ORDER — EPINEPHRINE 0.3 MG/.3ML
0.3 INJECTION SUBCUTANEOUS EVERY 5 MIN PRN
OUTPATIENT
Start: 2024-07-12

## 2024-07-08 RX ORDER — ALBUTEROL SULFATE 0.83 MG/ML
3 SOLUTION RESPIRATORY (INHALATION) AS NEEDED
OUTPATIENT
Start: 2024-07-12

## 2024-07-08 RX ORDER — PROCHLORPERAZINE EDISYLATE 5 MG/ML
10 INJECTION INTRAMUSCULAR; INTRAVENOUS EVERY 6 HOURS PRN
Status: DISCONTINUED | OUTPATIENT
Start: 2024-07-08 | End: 2024-07-08 | Stop reason: HOSPADM

## 2024-07-08 RX ORDER — DIPHENHYDRAMINE HCL 50 MG
50 CAPSULE ORAL ONCE
OUTPATIENT
Start: 2024-07-12

## 2024-07-08 RX ORDER — DIPHENHYDRAMINE HCL 50 MG
50 CAPSULE ORAL ONCE
Status: COMPLETED | OUTPATIENT
Start: 2024-07-08 | End: 2024-07-08

## 2024-07-08 RX ORDER — ACETAMINOPHEN 325 MG/1
650 TABLET ORAL ONCE
Status: COMPLETED | OUTPATIENT
Start: 2024-07-08 | End: 2024-07-08

## 2024-07-08 ASSESSMENT — PAIN SCALES - GENERAL: PAINLEVEL: 0-NO PAIN

## 2024-07-08 NOTE — PROGRESS NOTES
Patient ID: Tere Carrion is a 54 y.o. female.  Referring Physician: No referring provider defined for this encounter.  Primary Care Provider: Modesta Gallego DO  Visit Type: Follow Up  Diagnosis: immune TTP    Therapy summary (diagnosis: iTTP):   Rituximab: , , , 7/15, to be completed 7/15/2024  Cablivi: -to be completed       Subjective    HPI  54 y.o. female with a PMH significant for TTP in  (treated with prolonged (87) plasmapheresis, steroids, and immunosuppressants including vincristine and azathioprine, and splenectomy 1999), reported sickle cell trait and alpha thalassemia trait.   She was recently seen inpatient for immune TTP where she presented on 24 for 2 weeks of worsening generalized weakness, fatigue, migraines, hematuria, and new bruising with petechiae, with TTP. She was started on PLEX, did not receive Cablivi upfront 2/2 hematuria, which resolved over the last 3 days. She received PLEX x 3 (last on 24).    HIV, Hepatitis panel: Non-reactive  B12, folate, iron profile: Normal  T spot: Negative  Shiga toxin -ve   ELBERT -ve, SPEP work up negative for clonal protein   ADAMTS <5, inhibitor level 2.4    Re splenectomy, is followed by PCP for vaccination notes she is caught up at this time.     Notices fatigue since last 3 days, some skin bruising but no bleeding. Perimenopausal. Currently on pred 40mg, on taper.   Age appropriate cancer screening: mammo '23 due soon. [facility] '20 due next year.   FMH: no cancer, mother had a UE DVT post-op, mother has many polyps, maternal GF had colon cancer, mother's sibs had colon cancer too   Social history: never smoker, no ETOH, no RDU.     Review of Systems - Oncology  10 point review of systems negative except as stated in HPI    Objective   Past Surgical History:   Procedure Laterality Date    BREAST SURGERY       SECTION, LOW TRANSVERSE  2004    TUBAL LIGATION  2004     Oncology History    No history  exists.       BSA: 1.92 meters squared /67 (BP Location: Left arm, Patient Position: Sitting, BP Cuff Size: Adult)   Pulse 57   Temp 35.6 °C (96.1 °F) (Temporal)   Resp 18   Wt 81.5 kg (179 lb 10.8 oz)   LMP 12/31/2023   SpO2 99%   BMI 30.68 kg/m²   Physical Exam  Vitals reviewed.   Constitutional:       General: She is not in acute distress.     Appearance: She is not toxic-appearing.   HENT:      Head: Normocephalic and atraumatic.   Cardiovascular:      Rate and Rhythm: Normal rate and regular rhythm.   Pulmonary:      Effort: Pulmonary effort is normal.   Musculoskeletal:      Comments: No pedal edema   Neurological:      General: No focal deficit present.      Mental Status: She is oriented to person, place, and time.   Psychiatric:         Mood and Affect: Mood normal.         Assessment/Plan    54 y.o. female with a PMH significant for  TTP in 1998/1999 (treated with prolonged (87) plasmapheresis, steroids, and immunosuppressants including vincristine and azathioprine, and splenectomy March 1999), reported sickle cell trait and alpha thalassemia trait [confirmed on retesting].     # Immune TTP: Patient was initially diagnosed in 1998-99, had prolonged plasmapheresis followed by immunosuppression with vincristine azathioprine and eventually splenectomy in March 1999, has been followed at [facility] since then and has had no evidence of relapse.  As noted above the patient presented with hematuria and was eventually diagnosed with immune TTP with an antibody titer of 2.4.  She was started on Cablivi and rituximab and has tolerated both well.  She will complete both these therapies in the coming weeks.  Additionally the patient continues her steroid taper with prednisone.  Plan:  -Continue prednisone taper by 10 mg up to 20, then by 5 mg down to 0.  -Cablivi plan for 28 days, pending the [id number] activity and inhibitor assay drawn at 7/8/2024.  -The patient will complete immunosuppression with 1 cycle of  rituximab.  -Continue lab checks of [id number], CBC, CMP, troponin weekly while on therapy, followed by every 2 weeks x 3 followed by monthly x 3, and then quarterly.  -The patient has been educated about the signs and symptoms of TTP and the process of contacting us or heading to an ER as needed.  - follow up next: 1 month  - labs prior to follow up: Noted above      Tree Walker MD

## 2024-07-08 NOTE — PROGRESS NOTES
Tere Carrion is a 54 y.o. female who presents in stable condition for C3D1 infusion after seeing her provider.     She is on the following chemotherapy regimen:     Treatment Plans       Rituxan   Overall, she states her energy level is stable.  Her appetite has been good.  She reports fatigue and rash first two infusion .     Patient tolerated infusion well and has been educated with the overall therapy plan. She has no other questions or concerns at this time. AVS, lab results & future appointment provided. Patient discharged in stable condition.    Reviewed and approved by ARIC VARNER on 7/8/24 at 5:06 PM.

## 2024-07-09 ENCOUNTER — SPECIALTY PHARMACY (OUTPATIENT)
Dept: PHARMACY | Facility: CLINIC | Age: 54
End: 2024-07-09

## 2024-07-11 ENCOUNTER — SPECIALTY PHARMACY (OUTPATIENT)
Dept: PHARMACY | Facility: CLINIC | Age: 54
End: 2024-07-11

## 2024-07-12 ENCOUNTER — APPOINTMENT (OUTPATIENT)
Dept: HEMATOLOGY/ONCOLOGY | Facility: HOSPITAL | Age: 54
End: 2024-07-12
Payer: COMMERCIAL

## 2024-07-12 PROBLEM — D56.3 ALPHA THALASSEMIA TRAIT: Status: ACTIVE | Noted: 2024-07-12

## 2024-07-12 PROCEDURE — RXMED WILLOW AMBULATORY MEDICATION CHARGE

## 2024-07-13 ENCOUNTER — PHARMACY VISIT (OUTPATIENT)
Dept: PHARMACY | Facility: CLINIC | Age: 54
End: 2024-07-13
Payer: COMMERCIAL

## 2024-07-15 ENCOUNTER — INFUSION (OUTPATIENT)
Dept: HEMATOLOGY/ONCOLOGY | Facility: HOSPITAL | Age: 54
End: 2024-07-15
Payer: COMMERCIAL

## 2024-07-15 ENCOUNTER — LAB (OUTPATIENT)
Dept: LAB | Facility: HOSPITAL | Age: 54
End: 2024-07-15
Payer: COMMERCIAL

## 2024-07-15 VITALS
RESPIRATION RATE: 14 BRPM | TEMPERATURE: 97 F | OXYGEN SATURATION: 99 % | DIASTOLIC BLOOD PRESSURE: 69 MMHG | WEIGHT: 179.9 LBS | BODY MASS INDEX: 30.71 KG/M2 | SYSTOLIC BLOOD PRESSURE: 112 MMHG | HEART RATE: 71 BPM

## 2024-07-15 DIAGNOSIS — M31.19 ACQUIRED TTP (MULTI): ICD-10-CM

## 2024-07-15 LAB
ALBUMIN SERPL BCP-MCNC: 3.6 G/DL (ref 3.4–5)
ALP SERPL-CCNC: 50 U/L (ref 33–110)
ALT SERPL W P-5'-P-CCNC: 20 U/L (ref 7–45)
ANION GAP SERPL CALC-SCNC: 14 MMOL/L (ref 10–20)
AST SERPL W P-5'-P-CCNC: 13 U/L (ref 9–39)
BASOPHILS # BLD AUTO: 0.03 X10*3/UL (ref 0–0.1)
BASOPHILS NFR BLD AUTO: 0.3 %
BILIRUB SERPL-MCNC: 0.3 MG/DL (ref 0–1.2)
BUN SERPL-MCNC: 16 MG/DL (ref 6–23)
CALCIUM SERPL-MCNC: 9.4 MG/DL (ref 8.6–10.3)
CARDIAC TROPONIN I PNL SERPL HS: 6 NG/L (ref 0–34)
CHLORIDE SERPL-SCNC: 104 MMOL/L (ref 98–107)
CO2 SERPL-SCNC: 28 MMOL/L (ref 21–32)
CREAT SERPL-MCNC: 1.02 MG/DL (ref 0.5–1.05)
EGFRCR SERPLBLD CKD-EPI 2021: 65 ML/MIN/1.73M*2
EOSINOPHIL # BLD AUTO: 0 X10*3/UL (ref 0–0.7)
EOSINOPHIL NFR BLD AUTO: 0 %
ERYTHROCYTE [DISTWIDTH] IN BLOOD BY AUTOMATED COUNT: 15.2 % (ref 11.5–14.5)
GLUCOSE SERPL-MCNC: 82 MG/DL (ref 74–99)
HCT VFR BLD AUTO: 31.4 % (ref 36–46)
HGB BLD-MCNC: 10.2 G/DL (ref 12–16)
IMM GRANULOCYTES # BLD AUTO: 0.12 X10*3/UL (ref 0–0.7)
IMM GRANULOCYTES NFR BLD AUTO: 1.1 % (ref 0–0.9)
LYMPHOCYTES # BLD AUTO: 0.68 X10*3/UL (ref 1.2–4.8)
LYMPHOCYTES NFR BLD AUTO: 6.2 %
MCH RBC QN AUTO: 26 PG (ref 26–34)
MCHC RBC AUTO-ENTMCNC: 32.5 G/DL (ref 32–36)
MCV RBC AUTO: 80 FL (ref 80–100)
MONOCYTES # BLD AUTO: 0.43 X10*3/UL (ref 0.1–1)
MONOCYTES NFR BLD AUTO: 3.9 %
NEUTROPHILS # BLD AUTO: 9.74 X10*3/UL (ref 1.2–7.7)
NEUTROPHILS NFR BLD AUTO: 88.5 %
NRBC BLD-RTO: 0 /100 WBCS (ref 0–0)
PLATELET # BLD AUTO: 264 X10*3/UL (ref 150–450)
POTASSIUM SERPL-SCNC: 3.9 MMOL/L (ref 3.5–5.3)
PROT SERPL-MCNC: 6.5 G/DL (ref 6.4–8.2)
RBC # BLD AUTO: 3.92 X10*6/UL (ref 4–5.2)
SCAN RESULT: ABNORMAL
SODIUM SERPL-SCNC: 142 MMOL/L (ref 136–145)
VWF CP ACT/NOR PPP CHRO: <5 %
VWF CP INHIB PPP-ACNC: >8 INHIBITOR UNITS
WBC # BLD AUTO: 11 X10*3/UL (ref 4.4–11.3)

## 2024-07-15 PROCEDURE — 36415 COLL VENOUS BLD VENIPUNCTURE: CPT

## 2024-07-15 PROCEDURE — 85025 COMPLETE CBC W/AUTO DIFF WBC: CPT

## 2024-07-15 PROCEDURE — 2500000004 HC RX 250 GENERAL PHARMACY W/ HCPCS (ALT 636 FOR OP/ED): Mod: JZ | Performed by: STUDENT IN AN ORGANIZED HEALTH CARE EDUCATION/TRAINING PROGRAM

## 2024-07-15 PROCEDURE — 84075 ASSAY ALKALINE PHOSPHATASE: CPT

## 2024-07-15 PROCEDURE — 96415 CHEMO IV INFUSION ADDL HR: CPT

## 2024-07-15 PROCEDURE — 96413 CHEMO IV INFUSION 1 HR: CPT

## 2024-07-15 PROCEDURE — 85335 FACTOR INHIBITOR TEST: CPT

## 2024-07-15 PROCEDURE — 84484 ASSAY OF TROPONIN QUANT: CPT

## 2024-07-15 PROCEDURE — 2500000001 HC RX 250 WO HCPCS SELF ADMINISTERED DRUGS (ALT 637 FOR MEDICARE OP): Performed by: STUDENT IN AN ORGANIZED HEALTH CARE EDUCATION/TRAINING PROGRAM

## 2024-07-15 RX ORDER — PROCHLORPERAZINE MALEATE 10 MG
10 TABLET ORAL EVERY 6 HOURS PRN
OUTPATIENT
Start: 2024-07-19

## 2024-07-15 RX ORDER — ACETAMINOPHEN 325 MG/1
650 TABLET ORAL ONCE
OUTPATIENT
Start: 2024-07-19

## 2024-07-15 RX ORDER — FAMOTIDINE 10 MG/ML
20 INJECTION INTRAVENOUS ONCE AS NEEDED
OUTPATIENT
Start: 2024-07-19

## 2024-07-15 RX ORDER — ALBUTEROL SULFATE 0.83 MG/ML
3 SOLUTION RESPIRATORY (INHALATION) AS NEEDED
OUTPATIENT
Start: 2024-07-19

## 2024-07-15 RX ORDER — EPINEPHRINE 0.3 MG/.3ML
0.3 INJECTION SUBCUTANEOUS EVERY 5 MIN PRN
OUTPATIENT
Start: 2024-07-19

## 2024-07-15 RX ORDER — PROCHLORPERAZINE EDISYLATE 5 MG/ML
10 INJECTION INTRAMUSCULAR; INTRAVENOUS EVERY 6 HOURS PRN
Status: DISCONTINUED | OUTPATIENT
Start: 2024-07-15 | End: 2024-07-15 | Stop reason: HOSPADM

## 2024-07-15 RX ORDER — DIPHENHYDRAMINE HCL 50 MG
50 CAPSULE ORAL ONCE
OUTPATIENT
Start: 2024-07-19

## 2024-07-15 RX ORDER — PROCHLORPERAZINE MALEATE 10 MG
10 TABLET ORAL EVERY 6 HOURS PRN
Status: DISCONTINUED | OUTPATIENT
Start: 2024-07-15 | End: 2024-07-15 | Stop reason: HOSPADM

## 2024-07-15 RX ORDER — ACETAMINOPHEN 325 MG/1
650 TABLET ORAL ONCE
Status: COMPLETED | OUTPATIENT
Start: 2024-07-15 | End: 2024-07-15

## 2024-07-15 RX ORDER — PROCHLORPERAZINE EDISYLATE 5 MG/ML
10 INJECTION INTRAMUSCULAR; INTRAVENOUS EVERY 6 HOURS PRN
OUTPATIENT
Start: 2024-07-19

## 2024-07-15 RX ORDER — DIPHENHYDRAMINE HCL 50 MG
50 CAPSULE ORAL ONCE
Status: COMPLETED | OUTPATIENT
Start: 2024-07-15 | End: 2024-07-15

## 2024-07-15 RX ORDER — DIPHENHYDRAMINE HYDROCHLORIDE 50 MG/ML
50 INJECTION INTRAMUSCULAR; INTRAVENOUS AS NEEDED
OUTPATIENT
Start: 2024-07-19

## 2024-07-15 ASSESSMENT — PAIN SCALES - GENERAL: PAINLEVEL: 0-NO PAIN

## 2024-07-15 NOTE — PROGRESS NOTES
[facility] Infusion Nursing Note  07/15/24    Tere Carrion is a 54 y.o. year old female patient presenting to outpatient infusion for #4 Rituximab.     Since the last visit, she reports doing well. Overall, she states that energy level is energy level is good. The patient is able to perform all ADLs. . Appetite has been unchanged and good. she reports no complaints.     Line type: PIV 22 L AC  Line removed/maintained prior to discharge: removed    Administrations This Visit       acetaminophen (Tylenol) tablet 650 mg       Admin Date  07/15/2024 Action  Given Dose  650 mg Route  oral Documented By  Amisha Agrawal RN              diphenhydrAMINE (BENADryl) capsule 50 mg       Admin Date  07/15/2024 Action  Given Dose  50 mg Route  oral Documented By  Amisha Agrawal RN                  Hypersensitivity reaction noted: No  Patient tolerated treatment well. Discharged home in stable condition.    Follow-up Plan: labs in two weeks-- sent Dr. Du a message about making sure requested labs are dated correctly    Amisha Agrawal RN

## 2024-07-18 ENCOUNTER — SPECIALTY PHARMACY (OUTPATIENT)
Dept: PHARMACY | Facility: CLINIC | Age: 54
End: 2024-07-18

## 2024-07-18 PROCEDURE — RXMED WILLOW AMBULATORY MEDICATION CHARGE

## 2024-07-19 LAB
SCAN RESULT: ABNORMAL
VWF CP ACT/NOR PPP CHRO: <5 %
VWF CP INHIB PPP-ACNC: >8 INHIBITOR UNITS

## 2024-07-20 ENCOUNTER — PHARMACY VISIT (OUTPATIENT)
Dept: PHARMACY | Facility: CLINIC | Age: 54
End: 2024-07-20
Payer: COMMERCIAL

## 2024-07-22 ENCOUNTER — APPOINTMENT (OUTPATIENT)
Dept: HEMATOLOGY/ONCOLOGY | Facility: HOSPITAL | Age: 54
End: 2024-07-22
Payer: COMMERCIAL

## 2024-07-22 NOTE — PROGRESS NOTES
Tere Carrion female   1970 54 y.o.   [id number]      Chief Complaint  High risk surveillance care, annual mammogram and exam    History Of Present Illness  Tere Carrion is a very pleasant 54 year old AA female (formerly [facility]) followed in the [facility] for high risk surveillance care. She has family history of breast cancer, see below. She has a history of right breast excisional biopsy, atypical ductal hyperplasia in  and a left breast core biopsy, benign sclerosing adenosis and apocrine metaplasia in . These were performed at [facility]. She started Tamoxifen 10mg daily in 2023, currently taking and tolerating well. She presents today for annual mammogram and exam. She denies any new masses or lumps. 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 She was there for 10 days and discharged with routine weekly blood work. She is currently on Cablivi injections daily. She is feeling better and states her labs have come back normal. She states she was told upon discharge she could resume Tamoxifen.     BREAST IMAGIN2023 Bilateral Full MRI, indicates BI-RADS Category 2. 2023 Bilateral screening mammogram, indicates BI-RADS Category 1.      FEMALE HISTORY: menarche age 13, L4 (triplets), first birth age 16,  x 13 months, OCP's x 2 years, perimenopausal, no HRT, heterogeneously dense tissue     FAMILY CANCER HISTORY:  Maternal Aunt (1): Breast cancer  Maternal First Cousin (1): Breast cancer, age 40, BRCA negative  Maternal First Cousin (2): Breast cancer, age 40  Maternal Aunt (2): Colon cancer  Maternal Aunt (2): Colon cancer  Maternal Uncle: Colon cancer  Maternal Grandfather: Colon cancer     Surgical History  She has a past surgical history that includes Breast surgery;  section, low transverse (); Tubal ligation (); Breast biopsy; and Breast cyst excision.     Social History  She reports that she has never smoked. She has never used smokeless  tobacco. She reports that she does not drink alcohol and does not use drugs.    Family History  Family History   Problem Relation Name Age of Onset    Asthma Mother Bruna     Diabetes Mother Bruna     Hypertension Mother Bruna     Cancer Maternal Grandfather Gavin     Colon cancer Maternal Grandfather Gavin     Hypertension Maternal Grandmother Aan     Cancer Mother's Sister Stcaey     Cancer Mother's Brother Javier     Cancer Mother's Sister Pat     Cancer Mother's Brother Naeem     Diabetes Brother Teneuss     Hypertension Brother Teneuss     Hypertension Brother Martin     Learning disabilities Son Sanchez         Allergies  Shellfish derived, Latex, and Vancomycin    Medications  Current Outpatient Medications   Medication Instructions    Cablivi 11 mg, subcutaneous, Daily    metoprolol succinate XL (TOPROL-XL) 25 mg, oral, Daily, Do not crush or chew.    olopatadine (Pataday) 0.2 % ophthalmic solution 1 drop, Both Eyes, Daily    pantoprazole (PROTONIX) 40 mg, oral, Daily before breakfast, Do not crush, chew, or split.    sertraline (ZOLOFT) 50 mg, oral, Daily    tamoxifen (NOLVADEX) 10 mg, oral, Daily         REVIEW OF SYSTEMS    Constitutional:  Negative for appetite change, fatigue, fever and unexpected weight change.   HENT:  Negative for ear pain, hearing loss, nosebleeds, sore throat and trouble swallowing.    Eyes:  Negative for discharge, itching and visual disturbance.   Respiratory:  Negative for cough, chest tightness and shortness of breath.    Cardiovascular:  Negative for chest pain, palpitations and leg swelling.   Breast: as indicated in HPI  Gastrointestinal:  Negative for abdominal pain, constipation, diarrhea and nausea.   Endocrine: Negative for cold intolerance and heat intolerance.   Genitourinary:  Negative for dysuria, frequency, hematuria, pelvic pain and vaginal bleeding.   Musculoskeletal:  Negative for arthralgias, back pain, gait problem, joint swelling and myalgias.    Skin:  Negative for color change and rash.   Allergic/Immunologic: Negative for environmental allergies and food allergies.   Neurological:  Negative for dizziness, tremors, speech difficulty, weakness, numbness and headaches.   Hematological:  Does not bruise/bleed easily.   Psychiatric/Behavioral:  Negative for agitation, dysphoric mood and sleep disturbance. The patient is not nervous/anxious.         Past Medical History  She has a past medical history of Anemia, Anxiety, Atypical ductal hyperplasia, breast, Breast cyst, Clotting disorder (Multi), Fibrocystic breast, Headache, Sickle cell anemia (Multi) (Sickle cell trait), and Urinary tract infection.     Physical Exam  Patient is alert and oriented x3 and in a relaxed and appropriate mood. Her gait is steady and hand grasps are equal. Sclera is clear. The breasts are nearly symmetrical. The tissue is soft without palpable abnormalities, discrete nodules or masses. The right breast has a well-healed excisional biopsy incision, superior central quadrant with palpable scar tissue. The right upper chest wall has a well-healed incision from previous port placement. The skin and nipples appear normal. There is no cervical, supraclavicular or axillary lymphadenopathy. Heart rate and rhythm normal, S1 and S2 appreciated. The lungs are clear to auscultation bilaterally. Abdomen is soft and non-tender.     Physical Exam  Chest:              Last Recorded Vitals  Vitals:    07/31/24 1315   BP: 92/60   Pulse: 94   Resp: 16       Relevant Results   Time was spent discussing digital images of the radiology testing with the patient. I explained the results in depth, along with suggested explanation for follow up recommendations based on the testing results. BI-RADS Category 2    Imaging  Narrative & Impression   Interpreted By:  Fatou Chaudhari and Marshall Colin   STUDY:  BI MAMMO BILATERAL SCREENING TOMOSYNTHESIS;  7/31/2024 2:08 pm      ACCESSION  NUMBER(S):  [id number]      ORDERING CLINICIAN:  INNA PARKINSON      INDICATION:  Screening. History of benign right excisional biopsy. History of  benign left core biopsy.      COMPARISON:  07/26/2023, 01/13/2022 and 01/14/2021.      FINDINGS:  2D and tomosynthesis images were reviewed at 1 mm slice thickness.      Density:  The breast tissue is heterogeneously dense, which may  obscure small masses.      There is stable postoperative scarring in the superolateral right  breast at anterior to middle depth. There is a biopsy clip within the  superolateral left breast at middle depth. No suspicious masses or  calcifications are identified.      IMPRESSION:  No mammographic evidence of malignancy.      BI-RADS CATEGORY:  BI-RADS Category:  2 Benign.  Recommendation:  Annual Screening.  Recommended Date:  1 Year.  Laterality:  Bilateral.       MR breast bilateral w contrast full protocol 11/25/2023    Narrative  Interpreted By:  Hector Curry and O'Connor Gregory  STUDY:  BI MR BREAST BILATERAL WITH CONTRAST FULL PROTOCOL;  11/25/2023 11:43  am    ACCESSION NUMBER(S):  [id number]    ORDERING CLINICIAN:  INNA PARKINSON    INDICATION:  High-risk screening. History of right excisional biopsy revealing  ADH. History of benign left core biopsies. Family history of breast  cancer.    COMPARISON:  Mammogram dated 07/26/2023, 01/13/2022, MRI from 07/15/2021    TECHNIQUE:  Using a dedicated breast coil, STIR axial and T1-weighted fat  saturation axial images of the breasts were obtained, the latter both  before and after intravenous administration of Gadolinium DTPA. On an  independent workstation, 3-D images were formulated using [employer]  including time enhancement curves, subtraction images and MIP images.    Intravenous contrast:  14 mL of Dotarem    FINDINGS:  There is symmetric moderate bilateral background enhancement.    Density:  The breast tissue is heterogeneously dense, which may  obscure small masses.    RIGHT  BREAST: Post excisional changes in the superior central right  breast consistent with history of ADH. No suspicious mass or nonmass  enhancement is identified.    No axillary or internal mammary lymphadenopathy is appreciated.    LEFT BREAST:  No suspicious mass or nonmass enhancement is identified.    No axillary or internal mammary lymphadenopathy is appreciated.    NON-BREAST FINDINGS:  None.    Impression  No MRI evidence of malignancy in either breast.    BI-RADS CATEGORY:  BI-RADS Category:  2 Benign.  Recommendation:  Routine Screening Breast MRI in 1 Year.  Recommended Date:  1 Year.  Laterality:  Bilateral.    For any future breast imaging appointments, please call [phone] ([phone]).    I personally reviewed the images/study and I agree with the findings  as stated by resident physician Dr. Wilner Garcia.    MACRO:  None    Signed by: Hector Curry 11/27/2023 12:47 PM  Dictation workstation:   [id number]       Risk Profiles              Assessment/Plan   High risk surveillance care, normal clinical exam and imaging, history of right ADH, history of left core biopsy, benign, family history of breast cancer, heterogeneously dense tissue     Plan: Return in one year for bilateral screening mammogram and office visit. Full MRI in January 2025. Continue Tamoxifen 10 daily unless otherwise recommended by Hematologist. Asked patient to contact Hematology provider to confirm clearance with Tamoxifen. She will contact the office with recommendations.      Patient Discussion/Summary  Your clinical examination and imaging are normal. Please return in one year for bilateral screening mammogram and office visit or sooner if you have any problems or concerns. Continue Tamoxifen 10 daily.     High risk breast surveillance care plan:  Yearly mammogram with digital breast tomosynthesis  Twice yearly clinical breast examinations  Breast MRI - Full MRI in January 2025  Monthly self breast examinations  Vitamin D3 2000  IU/daily (over the counter)  Exercise 3-4 times per week for 45-60 minutes  Limit alcohol to 3-4 drinks per week   Eat a healthy low-fat diet with lots of fruits and vegetables    You can see your health information, review clinical summaries from office visits & test results online when you follow your health with MY  Chart, a personal health record. To sign up go to [email or web address]. If you need assistance with signing up or trouble getting into your account call [facility] Patient Line 24/7 at [phone].    My office phone number is [phone] if you need to get in touch with me or have additional questions or concerns. Thank you for choosing [facility] and trusting me as your healthcare provider. I look forward to seeing you again at your next office visit. I am honored to be a provider on your health care team and I remain dedicated to helping you achieve your health goals.      Hillary Johnson, APRN-CNP

## 2024-07-23 ENCOUNTER — APPOINTMENT (OUTPATIENT)
Dept: PRIMARY CARE | Facility: CLINIC | Age: 54
End: 2024-07-23
Payer: COMMERCIAL

## 2024-07-23 ENCOUNTER — LAB (OUTPATIENT)
Dept: LAB | Facility: LAB | Age: 54
End: 2024-07-23
Payer: COMMERCIAL

## 2024-07-23 ENCOUNTER — TELEPHONE (OUTPATIENT)
Dept: HEMATOLOGY/ONCOLOGY | Facility: CLINIC | Age: 54
End: 2024-07-23

## 2024-07-23 VITALS
BODY MASS INDEX: 30.56 KG/M2 | OXYGEN SATURATION: 99 % | SYSTOLIC BLOOD PRESSURE: 110 MMHG | RESPIRATION RATE: 14 BRPM | TEMPERATURE: 97.9 F | HEIGHT: 64 IN | HEART RATE: 88 BPM | DIASTOLIC BLOOD PRESSURE: 68 MMHG | WEIGHT: 179 LBS

## 2024-07-23 DIAGNOSIS — D57.3 SICKLE CELL TRAIT (CMS-HCC): ICD-10-CM

## 2024-07-23 DIAGNOSIS — F41.9 ANXIETY: ICD-10-CM

## 2024-07-23 DIAGNOSIS — M31.19 ACQUIRED TTP (MULTI): ICD-10-CM

## 2024-07-23 DIAGNOSIS — Z00.00 WELLNESS EXAMINATION: Primary | ICD-10-CM

## 2024-07-23 DIAGNOSIS — D56.3 ALPHA THALASSEMIA TRAIT: ICD-10-CM

## 2024-07-23 DIAGNOSIS — H10.13 ALLERGIC CONJUNCTIVITIS OF BOTH EYES: ICD-10-CM

## 2024-07-23 DIAGNOSIS — N60.99 ATYPICAL DUCTAL HYPERPLASIA OF BREAST: ICD-10-CM

## 2024-07-23 LAB
ALBUMIN SERPL BCP-MCNC: 4 G/DL (ref 3.4–5)
ALP SERPL-CCNC: 50 U/L (ref 33–110)
ALT SERPL W P-5'-P-CCNC: 21 U/L (ref 7–45)
ANION GAP SERPL CALC-SCNC: 11 MMOL/L (ref 10–20)
AST SERPL W P-5'-P-CCNC: 16 U/L (ref 9–39)
BASOPHILS # BLD AUTO: 0.02 X10*3/UL (ref 0–0.1)
BASOPHILS NFR BLD AUTO: 0.2 %
BILIRUB SERPL-MCNC: 0.3 MG/DL (ref 0–1.2)
BUN SERPL-MCNC: 11 MG/DL (ref 6–23)
CALCIUM SERPL-MCNC: 9.3 MG/DL (ref 8.6–10.3)
CARDIAC TROPONIN I PNL SERPL HS: 5 NG/L (ref 0–13)
CHLORIDE SERPL-SCNC: 103 MMOL/L (ref 98–107)
CO2 SERPL-SCNC: 30 MMOL/L (ref 21–32)
CREAT SERPL-MCNC: 0.89 MG/DL (ref 0.5–1.05)
EGFRCR SERPLBLD CKD-EPI 2021: 77 ML/MIN/1.73M*2
EOSINOPHIL # BLD AUTO: 0 X10*3/UL (ref 0–0.7)
EOSINOPHIL NFR BLD AUTO: 0 %
ERYTHROCYTE [DISTWIDTH] IN BLOOD BY AUTOMATED COUNT: 14.6 % (ref 11.5–14.5)
GLUCOSE SERPL-MCNC: 76 MG/DL (ref 74–99)
HCT VFR BLD AUTO: 33.8 % (ref 36–46)
HGB BLD-MCNC: 10.8 G/DL (ref 12–16)
IMM GRANULOCYTES # BLD AUTO: 0.03 X10*3/UL (ref 0–0.7)
IMM GRANULOCYTES NFR BLD AUTO: 0.3 % (ref 0–0.9)
LYMPHOCYTES # BLD AUTO: 0.72 X10*3/UL (ref 1.2–4.8)
LYMPHOCYTES NFR BLD AUTO: 6.9 %
MCH RBC QN AUTO: 26.2 PG (ref 26–34)
MCHC RBC AUTO-ENTMCNC: 32 G/DL (ref 32–36)
MCV RBC AUTO: 82 FL (ref 80–100)
MONOCYTES # BLD AUTO: 0.47 X10*3/UL (ref 0.1–1)
MONOCYTES NFR BLD AUTO: 4.5 %
NEUTROPHILS # BLD AUTO: 9.15 X10*3/UL (ref 1.2–7.7)
NEUTROPHILS NFR BLD AUTO: 88.1 %
NRBC BLD-RTO: 0 /100 WBCS (ref 0–0)
PLATELET # BLD AUTO: 280 X10*3/UL (ref 150–450)
POTASSIUM SERPL-SCNC: 4.2 MMOL/L (ref 3.5–5.3)
PROT SERPL-MCNC: 6.8 G/DL (ref 6.4–8.2)
RBC # BLD AUTO: 4.13 X10*6/UL (ref 4–5.2)
SODIUM SERPL-SCNC: 140 MMOL/L (ref 136–145)
WBC # BLD AUTO: 10.4 X10*3/UL (ref 4.4–11.3)

## 2024-07-23 PROCEDURE — 36415 COLL VENOUS BLD VENIPUNCTURE: CPT

## 2024-07-23 PROCEDURE — 99396 PREV VISIT EST AGE 40-64: CPT | Performed by: INTERNAL MEDICINE

## 2024-07-23 PROCEDURE — 85025 COMPLETE CBC W/AUTO DIFF WBC: CPT

## 2024-07-23 PROCEDURE — 85397 CLOTTING FUNCT ACTIVITY: CPT

## 2024-07-23 PROCEDURE — RXMED WILLOW AMBULATORY MEDICATION CHARGE

## 2024-07-23 PROCEDURE — 3008F BODY MASS INDEX DOCD: CPT | Performed by: INTERNAL MEDICINE

## 2024-07-23 PROCEDURE — 84484 ASSAY OF TROPONIN QUANT: CPT

## 2024-07-23 PROCEDURE — 85335 FACTOR INHIBITOR TEST: CPT

## 2024-07-23 PROCEDURE — 80053 COMPREHEN METABOLIC PANEL: CPT

## 2024-07-23 PROCEDURE — 1036F TOBACCO NON-USER: CPT | Performed by: INTERNAL MEDICINE

## 2024-07-23 RX ORDER — CAPLACIZUMAB 11 MG
11 KIT INJECTION DAILY
Qty: 30 KIT | Refills: 1 | Status: SHIPPED | OUTPATIENT
Start: 2024-07-23

## 2024-07-23 RX ORDER — OLOPATADINE HYDROCHLORIDE 2 MG/ML
1 SOLUTION/ DROPS OPHTHALMIC DAILY
Qty: 2.5 ML | Refills: 5 | Status: SHIPPED | OUTPATIENT
Start: 2024-07-23

## 2024-07-23 ASSESSMENT — PROMIS GLOBAL HEALTH SCALE
RATE_PHYSICAL_HEALTH: GOOD
CARRYOUT_PHYSICAL_ACTIVITIES: COMPLETELY
RATE_AVERAGE_FATIGUE: MODERATE
EMOTIONAL_PROBLEMS: RARELY
RATE_SOCIAL_SATISFACTION: FAIR
RATE_MENTAL_HEALTH: GOOD
CARRYOUT_SOCIAL_ACTIVITIES: GOOD
RATE_QUALITY_OF_LIFE: GOOD
RATE_GENERAL_HEALTH: GOOD
RATE_AVERAGE_PAIN: 3

## 2024-07-23 NOTE — TELEPHONE ENCOUNTER
RN also called the patient back and informed her that Dr. Walker would like for her to get weekly labs while on the cablivi. She said she will get labs drawn today.

## 2024-07-23 NOTE — PROGRESS NOTES
"Subjective    Tere Carrion is a 54 y.o. female who presents for Annual Exam.  HPI  Colonoscopy 2020 repeat in 2025  Mammogram scheduled  Pap 2024  She had abnormal pap in 2024 and had colposcopy. She will follow with gyn  Requesting olopatdine 0.2% RF   Her platelets are better. She is feeling ok. She is back to work.   Review of Systems   All other systems reviewed and are negative.        Objective     /68 (BP Location: Left arm, Patient Position: Sitting, BP Cuff Size: Adult)   Pulse 88   Temp 36.6 °C (97.9 °F) (Skin)   Resp 14   Ht 1.626 m (5' 4\")   Wt 81.2 kg (179 lb)   LMP 12/31/2023   SpO2 99%   BMI 30.73 kg/m²    Physical Exam  Vitals reviewed.   Constitutional:       General: She is not in acute distress.     Appearance: Normal appearance.   Cardiovascular:      Rate and Rhythm: Normal rate and regular rhythm.      Pulses: Normal pulses.      Heart sounds: Normal heart sounds.   Pulmonary:      Effort: Pulmonary effort is normal.      Breath sounds: Normal breath sounds.   Chest:      Chest wall: No mass or tenderness.   Breasts:     Right: Normal.      Left: Normal.   Abdominal:      General: Abdomen is flat.      Tenderness: There is no abdominal tenderness.   Musculoskeletal:         General: No swelling.      Cervical back: Neck supple. No tenderness.   Lymphadenopathy:      Cervical: No cervical adenopathy.      Upper Body:      Right upper body: No supraclavicular or axillary adenopathy.      Left upper body: No supraclavicular or axillary adenopathy.   Skin:     General: Skin is warm and dry.   Neurological:      Mental Status: She is alert.   Psychiatric:         Attention and Perception: Attention and perception normal.         Mood and Affect: Mood and affect normal.       Health Maintenance Due   Topic Date Due    HIB Vaccines (1 of 1 - Risk 1-dose series) Never done    Meningococcal Vaccine (1 - Risk 2-dose series) Never done    Meningococcal B Vaccine (1 of 4 - Increased Risk) " Never done    Hepatitis B Vaccines (3 of 3 - 19+ 3-dose series) 12/12/2016    COVID-19 Vaccine (3 - Pfizer risk series) 01/05/2022    Zoster Vaccines (2 of 2) 05/12/2022    Diabetes Screening  04/25/2023    Mammogram  07/26/2024          Assessment/Plan   Problem List Items Addressed This Visit       Anxiety    Atypical ductal hyperplasia of breast    Sickle cell trait (CMS-HCC)    Acquired TTP (Multi)    Alpha thalassemia trait     Other Visit Diagnoses       Wellness examination    -  Primary    Relevant Orders    Lipid Panel    Allergic conjunctivitis of both eyes        Relevant Medications    olopatadine (Pataday) 0.2 % ophthalmic solution        Check labs.   Mamm is scheduled  Recommend yearly follow ups with gyn for hpv  Call  with any problems or questions.   Follow up in a year or sooner if needed

## 2024-07-23 NOTE — TELEPHONE ENCOUNTER
RN called and left a message for the patient to call the office back. MD Walker said based on her most recent labs, he would like for her to continue doing cablivi injections. He sent in a request to [facility] for a new script.

## 2024-07-24 ENCOUNTER — SPECIALTY PHARMACY (OUTPATIENT)
Dept: PHARMACY | Facility: CLINIC | Age: 54
End: 2024-07-24

## 2024-07-24 DIAGNOSIS — F41.9 ANXIETY: ICD-10-CM

## 2024-07-24 RX ORDER — SERTRALINE HYDROCHLORIDE 50 MG/1
50 TABLET, FILM COATED ORAL DAILY
Qty: 90 TABLET | Refills: 3 | Status: SHIPPED | OUTPATIENT
Start: 2024-07-24 | End: 2025-07-24

## 2024-07-27 LAB
SCAN RESULT: ABNORMAL
VWF CP ACT/NOR PPP CHRO: <5 %
VWF CP INHIB PPP-ACNC: >8 INHIBITOR UNITS

## 2024-07-29 ENCOUNTER — PATIENT OUTREACH (OUTPATIENT)
Dept: CARE COORDINATION | Facility: CLINIC | Age: 54
End: 2024-07-29
Payer: COMMERCIAL

## 2024-07-29 ENCOUNTER — APPOINTMENT (OUTPATIENT)
Dept: HEMATOLOGY/ONCOLOGY | Facility: HOSPITAL | Age: 54
End: 2024-07-29
Payer: COMMERCIAL

## 2024-07-29 NOTE — PROGRESS NOTES
[facility]ABRIELA follow up:  Tere reports that she is doing OK, energy still low and does have a non debilitating headache most days.  She is receiving her meds from  specialty and self injecting, will contact the hematology office today with re-ordering and with questions about the prednisone.  Has follow up appointments scheduled.  No case management interventions required at this time.  Will close the GABRIELA program    She Zeng RN [facility]  [phone]

## 2024-07-30 ENCOUNTER — LAB (OUTPATIENT)
Dept: LAB | Facility: LAB | Age: 54
End: 2024-07-30
Payer: COMMERCIAL

## 2024-07-30 DIAGNOSIS — M31.19 ACQUIRED TTP (MULTI): ICD-10-CM

## 2024-07-30 LAB
ALBUMIN SERPL BCP-MCNC: 4 G/DL (ref 3.4–5)
ALP SERPL-CCNC: 48 U/L (ref 33–110)
ALT SERPL W P-5'-P-CCNC: 21 U/L (ref 7–45)
ANION GAP SERPL CALC-SCNC: 12 MMOL/L (ref 10–20)
AST SERPL W P-5'-P-CCNC: 19 U/L (ref 9–39)
BASOPHILS # BLD AUTO: 0.03 X10*3/UL (ref 0–0.1)
BASOPHILS NFR BLD AUTO: 0.4 %
BILIRUB SERPL-MCNC: 0.3 MG/DL (ref 0–1.2)
BUN SERPL-MCNC: 13 MG/DL (ref 6–23)
CALCIUM SERPL-MCNC: 9.4 MG/DL (ref 8.6–10.3)
CARDIAC TROPONIN I PNL SERPL HS: 4 NG/L (ref 0–13)
CHLORIDE SERPL-SCNC: 102 MMOL/L (ref 98–107)
CO2 SERPL-SCNC: 28 MMOL/L (ref 21–32)
CREAT SERPL-MCNC: 1 MG/DL (ref 0.5–1.05)
EGFRCR SERPLBLD CKD-EPI 2021: 67 ML/MIN/1.73M*2
EOSINOPHIL # BLD AUTO: 0.07 X10*3/UL (ref 0–0.7)
EOSINOPHIL NFR BLD AUTO: 0.8 %
ERYTHROCYTE [DISTWIDTH] IN BLOOD BY AUTOMATED COUNT: 14.1 % (ref 11.5–14.5)
GLUCOSE SERPL-MCNC: 82 MG/DL (ref 74–99)
HCT VFR BLD AUTO: 36.1 % (ref 36–46)
HGB BLD-MCNC: 11.7 G/DL (ref 12–16)
IMM GRANULOCYTES # BLD AUTO: 0.02 X10*3/UL (ref 0–0.7)
IMM GRANULOCYTES NFR BLD AUTO: 0.2 % (ref 0–0.9)
LYMPHOCYTES # BLD AUTO: 1.53 X10*3/UL (ref 1.2–4.8)
LYMPHOCYTES NFR BLD AUTO: 18.2 %
MCH RBC QN AUTO: 26.6 PG (ref 26–34)
MCHC RBC AUTO-ENTMCNC: 32.4 G/DL (ref 32–36)
MCV RBC AUTO: 82 FL (ref 80–100)
MONOCYTES # BLD AUTO: 0.84 X10*3/UL (ref 0.1–1)
MONOCYTES NFR BLD AUTO: 10 %
NEUTROPHILS # BLD AUTO: 5.9 X10*3/UL (ref 1.2–7.7)
NEUTROPHILS NFR BLD AUTO: 70.4 %
NRBC BLD-RTO: 0 /100 WBCS (ref 0–0)
PLATELET # BLD AUTO: 378 X10*3/UL (ref 150–450)
POTASSIUM SERPL-SCNC: 4 MMOL/L (ref 3.5–5.3)
PROT SERPL-MCNC: 7 G/DL (ref 6.4–8.2)
RBC # BLD AUTO: 4.4 X10*6/UL (ref 4–5.2)
SODIUM SERPL-SCNC: 138 MMOL/L (ref 136–145)
WBC # BLD AUTO: 8.4 X10*3/UL (ref 4.4–11.3)

## 2024-07-30 PROCEDURE — 84484 ASSAY OF TROPONIN QUANT: CPT

## 2024-07-30 PROCEDURE — 36415 COLL VENOUS BLD VENIPUNCTURE: CPT

## 2024-07-30 PROCEDURE — 85335 FACTOR INHIBITOR TEST: CPT

## 2024-07-30 PROCEDURE — 85397 CLOTTING FUNCT ACTIVITY: CPT

## 2024-07-30 PROCEDURE — 80053 COMPREHEN METABOLIC PANEL: CPT

## 2024-07-30 PROCEDURE — 85025 COMPLETE CBC W/AUTO DIFF WBC: CPT

## 2024-07-31 ENCOUNTER — PHARMACY VISIT (OUTPATIENT)
Dept: PHARMACY | Facility: CLINIC | Age: 54
End: 2024-07-31
Payer: COMMERCIAL

## 2024-07-31 ENCOUNTER — HOSPITAL ENCOUNTER (OUTPATIENT)
Dept: RADIOLOGY | Facility: HOSPITAL | Age: 54
Discharge: HOME | End: 2024-07-31
Payer: COMMERCIAL

## 2024-07-31 ENCOUNTER — OFFICE VISIT (OUTPATIENT)
Dept: SURGICAL ONCOLOGY | Facility: HOSPITAL | Age: 54
End: 2024-07-31
Payer: COMMERCIAL

## 2024-07-31 VITALS
SYSTOLIC BLOOD PRESSURE: 92 MMHG | WEIGHT: 179 LBS | HEIGHT: 64 IN | HEART RATE: 94 BPM | DIASTOLIC BLOOD PRESSURE: 60 MMHG | RESPIRATION RATE: 16 BRPM | BODY MASS INDEX: 30.56 KG/M2

## 2024-07-31 VITALS — HEIGHT: 64 IN | BODY MASS INDEX: 30.56 KG/M2 | WEIGHT: 179 LBS

## 2024-07-31 DIAGNOSIS — R92.30 DENSE BREAST TISSUE: ICD-10-CM

## 2024-07-31 DIAGNOSIS — Z12.39 BREAST CANCER SCREENING, HIGH RISK PATIENT: Primary | ICD-10-CM

## 2024-07-31 DIAGNOSIS — Z12.39 ENCOUNTER FOR OTHER SCREENING FOR MALIGNANT NEOPLASM OF BREAST: ICD-10-CM

## 2024-07-31 DIAGNOSIS — Z79.810 USE OF TAMOXIFEN (NOLVADEX): ICD-10-CM

## 2024-07-31 PROCEDURE — 77067 SCR MAMMO BI INCL CAD: CPT | Mod: BILATERAL PROCEDURE | Performed by: RADIOLOGY

## 2024-07-31 PROCEDURE — 77063 BREAST TOMOSYNTHESIS BI: CPT | Mod: BILATERAL PROCEDURE | Performed by: RADIOLOGY

## 2024-07-31 PROCEDURE — 99214 OFFICE O/P EST MOD 30 MIN: CPT | Performed by: NURSE PRACTITIONER

## 2024-07-31 PROCEDURE — 3008F BODY MASS INDEX DOCD: CPT | Performed by: NURSE PRACTITIONER

## 2024-07-31 PROCEDURE — 1036F TOBACCO NON-USER: CPT | Performed by: NURSE PRACTITIONER

## 2024-07-31 PROCEDURE — 77067 SCR MAMMO BI INCL CAD: CPT

## 2024-07-31 NOTE — PATIENT INSTRUCTIONS
Your clinical examination and imaging are normal. Please return in one year for bilateral screening mammogram and office visit or sooner if you have any problems or concerns. Continue Tamoxifen 10 daily.     High risk breast surveillance care plan:  Yearly mammogram with digital breast tomosynthesis  Twice yearly clinical breast examinations  Breast MRI - Full MRI in January 2025  Monthly self breast examinations  Vitamin D3 2000 IU/daily (over the counter)  Exercise 3-4 times per week for 45-60 minutes  Limit alcohol to 3-4 drinks per week   Eat a healthy low-fat diet with lots of fruits and vegetables    You can see your health information, review clinical summaries from office visits & test results online when you follow your health with MY  Chart, a personal health record. To sign up go to [email or web address]. If you need assistance with signing up or trouble getting into your account call [facility] Patient Line 24/7 at [phone].    My office phone number is [phone] if you need to get in touch with me or have additional questions or concerns. Thank you for choosing [facility] and trusting me as your healthcare provider. I look forward to seeing you again at your next office visit. I am honored to be a provider on your health care team and I remain dedicated to helping you achieve your health goals.

## 2024-08-01 ENCOUNTER — SPECIALTY PHARMACY (OUTPATIENT)
Dept: PHARMACY | Facility: CLINIC | Age: 54
End: 2024-08-01

## 2024-08-01 ENCOUNTER — TELEPHONE (OUTPATIENT)
Dept: HEMATOLOGY/ONCOLOGY | Facility: HOSPITAL | Age: 54
End: 2024-08-01
Payer: COMMERCIAL

## 2024-08-01 PROCEDURE — RXMED WILLOW AMBULATORY MEDICATION CHARGE

## 2024-08-01 NOTE — TELEPHONE ENCOUNTER
Returned call to notify no known interaction per Dr Du. No answer, left voicemail with callback number.

## 2024-08-02 ENCOUNTER — PHARMACY VISIT (OUTPATIENT)
Dept: PHARMACY | Facility: CLINIC | Age: 54
End: 2024-08-02
Payer: COMMERCIAL

## 2024-08-02 LAB
SCAN RESULT: ABNORMAL
VWF CP ACT/NOR PPP CHRO: <5 %
VWF CP INHIB PPP-ACNC: >8 INHIBITOR UNITS

## 2024-08-02 PROCEDURE — RXMED WILLOW AMBULATORY MEDICATION CHARGE

## 2024-08-06 ENCOUNTER — LAB (OUTPATIENT)
Dept: LAB | Facility: LAB | Age: 54
End: 2024-08-06
Payer: COMMERCIAL

## 2024-08-06 DIAGNOSIS — M31.19 ACQUIRED TTP (MULTI): ICD-10-CM

## 2024-08-06 LAB
ALBUMIN SERPL BCP-MCNC: 4 G/DL (ref 3.4–5)
ALP SERPL-CCNC: 49 U/L (ref 33–110)
ALT SERPL W P-5'-P-CCNC: 18 U/L (ref 7–45)
ANION GAP SERPL CALC-SCNC: 12 MMOL/L (ref 10–20)
AST SERPL W P-5'-P-CCNC: 18 U/L (ref 9–39)
BASOPHILS # BLD AUTO: 0.06 X10*3/UL (ref 0–0.1)
BASOPHILS NFR BLD AUTO: 0.8 %
BILIRUB SERPL-MCNC: 0.3 MG/DL (ref 0–1.2)
BUN SERPL-MCNC: 9 MG/DL (ref 6–23)
CALCIUM SERPL-MCNC: 9.4 MG/DL (ref 8.6–10.3)
CARDIAC TROPONIN I PNL SERPL HS: 3 NG/L (ref 0–13)
CHLORIDE SERPL-SCNC: 105 MMOL/L (ref 98–107)
CO2 SERPL-SCNC: 28 MMOL/L (ref 21–32)
CREAT SERPL-MCNC: 0.95 MG/DL (ref 0.5–1.05)
EGFRCR SERPLBLD CKD-EPI 2021: 71 ML/MIN/1.73M*2
EOSINOPHIL # BLD AUTO: 0.26 X10*3/UL (ref 0–0.7)
EOSINOPHIL NFR BLD AUTO: 3.6 %
ERYTHROCYTE [DISTWIDTH] IN BLOOD BY AUTOMATED COUNT: 13.8 % (ref 11.5–14.5)
GLUCOSE SERPL-MCNC: 106 MG/DL (ref 74–99)
HCT VFR BLD AUTO: 34.5 % (ref 36–46)
HGB BLD-MCNC: 11 G/DL (ref 12–16)
IMM GRANULOCYTES # BLD AUTO: 0.02 X10*3/UL (ref 0–0.7)
IMM GRANULOCYTES NFR BLD AUTO: 0.3 % (ref 0–0.9)
LYMPHOCYTES # BLD AUTO: 1.67 X10*3/UL (ref 1.2–4.8)
LYMPHOCYTES NFR BLD AUTO: 23 %
MCH RBC QN AUTO: 26.1 PG (ref 26–34)
MCHC RBC AUTO-ENTMCNC: 31.9 G/DL (ref 32–36)
MCV RBC AUTO: 82 FL (ref 80–100)
MONOCYTES # BLD AUTO: 0.82 X10*3/UL (ref 0.1–1)
MONOCYTES NFR BLD AUTO: 11.3 %
NEUTROPHILS # BLD AUTO: 4.44 X10*3/UL (ref 1.2–7.7)
NEUTROPHILS NFR BLD AUTO: 61 %
NRBC BLD-RTO: 0 /100 WBCS (ref 0–0)
PLATELET # BLD AUTO: 399 X10*3/UL (ref 150–450)
POTASSIUM SERPL-SCNC: 4.1 MMOL/L (ref 3.5–5.3)
PROT SERPL-MCNC: 7 G/DL (ref 6.4–8.2)
RBC # BLD AUTO: 4.22 X10*6/UL (ref 4–5.2)
SODIUM SERPL-SCNC: 141 MMOL/L (ref 136–145)
WBC # BLD AUTO: 7.3 X10*3/UL (ref 4.4–11.3)

## 2024-08-06 PROCEDURE — 85397 CLOTTING FUNCT ACTIVITY: CPT

## 2024-08-06 PROCEDURE — 85025 COMPLETE CBC W/AUTO DIFF WBC: CPT

## 2024-08-06 PROCEDURE — 80053 COMPREHEN METABOLIC PANEL: CPT

## 2024-08-06 PROCEDURE — 85335 FACTOR INHIBITOR TEST: CPT

## 2024-08-06 PROCEDURE — 36415 COLL VENOUS BLD VENIPUNCTURE: CPT

## 2024-08-06 PROCEDURE — 84484 ASSAY OF TROPONIN QUANT: CPT

## 2024-08-08 ENCOUNTER — SPECIALTY PHARMACY (OUTPATIENT)
Dept: PHARMACY | Facility: CLINIC | Age: 54
End: 2024-08-08

## 2024-08-08 PROCEDURE — RXMED WILLOW AMBULATORY MEDICATION CHARGE

## 2024-08-12 ENCOUNTER — PHARMACY VISIT (OUTPATIENT)
Dept: PHARMACY | Facility: CLINIC | Age: 54
End: 2024-08-12
Payer: COMMERCIAL

## 2024-08-12 LAB
SCAN RESULT: ABNORMAL
VWF CP ACT/NOR PPP CHRO: <5 %
VWF CP INHIB PPP-ACNC: >8 INHIBITOR UNITS

## 2024-08-13 ENCOUNTER — TELEPHONE (OUTPATIENT)
Dept: HEMATOLOGY/ONCOLOGY | Facility: HOSPITAL | Age: 54
End: 2024-08-13

## 2024-08-13 ENCOUNTER — LAB (OUTPATIENT)
Dept: LAB | Facility: LAB | Age: 54
End: 2024-08-13
Payer: COMMERCIAL

## 2024-08-13 DIAGNOSIS — M31.19 ACQUIRED TTP (MULTI): ICD-10-CM

## 2024-08-13 DIAGNOSIS — M31.19 ACQUIRED TTP (MULTI): Primary | ICD-10-CM

## 2024-08-13 LAB
ALBUMIN SERPL BCP-MCNC: 3.7 G/DL (ref 3.4–5)
ALP SERPL-CCNC: 46 U/L (ref 33–110)
ALT SERPL W P-5'-P-CCNC: 14 U/L (ref 7–45)
ANION GAP SERPL CALC-SCNC: 12 MMOL/L (ref 10–20)
AST SERPL W P-5'-P-CCNC: 17 U/L (ref 9–39)
BASOPHILS # BLD AUTO: 0.06 X10*3/UL (ref 0–0.1)
BASOPHILS NFR BLD AUTO: 0.9 %
BILIRUB SERPL-MCNC: 0.3 MG/DL (ref 0–1.2)
BUN SERPL-MCNC: 8 MG/DL (ref 6–23)
CALCIUM SERPL-MCNC: 9.2 MG/DL (ref 8.6–10.3)
CARDIAC TROPONIN I PNL SERPL HS: 3 NG/L (ref 0–13)
CHLORIDE SERPL-SCNC: 108 MMOL/L (ref 98–107)
CO2 SERPL-SCNC: 27 MMOL/L (ref 21–32)
CREAT SERPL-MCNC: 0.91 MG/DL (ref 0.5–1.05)
EGFRCR SERPLBLD CKD-EPI 2021: 75 ML/MIN/1.73M*2
EOSINOPHIL # BLD AUTO: 0.28 X10*3/UL (ref 0–0.7)
EOSINOPHIL NFR BLD AUTO: 4.1 %
ERYTHROCYTE [DISTWIDTH] IN BLOOD BY AUTOMATED COUNT: 13.3 % (ref 11.5–14.5)
GLUCOSE SERPL-MCNC: 70 MG/DL (ref 74–99)
HCT VFR BLD AUTO: 33.4 % (ref 36–46)
HGB BLD-MCNC: 10.6 G/DL (ref 12–16)
IMM GRANULOCYTES # BLD AUTO: 0.02 X10*3/UL (ref 0–0.7)
IMM GRANULOCYTES NFR BLD AUTO: 0.3 % (ref 0–0.9)
LYMPHOCYTES # BLD AUTO: 1.62 X10*3/UL (ref 1.2–4.8)
LYMPHOCYTES NFR BLD AUTO: 23.8 %
MCH RBC QN AUTO: 25.4 PG (ref 26–34)
MCHC RBC AUTO-ENTMCNC: 31.7 G/DL (ref 32–36)
MCV RBC AUTO: 80 FL (ref 80–100)
MONOCYTES # BLD AUTO: 0.86 X10*3/UL (ref 0.1–1)
MONOCYTES NFR BLD AUTO: 12.6 %
NEUTROPHILS # BLD AUTO: 3.97 X10*3/UL (ref 1.2–7.7)
NEUTROPHILS NFR BLD AUTO: 58.3 %
NRBC BLD-RTO: 0 /100 WBCS (ref 0–0)
PLATELET # BLD AUTO: 369 X10*3/UL (ref 150–450)
POTASSIUM SERPL-SCNC: 4.1 MMOL/L (ref 3.5–5.3)
PROT SERPL-MCNC: 6.7 G/DL (ref 6.4–8.2)
RBC # BLD AUTO: 4.17 X10*6/UL (ref 4–5.2)
SODIUM SERPL-SCNC: 143 MMOL/L (ref 136–145)
WBC # BLD AUTO: 6.8 X10*3/UL (ref 4.4–11.3)

## 2024-08-13 PROCEDURE — 85025 COMPLETE CBC W/AUTO DIFF WBC: CPT

## 2024-08-13 PROCEDURE — 36415 COLL VENOUS BLD VENIPUNCTURE: CPT

## 2024-08-13 PROCEDURE — 84484 ASSAY OF TROPONIN QUANT: CPT

## 2024-08-13 PROCEDURE — 85335 FACTOR INHIBITOR TEST: CPT

## 2024-08-13 PROCEDURE — 80053 COMPREHEN METABOLIC PANEL: CPT

## 2024-08-13 PROCEDURE — 85397 CLOTTING FUNCT ACTIVITY: CPT

## 2024-08-13 RX ORDER — ALBUTEROL SULFATE 0.83 MG/ML
3 SOLUTION RESPIRATORY (INHALATION) AS NEEDED
OUTPATIENT
Start: 2024-08-19

## 2024-08-13 RX ORDER — EPINEPHRINE 0.3 MG/.3ML
0.3 INJECTION SUBCUTANEOUS EVERY 5 MIN PRN
OUTPATIENT
Start: 2024-08-19

## 2024-08-13 RX ORDER — DIPHENHYDRAMINE HYDROCHLORIDE 50 MG/ML
50 INJECTION INTRAMUSCULAR; INTRAVENOUS AS NEEDED
OUTPATIENT
Start: 2024-08-19

## 2024-08-13 RX ORDER — FAMOTIDINE 10 MG/ML
20 INJECTION INTRAVENOUS ONCE AS NEEDED
OUTPATIENT
Start: 2024-08-19

## 2024-08-13 RX ORDER — PALONOSETRON 0.05 MG/ML
0.25 INJECTION, SOLUTION INTRAVENOUS ONCE
OUTPATIENT
Start: 2024-08-19

## 2024-08-13 NOTE — TELEPHONE ENCOUNTER
Spoke with patient about the results of her lrkuvd64 testing and persistent inhibitor suggesting that the patient is refractory to rituximab and steroid.  She remains clinically asymptomatic which is likely the result of her continuing to be on Cablivi at this time.  We discussed proceeding with cyclophosphamide IV.  Discussed the potential side effects including risk for secondary malignancy.  The patient has received chemotherapy and immunosuppression in the past at the time of her initial presentation with TTP in the late 90s.  We discussed additionally side effects of the infusion.  She appears to understand and has verbally consented to proceeding with treatment.  We will therefore  -Switch her appointment and schedule follow-up on Thursday next week, at which time we will formally get her consent on file.  -Will send a prescription for Zofran for the patient to take as needed after the infusion.  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

## 2024-08-13 NOTE — TELEPHONE ENCOUNTER
RN called and left a message to call the office back. [facility] infusion does not have spots on Thursday next week so RN was going to ask the patient if she would be willing to see Dr. Walker and get her infusion on Wednesday next week 8/21 at the [facility] infusion center.

## 2024-08-14 NOTE — TELEPHONE ENCOUNTER
Pt called back regarding having FUV with Dr. Du and infusion on 8/21 at [facility]. Pt currently scheduled for 8/19 for FUV and infusion on 8/21 at 9:30am. Dr. Du is fully booked on 8/21 at [facility]. Message sent to team. Please advise.

## 2024-08-14 NOTE — TELEPHONE ENCOUNTER
I will double book her somewhere. Please let her know that we will just see her sometime during her infusion.

## 2024-08-15 ENCOUNTER — SPECIALTY PHARMACY (OUTPATIENT)
Dept: PHARMACY | Facility: CLINIC | Age: 54
End: 2024-08-15

## 2024-08-15 PROCEDURE — RXMED WILLOW AMBULATORY MEDICATION CHARGE

## 2024-08-19 ENCOUNTER — PHARMACY VISIT (OUTPATIENT)
Dept: PHARMACY | Facility: CLINIC | Age: 54
End: 2024-08-19
Payer: COMMERCIAL

## 2024-08-19 ENCOUNTER — APPOINTMENT (OUTPATIENT)
Dept: HEMATOLOGY/ONCOLOGY | Facility: HOSPITAL | Age: 54
End: 2024-08-19
Payer: COMMERCIAL

## 2024-08-19 LAB
SCAN RESULT: ABNORMAL
VWF CP ACT/NOR PPP CHRO: <5 %
VWF CP INHIB PPP-ACNC: >8 INHIBITOR UNITS

## 2024-08-21 ENCOUNTER — OFFICE VISIT (OUTPATIENT)
Dept: HEMATOLOGY/ONCOLOGY | Facility: CLINIC | Age: 54
End: 2024-08-21
Payer: COMMERCIAL

## 2024-08-21 ENCOUNTER — INFUSION (OUTPATIENT)
Dept: HEMATOLOGY/ONCOLOGY | Facility: CLINIC | Age: 54
End: 2024-08-21
Payer: COMMERCIAL

## 2024-08-21 ENCOUNTER — SOCIAL WORK (OUTPATIENT)
Dept: CASE MANAGEMENT | Facility: HOSPITAL | Age: 54
End: 2024-08-21

## 2024-08-21 VITALS
SYSTOLIC BLOOD PRESSURE: 106 MMHG | HEIGHT: 65 IN | WEIGHT: 178.35 LBS | RESPIRATION RATE: 18 BRPM | BODY MASS INDEX: 29.72 KG/M2 | DIASTOLIC BLOOD PRESSURE: 72 MMHG | TEMPERATURE: 97.3 F | HEART RATE: 66 BPM

## 2024-08-21 VITALS
BODY MASS INDEX: 29.72 KG/M2 | DIASTOLIC BLOOD PRESSURE: 72 MMHG | TEMPERATURE: 97.3 F | RESPIRATION RATE: 18 BRPM | SYSTOLIC BLOOD PRESSURE: 106 MMHG | HEIGHT: 65 IN | HEART RATE: 66 BPM | WEIGHT: 178.35 LBS

## 2024-08-21 DIAGNOSIS — M31.19 ACQUIRED TTP (MULTI): Primary | ICD-10-CM

## 2024-08-21 DIAGNOSIS — M31.19 TTP (THROMBOTIC THROMBOCYTOPENIC PURPURA) (MULTI): ICD-10-CM

## 2024-08-21 DIAGNOSIS — M31.19 ACQUIRED TTP (MULTI): ICD-10-CM

## 2024-08-21 LAB
ALBUMIN SERPL BCP-MCNC: 3.7 G/DL (ref 3.4–5)
ALP SERPL-CCNC: 52 U/L (ref 33–110)
ALT SERPL W P-5'-P-CCNC: 11 U/L (ref 7–45)
ANION GAP SERPL CALC-SCNC: 11 MMOL/L (ref 10–20)
AST SERPL W P-5'-P-CCNC: 16 U/L (ref 9–39)
BASOPHILS # BLD AUTO: 0.05 X10*3/UL (ref 0–0.1)
BASOPHILS NFR BLD AUTO: 0.5 %
BILIRUB SERPL-MCNC: 0.3 MG/DL (ref 0–1.2)
BUN SERPL-MCNC: 15 MG/DL (ref 6–23)
CALCIUM SERPL-MCNC: 8.8 MG/DL (ref 8.6–10.6)
CHLORIDE SERPL-SCNC: 104 MMOL/L (ref 98–107)
CO2 SERPL-SCNC: 29 MMOL/L (ref 21–32)
CREAT SERPL-MCNC: 0.85 MG/DL (ref 0.5–1.05)
EGFRCR SERPLBLD CKD-EPI 2021: 81 ML/MIN/1.73M*2
EOSINOPHIL # BLD AUTO: 0.28 X10*3/UL (ref 0–0.7)
EOSINOPHIL NFR BLD AUTO: 2.9 %
ERYTHROCYTE [DISTWIDTH] IN BLOOD BY AUTOMATED COUNT: 12.8 % (ref 11.5–14.5)
GLUCOSE SERPL-MCNC: 77 MG/DL (ref 74–99)
HCT VFR BLD AUTO: 30.7 % (ref 36–46)
HGB BLD-MCNC: 10.1 G/DL (ref 12–16)
IMM GRANULOCYTES # BLD AUTO: 0.01 X10*3/UL (ref 0–0.7)
IMM GRANULOCYTES NFR BLD AUTO: 0.1 % (ref 0–0.9)
LDH SERPL L TO P-CCNC: 266 U/L (ref 84–246)
LYMPHOCYTES # BLD AUTO: 1.63 X10*3/UL (ref 1.2–4.8)
LYMPHOCYTES NFR BLD AUTO: 16.9 %
MCH RBC QN AUTO: 25.8 PG (ref 26–34)
MCHC RBC AUTO-ENTMCNC: 32.9 G/DL (ref 32–36)
MCV RBC AUTO: 78 FL (ref 80–100)
MONOCYTES # BLD AUTO: 0.68 X10*3/UL (ref 0.1–1)
MONOCYTES NFR BLD AUTO: 7.1 %
NEUTROPHILS # BLD AUTO: 6.99 X10*3/UL (ref 1.2–7.7)
NEUTROPHILS NFR BLD AUTO: 72.5 %
NRBC BLD-RTO: ABNORMAL /100{WBCS}
PLATELET # BLD AUTO: 291 X10*3/UL (ref 150–450)
POTASSIUM SERPL-SCNC: 4.3 MMOL/L (ref 3.5–5.3)
PROT SERPL-MCNC: 6.9 G/DL (ref 6.4–8.2)
RBC # BLD AUTO: 3.92 X10*6/UL (ref 4–5.2)
SODIUM SERPL-SCNC: 140 MMOL/L (ref 136–145)
WBC # BLD AUTO: 9.6 X10*3/UL (ref 4.4–11.3)

## 2024-08-21 PROCEDURE — 83615 LACTATE (LD) (LDH) ENZYME: CPT

## 2024-08-21 PROCEDURE — 96361 HYDRATE IV INFUSION ADD-ON: CPT | Mod: INF

## 2024-08-21 PROCEDURE — 80053 COMPREHEN METABOLIC PANEL: CPT

## 2024-08-21 PROCEDURE — 96413 CHEMO IV INFUSION 1 HR: CPT

## 2024-08-21 PROCEDURE — 85025 COMPLETE CBC W/AUTO DIFF WBC: CPT

## 2024-08-21 PROCEDURE — 2500000004 HC RX 250 GENERAL PHARMACY W/ HCPCS (ALT 636 FOR OP/ED): Performed by: STUDENT IN AN ORGANIZED HEALTH CARE EDUCATION/TRAINING PROGRAM

## 2024-08-21 PROCEDURE — 83010 ASSAY OF HAPTOGLOBIN QUANT: CPT

## 2024-08-21 PROCEDURE — 83010 ASSAY OF HAPTOGLOBIN QUANT: CPT | Mod: WESLAB

## 2024-08-21 PROCEDURE — 99214 OFFICE O/P EST MOD 30 MIN: CPT | Mod: 25 | Performed by: STUDENT IN AN ORGANIZED HEALTH CARE EDUCATION/TRAINING PROGRAM

## 2024-08-21 PROCEDURE — 96375 TX/PRO/DX INJ NEW DRUG ADDON: CPT | Mod: INF

## 2024-08-21 PROCEDURE — 99214 OFFICE O/P EST MOD 30 MIN: CPT | Performed by: STUDENT IN AN ORGANIZED HEALTH CARE EDUCATION/TRAINING PROGRAM

## 2024-08-21 PROCEDURE — 3008F BODY MASS INDEX DOCD: CPT | Performed by: STUDENT IN AN ORGANIZED HEALTH CARE EDUCATION/TRAINING PROGRAM

## 2024-08-21 RX ORDER — ALBUTEROL SULFATE 0.83 MG/ML
3 SOLUTION RESPIRATORY (INHALATION) AS NEEDED
OUTPATIENT
Start: 2024-09-11

## 2024-08-21 RX ORDER — ALBUTEROL SULFATE 0.83 MG/ML
3 SOLUTION RESPIRATORY (INHALATION) AS NEEDED
Status: DISCONTINUED | OUTPATIENT
Start: 2024-08-21 | End: 2024-08-21 | Stop reason: HOSPADM

## 2024-08-21 RX ORDER — ONDANSETRON HYDROCHLORIDE 8 MG/1
8 TABLET, FILM COATED ORAL 2 TIMES DAILY PRN
Qty: 30 TABLET | Refills: 1 | Status: SHIPPED | OUTPATIENT
Start: 2024-08-21 | End: 2024-09-11

## 2024-08-21 RX ORDER — PALONOSETRON 0.05 MG/ML
0.25 INJECTION, SOLUTION INTRAVENOUS ONCE
OUTPATIENT
Start: 2024-09-11

## 2024-08-21 RX ORDER — FAMOTIDINE 10 MG/ML
20 INJECTION INTRAVENOUS ONCE AS NEEDED
OUTPATIENT
Start: 2024-09-11

## 2024-08-21 RX ORDER — DIPHENHYDRAMINE HYDROCHLORIDE 50 MG/ML
50 INJECTION INTRAMUSCULAR; INTRAVENOUS AS NEEDED
Status: DISCONTINUED | OUTPATIENT
Start: 2024-08-21 | End: 2024-08-21 | Stop reason: HOSPADM

## 2024-08-21 RX ORDER — DIPHENHYDRAMINE HYDROCHLORIDE 50 MG/ML
50 INJECTION INTRAMUSCULAR; INTRAVENOUS AS NEEDED
OUTPATIENT
Start: 2024-09-11

## 2024-08-21 RX ORDER — FAMOTIDINE 10 MG/ML
20 INJECTION INTRAVENOUS ONCE AS NEEDED
Status: DISCONTINUED | OUTPATIENT
Start: 2024-08-21 | End: 2024-08-21 | Stop reason: HOSPADM

## 2024-08-21 RX ORDER — DEXTROSE MONOHYDRATE AND SODIUM CHLORIDE 5; .45 G/100ML; G/100ML
250 INJECTION, SOLUTION INTRAVENOUS ONCE
Status: DISCONTINUED | OUTPATIENT
Start: 2024-08-21 | End: 2024-08-21 | Stop reason: HOSPADM

## 2024-08-21 RX ORDER — EPINEPHRINE 0.3 MG/.3ML
0.3 INJECTION SUBCUTANEOUS EVERY 5 MIN PRN
Status: DISCONTINUED | OUTPATIENT
Start: 2024-08-21 | End: 2024-08-21 | Stop reason: HOSPADM

## 2024-08-21 RX ORDER — DEXTROSE MONOHYDRATE 50 MG/ML
250 INJECTION, SOLUTION INTRAVENOUS ONCE
Status: COMPLETED | OUTPATIENT
Start: 2024-08-21 | End: 2024-08-21

## 2024-08-21 RX ORDER — PALONOSETRON 0.05 MG/ML
0.25 INJECTION, SOLUTION INTRAVENOUS ONCE
Status: COMPLETED | OUTPATIENT
Start: 2024-08-21 | End: 2024-08-21

## 2024-08-21 RX ORDER — EPINEPHRINE 0.3 MG/.3ML
0.3 INJECTION SUBCUTANEOUS EVERY 5 MIN PRN
OUTPATIENT
Start: 2024-09-11

## 2024-08-21 ASSESSMENT — ENCOUNTER SYMPTOMS
OCCASIONAL FEELINGS OF UNSTEADINESS: 0
DEPRESSION: 0
LOSS OF SENSATION IN FEET: 0

## 2024-08-21 ASSESSMENT — PAIN SCALES - GENERAL
PAINLEVEL: 0-NO PAIN
PAINLEVEL: 0-NO PAIN

## 2024-08-21 NOTE — PROGRESS NOTES
Patient ID: Tere Carrion is a 54 y.o. female.  Referring Physician: Tree Du MD  [id number] [id number]  Primary Care Provider: Modesta Gallego DO  Visit Type: Follow Up  Diagnosis: immune TTP    Therapy summary (diagnosis: iTTP):   Rituximab: 6/21, 6/28, 7/8, 7/15, to be completed 7/15/2024  Cablivi: 6/21-to be completed 7/19  Cyclophosphamide: 8/21/2024 -       Subjective    HPI  54 y.o. 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   She was recently seen inpatient for immune TTP where she presented on 6/18/24 for 2 weeks of worsening generalized weakness, fatigue, migraines, hematuria, and new bruising with petechiae, with TTP. She was started on PLEX, did not receive Cablivi upfront 2/2 hematuria, which resolved over the last 3 days. She received PLEX x 3 (last on 6/21/24).    HIV, Hepatitis panel: Non-reactive  B12, folate, iron profile: Normal  T spot: Negative  Shiga toxin -ve   ELBERT -ve, SPEP work up negative for clonal protein   ADAMTS <5, inhibitor level 2.4    Re splenectomy, is followed by PCP for vaccination notes she is caught up at this time.   Notices fatigue since last 3 days, some skin bruising but no bleeding. Perimenopausal. Currently on pred 40mg, on taper.   Age appropriate cancer screening: mammo '23 due soon. [place] '20 due next year.   FMH: no cancer, mother had a UE DVT post-op, mother has many polyps, maternal GF had colon cancer, mother's sibs had colon cancer too   Social history: never smoker, no ETOH, no RDU.   08/21/24: Presents alone for follow-up.  The patient is being seen in treatment.      Review of Systems - Oncology  10 point review of systems negative except as stated in HPI    Objective   Past Surgical History:   Procedure Laterality Date     BREAST BIOPSY      BREAST CYST EXCISION      BREAST SURGERY       SECTION, LOW TRANSVERSE  2004    TUBAL LIGATION  2004     Oncology History    No history exists.       BSA: There is no height or weight on file to calculate BSA. LMP 2023   Physical Exam  Vitals reviewed.   Constitutional:       General: She is not in acute distress.     Appearance: She is not toxic-appearing.   HENT:      Head: Normocephalic and atraumatic.   Cardiovascular:      Rate and Rhythm: Normal rate and regular rhythm.   Pulmonary:      Effort: Pulmonary effort is normal.   Musculoskeletal:      Comments: No pedal edema   Neurological:      General: No focal deficit present.      Mental Status: She is oriented to person, place, and time.   Psychiatric:         Mood and Affect: Mood normal.         Assessment/Plan    54 y.o. female with a PMH significant for  TTP in  (treated with prolonged () plasmapheresis, steroids, and immunosuppressants including vincristine and azathioprine, and splenectomy 1999), reported sickle cell trait and alpha thalassemia trait [confirmed on retesting].     # Immune TTP: Patient was initially diagnosed in , had prolonged plasmapheresis followed by immunosuppression with vincristine azathioprine and eventually splenectomy in 1999, has been followed at [facility], operated by [facility] since then and has had no evidence of relapse.  As noted above the patient presented with hematuria and was eventually diagnosed with immune TTP with an antibody titer of 2.4.  She was started on Cablivi and rituximab and has tolerated both well.  She will complete both these therapies in the coming weeks.  Additionally the patient continues her steroid taper with prednisone.  Plan:  -completed prednisone taper   - day 1 of cyclophosphamide 21 day cycles, plan 4-6 depending on how she tolerates   - will continue Cablivi until [id number] is >20%  - weekly CBC/diff and close monitoring for bleeding with thrombocytopenia  from drug and TTP and cablivi   -The patient has been educated about the signs and symptoms of TTP and the process of contacting us or heading to an ER as needed.  - follow up next: 1 month  - labs prior to follow up: Noted above      Tree Walker MD

## 2024-08-21 NOTE — PROGRESS NOTES
SW attempted to meet with patient today at her initial treatment. Patient asleep. SW left contact information. SW will follow up with patient at a later time.

## 2024-08-21 NOTE — PROGRESS NOTES
Pt describes pressure/burning feeling to nose at completion of cytoxan infusion. Denies any other complaints. Encouraged pt to call if symptoms do not improve or worsen.

## 2024-08-21 NOTE — SIGNIFICANT EVENT
08/21/24 1019   Prechemo Checklist   Has the patient been in the hospital, ED, or urgent care since last date of service No   Chemo/Immuno Consent Completed and Signed Yes   Protocol/Indications Verified Yes   Confirmed to previous date/time of medication Yes   Compared to previous dose Yes   All medications are dated accurately Yes   Pregnancy Test Negative Not applicable  (pt signed declination for pregnancy testing, hx of tubal ligation)   Parameters Met Yes   BSA/Weight-Height Verified Yes   Dose Calculations Verified (current, total, cumulative) Yes  (first dose)

## 2024-08-22 ENCOUNTER — SPECIALTY PHARMACY (OUTPATIENT)
Dept: PHARMACY | Facility: CLINIC | Age: 54
End: 2024-08-22

## 2024-08-22 LAB
HAPTOGLOB SERPL-MCNC: 142 MG/DL (ref 37–246)
LDH SERPL L TO P-CCNC: 185 U/L (ref 84–246)

## 2024-08-22 PROCEDURE — RXMED WILLOW AMBULATORY MEDICATION CHARGE

## 2024-08-23 ENCOUNTER — PHARMACY VISIT (OUTPATIENT)
Dept: PHARMACY | Facility: CLINIC | Age: 54
End: 2024-08-23
Payer: COMMERCIAL

## 2024-08-23 LAB — HAPTOGLOB SERPL-MCNC: 141 MG/DL (ref 37–246)

## 2024-08-27 ENCOUNTER — LAB (OUTPATIENT)
Dept: LAB | Facility: LAB | Age: 54
End: 2024-08-27
Payer: COMMERCIAL

## 2024-08-27 ENCOUNTER — TELEPHONE (OUTPATIENT)
Dept: ADMISSION | Facility: HOSPITAL | Age: 54
End: 2024-08-27

## 2024-08-27 DIAGNOSIS — M31.19 TTP (THROMBOTIC THROMBOCYTOPENIC PURPURA) (MULTI): ICD-10-CM

## 2024-08-27 DIAGNOSIS — M31.19 ACQUIRED TTP (MULTI): ICD-10-CM

## 2024-08-27 LAB
ALBUMIN SERPL BCP-MCNC: 3.9 G/DL (ref 3.4–5)
ALP SERPL-CCNC: 49 U/L (ref 33–110)
ALT SERPL W P-5'-P-CCNC: 12 U/L (ref 7–45)
ANION GAP SERPL CALC-SCNC: 11 MMOL/L (ref 10–20)
AST SERPL W P-5'-P-CCNC: 17 U/L (ref 9–39)
BASOPHILS # BLD AUTO: 0.06 X10*3/UL (ref 0–0.1)
BASOPHILS NFR BLD AUTO: 0.7 %
BILIRUB SERPL-MCNC: 0.3 MG/DL (ref 0–1.2)
BUN SERPL-MCNC: 10 MG/DL (ref 6–23)
CALCIUM SERPL-MCNC: 9.4 MG/DL (ref 8.6–10.3)
CARDIAC TROPONIN I PNL SERPL HS: 3 NG/L (ref 0–13)
CHLORIDE SERPL-SCNC: 105 MMOL/L (ref 98–107)
CO2 SERPL-SCNC: 29 MMOL/L (ref 21–32)
CREAT SERPL-MCNC: 0.83 MG/DL (ref 0.5–1.05)
EGFRCR SERPLBLD CKD-EPI 2021: 83 ML/MIN/1.73M*2
EOSINOPHIL # BLD AUTO: 0.31 X10*3/UL (ref 0–0.7)
EOSINOPHIL NFR BLD AUTO: 3.7 %
ERYTHROCYTE [DISTWIDTH] IN BLOOD BY AUTOMATED COUNT: 12.9 % (ref 11.5–14.5)
GLUCOSE SERPL-MCNC: 96 MG/DL (ref 74–99)
HCT VFR BLD AUTO: 33.3 % (ref 36–46)
HGB BLD-MCNC: 10.8 G/DL (ref 12–16)
IMM GRANULOCYTES # BLD AUTO: 0.02 X10*3/UL (ref 0–0.7)
IMM GRANULOCYTES NFR BLD AUTO: 0.2 % (ref 0–0.9)
LYMPHOCYTES # BLD AUTO: 1.79 X10*3/UL (ref 1.2–4.8)
LYMPHOCYTES NFR BLD AUTO: 21.3 %
MCH RBC QN AUTO: 25.6 PG (ref 26–34)
MCHC RBC AUTO-ENTMCNC: 32.4 G/DL (ref 32–36)
MCV RBC AUTO: 79 FL (ref 80–100)
MONOCYTES # BLD AUTO: 0.69 X10*3/UL (ref 0.1–1)
MONOCYTES NFR BLD AUTO: 8.2 %
NEUTROPHILS # BLD AUTO: 5.52 X10*3/UL (ref 1.2–7.7)
NEUTROPHILS NFR BLD AUTO: 65.9 %
NRBC BLD-RTO: 0.4 /100 WBCS (ref 0–0)
PLATELET # BLD AUTO: 323 X10*3/UL (ref 150–450)
POTASSIUM SERPL-SCNC: 4.2 MMOL/L (ref 3.5–5.3)
PROT SERPL-MCNC: 7 G/DL (ref 6.4–8.2)
RBC # BLD AUTO: 4.22 X10*6/UL (ref 4–5.2)
SODIUM SERPL-SCNC: 141 MMOL/L (ref 136–145)
WBC # BLD AUTO: 8.4 X10*3/UL (ref 4.4–11.3)

## 2024-08-27 PROCEDURE — 80053 COMPREHEN METABOLIC PANEL: CPT

## 2024-08-27 PROCEDURE — 85335 FACTOR INHIBITOR TEST: CPT

## 2024-08-27 PROCEDURE — 85025 COMPLETE CBC W/AUTO DIFF WBC: CPT

## 2024-08-27 PROCEDURE — 36415 COLL VENOUS BLD VENIPUNCTURE: CPT

## 2024-08-27 PROCEDURE — 84484 ASSAY OF TROPONIN QUANT: CPT

## 2024-08-27 PROCEDURE — 85397 CLOTTING FUNCT ACTIVITY: CPT

## 2024-08-27 NOTE — TELEPHONE ENCOUNTER
Tere called nurse line to see if infusion orders were in so she can schedule appointments. I told her there were two orders in and transferred her to scheduling to make the appointments.

## 2024-08-29 ENCOUNTER — SPECIALTY PHARMACY (OUTPATIENT)
Dept: PHARMACY | Facility: CLINIC | Age: 54
End: 2024-08-29

## 2024-08-29 PROCEDURE — RXMED WILLOW AMBULATORY MEDICATION CHARGE

## 2024-08-30 ENCOUNTER — PHARMACY VISIT (OUTPATIENT)
Dept: PHARMACY | Facility: CLINIC | Age: 54
End: 2024-08-30
Payer: COMMERCIAL

## 2024-08-31 LAB
SCAN RESULT: ABNORMAL
VWF CP ACT/NOR PPP CHRO: <5 %
VWF CP INHIB PPP-ACNC: >8 INHIBITOR UNITS

## 2024-09-03 ENCOUNTER — LAB (OUTPATIENT)
Dept: LAB | Facility: LAB | Age: 54
End: 2024-09-03
Payer: COMMERCIAL

## 2024-09-03 DIAGNOSIS — M31.19 ACQUIRED TTP (MULTI): ICD-10-CM

## 2024-09-03 LAB
ALBUMIN SERPL BCP-MCNC: 3.9 G/DL (ref 3.4–5)
ALP SERPL-CCNC: 44 U/L (ref 33–110)
ALT SERPL W P-5'-P-CCNC: 12 U/L (ref 7–45)
ANION GAP SERPL CALC-SCNC: 12 MMOL/L (ref 10–20)
AST SERPL W P-5'-P-CCNC: 14 U/L (ref 9–39)
BASOPHILS # BLD AUTO: 0.07 X10*3/UL (ref 0–0.1)
BASOPHILS NFR BLD AUTO: 1.1 %
BILIRUB SERPL-MCNC: 0.4 MG/DL (ref 0–1.2)
BUN SERPL-MCNC: 10 MG/DL (ref 6–23)
CALCIUM SERPL-MCNC: 9.3 MG/DL (ref 8.6–10.3)
CARDIAC TROPONIN I PNL SERPL HS: 3 NG/L (ref 0–13)
CHLORIDE SERPL-SCNC: 107 MMOL/L (ref 98–107)
CO2 SERPL-SCNC: 28 MMOL/L (ref 21–32)
CREAT SERPL-MCNC: 0.96 MG/DL (ref 0.5–1.05)
EGFRCR SERPLBLD CKD-EPI 2021: 70 ML/MIN/1.73M*2
EOSINOPHIL # BLD AUTO: 0.19 X10*3/UL (ref 0–0.7)
EOSINOPHIL NFR BLD AUTO: 3 %
ERYTHROCYTE [DISTWIDTH] IN BLOOD BY AUTOMATED COUNT: 13.1 % (ref 11.5–14.5)
GLUCOSE SERPL-MCNC: 78 MG/DL (ref 74–99)
HCT VFR BLD AUTO: 33 % (ref 36–46)
HGB BLD-MCNC: 10.5 G/DL (ref 12–16)
IMM GRANULOCYTES # BLD AUTO: 0.01 X10*3/UL (ref 0–0.7)
IMM GRANULOCYTES NFR BLD AUTO: 0.2 % (ref 0–0.9)
LYMPHOCYTES # BLD AUTO: 1.49 X10*3/UL (ref 1.2–4.8)
LYMPHOCYTES NFR BLD AUTO: 23.6 %
MCH RBC QN AUTO: 25.1 PG (ref 26–34)
MCHC RBC AUTO-ENTMCNC: 31.8 G/DL (ref 32–36)
MCV RBC AUTO: 79 FL (ref 80–100)
MONOCYTES # BLD AUTO: 0.57 X10*3/UL (ref 0.1–1)
MONOCYTES NFR BLD AUTO: 9 %
NEUTROPHILS # BLD AUTO: 3.99 X10*3/UL (ref 1.2–7.7)
NEUTROPHILS NFR BLD AUTO: 63.1 %
NRBC BLD-RTO: 0 /100 WBCS (ref 0–0)
PLATELET # BLD AUTO: 324 X10*3/UL (ref 150–450)
POTASSIUM SERPL-SCNC: 3.9 MMOL/L (ref 3.5–5.3)
PROT SERPL-MCNC: 6.9 G/DL (ref 6.4–8.2)
RBC # BLD AUTO: 4.18 X10*6/UL (ref 4–5.2)
SODIUM SERPL-SCNC: 143 MMOL/L (ref 136–145)
WBC # BLD AUTO: 6.3 X10*3/UL (ref 4.4–11.3)

## 2024-09-03 PROCEDURE — 84484 ASSAY OF TROPONIN QUANT: CPT

## 2024-09-03 PROCEDURE — 85025 COMPLETE CBC W/AUTO DIFF WBC: CPT

## 2024-09-03 PROCEDURE — 85397 CLOTTING FUNCT ACTIVITY: CPT

## 2024-09-03 PROCEDURE — 80053 COMPREHEN METABOLIC PANEL: CPT

## 2024-09-03 PROCEDURE — 36415 COLL VENOUS BLD VENIPUNCTURE: CPT

## 2024-09-03 PROCEDURE — 85335 FACTOR INHIBITOR TEST: CPT

## 2024-09-05 ENCOUNTER — SPECIALTY PHARMACY (OUTPATIENT)
Dept: PHARMACY | Facility: CLINIC | Age: 54
End: 2024-09-05

## 2024-09-05 LAB
SCAN RESULT: ABNORMAL
VWF CP ACT/NOR PPP CHRO: <5 %
VWF CP INHIB PPP-ACNC: >8 INHIBITOR UNITS

## 2024-09-05 PROCEDURE — RXMED WILLOW AMBULATORY MEDICATION CHARGE

## 2024-09-06 ENCOUNTER — PHARMACY VISIT (OUTPATIENT)
Dept: PHARMACY | Facility: CLINIC | Age: 54
End: 2024-09-06
Payer: COMMERCIAL

## 2024-09-07 LAB
SCAN RESULT: ABNORMAL
VWF CP ACT/NOR PPP CHRO: <5 %
VWF CP INHIB PPP-ACNC: 8 INHIBITOR UNITS

## 2024-09-09 ENCOUNTER — APPOINTMENT (OUTPATIENT)
Dept: HEMATOLOGY/ONCOLOGY | Facility: HOSPITAL | Age: 54
End: 2024-09-09
Payer: COMMERCIAL

## 2024-09-09 ENCOUNTER — OFFICE VISIT (OUTPATIENT)
Dept: HEMATOLOGY/ONCOLOGY | Facility: HOSPITAL | Age: 54
End: 2024-09-09
Payer: COMMERCIAL

## 2024-09-09 VITALS
BODY MASS INDEX: 29.76 KG/M2 | DIASTOLIC BLOOD PRESSURE: 73 MMHG | SYSTOLIC BLOOD PRESSURE: 106 MMHG | HEART RATE: 71 BPM | OXYGEN SATURATION: 100 % | TEMPERATURE: 97.7 F | RESPIRATION RATE: 20 BRPM | WEIGHT: 180.34 LBS

## 2024-09-09 DIAGNOSIS — M31.19 ACQUIRED TTP (MULTI): ICD-10-CM

## 2024-09-09 DIAGNOSIS — R42 INTERMITTENT LIGHTHEADEDNESS: ICD-10-CM

## 2024-09-09 DIAGNOSIS — R51.9 CHRONIC INTRACTABLE HEADACHE, UNSPECIFIED HEADACHE TYPE: Primary | ICD-10-CM

## 2024-09-09 DIAGNOSIS — G89.29 CHRONIC INTRACTABLE HEADACHE, UNSPECIFIED HEADACHE TYPE: Primary | ICD-10-CM

## 2024-09-09 PROCEDURE — 99214 OFFICE O/P EST MOD 30 MIN: CPT | Performed by: STUDENT IN AN ORGANIZED HEALTH CARE EDUCATION/TRAINING PROGRAM

## 2024-09-09 PROCEDURE — 1036F TOBACCO NON-USER: CPT | Performed by: STUDENT IN AN ORGANIZED HEALTH CARE EDUCATION/TRAINING PROGRAM

## 2024-09-09 ASSESSMENT — PAIN SCALES - GENERAL: PAINLEVEL: 0-NO PAIN

## 2024-09-09 NOTE — PROGRESS NOTES
Patient ID: Tere Carrion is a 54 y.o. female.  Referring Physician: No referring provider defined for this encounter.  Primary Care Provider: Modesta Gallego DO  Visit Type: Follow Up  Diagnosis: immune TTP    Therapy summary (diagnosis: iTTP):   Rituximab: 6/21, 6/28, 7/8, 7/15, to be completed 7/15/2024  Cablivi: 6/21-to be completed 7/19  Cyclophosphamide: 8/21/2024, 9/11/24      Subjective    HPI  54 y.o. female with a PMH significant for TTP in 1998/1999 (treated with prolonged (87) plasmapheresis, steroids, and immunosuppressants including vincristine and azathioprine, and splenectomy March 1999), reported sickle cell trait and alpha thalassemia trait, migraines.  She was recently seen inpatient for immune TTP where she presented on 6/18/24 for 2 weeks of worsening generalized weakness, fatigue, migraines, hematuria, and new bruising with petechiae, with TTP. She was started on PLEX, did not receive Cablivi upfront 2/2 hematuria, which resolved over the last 3 days. She received PLEX x 3 (last on 6/21/24).    HIV, Hepatitis panel: Non-reactive  B12, folate, iron profile: Normal  T spot: Negative  Shiga toxin -ve   ELBERT -ve, SPEP work up negative for clonal protein   ADAMTS <5, inhibitor level 2.4    Re splenectomy, is followed by PCP for vaccination notes she is caught up at this time.   Notices fatigue since last 3 days, some skin bruising but no bleeding. Perimenopausal. Currently on pred 40mg, on taper.   Age appropriate cancer screening: mammo '23 due soon. [place] '20 due next year.   FMH: no cancer, mother had a UE DVT post-op, mother has many polyps, maternal GF had colon cancer, mother's sibs had colon cancer too   Social history: never smoker, no ETOH, no RDU.   08/21/24: Presents alone for follow-up.  The patient is being seen in treatment.  09/09/24: headaches left sided, since last 1 month daily, takes excedrin migraine daily occasionally ibuprofen. Lightheaded when stands majority of the  times. Couple days of nausea after the cytoxan.         Review of Systems - Oncology  10 point review of systems negative except as stated in HPI    Objective   Past Surgical History:   Procedure Laterality Date    BREAST BIOPSY      BREAST CYST EXCISION      BREAST SURGERY       SECTION, LOW TRANSVERSE      TUBAL LIGATION       Oncology History    No history exists.       BSA: 1.94 meters squared /73   Pulse 71   Temp 36.5 °C (97.7 °F) (Core)   Resp 20   Wt 81.8 kg (180 lb 5.4 oz)   SpO2 100%   BMI 29.76 kg/m²   Physical Exam  Vitals reviewed.   Constitutional:       General: She is not in acute distress.     Appearance: She is not toxic-appearing.   HENT:      Head: Normocephalic and atraumatic.   Cardiovascular:      Rate and Rhythm: Normal rate and regular rhythm.   Pulmonary:      Effort: Pulmonary effort is normal.   Musculoskeletal:      Comments: No pedal edema   Skin:     Comments: No bruising    Neurological:      General: No focal deficit present.      Mental Status: She is oriented to person, place, and time.   Psychiatric:         Mood and Affect: Mood normal.         Assessment/Plan    54 y.o. female with a PMH significant for  TTP in  (treated with prolonged () plasmapheresis, steroids, and immunosuppressants including vincristine and azathioprine, and splenectomy 1999), reported sickle cell trait and alpha thalassemia trait [confirmed on retesting].     # Immune TTP: Patient was initially diagnosed in , had prolonged plasmapheresis followed by immunosuppression with vincristine azathioprine and eventually splenectomy in 1999, has been followed at [facility] since then and has had no evidence of relapse.  As noted above the patient presented with hematuria and was eventually diagnosed with immune TTP with an antibody titer of 2.4.  She was started on Cablivi and rituximab and has tolerated both well.  She will complete both these therapies in the  coming weeks.  Additionally the patient continues her steroid taper with prednisone.  Plan:  - noted increased frequency of ?migraines, lightheadedness within last month, will proceed with MRI brain to eval for stroke/venous thrombosis in context of active TTP  - single episode of hematuria, continue to monitor   - ok to proceed with cycle 2 of cyclophosphamide 21 day cycles, plan 4-6 depending on how she tolerates   - will continue Cablivi until [id number] is >20%  - weekly CBC/diff and close monitoring for bleeding with thrombocytopenia from drug and TTP and cablivi   -The patient has been educated about the signs and symptoms of TTP and the process of contacting us or heading to an ER as needed.  - plan reviewed and questions were answered   - follow up next: 1 month  - labs prior to follow up: Noted above      Tree Walker MD

## 2024-09-10 ENCOUNTER — LAB (OUTPATIENT)
Dept: LAB | Facility: LAB | Age: 54
End: 2024-09-10
Payer: COMMERCIAL

## 2024-09-10 DIAGNOSIS — M31.19 ACQUIRED TTP (MULTI): ICD-10-CM

## 2024-09-10 LAB
ALBUMIN SERPL BCP-MCNC: 4 G/DL (ref 3.4–5)
ALP SERPL-CCNC: 46 U/L (ref 33–110)
ALT SERPL W P-5'-P-CCNC: 12 U/L (ref 7–45)
ANION GAP SERPL CALC-SCNC: 13 MMOL/L (ref 10–20)
AST SERPL W P-5'-P-CCNC: 17 U/L (ref 9–39)
BASOPHILS # BLD AUTO: 0.07 X10*3/UL (ref 0–0.1)
BASOPHILS NFR BLD AUTO: 1.1 %
BILIRUB SERPL-MCNC: 0.3 MG/DL (ref 0–1.2)
BUN SERPL-MCNC: 8 MG/DL (ref 6–23)
CALCIUM SERPL-MCNC: 9.4 MG/DL (ref 8.6–10.3)
CHLORIDE SERPL-SCNC: 106 MMOL/L (ref 98–107)
CO2 SERPL-SCNC: 27 MMOL/L (ref 21–32)
CREAT SERPL-MCNC: 0.93 MG/DL (ref 0.5–1.05)
EGFRCR SERPLBLD CKD-EPI 2021: 73 ML/MIN/1.73M*2
EOSINOPHIL # BLD AUTO: 0.23 X10*3/UL (ref 0–0.7)
EOSINOPHIL NFR BLD AUTO: 3.5 %
ERYTHROCYTE [DISTWIDTH] IN BLOOD BY AUTOMATED COUNT: 12.9 % (ref 11.5–14.5)
GLUCOSE SERPL-MCNC: 74 MG/DL (ref 74–99)
HCT VFR BLD AUTO: 31.7 % (ref 36–46)
HGB BLD-MCNC: 10.2 G/DL (ref 12–16)
IMM GRANULOCYTES # BLD AUTO: 0.02 X10*3/UL (ref 0–0.7)
IMM GRANULOCYTES NFR BLD AUTO: 0.3 % (ref 0–0.9)
LDH SERPL L TO P-CCNC: 168 U/L (ref 84–246)
LYMPHOCYTES # BLD AUTO: 1.69 X10*3/UL (ref 1.2–4.8)
LYMPHOCYTES NFR BLD AUTO: 25.6 %
MCH RBC QN AUTO: 25.5 PG (ref 26–34)
MCHC RBC AUTO-ENTMCNC: 32.2 G/DL (ref 32–36)
MCV RBC AUTO: 79 FL (ref 80–100)
MONOCYTES # BLD AUTO: 0.58 X10*3/UL (ref 0.1–1)
MONOCYTES NFR BLD AUTO: 8.8 %
NEUTROPHILS # BLD AUTO: 4.01 X10*3/UL (ref 1.2–7.7)
NEUTROPHILS NFR BLD AUTO: 60.7 %
NRBC BLD-RTO: 0 /100 WBCS (ref 0–0)
PLATELET # BLD AUTO: 296 X10*3/UL (ref 150–450)
POTASSIUM SERPL-SCNC: 4.2 MMOL/L (ref 3.5–5.3)
PROT SERPL-MCNC: 7.1 G/DL (ref 6.4–8.2)
RBC # BLD AUTO: 4 X10*6/UL (ref 4–5.2)
SODIUM SERPL-SCNC: 142 MMOL/L (ref 136–145)
WBC # BLD AUTO: 6.6 X10*3/UL (ref 4.4–11.3)

## 2024-09-10 PROCEDURE — 80053 COMPREHEN METABOLIC PANEL: CPT

## 2024-09-10 PROCEDURE — 83615 LACTATE (LD) (LDH) ENZYME: CPT

## 2024-09-10 PROCEDURE — 85335 FACTOR INHIBITOR TEST: CPT

## 2024-09-10 PROCEDURE — 83010 ASSAY OF HAPTOGLOBIN QUANT: CPT

## 2024-09-10 PROCEDURE — 36415 COLL VENOUS BLD VENIPUNCTURE: CPT

## 2024-09-10 PROCEDURE — 85397 CLOTTING FUNCT ACTIVITY: CPT

## 2024-09-10 PROCEDURE — 85025 COMPLETE CBC W/AUTO DIFF WBC: CPT

## 2024-09-11 ENCOUNTER — INFUSION (OUTPATIENT)
Dept: HEMATOLOGY/ONCOLOGY | Facility: HOSPITAL | Age: 54
End: 2024-09-11
Payer: COMMERCIAL

## 2024-09-11 VITALS
OXYGEN SATURATION: 99 % | TEMPERATURE: 97 F | HEART RATE: 67 BPM | RESPIRATION RATE: 18 BRPM | BODY MASS INDEX: 29.52 KG/M2 | WEIGHT: 178.9 LBS

## 2024-09-11 DIAGNOSIS — M31.19 ACQUIRED TTP (MULTI): ICD-10-CM

## 2024-09-11 LAB — HAPTOGLOB SERPL NEPH-MCNC: 178 MG/DL (ref 30–200)

## 2024-09-11 PROCEDURE — 96413 CHEMO IV INFUSION 1 HR: CPT

## 2024-09-11 PROCEDURE — 2500000004 HC RX 250 GENERAL PHARMACY W/ HCPCS (ALT 636 FOR OP/ED): Performed by: STUDENT IN AN ORGANIZED HEALTH CARE EDUCATION/TRAINING PROGRAM

## 2024-09-11 PROCEDURE — 96361 HYDRATE IV INFUSION ADD-ON: CPT | Mod: INF

## 2024-09-11 PROCEDURE — 96375 TX/PRO/DX INJ NEW DRUG ADDON: CPT | Mod: INF

## 2024-09-11 RX ORDER — HEPARIN SODIUM,PORCINE/PF 10 UNIT/ML
50 SYRINGE (ML) INTRAVENOUS AS NEEDED
OUTPATIENT
Start: 2024-09-11

## 2024-09-11 RX ORDER — ALBUTEROL SULFATE 0.83 MG/ML
3 SOLUTION RESPIRATORY (INHALATION) AS NEEDED
OUTPATIENT
Start: 2024-10-02

## 2024-09-11 RX ORDER — ALBUTEROL SULFATE 0.83 MG/ML
3 SOLUTION RESPIRATORY (INHALATION) AS NEEDED
Status: DISCONTINUED | OUTPATIENT
Start: 2024-09-11 | End: 2024-09-11 | Stop reason: HOSPADM

## 2024-09-11 RX ORDER — DIPHENHYDRAMINE HYDROCHLORIDE 50 MG/ML
50 INJECTION INTRAMUSCULAR; INTRAVENOUS AS NEEDED
OUTPATIENT
Start: 2024-10-02

## 2024-09-11 RX ORDER — FAMOTIDINE 10 MG/ML
20 INJECTION INTRAVENOUS ONCE AS NEEDED
Status: DISCONTINUED | OUTPATIENT
Start: 2024-09-11 | End: 2024-09-11 | Stop reason: HOSPADM

## 2024-09-11 RX ORDER — DEXTROSE MONOHYDRATE AND SODIUM CHLORIDE 5; .45 G/100ML; G/100ML
250 INJECTION, SOLUTION INTRAVENOUS ONCE
Status: COMPLETED | OUTPATIENT
Start: 2024-09-11 | End: 2024-09-11

## 2024-09-11 RX ORDER — EPINEPHRINE 0.3 MG/.3ML
0.3 INJECTION SUBCUTANEOUS EVERY 5 MIN PRN
Status: DISCONTINUED | OUTPATIENT
Start: 2024-09-11 | End: 2024-09-11 | Stop reason: HOSPADM

## 2024-09-11 RX ORDER — PALONOSETRON 0.05 MG/ML
0.25 INJECTION, SOLUTION INTRAVENOUS ONCE
OUTPATIENT
Start: 2024-10-02

## 2024-09-11 RX ORDER — EPINEPHRINE 0.3 MG/.3ML
0.3 INJECTION SUBCUTANEOUS EVERY 5 MIN PRN
OUTPATIENT
Start: 2024-10-02

## 2024-09-11 RX ORDER — DIPHENHYDRAMINE HYDROCHLORIDE 50 MG/ML
50 INJECTION INTRAMUSCULAR; INTRAVENOUS AS NEEDED
Status: DISCONTINUED | OUTPATIENT
Start: 2024-09-11 | End: 2024-09-11 | Stop reason: HOSPADM

## 2024-09-11 RX ORDER — FAMOTIDINE 10 MG/ML
20 INJECTION INTRAVENOUS ONCE AS NEEDED
OUTPATIENT
Start: 2024-10-02

## 2024-09-11 RX ORDER — PALONOSETRON 0.05 MG/ML
0.25 INJECTION, SOLUTION INTRAVENOUS ONCE
Status: COMPLETED | OUTPATIENT
Start: 2024-09-11 | End: 2024-09-11

## 2024-09-11 RX ORDER — HEPARIN 100 UNIT/ML
500 SYRINGE INTRAVENOUS AS NEEDED
OUTPATIENT
Start: 2024-09-11

## 2024-09-11 ASSESSMENT — PAIN SCALES - GENERAL: PAINLEVEL: 0-NO PAIN

## 2024-09-11 NOTE — SIGNIFICANT EVENT
09/11/24 8770   Prechemo Checklist   Has the patient been in the hospital, ED, or urgent care since last date of service No   Chemo/Immuno Consent Completed and Signed Not applicable   Protocol/Indications Verified Yes   Confirmed to previous date/time of medication Yes   Compared to previous dose Yes   All medications are dated accurately Yes   Pregnancy Test Negative Not applicable   Parameters Met Yes   BSA/Weight-Height Verified Yes   Dose Calculations Verified (current, total, cumulative) Yes

## 2024-09-11 NOTE — SIGNIFICANT EVENT
09/11/24 7500   Prechemo Checklist   Has the patient been in the hospital, ED, or urgent care since last date of service No   Chemo/Immuno Consent Completed and Signed Not applicable   Protocol/Indications Verified Yes   Confirmed to previous date/time of medication Yes   Compared to previous dose Yes   All medications are dated accurately Yes   Pregnancy Test Negative Not applicable   Parameters Met Yes   BSA/Weight-Height Verified Yes   Dose Calculations Verified (current, total, cumulative) Yes

## 2024-09-12 ENCOUNTER — SPECIALTY PHARMACY (OUTPATIENT)
Dept: PHARMACY | Facility: CLINIC | Age: 54
End: 2024-09-12

## 2024-09-12 PROCEDURE — RXMED WILLOW AMBULATORY MEDICATION CHARGE

## 2024-09-13 ENCOUNTER — HOSPITAL ENCOUNTER (OUTPATIENT)
Dept: RADIOLOGY | Facility: HOSPITAL | Age: 54
Discharge: HOME | End: 2024-09-13
Payer: COMMERCIAL

## 2024-09-13 DIAGNOSIS — M31.19 ACQUIRED TTP (MULTI): ICD-10-CM

## 2024-09-13 DIAGNOSIS — R42 INTERMITTENT LIGHTHEADEDNESS: ICD-10-CM

## 2024-09-13 DIAGNOSIS — R51.9 CHRONIC INTRACTABLE HEADACHE, UNSPECIFIED HEADACHE TYPE: ICD-10-CM

## 2024-09-13 DIAGNOSIS — G89.29 CHRONIC INTRACTABLE HEADACHE, UNSPECIFIED HEADACHE TYPE: ICD-10-CM

## 2024-09-13 PROCEDURE — A9575 INJ GADOTERATE MEGLUMI 0.1ML: HCPCS | Performed by: STUDENT IN AN ORGANIZED HEALTH CARE EDUCATION/TRAINING PROGRAM

## 2024-09-13 PROCEDURE — 70553 MRI BRAIN STEM W/O & W/DYE: CPT

## 2024-09-13 PROCEDURE — 2550000001 HC RX 255 CONTRASTS: Performed by: STUDENT IN AN ORGANIZED HEALTH CARE EDUCATION/TRAINING PROGRAM

## 2024-09-13 RX ORDER — GADOTERATE MEGLUMINE 376.9 MG/ML
16 INJECTION INTRAVENOUS
Status: COMPLETED | OUTPATIENT
Start: 2024-09-13 | End: 2024-09-13

## 2024-09-16 ENCOUNTER — PHARMACY VISIT (OUTPATIENT)
Dept: PHARMACY | Facility: CLINIC | Age: 54
End: 2024-09-16
Payer: COMMERCIAL

## 2024-09-17 ENCOUNTER — LAB (OUTPATIENT)
Dept: LAB | Facility: LAB | Age: 54
End: 2024-09-17
Payer: COMMERCIAL

## 2024-09-17 DIAGNOSIS — M31.19 ACQUIRED TTP (MULTI): ICD-10-CM

## 2024-09-17 DIAGNOSIS — Z00.00 WELLNESS EXAMINATION: ICD-10-CM

## 2024-09-17 LAB
ALBUMIN SERPL BCP-MCNC: 4 G/DL (ref 3.4–5)
ALP SERPL-CCNC: 44 U/L (ref 33–110)
ALT SERPL W P-5'-P-CCNC: 10 U/L (ref 7–45)
ANION GAP SERPL CALC-SCNC: 12 MMOL/L (ref 10–20)
AST SERPL W P-5'-P-CCNC: 14 U/L (ref 9–39)
BASOPHILS # BLD AUTO: 0.05 X10*3/UL (ref 0–0.1)
BASOPHILS NFR BLD AUTO: 0.7 %
BILIRUB SERPL-MCNC: 0.3 MG/DL (ref 0–1.2)
BUN SERPL-MCNC: 10 MG/DL (ref 6–23)
CALCIUM SERPL-MCNC: 9.2 MG/DL (ref 8.6–10.3)
CHLORIDE SERPL-SCNC: 104 MMOL/L (ref 98–107)
CHOLEST SERPL-MCNC: 225 MG/DL (ref 0–199)
CHOLESTEROL/HDL RATIO: 3.9
CO2 SERPL-SCNC: 28 MMOL/L (ref 21–32)
CREAT SERPL-MCNC: 0.9 MG/DL (ref 0.5–1.05)
EGFRCR SERPLBLD CKD-EPI 2021: 76 ML/MIN/1.73M*2
EOSINOPHIL # BLD AUTO: 0.31 X10*3/UL (ref 0–0.7)
EOSINOPHIL NFR BLD AUTO: 4.1 %
ERYTHROCYTE [DISTWIDTH] IN BLOOD BY AUTOMATED COUNT: 12.8 % (ref 11.5–14.5)
GLUCOSE SERPL-MCNC: 69 MG/DL (ref 74–99)
HCT VFR BLD AUTO: 31.4 % (ref 36–46)
HDLC SERPL-MCNC: 57.9 MG/DL
HGB BLD-MCNC: 10.1 G/DL (ref 12–16)
IMM GRANULOCYTES # BLD AUTO: 0.01 X10*3/UL (ref 0–0.7)
IMM GRANULOCYTES NFR BLD AUTO: 0.1 % (ref 0–0.9)
LDH SERPL L TO P-CCNC: 148 U/L (ref 84–246)
LDLC SERPL CALC-MCNC: 131 MG/DL
LYMPHOCYTES # BLD AUTO: 1.87 X10*3/UL (ref 1.2–4.8)
LYMPHOCYTES NFR BLD AUTO: 24.9 %
MCH RBC QN AUTO: 25.1 PG (ref 26–34)
MCHC RBC AUTO-ENTMCNC: 32.2 G/DL (ref 32–36)
MCV RBC AUTO: 78 FL (ref 80–100)
MONOCYTES # BLD AUTO: 0.7 X10*3/UL (ref 0.1–1)
MONOCYTES NFR BLD AUTO: 9.3 %
NEUTROPHILS # BLD AUTO: 4.57 X10*3/UL (ref 1.2–7.7)
NEUTROPHILS NFR BLD AUTO: 60.9 %
NON HDL CHOLESTEROL: 167 MG/DL (ref 0–149)
NRBC BLD-RTO: 1.5 /100 WBCS (ref 0–0)
PLATELET # BLD AUTO: 328 X10*3/UL (ref 150–450)
POTASSIUM SERPL-SCNC: 4.3 MMOL/L (ref 3.5–5.3)
PROT SERPL-MCNC: 6.9 G/DL (ref 6.4–8.2)
RBC # BLD AUTO: 4.03 X10*6/UL (ref 4–5.2)
SODIUM SERPL-SCNC: 140 MMOL/L (ref 136–145)
TRIGL SERPL-MCNC: 181 MG/DL (ref 0–149)
VLDL: 36 MG/DL (ref 0–40)
WBC # BLD AUTO: 7.5 X10*3/UL (ref 4.4–11.3)

## 2024-09-17 PROCEDURE — 80053 COMPREHEN METABOLIC PANEL: CPT

## 2024-09-17 PROCEDURE — 83615 LACTATE (LD) (LDH) ENZYME: CPT

## 2024-09-17 PROCEDURE — 85335 FACTOR INHIBITOR TEST: CPT

## 2024-09-17 PROCEDURE — 80061 LIPID PANEL: CPT

## 2024-09-17 PROCEDURE — 85397 CLOTTING FUNCT ACTIVITY: CPT

## 2024-09-17 PROCEDURE — 85025 COMPLETE CBC W/AUTO DIFF WBC: CPT

## 2024-09-17 PROCEDURE — 83010 ASSAY OF HAPTOGLOBIN QUANT: CPT

## 2024-09-17 PROCEDURE — 36415 COLL VENOUS BLD VENIPUNCTURE: CPT

## 2024-09-18 LAB — HAPTOGLOB SERPL NEPH-MCNC: 164 MG/DL (ref 30–200)

## 2024-09-19 ENCOUNTER — TELEPHONE (OUTPATIENT)
Dept: ADMISSION | Facility: HOSPITAL | Age: 54
End: 2024-09-19
Payer: COMMERCIAL

## 2024-09-19 ENCOUNTER — APPOINTMENT (OUTPATIENT)
Dept: RADIOLOGY | Facility: HOSPITAL | Age: 54
End: 2024-09-19
Payer: COMMERCIAL

## 2024-09-19 ENCOUNTER — TELEPHONE (OUTPATIENT)
Dept: HEMATOLOGY/ONCOLOGY | Facility: CLINIC | Age: 54
End: 2024-09-19
Payer: COMMERCIAL

## 2024-09-19 ENCOUNTER — SPECIALTY PHARMACY (OUTPATIENT)
Dept: PHARMACY | Facility: CLINIC | Age: 54
End: 2024-09-19

## 2024-09-19 DIAGNOSIS — M31.19 ACQUIRED TTP (MULTI): ICD-10-CM

## 2024-09-19 LAB
SCAN RESULT: ABNORMAL
VWF CP ACT/NOR PPP CHRO: <5 %
VWF CP INHIB PPP-ACNC: >8 INHIBITOR UNITS

## 2024-09-19 PROCEDURE — RXMED WILLOW AMBULATORY MEDICATION CHARGE

## 2024-09-19 RX ORDER — CAPLACIZUMAB 11 MG
11 KIT INJECTION DAILY
Qty: 30 KIT | Refills: 1 | Status: SHIPPED | OUTPATIENT
Start: 2024-09-19

## 2024-09-19 NOTE — TELEPHONE ENCOUNTER
Pt needs to cancel an upcoming cruise and is requesting a letter stating it is not advisable for her to travel at this time.  Cruise dates 10/26/24-10/31/24 on [facility].  She asks to have this emailed to her once completed: [email or web address]

## 2024-09-19 NOTE — TELEPHONE ENCOUNTER
----- Message from Elle WALTON sent at 9/19/2024 10:02 AM EDT -----  Regarding: RE: Cablivi  Good morning,  Sorry to bug you all again but we haven't gotten the script yet.  Can we get that today?  Isma  ----- Message -----  From: Tree Du MD  Sent: 9/18/2024   1:38 PM EDT  To: Larisa Caldwell, RN; Maria C Harp, RN; #  Subject: RE: Cablivi                                      Yes we want to continue. I can sign the pended script. Thanks  ----- Message -----  From: Elle Bernard  Sent: 9/18/2024  12:09 PM EDT  To: Tree Du MD; Larisa Caldwell, RN; #  Subject: RE: CabKendra Alvarado    That patient only has 4 syringes left of the script.  Do we want to continue on with this medication for this patient?  If so, can we get another script sent over for a refill?    Isma  ----- Message -----  From: Jean Mukherjee PharmD  Sent: 7/24/2024  12:25 PM EDT  To: Tree Du MD; Larisa Caldwell RN; #  Subject: RE: Cablivi                                      No problem. We'll keep prescription as is then. We can continue to send out 7 doses at a time. Please do let us know if there is a change in patient's therapy through these next 2 months.     Thank you again,  Jean  ----- Message -----  From: Tree Du MD  Sent: 7/24/2024  12:12 PM EDT  To: Larisa Caldwell RN; Maria C Harp RN; #  Subject: RE: Cablivi                                      The drug is stopped once the [id number] is >20%, not after a fixed number of doses. If her REGHXM65 levels get there within the timeline of the script, we can stop immediately. If you would prefer to give her fewer doses per script, that would be fine, please adjust the order.   Thanks  ----- Message -----  From: Jean Mukherjee PharmD  Sent: 7/24/2024  11:13 AM EDT  To: Tree Du MD; Larisa Caldwell RN; #  Subject: FW: Graciela Rojas Dr. for another message. Thank you for  sending for the new Cablivi prescription. I see this prescription is written for 30 doses with +1 refill. Looks like patient started their therapy in patient on 6/21 and received 8 doses at the hospital. Then ended up getting 30 doses shipped to their house from us. Per misbahmp I see the max is normally 58 doses with Cablivi. Will patient be on therapy for another 60 doses? If not would it be ok to change the quantity to 20 doses with 0 refills on this prescription to total 58 doses all together? Thank you for your time again.     Jean  ----- Message -----  From: Jean Mukherjee PharmBRITTA  Sent: 7/18/2024   2:03 PM EDT  To: Tree Du MD; Larisa Caldwell, RN; #  Subject: RE: Cablivi                                      I see, that makes sense. I see that patients can add on up to extra 28 days if the initial 30 days of therapy is not sufficient.   Her next batch of 6 doses will be going out on 7/23/24. That will make it total of 30 doses she has received from us plus any doses she might have received inpatient. What ever amount of extra doses you believe patient needs, if we can receive an updated prescription next week after 7/23/24 (to prevent the old rx from being discontinued).     Thank you,  Jean  ----- Message -----  From: Tree Du MD  Sent: 7/18/2024  12:34 PM EDT  To: Larisa Caldwell, MAICOL; Maria C Harp RN; #  Subject: RE: Cablivi                                      Her last levels remain <5% she is going to need extended therapy beyond the 28 days. Please advise on how I should send you the updated script.   Thanks  ----- Message -----  From: Jean Mukherjee PharmBRITTA  Sent: 7/18/2024  11:38 AM EDT  To: Tree Du MD; Larisa Caldwell, RN; #  Subject: Cablivi                                          Pito Du,   I saw your most recent chart notes for this patients Cablivi. From the discharge summary looks like patient started this medication inpatient on 6/21. We have so far dispensed  out 24 doses out patient to the patient. There are 6 more doses left on this prescription we currently have.   I saw on your notes that you might want patient to be on therapy for 28 days only? Just would like to make sure you would like us to send out the 6 doses remaining on this prescription to the patient? Thank you for your time!    Jean Mukherjee PharmBRITTA, [facility]  Clinical Pharmacist  [facility]  [address] [place] [address]  Email: [email or web address]  Tel: [phone]  Fax: [phone]

## 2024-09-21 LAB
SCAN RESULT: ABNORMAL
VWF CP ACT/NOR PPP CHRO: <5 %
VWF CP INHIB PPP-ACNC: >8 INHIBITOR UNITS

## 2024-09-23 ENCOUNTER — PHARMACY VISIT (OUTPATIENT)
Dept: PHARMACY | Facility: CLINIC | Age: 54
End: 2024-09-23
Payer: COMMERCIAL

## 2024-09-24 ENCOUNTER — APPOINTMENT (OUTPATIENT)
Dept: CARDIOLOGY | Facility: HOSPITAL | Age: 54
End: 2024-09-24
Payer: COMMERCIAL

## 2024-09-24 ENCOUNTER — TELEPHONE (OUTPATIENT)
Dept: ADMISSION | Facility: HOSPITAL | Age: 54
End: 2024-09-24

## 2024-09-24 ENCOUNTER — LAB (OUTPATIENT)
Dept: LAB | Facility: LAB | Age: 54
End: 2024-09-24
Payer: COMMERCIAL

## 2024-09-24 DIAGNOSIS — D56.9 THALASSEMIA, UNSPECIFIED TYPE: Primary | ICD-10-CM

## 2024-09-24 NOTE — TELEPHONE ENCOUNTER
Pt went to have labs completed today- was told only order was for CBC/diff.   Pt thought she was to have CMP, Kern TS 13, haptoglobin, and troponin drawn weekly.   Last labs 9/7.   Pt also checking when next FUV with Dr. Walker will be  She has infusion on 10/2 and orders in place for infusion on 10/23 ( message sent to schedulers to assist with 10/23 infusion orders)

## 2024-09-25 ENCOUNTER — LAB (OUTPATIENT)
Dept: LAB | Facility: LAB | Age: 54
End: 2024-09-25
Payer: COMMERCIAL

## 2024-09-25 DIAGNOSIS — D50.9 MICROCYTIC ANEMIA: ICD-10-CM

## 2024-09-25 DIAGNOSIS — M31.19 ACQUIRED TTP (MULTI): ICD-10-CM

## 2024-09-25 LAB
ALBUMIN SERPL BCP-MCNC: 4.2 G/DL (ref 3.4–5)
ALP SERPL-CCNC: 49 U/L (ref 33–110)
ALT SERPL W P-5'-P-CCNC: 12 U/L (ref 7–45)
ANION GAP SERPL CALC-SCNC: 12 MMOL/L (ref 10–20)
AST SERPL W P-5'-P-CCNC: 17 U/L (ref 9–39)
BASOPHILS # BLD AUTO: 0.04 X10*3/UL (ref 0–0.1)
BASOPHILS NFR BLD AUTO: 0.5 %
BILIRUB SERPL-MCNC: 0.5 MG/DL (ref 0–1.2)
BUN SERPL-MCNC: 13 MG/DL (ref 6–23)
CALCIUM SERPL-MCNC: 9.4 MG/DL (ref 8.6–10.3)
CHLORIDE SERPL-SCNC: 105 MMOL/L (ref 98–107)
CO2 SERPL-SCNC: 29 MMOL/L (ref 21–32)
CREAT SERPL-MCNC: 0.89 MG/DL (ref 0.5–1.05)
EGFRCR SERPLBLD CKD-EPI 2021: 77 ML/MIN/1.73M*2
EOSINOPHIL # BLD AUTO: 0.21 X10*3/UL (ref 0–0.7)
EOSINOPHIL NFR BLD AUTO: 2.5 %
ERYTHROCYTE [DISTWIDTH] IN BLOOD BY AUTOMATED COUNT: 13.1 % (ref 11.5–14.5)
GLUCOSE SERPL-MCNC: 62 MG/DL (ref 74–99)
HCT VFR BLD AUTO: 32.5 % (ref 36–46)
HGB BLD-MCNC: 10.3 G/DL (ref 12–16)
IMM GRANULOCYTES # BLD AUTO: 0.02 X10*3/UL (ref 0–0.7)
IMM GRANULOCYTES NFR BLD AUTO: 0.2 % (ref 0–0.9)
LDH SERPL L TO P-CCNC: 156 U/L (ref 84–246)
LYMPHOCYTES # BLD AUTO: 1.49 X10*3/UL (ref 1.2–4.8)
LYMPHOCYTES NFR BLD AUTO: 17.8 %
MCH RBC QN AUTO: 24.6 PG (ref 26–34)
MCHC RBC AUTO-ENTMCNC: 31.7 G/DL (ref 32–36)
MCV RBC AUTO: 78 FL (ref 80–100)
MONOCYTES # BLD AUTO: 0.79 X10*3/UL (ref 0.1–1)
MONOCYTES NFR BLD AUTO: 9.4 %
NEUTROPHILS # BLD AUTO: 5.84 X10*3/UL (ref 1.2–7.7)
NEUTROPHILS NFR BLD AUTO: 69.6 %
NRBC BLD-RTO: 0 /100 WBCS (ref 0–0)
PLATELET # BLD AUTO: 331 X10*3/UL (ref 150–450)
POTASSIUM SERPL-SCNC: 4 MMOL/L (ref 3.5–5.3)
PROT SERPL-MCNC: 7.1 G/DL (ref 6.4–8.2)
RBC # BLD AUTO: 4.19 X10*6/UL (ref 4–5.2)
SODIUM SERPL-SCNC: 142 MMOL/L (ref 136–145)
WBC # BLD AUTO: 8.4 X10*3/UL (ref 4.4–11.3)

## 2024-09-25 PROCEDURE — 82728 ASSAY OF FERRITIN: CPT

## 2024-09-25 PROCEDURE — 85397 CLOTTING FUNCT ACTIVITY: CPT

## 2024-09-25 PROCEDURE — 36415 COLL VENOUS BLD VENIPUNCTURE: CPT

## 2024-09-25 PROCEDURE — 80053 COMPREHEN METABOLIC PANEL: CPT

## 2024-09-25 PROCEDURE — 83540 ASSAY OF IRON: CPT

## 2024-09-25 PROCEDURE — 83010 ASSAY OF HAPTOGLOBIN QUANT: CPT

## 2024-09-25 PROCEDURE — 83615 LACTATE (LD) (LDH) ENZYME: CPT

## 2024-09-25 PROCEDURE — 85025 COMPLETE CBC W/AUTO DIFF WBC: CPT

## 2024-09-25 PROCEDURE — 85335 FACTOR INHIBITOR TEST: CPT

## 2024-09-25 PROCEDURE — 83550 IRON BINDING TEST: CPT

## 2024-09-26 ENCOUNTER — SPECIALTY PHARMACY (OUTPATIENT)
Dept: PHARMACY | Facility: CLINIC | Age: 54
End: 2024-09-26

## 2024-09-26 DIAGNOSIS — D50.9 MICROCYTIC ANEMIA: Primary | ICD-10-CM

## 2024-09-26 LAB
FERRITIN SERPL-MCNC: 141 NG/ML (ref 8–150)
HAPTOGLOB SERPL NEPH-MCNC: 172 MG/DL (ref 30–200)
IRON SATN MFR SERPL: 31 % (ref 25–45)
IRON SERPL-MCNC: 107 UG/DL (ref 35–150)
TIBC SERPL-MCNC: 347 UG/DL (ref 240–445)
UIBC SERPL-MCNC: 240 UG/DL (ref 110–370)

## 2024-09-26 PROCEDURE — RXMED WILLOW AMBULATORY MEDICATION CHARGE

## 2024-09-26 NOTE — PROGRESS NOTES
[facility] Clinical Note    Tere Carrion is a 54 y.o. female, who is on the specialty pharmacy service for management of:  Blood Modifiers Core.    Tere Carrion is taking: Cablivi.    Medication Start Date (planned or actual): Established on therapy    Tere was contacted on 9/26/2024 at 10:20 AM for a virtual pharmacy visit with encounter number [id number] from:   [facility]  [address] [place] [id number]  Dept: [phone]  Dept Fax: [phone]    Tere was offered a Telemedicine Video visit or Telephone visit.  Tere consented to a Telephone visit, which was performed.    The most recent encounter visit with the referring prescriber Tree Du MD on 9/9/2024 was reviewed.  Pharmacy will continue to collaborate in the care of this patient with the referring prescriber Tree Du MD.    General Assessment      Impression/Plan  IMPRESSION/PLAN:  Is patient high risk (potential patients:  pregnancy, geriatric, pediatric)?  No  Is laboratory follow-up needed? Weekly OIEHCN15 done   Is a clinical intervention needed? No  Next reassessment date? 12/26/2024  Additional comments:   Interaction with sertraline (pt reports no signs/symptoms of bleeding/bruising)     Refer to the encounter summary report for documentation details about patient counseling and education.      Medication Adherence    The importance of adherence was discussed with the patient and they were advised to take the medication as prescribed by their provider. Patient was encouraged to call their physician's office if they have a question regarding a missed dose.     QOL/Patient Satisfaction  Rate your quality of life on scale of 1-10: 8  Rate your satisfaction with  Specialty Pharmacy on scale of 1-10: 10 - Completely satisfied      Patient was advised to contact the pharmacy if there are any changes to their medication list, including prescriptions,  OTC medications, herbal products, or supplements. Patient was advised of [facility]'s dispensing process, refill timeline, contact information ([phone]), and patient management follow up. Patient confirmed understanding of education conducted during assessment. All patient questions and concerns were addressed to the best of my ability. Patient was encouraged to contact the specialty pharmacy with any questions or concerns.    Confirmed follow-up outreaches are properly scheduled and reviewed goals of therapy with the patient.        NIKUNJ CALVILLO

## 2024-09-30 ENCOUNTER — OFFICE VISIT (OUTPATIENT)
Dept: HEMATOLOGY/ONCOLOGY | Facility: HOSPITAL | Age: 54
End: 2024-09-30
Payer: COMMERCIAL

## 2024-09-30 ENCOUNTER — PHARMACY VISIT (OUTPATIENT)
Dept: PHARMACY | Facility: CLINIC | Age: 54
End: 2024-09-30
Payer: COMMERCIAL

## 2024-09-30 ENCOUNTER — LAB (OUTPATIENT)
Dept: LAB | Facility: LAB | Age: 54
End: 2024-09-30
Payer: COMMERCIAL

## 2024-09-30 ENCOUNTER — APPOINTMENT (OUTPATIENT)
Dept: HEMATOLOGY/ONCOLOGY | Facility: HOSPITAL | Age: 54
End: 2024-09-30
Payer: COMMERCIAL

## 2024-09-30 VITALS
HEART RATE: 80 BPM | WEIGHT: 181 LBS | DIASTOLIC BLOOD PRESSURE: 63 MMHG | BODY MASS INDEX: 29.87 KG/M2 | RESPIRATION RATE: 16 BRPM | SYSTOLIC BLOOD PRESSURE: 102 MMHG | OXYGEN SATURATION: 100 % | TEMPERATURE: 95.9 F

## 2024-09-30 DIAGNOSIS — D56.9 THALASSEMIA, UNSPECIFIED TYPE: ICD-10-CM

## 2024-09-30 DIAGNOSIS — M31.19 ACQUIRED TTP (MULTI): ICD-10-CM

## 2024-09-30 LAB
BASOPHILS # BLD AUTO: 0.05 X10*3/UL (ref 0–0.1)
BASOPHILS NFR BLD AUTO: 0.7 %
EOSINOPHIL # BLD AUTO: 0.18 X10*3/UL (ref 0–0.7)
EOSINOPHIL NFR BLD AUTO: 2.5 %
ERYTHROCYTE [DISTWIDTH] IN BLOOD BY AUTOMATED COUNT: 13.2 % (ref 11.5–14.5)
HCT VFR BLD AUTO: 29.8 % (ref 36–46)
HGB BLD-MCNC: 9.6 G/DL (ref 12–16)
IMM GRANULOCYTES # BLD AUTO: 0.02 X10*3/UL (ref 0–0.7)
IMM GRANULOCYTES NFR BLD AUTO: 0.3 % (ref 0–0.9)
LYMPHOCYTES # BLD AUTO: 1.78 X10*3/UL (ref 1.2–4.8)
LYMPHOCYTES NFR BLD AUTO: 24.9 %
MCH RBC QN AUTO: 24.6 PG (ref 26–34)
MCHC RBC AUTO-ENTMCNC: 32.2 G/DL (ref 32–36)
MCV RBC AUTO: 76 FL (ref 80–100)
MONOCYTES # BLD AUTO: 0.68 X10*3/UL (ref 0.1–1)
MONOCYTES NFR BLD AUTO: 9.5 %
NEUTROPHILS # BLD AUTO: 4.45 X10*3/UL (ref 1.2–7.7)
NEUTROPHILS NFR BLD AUTO: 62.1 %
NRBC BLD-RTO: 0.3 /100 WBCS (ref 0–0)
PLATELET # BLD AUTO: 300 X10*3/UL (ref 150–450)
RBC # BLD AUTO: 3.9 X10*6/UL (ref 4–5.2)
WBC # BLD AUTO: 7.2 X10*3/UL (ref 4.4–11.3)

## 2024-09-30 PROCEDURE — 99214 OFFICE O/P EST MOD 30 MIN: CPT | Performed by: STUDENT IN AN ORGANIZED HEALTH CARE EDUCATION/TRAINING PROGRAM

## 2024-09-30 PROCEDURE — 36415 COLL VENOUS BLD VENIPUNCTURE: CPT

## 2024-09-30 PROCEDURE — 85025 COMPLETE CBC W/AUTO DIFF WBC: CPT

## 2024-09-30 ASSESSMENT — PAIN SCALES - GENERAL: PAINLEVEL: 0-NO PAIN

## 2024-09-30 NOTE — PROGRESS NOTES
Patient ID: Tere Carrion is a 54 y.o. female.  Referring Physician: Tree Du [place]  [address] [place] [address]  Primary Care Provider: Modesta Gallego DO  Visit Type: Follow Up  Diagnosis: immune TTP    Therapy summary (diagnosis: iTTP):   Rituximab: 6/21, 6/28, 7/8, 7/15, to be completed 7/15/2024  Cablivi: 6/21-to be completed 7/19  Cyclophosphamide: 8/21/2024, 9/11/24, 10/2/24      Subjective    HPI  54 y.o. female with a PMH significant for TTP in 1998/1999 (treated with prolonged (87) plasmapheresis, steroids, and immunosuppressants including vincristine and azathioprine, and splenectomy March 1999), reported sickle cell trait and alpha thalassemia trait, migraines.  She was recently seen inpatient for immune TTP where she presented on 6/18/24 for 2 weeks of worsening generalized weakness, fatigue, migraines, hematuria, and new bruising with petechiae, with TTP. She was started on PLEX, did not receive Cablivi upfront 2/2 hematuria, which resolved over the last 3 days. She received PLEX x 3 (last on 6/21/24).    HIV, Hepatitis panel: Non-reactive  B12, folate, iron profile: Normal  T spot: Negative  Shiga toxin -ve   ELBERT -ve, SPEP work up negative for clonal protein   ADAMTS <5, inhibitor level 2.4    Re splenectomy, is followed by PCP for vaccination notes she is caught up at this time.   Notices fatigue since last 3 days, some skin bruising but no bleeding. Perimenopausal. Currently on pred 40mg, on taper.   Age appropriate cancer screening: mammo '23 due soon. [place] '20 due next year.   FMH: no cancer, mother had a UE DVT post-op, mother has many polyps, maternal GF had colon cancer, mother's sibs had colon cancer too   Social history: never smoker, no ETOH, no RDU.   08/21/24: Presents alone for follow-up.  The patient is being seen in treatment.  09/09/24: headaches left sided, since last 1 month daily, takes excedrin migraine daily occasionally ibuprofen. Lightheaded when stands  majority of the times. Couple days of nausea after the cytoxan.   24: no significant complaints, is tolerating cytoxan well. Dose noted to be at 400mg/m2 with no response in terms of inhibitor or [id number] level, escalated to 500mg/m2. Dose range reported up to 750mg/m2. D/w pt will likely need to complete 6 cycles.       Review of Systems - Oncology  10 point review of systems negative except as stated in HPI    Objective   Past Surgical History:   Procedure Laterality Date    BREAST BIOPSY      BREAST CYST EXCISION      BREAST SURGERY       SECTION, LOW TRANSVERSE  2004    TUBAL LIGATION  2004     Oncology History    No history exists.       BSA: 1.94 meters squared /63 (BP Location: Left arm, Patient Position: Sitting, BP Cuff Size: Large adult)   Pulse 80   Temp 35.5 °C (95.9 °F) (Skin)   Resp 16   Wt 82.1 kg (181 lb)   SpO2 100%   BMI 29.87 kg/m²   Physical Exam  Vitals reviewed.   Constitutional:       General: She is not in acute distress.     Appearance: She is not toxic-appearing.   HENT:      Head: Normocephalic and atraumatic.   Cardiovascular:      Rate and Rhythm: Normal rate and regular rhythm.   Pulmonary:      Effort: Pulmonary effort is normal.   Musculoskeletal:      Comments: No pedal edema   Skin:     Comments: No bruising    Neurological:      General: No focal deficit present.      Mental Status: She is oriented to person, place, and time.   Psychiatric:         Mood and Affect: Mood normal.         Assessment/Plan    54 y.o. female with a PMH significant for  TTP in  (treated with prolonged (87) plasmapheresis, steroids, and immunosuppressants including vincristine and azathioprine, and splenectomy 1999), reported sickle cell trait and alpha thalassemia trait [confirmed on retesting].     # Immune TTP: Patient was initially diagnosed in , had prolonged plasmapheresis followed by immunosuppression with vincristine azathioprine and eventually  splenectomy in March 1999, has been followed at [facility] since then and has had no evidence of relapse.  As noted above the patient presented with hematuria and was eventually diagnosed with immune TTP with an antibody titer of 2.4.  She was started on Cablivi and rituximab and has tolerated both well.  She will complete both these therapies in the coming weeks.  Additionally the patient continues her steroid taper with prednisone.  Plan:  - noted increased frequency of ?migraines, lightheadedness within last month, will proceed with MRI brain to eval for stroke/venous thrombosis in context of active TTP--> showing chronic ischemic changes which is known to occur in pts with TTP whether this relates to between the relapses or at relapse is unclear.   - single episode of hematuria, continue to monitor, not reucrred  - ok to proceed with cycle 3 of cyclophosphamide 21 day cycles, plan 6 depending on how she tolerates--> dose escalation to 500mg/m2  - will continue Cablivi until [id number] is >20%  - weekly CBC/diff and close monitoring for bleeding with thrombocytopenia from drug and TTP and cablivi   -The patient has been educated about the signs and symptoms of TTP and the process of contacting us or heading to an ER as needed.  - plan reviewed and questions were answered   - ECHO is WNL   - follow up next: 1 month  - labs prior to follow up: Noted above      Tree Walker MD

## 2024-10-01 ENCOUNTER — HOSPITAL ENCOUNTER (OUTPATIENT)
Dept: CARDIOLOGY | Facility: HOSPITAL | Age: 54
Discharge: HOME | End: 2024-10-01
Payer: COMMERCIAL

## 2024-10-01 DIAGNOSIS — M31.19 ACQUIRED TTP (MULTI): ICD-10-CM

## 2024-10-01 DIAGNOSIS — D47.3 ESSENTIAL THROMBOCYTHEMIA (MULTI): ICD-10-CM

## 2024-10-01 LAB
AORTIC VALVE MEAN GRADIENT: 3 MMHG
AORTIC VALVE PEAK VELOCITY: 1.21 M/S
AV PEAK GRADIENT: 5.8 MMHG
AVA (PEAK VEL): 1.95 CM2
AVA (VTI): 1.86 CM2
EJECTION FRACTION APICAL 4 CHAMBER: 68.3
EJECTION FRACTION: 65 %
LEFT VENTRICLE INTERNAL DIMENSION DIASTOLE: 4.33 CM (ref 3.5–6)
LEFT VENTRICULAR OUTFLOW TRACT DIAMETER: 1.85 CM
LV EJECTION FRACTION BIPLANE: 68 %
MITRAL VALVE E/A RATIO: 1.46
RIGHT VENTRICLE FREE WALL PEAK S': 11 CM/S
RIGHT VENTRICLE PEAK SYSTOLIC PRESSURE: 22.8 MMHG
SCAN RESULT: ABNORMAL
TRICUSPID ANNULAR PLANE SYSTOLIC EXCURSION: 2.2 CM
VWF CP ACT/NOR PPP CHRO: <5 %
VWF CP INHIB PPP-ACNC: >8 INHIBITOR UNITS

## 2024-10-01 PROCEDURE — 93308 TTE F-UP OR LMTD: CPT | Performed by: INTERNAL MEDICINE

## 2024-10-01 PROCEDURE — 93325 DOPPLER ECHO COLOR FLOW MAPG: CPT

## 2024-10-01 PROCEDURE — 93321 DOPPLER ECHO F-UP/LMTD STD: CPT | Performed by: INTERNAL MEDICINE

## 2024-10-01 PROCEDURE — 93325 DOPPLER ECHO COLOR FLOW MAPG: CPT | Performed by: INTERNAL MEDICINE

## 2024-10-02 ENCOUNTER — INFUSION (OUTPATIENT)
Dept: HEMATOLOGY/ONCOLOGY | Facility: HOSPITAL | Age: 54
End: 2024-10-02
Payer: COMMERCIAL

## 2024-10-02 VITALS
OXYGEN SATURATION: 100 % | BODY MASS INDEX: 29.54 KG/M2 | HEART RATE: 67 BPM | DIASTOLIC BLOOD PRESSURE: 64 MMHG | RESPIRATION RATE: 18 BRPM | SYSTOLIC BLOOD PRESSURE: 101 MMHG | WEIGHT: 179.01 LBS | TEMPERATURE: 97.7 F

## 2024-10-02 DIAGNOSIS — M31.19 ACQUIRED TTP (MULTI): Primary | ICD-10-CM

## 2024-10-02 DIAGNOSIS — M31.19 ACQUIRED TTP (MULTI): ICD-10-CM

## 2024-10-02 LAB
ALBUMIN SERPL BCP-MCNC: 4 G/DL (ref 3.4–5)
ALP SERPL-CCNC: 46 U/L (ref 33–110)
ALT SERPL W P-5'-P-CCNC: 12 U/L (ref 7–45)
ANION GAP SERPL CALC-SCNC: 12 MMOL/L (ref 10–20)
AST SERPL W P-5'-P-CCNC: 16 U/L (ref 9–39)
BASOPHILS # BLD AUTO: 0.04 X10*3/UL (ref 0–0.1)
BASOPHILS NFR BLD AUTO: 0.7 %
BILIRUB SERPL-MCNC: 0.5 MG/DL (ref 0–1.2)
BUN SERPL-MCNC: 9 MG/DL (ref 6–23)
CALCIUM SERPL-MCNC: 9.2 MG/DL (ref 8.6–10.3)
CHLORIDE SERPL-SCNC: 105 MMOL/L (ref 98–107)
CO2 SERPL-SCNC: 28 MMOL/L (ref 21–32)
CREAT SERPL-MCNC: 0.79 MG/DL (ref 0.5–1.05)
EGFRCR SERPLBLD CKD-EPI 2021: 88 ML/MIN/1.73M*2
EOSINOPHIL # BLD AUTO: 0.19 X10*3/UL (ref 0–0.7)
EOSINOPHIL NFR BLD AUTO: 3.2 %
ERYTHROCYTE [DISTWIDTH] IN BLOOD BY AUTOMATED COUNT: 13 % (ref 11.5–14.5)
GLUCOSE SERPL-MCNC: 84 MG/DL (ref 74–99)
HAPTOGLOB SERPL NEPH-MCNC: 161 MG/DL (ref 30–200)
HCT VFR BLD AUTO: 30.2 % (ref 36–46)
HGB BLD-MCNC: 9.9 G/DL (ref 12–16)
IMM GRANULOCYTES # BLD AUTO: 0.01 X10*3/UL (ref 0–0.7)
IMM GRANULOCYTES NFR BLD AUTO: 0.2 % (ref 0–0.9)
LDH SERPL L TO P-CCNC: 150 U/L (ref 84–246)
LYMPHOCYTES # BLD AUTO: 1.26 X10*3/UL (ref 1.2–4.8)
LYMPHOCYTES NFR BLD AUTO: 21.3 %
MCH RBC QN AUTO: 24.8 PG (ref 26–34)
MCHC RBC AUTO-ENTMCNC: 32.8 G/DL (ref 32–36)
MCV RBC AUTO: 76 FL (ref 80–100)
MONOCYTES # BLD AUTO: 0.51 X10*3/UL (ref 0.1–1)
MONOCYTES NFR BLD AUTO: 8.6 %
NEUTROPHILS # BLD AUTO: 3.91 X10*3/UL (ref 1.2–7.7)
NEUTROPHILS NFR BLD AUTO: 66 %
NRBC BLD-RTO: 0 /100 WBCS (ref 0–0)
PLATELET # BLD AUTO: 351 X10*3/UL (ref 150–450)
POTASSIUM SERPL-SCNC: 4 MMOL/L (ref 3.5–5.3)
PROT SERPL-MCNC: 7.2 G/DL (ref 6.4–8.2)
RBC # BLD AUTO: 4 X10*6/UL (ref 4–5.2)
SODIUM SERPL-SCNC: 141 MMOL/L (ref 136–145)
WBC # BLD AUTO: 5.9 X10*3/UL (ref 4.4–11.3)

## 2024-10-02 PROCEDURE — 96367 TX/PROPH/DG ADDL SEQ IV INF: CPT

## 2024-10-02 PROCEDURE — 83615 LACTATE (LD) (LDH) ENZYME: CPT

## 2024-10-02 PROCEDURE — 96413 CHEMO IV INFUSION 1 HR: CPT

## 2024-10-02 PROCEDURE — 84075 ASSAY ALKALINE PHOSPHATASE: CPT

## 2024-10-02 PROCEDURE — 2500000004 HC RX 250 GENERAL PHARMACY W/ HCPCS (ALT 636 FOR OP/ED): Performed by: STUDENT IN AN ORGANIZED HEALTH CARE EDUCATION/TRAINING PROGRAM

## 2024-10-02 PROCEDURE — 83010 ASSAY OF HAPTOGLOBIN QUANT: CPT

## 2024-10-02 PROCEDURE — 96375 TX/PRO/DX INJ NEW DRUG ADDON: CPT | Mod: INF

## 2024-10-02 PROCEDURE — 85025 COMPLETE CBC W/AUTO DIFF WBC: CPT

## 2024-10-02 RX ORDER — DEXTROSE MONOHYDRATE AND SODIUM CHLORIDE 5; .45 G/100ML; G/100ML
250 INJECTION, SOLUTION INTRAVENOUS ONCE
Status: COMPLETED | OUTPATIENT
Start: 2024-10-02 | End: 2024-10-02

## 2024-10-02 RX ORDER — DIPHENHYDRAMINE HYDROCHLORIDE 50 MG/ML
50 INJECTION INTRAMUSCULAR; INTRAVENOUS AS NEEDED
OUTPATIENT
Start: 2024-10-23

## 2024-10-02 RX ORDER — EPINEPHRINE 0.3 MG/.3ML
0.3 INJECTION SUBCUTANEOUS EVERY 5 MIN PRN
OUTPATIENT
Start: 2024-10-23

## 2024-10-02 RX ORDER — PALONOSETRON 0.05 MG/ML
0.25 INJECTION, SOLUTION INTRAVENOUS ONCE
Status: COMPLETED | OUTPATIENT
Start: 2024-10-02 | End: 2024-10-02

## 2024-10-02 RX ORDER — PALONOSETRON 0.05 MG/ML
0.25 INJECTION, SOLUTION INTRAVENOUS ONCE
OUTPATIENT
Start: 2024-10-23

## 2024-10-02 RX ORDER — HEPARIN 100 UNIT/ML
500 SYRINGE INTRAVENOUS AS NEEDED
OUTPATIENT
Start: 2024-10-02

## 2024-10-02 RX ORDER — HEPARIN SODIUM,PORCINE/PF 10 UNIT/ML
50 SYRINGE (ML) INTRAVENOUS AS NEEDED
OUTPATIENT
Start: 2024-10-02

## 2024-10-02 RX ORDER — ALBUTEROL SULFATE 0.83 MG/ML
3 SOLUTION RESPIRATORY (INHALATION) AS NEEDED
OUTPATIENT
Start: 2024-10-23

## 2024-10-02 RX ORDER — FAMOTIDINE 10 MG/ML
20 INJECTION INTRAVENOUS ONCE AS NEEDED
OUTPATIENT
Start: 2024-10-23

## 2024-10-02 ASSESSMENT — PAIN SCALES - GENERAL: PAINLEVEL: 7

## 2024-10-02 NOTE — SIGNIFICANT EVENT
10/02/24 1246   Prechemo Checklist   Has the patient been in the hospital, ED, or urgent care since last date of service No   Chemo/Immuno Consent Completed and Signed Not applicable   Protocol/Indications Verified Yes   Confirmed to previous date/time of medication Yes   Compared to previous dose Yes   All medications are dated accurately Yes   Pregnancy Test Negative Not applicable   Parameters Met Yes   BSA/Weight-Height Verified Yes

## 2024-10-03 ENCOUNTER — SPECIALTY PHARMACY (OUTPATIENT)
Dept: PHARMACY | Facility: CLINIC | Age: 54
End: 2024-10-03

## 2024-10-03 PROCEDURE — RXMED WILLOW AMBULATORY MEDICATION CHARGE

## 2024-10-04 ENCOUNTER — PHARMACY VISIT (OUTPATIENT)
Dept: PHARMACY | Facility: CLINIC | Age: 54
End: 2024-10-04
Payer: COMMERCIAL

## 2024-10-08 ENCOUNTER — LAB (OUTPATIENT)
Dept: LAB | Facility: LAB | Age: 54
End: 2024-10-08
Payer: COMMERCIAL

## 2024-10-08 DIAGNOSIS — M31.19 ACQUIRED TTP (MULTI): ICD-10-CM

## 2024-10-08 LAB
ALBUMIN SERPL BCP-MCNC: 4 G/DL (ref 3.4–5)
ALP SERPL-CCNC: 44 U/L (ref 33–110)
ALT SERPL W P-5'-P-CCNC: 12 U/L (ref 7–45)
ANION GAP SERPL CALC-SCNC: 12 MMOL/L (ref 10–20)
AST SERPL W P-5'-P-CCNC: 16 U/L (ref 9–39)
BASOPHILS # BLD AUTO: 0.04 X10*3/UL (ref 0–0.1)
BASOPHILS NFR BLD AUTO: 0.6 %
BILIRUB SERPL-MCNC: 0.4 MG/DL (ref 0–1.2)
BUN SERPL-MCNC: 10 MG/DL (ref 6–23)
CALCIUM SERPL-MCNC: 9.2 MG/DL (ref 8.6–10.3)
CHLORIDE SERPL-SCNC: 105 MMOL/L (ref 98–107)
CO2 SERPL-SCNC: 28 MMOL/L (ref 21–32)
CREAT SERPL-MCNC: 0.84 MG/DL (ref 0.5–1.05)
EGFRCR SERPLBLD CKD-EPI 2021: 82 ML/MIN/1.73M*2
EOSINOPHIL # BLD AUTO: 0.21 X10*3/UL (ref 0–0.7)
EOSINOPHIL NFR BLD AUTO: 3.2 %
ERYTHROCYTE [DISTWIDTH] IN BLOOD BY AUTOMATED COUNT: 13 % (ref 11.5–14.5)
GLUCOSE SERPL-MCNC: 83 MG/DL (ref 74–99)
HCT VFR BLD AUTO: 29.8 % (ref 36–46)
HGB BLD-MCNC: 9.6 G/DL (ref 12–16)
IMM GRANULOCYTES # BLD AUTO: 0.02 X10*3/UL (ref 0–0.7)
IMM GRANULOCYTES NFR BLD AUTO: 0.3 % (ref 0–0.9)
LDH SERPL L TO P-CCNC: 145 U/L (ref 84–246)
LYMPHOCYTES # BLD AUTO: 1.39 X10*3/UL (ref 1.2–4.8)
LYMPHOCYTES NFR BLD AUTO: 21 %
MCH RBC QN AUTO: 24.5 PG (ref 26–34)
MCHC RBC AUTO-ENTMCNC: 32.2 G/DL (ref 32–36)
MCV RBC AUTO: 76 FL (ref 80–100)
MONOCYTES # BLD AUTO: 0.63 X10*3/UL (ref 0.1–1)
MONOCYTES NFR BLD AUTO: 9.5 %
NEUTROPHILS # BLD AUTO: 4.33 X10*3/UL (ref 1.2–7.7)
NEUTROPHILS NFR BLD AUTO: 65.4 %
NRBC BLD-RTO: 0.8 /100 WBCS (ref 0–0)
PLATELET # BLD AUTO: 318 X10*3/UL (ref 150–450)
POTASSIUM SERPL-SCNC: 3.9 MMOL/L (ref 3.5–5.3)
PROT SERPL-MCNC: 6.9 G/DL (ref 6.4–8.2)
RBC # BLD AUTO: 3.92 X10*6/UL (ref 4–5.2)
SODIUM SERPL-SCNC: 141 MMOL/L (ref 136–145)
WBC # BLD AUTO: 6.6 X10*3/UL (ref 4.4–11.3)

## 2024-10-08 PROCEDURE — 85025 COMPLETE CBC W/AUTO DIFF WBC: CPT

## 2024-10-08 PROCEDURE — 83615 LACTATE (LD) (LDH) ENZYME: CPT

## 2024-10-08 PROCEDURE — 80053 COMPREHEN METABOLIC PANEL: CPT

## 2024-10-08 PROCEDURE — 36415 COLL VENOUS BLD VENIPUNCTURE: CPT

## 2024-10-08 PROCEDURE — 85335 FACTOR INHIBITOR TEST: CPT

## 2024-10-08 PROCEDURE — 83010 ASSAY OF HAPTOGLOBIN QUANT: CPT

## 2024-10-08 PROCEDURE — 85397 CLOTTING FUNCT ACTIVITY: CPT

## 2024-10-09 LAB — HAPTOGLOB SERPL NEPH-MCNC: 149 MG/DL (ref 30–200)

## 2024-10-10 ENCOUNTER — SPECIALTY PHARMACY (OUTPATIENT)
Dept: PHARMACY | Facility: CLINIC | Age: 54
End: 2024-10-10

## 2024-10-10 PROCEDURE — RXMED WILLOW AMBULATORY MEDICATION CHARGE

## 2024-10-11 ENCOUNTER — PHARMACY VISIT (OUTPATIENT)
Dept: PHARMACY | Facility: CLINIC | Age: 54
End: 2024-10-11
Payer: COMMERCIAL

## 2024-10-14 LAB
SCAN RESULT: ABNORMAL
VWF CP ACT/NOR PPP CHRO: <5 %
VWF CP INHIB PPP-ACNC: >8 INHIBITOR UNITS

## 2024-10-15 ENCOUNTER — LAB (OUTPATIENT)
Dept: LAB | Facility: LAB | Age: 54
End: 2024-10-15
Payer: COMMERCIAL

## 2024-10-15 DIAGNOSIS — M31.19 ACQUIRED TTP (MULTI): ICD-10-CM

## 2024-10-15 LAB
ALBUMIN SERPL BCP-MCNC: 4.1 G/DL (ref 3.4–5)
ALP SERPL-CCNC: 51 U/L (ref 33–110)
ALT SERPL W P-5'-P-CCNC: 11 U/L (ref 7–45)
ANION GAP SERPL CALC-SCNC: 13 MMOL/L (ref 10–20)
AST SERPL W P-5'-P-CCNC: 15 U/L (ref 9–39)
BASOPHILS # BLD AUTO: 0.04 X10*3/UL (ref 0–0.1)
BASOPHILS NFR BLD AUTO: 0.6 %
BILIRUB SERPL-MCNC: 0.4 MG/DL (ref 0–1.2)
BUN SERPL-MCNC: 10 MG/DL (ref 6–23)
CALCIUM SERPL-MCNC: 9.2 MG/DL (ref 8.6–10.3)
CHLORIDE SERPL-SCNC: 105 MMOL/L (ref 98–107)
CO2 SERPL-SCNC: 27 MMOL/L (ref 21–32)
CREAT SERPL-MCNC: 0.9 MG/DL (ref 0.5–1.05)
EGFRCR SERPLBLD CKD-EPI 2021: 76 ML/MIN/1.73M*2
EOSINOPHIL # BLD AUTO: 0.21 X10*3/UL (ref 0–0.7)
EOSINOPHIL NFR BLD AUTO: 3.1 %
ERYTHROCYTE [DISTWIDTH] IN BLOOD BY AUTOMATED COUNT: 13 % (ref 11.5–14.5)
GLUCOSE SERPL-MCNC: 82 MG/DL (ref 74–99)
HCT VFR BLD AUTO: 31.1 % (ref 36–46)
HGB BLD-MCNC: 10 G/DL (ref 12–16)
IMM GRANULOCYTES # BLD AUTO: 0.02 X10*3/UL (ref 0–0.7)
IMM GRANULOCYTES NFR BLD AUTO: 0.3 % (ref 0–0.9)
LDH SERPL L TO P-CCNC: 146 U/L (ref 84–246)
LYMPHOCYTES # BLD AUTO: 1.8 X10*3/UL (ref 1.2–4.8)
LYMPHOCYTES NFR BLD AUTO: 26.6 %
MCH RBC QN AUTO: 24.6 PG (ref 26–34)
MCHC RBC AUTO-ENTMCNC: 32.2 G/DL (ref 32–36)
MCV RBC AUTO: 77 FL (ref 80–100)
MONOCYTES # BLD AUTO: 0.68 X10*3/UL (ref 0.1–1)
MONOCYTES NFR BLD AUTO: 10 %
NEUTROPHILS # BLD AUTO: 4.02 X10*3/UL (ref 1.2–7.7)
NEUTROPHILS NFR BLD AUTO: 59.4 %
NRBC BLD-RTO: 0.3 /100 WBCS (ref 0–0)
PLATELET # BLD AUTO: 339 X10*3/UL (ref 150–450)
POTASSIUM SERPL-SCNC: 3.9 MMOL/L (ref 3.5–5.3)
PROT SERPL-MCNC: 7.2 G/DL (ref 6.4–8.2)
RBC # BLD AUTO: 4.06 X10*6/UL (ref 4–5.2)
SODIUM SERPL-SCNC: 141 MMOL/L (ref 136–145)
WBC # BLD AUTO: 6.8 X10*3/UL (ref 4.4–11.3)

## 2024-10-15 PROCEDURE — 83615 LACTATE (LD) (LDH) ENZYME: CPT

## 2024-10-15 PROCEDURE — 83010 ASSAY OF HAPTOGLOBIN QUANT: CPT

## 2024-10-15 PROCEDURE — 85335 FACTOR INHIBITOR TEST: CPT

## 2024-10-15 PROCEDURE — 36415 COLL VENOUS BLD VENIPUNCTURE: CPT

## 2024-10-15 PROCEDURE — 85397 CLOTTING FUNCT ACTIVITY: CPT

## 2024-10-15 PROCEDURE — 85025 COMPLETE CBC W/AUTO DIFF WBC: CPT

## 2024-10-15 PROCEDURE — 80053 COMPREHEN METABOLIC PANEL: CPT

## 2024-10-16 LAB — HAPTOGLOB SERPL NEPH-MCNC: 155 MG/DL (ref 30–200)

## 2024-10-17 ENCOUNTER — SPECIALTY PHARMACY (OUTPATIENT)
Dept: PHARMACY | Facility: CLINIC | Age: 54
End: 2024-10-17

## 2024-10-17 PROCEDURE — RXMED WILLOW AMBULATORY MEDICATION CHARGE

## 2024-10-19 ENCOUNTER — PHARMACY VISIT (OUTPATIENT)
Dept: PHARMACY | Facility: CLINIC | Age: 54
End: 2024-10-19
Payer: COMMERCIAL

## 2024-10-22 ENCOUNTER — LAB (OUTPATIENT)
Dept: LAB | Facility: LAB | Age: 54
End: 2024-10-22
Payer: COMMERCIAL

## 2024-10-22 DIAGNOSIS — M31.19 ACQUIRED TTP (MULTI): ICD-10-CM

## 2024-10-22 LAB
ALBUMIN SERPL BCP-MCNC: 4.1 G/DL (ref 3.4–5)
ALP SERPL-CCNC: 44 U/L (ref 33–110)
ALT SERPL W P-5'-P-CCNC: 14 U/L (ref 7–45)
ANION GAP SERPL CALC-SCNC: 12 MMOL/L (ref 10–20)
AST SERPL W P-5'-P-CCNC: 22 U/L (ref 9–39)
BASOPHILS # BLD AUTO: 0.05 X10*3/UL (ref 0–0.1)
BASOPHILS NFR BLD AUTO: 0.8 %
BILIRUB SERPL-MCNC: 0.4 MG/DL (ref 0–1.2)
BUN SERPL-MCNC: 8 MG/DL (ref 6–23)
CALCIUM SERPL-MCNC: 9.2 MG/DL (ref 8.6–10.3)
CHLORIDE SERPL-SCNC: 104 MMOL/L (ref 98–107)
CO2 SERPL-SCNC: 28 MMOL/L (ref 21–32)
CREAT SERPL-MCNC: 0.87 MG/DL (ref 0.5–1.05)
EGFRCR SERPLBLD CKD-EPI 2021: 79 ML/MIN/1.73M*2
EOSINOPHIL # BLD AUTO: 0.16 X10*3/UL (ref 0–0.7)
EOSINOPHIL NFR BLD AUTO: 2.5 %
ERYTHROCYTE [DISTWIDTH] IN BLOOD BY AUTOMATED COUNT: 13.3 % (ref 11.5–14.5)
GLUCOSE SERPL-MCNC: 87 MG/DL (ref 74–99)
HCT VFR BLD AUTO: 30.5 % (ref 36–46)
HGB BLD-MCNC: 9.8 G/DL (ref 12–16)
IMM GRANULOCYTES # BLD AUTO: 0.02 X10*3/UL (ref 0–0.7)
IMM GRANULOCYTES NFR BLD AUTO: 0.3 % (ref 0–0.9)
LDH SERPL L TO P-CCNC: 193 U/L (ref 84–246)
LYMPHOCYTES # BLD AUTO: 1.52 X10*3/UL (ref 1.2–4.8)
LYMPHOCYTES NFR BLD AUTO: 23.8 %
MCH RBC QN AUTO: 24.6 PG (ref 26–34)
MCHC RBC AUTO-ENTMCNC: 32.1 G/DL (ref 32–36)
MCV RBC AUTO: 76 FL (ref 80–100)
MONOCYTES # BLD AUTO: 0.72 X10*3/UL (ref 0.1–1)
MONOCYTES NFR BLD AUTO: 11.3 %
NEUTROPHILS # BLD AUTO: 3.93 X10*3/UL (ref 1.2–7.7)
NEUTROPHILS NFR BLD AUTO: 61.3 %
NRBC BLD-RTO: 0 /100 WBCS (ref 0–0)
PLATELET # BLD AUTO: 316 X10*3/UL (ref 150–450)
POTASSIUM SERPL-SCNC: 4.2 MMOL/L (ref 3.5–5.3)
PROT SERPL-MCNC: 7.1 G/DL (ref 6.4–8.2)
RBC # BLD AUTO: 3.99 X10*6/UL (ref 4–5.2)
SCAN RESULT: ABNORMAL
SODIUM SERPL-SCNC: 140 MMOL/L (ref 136–145)
VWF CP ACT/NOR PPP CHRO: <5 %
VWF CP INHIB PPP-ACNC: >8 INHIBITOR UNITS
WBC # BLD AUTO: 6.4 X10*3/UL (ref 4.4–11.3)

## 2024-10-22 PROCEDURE — 80053 COMPREHEN METABOLIC PANEL: CPT

## 2024-10-22 PROCEDURE — 85335 FACTOR INHIBITOR TEST: CPT

## 2024-10-22 PROCEDURE — 36415 COLL VENOUS BLD VENIPUNCTURE: CPT

## 2024-10-22 PROCEDURE — 85025 COMPLETE CBC W/AUTO DIFF WBC: CPT

## 2024-10-22 PROCEDURE — 85397 CLOTTING FUNCT ACTIVITY: CPT

## 2024-10-22 PROCEDURE — 83010 ASSAY OF HAPTOGLOBIN QUANT: CPT

## 2024-10-22 PROCEDURE — 83615 LACTATE (LD) (LDH) ENZYME: CPT

## 2024-10-23 LAB — HAPTOGLOB SERPL NEPH-MCNC: 125 MG/DL (ref 30–200)

## 2024-10-24 ENCOUNTER — OFFICE VISIT (OUTPATIENT)
Dept: HEMATOLOGY/ONCOLOGY | Facility: HOSPITAL | Age: 54
End: 2024-10-24
Payer: COMMERCIAL

## 2024-10-24 ENCOUNTER — INFUSION (OUTPATIENT)
Dept: HEMATOLOGY/ONCOLOGY | Facility: HOSPITAL | Age: 54
End: 2024-10-24
Payer: COMMERCIAL

## 2024-10-24 ENCOUNTER — SPECIALTY PHARMACY (OUTPATIENT)
Dept: PHARMACY | Facility: CLINIC | Age: 54
End: 2024-10-24

## 2024-10-24 VITALS
BODY MASS INDEX: 29.8 KG/M2 | RESPIRATION RATE: 16 BRPM | SYSTOLIC BLOOD PRESSURE: 117 MMHG | TEMPERATURE: 97 F | WEIGHT: 180.6 LBS | HEART RATE: 69 BPM | DIASTOLIC BLOOD PRESSURE: 64 MMHG | OXYGEN SATURATION: 96 %

## 2024-10-24 DIAGNOSIS — M31.19 ACQUIRED TTP (MULTI): ICD-10-CM

## 2024-10-24 DIAGNOSIS — M31.19 ACQUIRED TTP (MULTI): Primary | ICD-10-CM

## 2024-10-24 DIAGNOSIS — D56.9 THALASSEMIA, UNSPECIFIED TYPE: ICD-10-CM

## 2024-10-24 PROCEDURE — 96413 CHEMO IV INFUSION 1 HR: CPT

## 2024-10-24 PROCEDURE — RXMED WILLOW AMBULATORY MEDICATION CHARGE

## 2024-10-24 PROCEDURE — 96375 TX/PRO/DX INJ NEW DRUG ADDON: CPT | Mod: INF

## 2024-10-24 PROCEDURE — 2580000001 HC RX 258 IV SOLUTIONS: Performed by: STUDENT IN AN ORGANIZED HEALTH CARE EDUCATION/TRAINING PROGRAM

## 2024-10-24 PROCEDURE — 99214 OFFICE O/P EST MOD 30 MIN: CPT | Performed by: STUDENT IN AN ORGANIZED HEALTH CARE EDUCATION/TRAINING PROGRAM

## 2024-10-24 PROCEDURE — 2500000004 HC RX 250 GENERAL PHARMACY W/ HCPCS (ALT 636 FOR OP/ED): Performed by: STUDENT IN AN ORGANIZED HEALTH CARE EDUCATION/TRAINING PROGRAM

## 2024-10-24 RX ORDER — PALONOSETRON 0.05 MG/ML
0.25 INJECTION, SOLUTION INTRAVENOUS ONCE
Status: CANCELLED | OUTPATIENT
Start: 2024-10-24

## 2024-10-24 RX ORDER — HEPARIN SODIUM,PORCINE/PF 10 UNIT/ML
50 SYRINGE (ML) INTRAVENOUS AS NEEDED
OUTPATIENT
Start: 2024-10-24

## 2024-10-24 RX ORDER — PALONOSETRON 0.05 MG/ML
0.25 INJECTION, SOLUTION INTRAVENOUS ONCE
Status: COMPLETED | OUTPATIENT
Start: 2024-10-24 | End: 2024-10-24

## 2024-10-24 RX ORDER — ALBUTEROL SULFATE 0.83 MG/ML
3 SOLUTION RESPIRATORY (INHALATION) AS NEEDED
OUTPATIENT
Start: 2024-11-14

## 2024-10-24 RX ORDER — HEPARIN 100 UNIT/ML
500 SYRINGE INTRAVENOUS AS NEEDED
OUTPATIENT
Start: 2024-10-24

## 2024-10-24 RX ORDER — DIPHENHYDRAMINE HYDROCHLORIDE 50 MG/ML
50 INJECTION INTRAMUSCULAR; INTRAVENOUS AS NEEDED
OUTPATIENT
Start: 2024-11-14

## 2024-10-24 RX ORDER — FAMOTIDINE 10 MG/ML
20 INJECTION INTRAVENOUS ONCE AS NEEDED
OUTPATIENT
Start: 2024-11-14

## 2024-10-24 RX ORDER — DEXAMETHASONE 6 MG/1
12 TABLET ORAL ONCE
Status: COMPLETED | OUTPATIENT
Start: 2024-10-24 | End: 2024-10-24

## 2024-10-24 RX ORDER — EPINEPHRINE 0.3 MG/.3ML
0.3 INJECTION SUBCUTANEOUS EVERY 5 MIN PRN
OUTPATIENT
Start: 2024-11-14

## 2024-10-24 RX ORDER — PALONOSETRON 0.05 MG/ML
0.25 INJECTION, SOLUTION INTRAVENOUS ONCE
OUTPATIENT
Start: 2024-11-14

## 2024-10-24 RX ORDER — DEXAMETHASONE 6 MG/1
12 TABLET ORAL ONCE
OUTPATIENT
Start: 2024-11-14 | End: 2024-11-14

## 2024-10-24 RX ORDER — DEXAMETHASONE 6 MG/1
12 TABLET ORAL ONCE
Status: CANCELLED | OUTPATIENT
Start: 2024-10-24 | End: 2024-10-24

## 2024-10-24 ASSESSMENT — PAIN SCALES - GENERAL: PAINLEVEL_OUTOF10: 0-NO PAIN

## 2024-10-24 NOTE — PROGRESS NOTES
Patient ID: Tere Carrion is a 54 y.o. female.  Referring Physician: Tree Du MD  [id number] [place] [address]  Primary Care Provider: Modesta Gallego DO  Visit Type: Follow Up  Diagnosis: immune TTP    Therapy summary (diagnosis: iTTP):   Rituximab: 6/21, 6/28, 7/8, 7/15, to be completed 7/15/2024  Cablivi: 6/21-to be completed 7/19  Cyclophosphamide: 8/21/2024, 9/11/24, 10/2/24, 10/24/24      Subjective    HPI  54 y.o. female with a PMH significant for TTP in 1998/1999 (treated with prolonged (87) plasmapheresis, steroids, and immunosuppressants including vincristine and azathioprine, and splenectomy March 1999), reported sickle cell trait and alpha thalassemia trait, migraines.  She was recently seen inpatient for immune TTP where she presented on 6/18/24 for 2 weeks of worsening generalized weakness, fatigue, migraines, hematuria, and new bruising with petechiae, with TTP. She was started on PLEX, did not receive Cablivi upfront 2/2 hematuria, which resolved over the last 3 days. She received PLEX x 3 (last on 6/21/24).    HIV, Hepatitis panel: Non-reactive  B12, folate, iron profile: Normal  T spot: Negative  Shiga toxin -ve   ELBERT -ve, SPEP work up negative for clonal protein   ADAMTS <5, inhibitor level 2.4    Re splenectomy, is followed by PCP for vaccination notes she is caught up at this time.   Notices fatigue since last 3 days, some skin bruising but no bleeding. Perimenopausal. Currently on pred 40mg, on taper.   Age appropriate cancer screening: mammo '23 due soon. [place] '20 due next year.   FMH: no cancer, mother had a UE DVT post-op, mother has many polyps, maternal GF had colon cancer, mother's sibs had colon cancer too   Social history: never smoker, no ETOH, no RDU.   08/21/24: Presents alone for follow-up.  The patient is being seen in treatment.  09/09/24: headaches left sided, since last 1 month daily, takes excedrin migraine daily occasionally ibuprofen. Lightheaded  when stands majority of the times. Couple days of nausea after the cytoxan.   24: no significant complaints, is tolerating cytoxan well. Dose noted to be at 400mg/m2 with no response in terms of inhibitor or [id number] level, escalated to 500mg/m2. Dose range reported up to 750mg/m2. D/w pt will likely need to complete 6 cycles.   10/24/24: presents with son. Asymptomatic. Has no particular complaints.       Review of Systems - Oncology  10 point review of systems negative except as stated in HPI    Objective   Past Surgical History:   Procedure Laterality Date   • BREAST BIOPSY     • BREAST CYST EXCISION     • BREAST SURGERY     •  SECTION, LOW TRANSVERSE     • TUBAL LIGATION       Oncology History    No history exists.       BSA: 1.94 meters squared /64 (BP Location: Left arm, Patient Position: Sitting)   Pulse 69   Temp 36.1 °C (97 °F) (Temporal)   Resp 16   Wt 81.9 kg (180 lb 9.6 oz)   SpO2 96%   BMI 29.80 kg/m²   Physical Exam  Vitals reviewed.   Constitutional:       General: She is not in acute distress.     Appearance: She is not toxic-appearing.   HENT:      Head: Normocephalic and atraumatic.   Cardiovascular:      Rate and Rhythm: Normal rate and regular rhythm.   Pulmonary:      Effort: Pulmonary effort is normal.   Musculoskeletal:      Comments: No pedal edema   Skin:     Comments: No bruising    Neurological:      General: No focal deficit present.      Mental Status: She is oriented to person, place, and time.   Psychiatric:         Mood and Affect: Mood normal.       Assessment/Plan    54 y.o. female with a PMH significant for  TTP in  (treated with prolonged (87) plasmapheresis, steroids, and immunosuppressants including vincristine and azathioprine, and splenectomy 1999), reported sickle cell trait and alpha thalassemia trait [confirmed on retesting].     # Immune TTP: Patient was initially diagnosed in , had prolonged plasmapheresis followed by  immunosuppression with vincristine azathioprine and eventually splenectomy in March 1999, has been followed at [facility] since then and has had no evidence of relapse.  As noted above the patient presented with hematuria and was eventually diagnosed with immune TTP with an antibody titer of 2.4.  She was started on Cablivi and rituximab and has tolerated both well.  She will complete both these therapies in the coming weeks.  Additionally the patient continues her steroid taper with prednisone.  Plan:  - continues to have episodic migraines.   - no bleeding.   - ok to proceed with cycle 4 of cyclophosphamide 21 day cycles, plan 6 depending on how she tolerates--> dose escalation to 500mg/m2-->750mg/m2, if tolerated with limited response given lower dosing for cycle 1-2, will dose cycle 7.  - will continue Cablivi until [id number] is >20%  - weekly CBC/diff and close monitoring for bleeding with thrombocytopenia from drug and TTP and cablivi   -The patient has been educated about the signs and symptoms of TTP and the process of contacting us or heading to an ER as needed.  - plan reviewed and questions were answered   - follow up next: 1 month  - labs prior to follow up: Noted above      Tree Walker MD

## 2024-10-24 NOTE — PROGRESS NOTES
Patient arrived ambulatory to infusion for scheduled tx of Cytoxan. Saw   in clinic prior. Denies any new or worsening sx. Patient experienced nasal burning at the end of infusion. No other symptoms were identified. Discharged in stable condition.

## 2024-10-28 ENCOUNTER — PHARMACY VISIT (OUTPATIENT)
Dept: PHARMACY | Facility: CLINIC | Age: 54
End: 2024-10-28
Payer: COMMERCIAL

## 2024-10-28 LAB
SCAN RESULT: ABNORMAL
VWF CP ACT/NOR PPP CHRO: <5 %
VWF CP INHIB PPP-ACNC: >8 INHIBITOR UNITS

## 2024-10-29 ENCOUNTER — LAB (OUTPATIENT)
Dept: LAB | Facility: LAB | Age: 54
End: 2024-10-29
Payer: COMMERCIAL

## 2024-10-29 ENCOUNTER — TELEPHONE (OUTPATIENT)
Dept: ADMISSION | Facility: HOSPITAL | Age: 54
End: 2024-10-29
Payer: COMMERCIAL

## 2024-10-29 DIAGNOSIS — M31.19 ACQUIRED TTP (MULTI): ICD-10-CM

## 2024-10-29 LAB
ALBUMIN SERPL BCP-MCNC: 4.1 G/DL (ref 3.4–5)
ALP SERPL-CCNC: 47 U/L (ref 33–110)
ALT SERPL W P-5'-P-CCNC: 13 U/L (ref 7–45)
ANION GAP SERPL CALC-SCNC: 9 MMOL/L (ref 10–20)
AST SERPL W P-5'-P-CCNC: 16 U/L (ref 9–39)
BASOPHILS # BLD AUTO: 0.03 X10*3/UL (ref 0–0.1)
BASOPHILS NFR BLD AUTO: 0.5 %
BILIRUB SERPL-MCNC: 0.4 MG/DL (ref 0–1.2)
BUN SERPL-MCNC: 11 MG/DL (ref 6–23)
CALCIUM SERPL-MCNC: 9.2 MG/DL (ref 8.6–10.3)
CHLORIDE SERPL-SCNC: 104 MMOL/L (ref 98–107)
CO2 SERPL-SCNC: 31 MMOL/L (ref 21–32)
CREAT SERPL-MCNC: 0.78 MG/DL (ref 0.5–1.05)
EGFRCR SERPLBLD CKD-EPI 2021: 90 ML/MIN/1.73M*2
EOSINOPHIL # BLD AUTO: 0.17 X10*3/UL (ref 0–0.7)
EOSINOPHIL NFR BLD AUTO: 2.7 %
ERYTHROCYTE [DISTWIDTH] IN BLOOD BY AUTOMATED COUNT: 13.2 % (ref 11.5–14.5)
GLUCOSE SERPL-MCNC: 83 MG/DL (ref 74–99)
HCT VFR BLD AUTO: 30.9 % (ref 36–46)
HGB BLD-MCNC: 9.8 G/DL (ref 12–16)
IMM GRANULOCYTES # BLD AUTO: 0.01 X10*3/UL (ref 0–0.7)
IMM GRANULOCYTES NFR BLD AUTO: 0.2 % (ref 0–0.9)
LDH SERPL L TO P-CCNC: 158 U/L (ref 84–246)
LYMPHOCYTES # BLD AUTO: 1.69 X10*3/UL (ref 1.2–4.8)
LYMPHOCYTES NFR BLD AUTO: 26.9 %
MCH RBC QN AUTO: 24 PG (ref 26–34)
MCHC RBC AUTO-ENTMCNC: 31.7 G/DL (ref 32–36)
MCV RBC AUTO: 76 FL (ref 80–100)
MONOCYTES # BLD AUTO: 0.49 X10*3/UL (ref 0.1–1)
MONOCYTES NFR BLD AUTO: 7.8 %
NEUTROPHILS # BLD AUTO: 3.9 X10*3/UL (ref 1.2–7.7)
NEUTROPHILS NFR BLD AUTO: 61.9 %
NRBC BLD-RTO: 0 /100 WBCS (ref 0–0)
PLATELET # BLD AUTO: 315 X10*3/UL (ref 150–450)
POTASSIUM SERPL-SCNC: 4 MMOL/L (ref 3.5–5.3)
PROT SERPL-MCNC: 7 G/DL (ref 6.4–8.2)
RBC # BLD AUTO: 4.09 X10*6/UL (ref 4–5.2)
SODIUM SERPL-SCNC: 140 MMOL/L (ref 136–145)
WBC # BLD AUTO: 6.3 X10*3/UL (ref 4.4–11.3)

## 2024-10-29 PROCEDURE — 83010 ASSAY OF HAPTOGLOBIN QUANT: CPT

## 2024-10-29 PROCEDURE — 85397 CLOTTING FUNCT ACTIVITY: CPT

## 2024-10-29 PROCEDURE — 83615 LACTATE (LD) (LDH) ENZYME: CPT

## 2024-10-29 PROCEDURE — 85335 FACTOR INHIBITOR TEST: CPT

## 2024-10-29 PROCEDURE — 85025 COMPLETE CBC W/AUTO DIFF WBC: CPT

## 2024-10-29 PROCEDURE — 36415 COLL VENOUS BLD VENIPUNCTURE: CPT

## 2024-10-29 PROCEDURE — 80053 COMPREHEN METABOLIC PANEL: CPT

## 2024-10-30 LAB — HAPTOGLOB SERPL NEPH-MCNC: 149 MG/DL (ref 30–200)

## 2024-10-31 ENCOUNTER — SPECIALTY PHARMACY (OUTPATIENT)
Dept: PHARMACY | Facility: CLINIC | Age: 54
End: 2024-10-31

## 2024-10-31 PROCEDURE — RXMED WILLOW AMBULATORY MEDICATION CHARGE

## 2024-11-01 ENCOUNTER — PHARMACY VISIT (OUTPATIENT)
Dept: PHARMACY | Facility: CLINIC | Age: 54
End: 2024-11-01
Payer: COMMERCIAL

## 2024-11-04 LAB
SCAN RESULT: ABNORMAL
VWF CP ACT/NOR PPP CHRO: <5 %
VWF CP INHIB PPP-ACNC: >8 INHIBITOR UNITS

## 2024-11-05 ENCOUNTER — LAB (OUTPATIENT)
Dept: LAB | Facility: LAB | Age: 54
End: 2024-11-05
Payer: COMMERCIAL

## 2024-11-05 DIAGNOSIS — M31.19 ACQUIRED TTP (MULTI): ICD-10-CM

## 2024-11-05 LAB
ALBUMIN SERPL BCP-MCNC: 4 G/DL (ref 3.4–5)
ALP SERPL-CCNC: 49 U/L (ref 33–110)
ALT SERPL W P-5'-P-CCNC: 13 U/L (ref 7–45)
ANION GAP SERPL CALC-SCNC: 10 MMOL/L (ref 10–20)
AST SERPL W P-5'-P-CCNC: 15 U/L (ref 9–39)
BASOPHILS # BLD AUTO: 0.03 X10*3/UL (ref 0–0.1)
BASOPHILS NFR BLD AUTO: 0.5 %
BILIRUB SERPL-MCNC: 0.3 MG/DL (ref 0–1.2)
BUN SERPL-MCNC: 12 MG/DL (ref 6–23)
CALCIUM SERPL-MCNC: 9.3 MG/DL (ref 8.6–10.3)
CHLORIDE SERPL-SCNC: 106 MMOL/L (ref 98–107)
CO2 SERPL-SCNC: 29 MMOL/L (ref 21–32)
CREAT SERPL-MCNC: 0.92 MG/DL (ref 0.5–1.05)
EGFRCR SERPLBLD CKD-EPI 2021: 74 ML/MIN/1.73M*2
EOSINOPHIL # BLD AUTO: 0.08 X10*3/UL (ref 0–0.7)
EOSINOPHIL NFR BLD AUTO: 1.4 %
ERYTHROCYTE [DISTWIDTH] IN BLOOD BY AUTOMATED COUNT: 13.2 % (ref 11.5–14.5)
GLUCOSE SERPL-MCNC: 96 MG/DL (ref 74–99)
HCT VFR BLD AUTO: 29.5 % (ref 36–46)
HGB BLD-MCNC: 9.5 G/DL (ref 12–16)
IMM GRANULOCYTES # BLD AUTO: 0.02 X10*3/UL (ref 0–0.7)
IMM GRANULOCYTES NFR BLD AUTO: 0.3 % (ref 0–0.9)
LDH SERPL L TO P-CCNC: 152 U/L (ref 84–246)
LYMPHOCYTES # BLD AUTO: 1.4 X10*3/UL (ref 1.2–4.8)
LYMPHOCYTES NFR BLD AUTO: 24.1 %
MCH RBC QN AUTO: 24.4 PG (ref 26–34)
MCHC RBC AUTO-ENTMCNC: 32.2 G/DL (ref 32–36)
MCV RBC AUTO: 76 FL (ref 80–100)
MONOCYTES # BLD AUTO: 0.57 X10*3/UL (ref 0.1–1)
MONOCYTES NFR BLD AUTO: 9.8 %
NEUTROPHILS # BLD AUTO: 3.7 X10*3/UL (ref 1.2–7.7)
NEUTROPHILS NFR BLD AUTO: 63.9 %
NRBC BLD-RTO: 0 /100 WBCS (ref 0–0)
PLATELET # BLD AUTO: 348 X10*3/UL (ref 150–450)
POTASSIUM SERPL-SCNC: 4.2 MMOL/L (ref 3.5–5.3)
PROT SERPL-MCNC: 6.9 G/DL (ref 6.4–8.2)
RBC # BLD AUTO: 3.89 X10*6/UL (ref 4–5.2)
SODIUM SERPL-SCNC: 141 MMOL/L (ref 136–145)
WBC # BLD AUTO: 5.8 X10*3/UL (ref 4.4–11.3)

## 2024-11-05 PROCEDURE — 85397 CLOTTING FUNCT ACTIVITY: CPT

## 2024-11-05 PROCEDURE — 85335 FACTOR INHIBITOR TEST: CPT

## 2024-11-05 PROCEDURE — 83010 ASSAY OF HAPTOGLOBIN QUANT: CPT

## 2024-11-05 PROCEDURE — 80053 COMPREHEN METABOLIC PANEL: CPT

## 2024-11-05 PROCEDURE — 85025 COMPLETE CBC W/AUTO DIFF WBC: CPT

## 2024-11-05 PROCEDURE — 36415 COLL VENOUS BLD VENIPUNCTURE: CPT

## 2024-11-05 PROCEDURE — 83615 LACTATE (LD) (LDH) ENZYME: CPT

## 2024-11-06 LAB — HAPTOGLOB SERPL NEPH-MCNC: 145 MG/DL (ref 30–200)

## 2024-11-07 ENCOUNTER — SPECIALTY PHARMACY (OUTPATIENT)
Dept: PHARMACY | Facility: CLINIC | Age: 54
End: 2024-11-07

## 2024-11-07 PROCEDURE — RXMED WILLOW AMBULATORY MEDICATION CHARGE

## 2024-11-08 ENCOUNTER — PHARMACY VISIT (OUTPATIENT)
Dept: PHARMACY | Facility: CLINIC | Age: 54
End: 2024-11-08
Payer: COMMERCIAL

## 2024-11-11 ENCOUNTER — OFFICE VISIT (OUTPATIENT)
Dept: HEMATOLOGY/ONCOLOGY | Facility: HOSPITAL | Age: 54
End: 2024-11-11
Payer: COMMERCIAL

## 2024-11-11 VITALS
HEART RATE: 91 BPM | DIASTOLIC BLOOD PRESSURE: 64 MMHG | RESPIRATION RATE: 18 BRPM | TEMPERATURE: 97.3 F | WEIGHT: 181.66 LBS | BODY MASS INDEX: 29.97 KG/M2 | OXYGEN SATURATION: 99 % | SYSTOLIC BLOOD PRESSURE: 112 MMHG

## 2024-11-11 DIAGNOSIS — G43.819 OTHER MIGRAINE WITHOUT STATUS MIGRAINOSUS, INTRACTABLE: ICD-10-CM

## 2024-11-11 DIAGNOSIS — M31.19 ACQUIRED TTP (MULTI): Primary | ICD-10-CM

## 2024-11-11 PROCEDURE — 1036F TOBACCO NON-USER: CPT | Performed by: STUDENT IN AN ORGANIZED HEALTH CARE EDUCATION/TRAINING PROGRAM

## 2024-11-11 PROCEDURE — 99213 OFFICE O/P EST LOW 20 MIN: CPT | Performed by: STUDENT IN AN ORGANIZED HEALTH CARE EDUCATION/TRAINING PROGRAM

## 2024-11-11 ASSESSMENT — PAIN SCALES - GENERAL: PAINLEVEL_OUTOF10: 0-NO PAIN

## 2024-11-11 NOTE — PROGRESS NOTES
Patient ID: Tere Carrion is a 54 y.o. female.  Referring Physician: No referring provider defined for this encounter.  Primary Care Provider: Modesta Gallego DO  Visit Type: Follow Up  Diagnosis: immune TTP    Therapy summary (diagnosis: iTTP):   Rituximab: 6/21, 6/28, 7/8, 7/15, to be completed 7/15/2024  Cablivi: 6/21-to be completed 7/19  Cyclophosphamide: 8/21/2024, 9/11/24, 10/2/24, 10/24/24      Subjective    HPI  54 y.o. female with a PMH significant for TTP in 1998/1999 (treated with prolonged (87) plasmapheresis, steroids, and immunosuppressants including vincristine and azathioprine, and splenectomy March 1999), reported sickle cell trait and alpha thalassemia trait, migraines.    Re TTP:   She was recently seen inpatient for immune TTP where she presented on 6/18/24 for 2 weeks of worsening generalized weakness, fatigue, migraines, hematuria, and new bruising with petechiae, with TTP. She was started on PLEX, did not receive Cablivi upfront 2/2 hematuria, which resolved over the last 3 days. She received PLEX x 3 (last on 6/21/24).    HIV, Hepatitis panel: Non-reactive  B12, folate, iron profile: Normal  T spot: Negative  Shiga toxin -ve   ELBERT -ve, SPEP work up negative for clonal protein   ADAMTS <5, inhibitor level 2.4    Re splenectomy, is followed by PCP for vaccination notes she is caught up at this time.   Notices fatigue since last 3 days, some skin bruising but no bleeding. Perimenopausal. Currently on pred 40mg, on taper.   Age appropriate cancer screening: mammo '23 due soon. [place] '20 due next year.   FMH: no cancer, mother had a UE DVT post-op, mother has many polyps, maternal GF had colon cancer, mother's sibs had colon cancer too   Social history: never smoker, no ETOH, no RDU.   08/21/24: Presents alone for follow-up.  The patient is being seen in treatment.  09/09/24: headaches left sided, since last 1 month daily, takes excedrin migraine daily occasionally ibuprofen. Lightheaded  when stands majority of the times. Couple days of nausea after the cytoxan.   24: no significant complaints, is tolerating cytoxan well. Dose noted to be at 400mg/m2 with no response in terms of inhibitor or [id number] level, escalated to 500mg/m2. Dose range reported up to 750mg/m2. D/w pt will likely need to complete 6 cycles.   10/24/24: presents with son. Asymptomatic. Has no particular complaints.   24: presents alone, notices some GI cramps on day 3 after dosing, but otherwise tolerates well.       Review of Systems - Oncology  10 point review of systems negative except as stated in HPI    Objective   Past Surgical History:   Procedure Laterality Date    BREAST BIOPSY      BREAST CYST EXCISION      BREAST SURGERY       SECTION, LOW TRANSVERSE      TUBAL LIGATION       Oncology History    No history exists.       BSA: 1.95 meters squared /64   Pulse 91   Temp 36.3 °C (97.3 °F) (Core)   Resp 18   Wt 82.4 kg (181 lb 10.5 oz)   SpO2 99%   BMI 29.97 kg/m²   Physical Exam  Vitals reviewed.   Constitutional:       General: She is not in acute distress.     Appearance: She is not toxic-appearing.   HENT:      Head: Normocephalic and atraumatic.   Cardiovascular:      Rate and Rhythm: Normal rate and regular rhythm.   Pulmonary:      Effort: Pulmonary effort is normal.   Musculoskeletal:      Comments: No pedal edema   Skin:     Comments: No bruising    Neurological:      General: No focal deficit present.      Mental Status: She is oriented to person, place, and time.   Psychiatric:         Mood and Affect: Mood normal.         Assessment/Plan    54 y.o. female with a PMH significant for  TTP in  (treated with prolonged (87) plasmapheresis, steroids, and immunosuppressants including vincristine and azathioprine, and splenectomy 1999), reported sickle cell trait and alpha thalassemia trait [confirmed on retesting].     # Immune TTP: Patient was initially diagnosed in  1998-99, had prolonged plasmapheresis followed by immunosuppression with vincristine azathioprine and eventually splenectomy in March 1999, has been followed at [facility] [place] since then and has had no evidence of relapse.  As noted above the patient presented with hematuria and was eventually diagnosed with immune TTP with an antibody titer of 2.4.  She was started on Cablivi and rituximab and has tolerated both well.  She will complete both these therapies in the coming weeks.  Additionally the patient continues her steroid taper with prednisone.  Plan:  - continues to have episodic migraines, referred to neuro-headache clinic   - no bleeding.   - ok to proceed with cycle 4 of cyclophosphamide 21 day cycles, plan 6 depending on how she tolerates--> dose escalation to 500mg/m2-->750mg/m2, if tolerated with limited response given lower dosing for cycle 1-2, will dose cycle 7.  - will continue Cablivi until [id number] is >20%  - weekly CBC/diff and close monitoring for bleeding with thrombocytopenia from drug and TTP and cablivi   -The patient has been educated about the signs and symptoms of TTP and the process of contacting us or heading to an ER as needed.  - plan reviewed and questions were answered   - follow up next: 1 month  - labs prior to follow up: Noted above      Tree Walker MD

## 2024-11-12 ENCOUNTER — LAB (OUTPATIENT)
Dept: LAB | Facility: LAB | Age: 54
End: 2024-11-12
Payer: COMMERCIAL

## 2024-11-12 DIAGNOSIS — M31.19 ACQUIRED TTP (MULTI): ICD-10-CM

## 2024-11-12 LAB
ALBUMIN SERPL BCP-MCNC: 3.9 G/DL (ref 3.4–5)
ALP SERPL-CCNC: 43 U/L (ref 33–110)
ALT SERPL W P-5'-P-CCNC: 14 U/L (ref 7–45)
ANION GAP SERPL CALC-SCNC: 11 MMOL/L (ref 10–20)
AST SERPL W P-5'-P-CCNC: 18 U/L (ref 9–39)
BASOPHILS # BLD AUTO: 0.04 X10*3/UL (ref 0–0.1)
BASOPHILS NFR BLD AUTO: 0.9 %
BILIRUB SERPL-MCNC: 0.3 MG/DL (ref 0–1.2)
BUN SERPL-MCNC: 9 MG/DL (ref 6–23)
CALCIUM SERPL-MCNC: 9 MG/DL (ref 8.6–10.3)
CHLORIDE SERPL-SCNC: 108 MMOL/L (ref 98–107)
CO2 SERPL-SCNC: 28 MMOL/L (ref 21–32)
CREAT SERPL-MCNC: 0.83 MG/DL (ref 0.5–1.05)
EGFRCR SERPLBLD CKD-EPI 2021: 83 ML/MIN/1.73M*2
EOSINOPHIL # BLD AUTO: 0.1 X10*3/UL (ref 0–0.7)
EOSINOPHIL NFR BLD AUTO: 2.2 %
ERYTHROCYTE [DISTWIDTH] IN BLOOD BY AUTOMATED COUNT: 13.4 % (ref 11.5–14.5)
GLUCOSE SERPL-MCNC: 72 MG/DL (ref 74–99)
HCT VFR BLD AUTO: 30.1 % (ref 36–46)
HGB BLD-MCNC: 9.8 G/DL (ref 12–16)
IMM GRANULOCYTES # BLD AUTO: 0.02 X10*3/UL (ref 0–0.7)
IMM GRANULOCYTES NFR BLD AUTO: 0.4 % (ref 0–0.9)
LDH SERPL L TO P-CCNC: 171 U/L (ref 84–246)
LYMPHOCYTES # BLD AUTO: 1.27 X10*3/UL (ref 1.2–4.8)
LYMPHOCYTES NFR BLD AUTO: 27.3 %
MCH RBC QN AUTO: 24.6 PG (ref 26–34)
MCHC RBC AUTO-ENTMCNC: 32.6 G/DL (ref 32–36)
MCV RBC AUTO: 76 FL (ref 80–100)
MONOCYTES # BLD AUTO: 0.81 X10*3/UL (ref 0.1–1)
MONOCYTES NFR BLD AUTO: 17.4 %
NEUTROPHILS # BLD AUTO: 2.41 X10*3/UL (ref 1.2–7.7)
NEUTROPHILS NFR BLD AUTO: 51.8 %
NRBC BLD-RTO: 0.4 /100 WBCS (ref 0–0)
PLATELET # BLD AUTO: 336 X10*3/UL (ref 150–450)
POTASSIUM SERPL-SCNC: 4.3 MMOL/L (ref 3.5–5.3)
PROT SERPL-MCNC: 6.7 G/DL (ref 6.4–8.2)
RBC # BLD AUTO: 3.98 X10*6/UL (ref 4–5.2)
SCAN RESULT: ABNORMAL
SODIUM SERPL-SCNC: 143 MMOL/L (ref 136–145)
VWF CP ACT/NOR PPP CHRO: <5 %
VWF CP INHIB PPP-ACNC: >8 INHIBITOR UNITS
WBC # BLD AUTO: 4.7 X10*3/UL (ref 4.4–11.3)

## 2024-11-12 PROCEDURE — 85335 FACTOR INHIBITOR TEST: CPT

## 2024-11-12 PROCEDURE — 88185 FLOWCYTOMETRY/TC ADD-ON: CPT

## 2024-11-12 PROCEDURE — 83010 ASSAY OF HAPTOGLOBIN QUANT: CPT

## 2024-11-12 PROCEDURE — 36415 COLL VENOUS BLD VENIPUNCTURE: CPT

## 2024-11-12 PROCEDURE — 85025 COMPLETE CBC W/AUTO DIFF WBC: CPT

## 2024-11-12 PROCEDURE — 80053 COMPREHEN METABOLIC PANEL: CPT

## 2024-11-12 PROCEDURE — 88189 FLOWCYTOMETRY/READ 16 & >: CPT | Performed by: STUDENT IN AN ORGANIZED HEALTH CARE EDUCATION/TRAINING PROGRAM

## 2024-11-12 PROCEDURE — 83615 LACTATE (LD) (LDH) ENZYME: CPT

## 2024-11-12 PROCEDURE — 88184 FLOWCYTOMETRY/ TC 1 MARKER: CPT

## 2024-11-12 PROCEDURE — 85397 CLOTTING FUNCT ACTIVITY: CPT

## 2024-11-13 ENCOUNTER — INFUSION (OUTPATIENT)
Dept: HEMATOLOGY/ONCOLOGY | Facility: HOSPITAL | Age: 54
End: 2024-11-13
Payer: COMMERCIAL

## 2024-11-13 ENCOUNTER — APPOINTMENT (OUTPATIENT)
Dept: HEMATOLOGY/ONCOLOGY | Facility: HOSPITAL | Age: 54
End: 2024-11-13
Payer: COMMERCIAL

## 2024-11-13 VITALS
TEMPERATURE: 96.4 F | OXYGEN SATURATION: 100 % | SYSTOLIC BLOOD PRESSURE: 116 MMHG | DIASTOLIC BLOOD PRESSURE: 76 MMHG | HEART RATE: 71 BPM | BODY MASS INDEX: 30.12 KG/M2 | WEIGHT: 182.54 LBS | RESPIRATION RATE: 18 BRPM

## 2024-11-13 DIAGNOSIS — M31.19 ACQUIRED TTP (MULTI): ICD-10-CM

## 2024-11-13 LAB — HAPTOGLOB SERPL NEPH-MCNC: 144 MG/DL (ref 30–200)

## 2024-11-13 PROCEDURE — 2500000004 HC RX 250 GENERAL PHARMACY W/ HCPCS (ALT 636 FOR OP/ED): Performed by: STUDENT IN AN ORGANIZED HEALTH CARE EDUCATION/TRAINING PROGRAM

## 2024-11-13 PROCEDURE — 96375 TX/PRO/DX INJ NEW DRUG ADDON: CPT | Mod: INF

## 2024-11-13 PROCEDURE — 96361 HYDRATE IV INFUSION ADD-ON: CPT | Mod: INF

## 2024-11-13 PROCEDURE — 96413 CHEMO IV INFUSION 1 HR: CPT

## 2024-11-13 RX ORDER — EPINEPHRINE 0.3 MG/.3ML
0.3 INJECTION SUBCUTANEOUS EVERY 5 MIN PRN
Status: DISCONTINUED | OUTPATIENT
Start: 2024-11-13 | End: 2024-11-13 | Stop reason: HOSPADM

## 2024-11-13 RX ORDER — PALONOSETRON 0.05 MG/ML
0.25 INJECTION, SOLUTION INTRAVENOUS ONCE
OUTPATIENT
Start: 2024-11-14

## 2024-11-13 RX ORDER — DEXTROSE MONOHYDRATE AND SODIUM CHLORIDE 5; .45 G/100ML; G/100ML
250 INJECTION, SOLUTION INTRAVENOUS ONCE
Status: COMPLETED | OUTPATIENT
Start: 2024-11-13 | End: 2024-11-13

## 2024-11-13 RX ORDER — PALONOSETRON 0.05 MG/ML
0.25 INJECTION, SOLUTION INTRAVENOUS ONCE
Status: COMPLETED | OUTPATIENT
Start: 2024-11-13 | End: 2024-11-13

## 2024-11-13 RX ORDER — CAPLACIZUMAB 11 MG
11 KIT INJECTION DAILY
Qty: 30 KIT | Refills: 1 | Status: CANCELLED | OUTPATIENT
Start: 2024-11-13

## 2024-11-13 RX ORDER — ALBUTEROL SULFATE 0.83 MG/ML
3 SOLUTION RESPIRATORY (INHALATION) AS NEEDED
Status: DISCONTINUED | OUTPATIENT
Start: 2024-11-13 | End: 2024-11-13 | Stop reason: HOSPADM

## 2024-11-13 RX ORDER — HEPARIN SODIUM,PORCINE/PF 10 UNIT/ML
50 SYRINGE (ML) INTRAVENOUS AS NEEDED
OUTPATIENT
Start: 2024-11-13

## 2024-11-13 RX ORDER — FAMOTIDINE 10 MG/ML
20 INJECTION INTRAVENOUS ONCE AS NEEDED
OUTPATIENT
Start: 2024-11-14

## 2024-11-13 RX ORDER — ALBUTEROL SULFATE 0.83 MG/ML
3 SOLUTION RESPIRATORY (INHALATION) AS NEEDED
OUTPATIENT
Start: 2024-11-14

## 2024-11-13 RX ORDER — EPINEPHRINE 0.3 MG/.3ML
0.3 INJECTION SUBCUTANEOUS EVERY 5 MIN PRN
OUTPATIENT
Start: 2024-11-14

## 2024-11-13 RX ORDER — FAMOTIDINE 10 MG/ML
20 INJECTION INTRAVENOUS ONCE AS NEEDED
Status: DISCONTINUED | OUTPATIENT
Start: 2024-11-13 | End: 2024-11-13 | Stop reason: HOSPADM

## 2024-11-13 RX ORDER — DEXAMETHASONE 6 MG/1
12 TABLET ORAL ONCE
OUTPATIENT
Start: 2024-11-14 | End: 2024-11-14

## 2024-11-13 RX ORDER — HEPARIN 100 UNIT/ML
500 SYRINGE INTRAVENOUS AS NEEDED
OUTPATIENT
Start: 2024-11-13

## 2024-11-13 RX ORDER — DIPHENHYDRAMINE HYDROCHLORIDE 50 MG/ML
50 INJECTION INTRAMUSCULAR; INTRAVENOUS AS NEEDED
OUTPATIENT
Start: 2024-11-14

## 2024-11-13 RX ORDER — DIPHENHYDRAMINE HYDROCHLORIDE 50 MG/ML
50 INJECTION INTRAMUSCULAR; INTRAVENOUS AS NEEDED
Status: DISCONTINUED | OUTPATIENT
Start: 2024-11-13 | End: 2024-11-13 | Stop reason: HOSPADM

## 2024-11-13 RX ORDER — DEXAMETHASONE 6 MG/1
12 TABLET ORAL ONCE
Status: COMPLETED | OUTPATIENT
Start: 2024-11-13 | End: 2024-11-13

## 2024-11-13 NOTE — PROGRESS NOTES
Patient presents to infusion appointment in stable condition, denies any new concerns. Cyclophosphamide treatment tolerated without incident. Schedule reviewed. Discharged in stable condition.

## 2024-11-14 ENCOUNTER — SPECIALTY PHARMACY (OUTPATIENT)
Dept: PHARMACY | Facility: CLINIC | Age: 54
End: 2024-11-14

## 2024-11-14 ENCOUNTER — APPOINTMENT (OUTPATIENT)
Dept: HEMATOLOGY/ONCOLOGY | Facility: HOSPITAL | Age: 54
End: 2024-11-14
Payer: COMMERCIAL

## 2024-11-14 DIAGNOSIS — M31.19 ACQUIRED TTP (MULTI): ICD-10-CM

## 2024-11-14 DIAGNOSIS — Z12.39 BREAST CANCER SCREENING, HIGH RISK PATIENT: Primary | ICD-10-CM

## 2024-11-14 PROCEDURE — RXMED WILLOW AMBULATORY MEDICATION CHARGE

## 2024-11-14 RX ORDER — TAMOXIFEN CITRATE 10 MG/1
10 TABLET ORAL DAILY
Qty: 90 TABLET | Refills: 3 | Status: SHIPPED | OUTPATIENT
Start: 2024-11-14

## 2024-11-14 RX ORDER — CAPLACIZUMAB 11 MG
11 KIT INJECTION DAILY
Qty: 30 KIT | Refills: 1 | Status: SHIPPED | OUTPATIENT
Start: 2024-11-14

## 2024-11-15 ENCOUNTER — PHARMACY VISIT (OUTPATIENT)
Dept: PHARMACY | Facility: CLINIC | Age: 54
End: 2024-11-15
Payer: COMMERCIAL

## 2024-11-15 LAB
CELL COUNT (BLOOD): 4.7 X10*3/UL
CELL POPULATIONS: NORMAL
DIAGNOSIS: NORMAL
FLOW DIFFERENTIAL: NORMAL
FLOW TEST ORDERED: NORMAL
LAB TEST METHOD: NORMAL
NUMBER OF CELLS COLLECTED: NORMAL PER TUBE
PATH REPORT.TOTAL CANCER: NORMAL
SIGNATURE COMMENT: NORMAL
SPECIMEN VIABILITY: NORMAL

## 2024-11-16 LAB
SCAN RESULT: ABNORMAL
VWF CP ACT/NOR PPP CHRO: <5 %
VWF CP INHIB PPP-ACNC: >8 INHIBITOR UNITS

## 2024-11-19 ENCOUNTER — LAB (OUTPATIENT)
Dept: LAB | Facility: LAB | Age: 54
End: 2024-11-19
Payer: COMMERCIAL

## 2024-11-19 DIAGNOSIS — M31.19 ACQUIRED TTP (MULTI): ICD-10-CM

## 2024-11-19 LAB
ALBUMIN SERPL BCP-MCNC: 4.3 G/DL (ref 3.4–5)
ALP SERPL-CCNC: 50 U/L (ref 33–110)
ALT SERPL W P-5'-P-CCNC: 15 U/L (ref 7–45)
ANION GAP SERPL CALC-SCNC: 10 MMOL/L (ref 10–20)
AST SERPL W P-5'-P-CCNC: 19 U/L (ref 9–39)
BASOPHILS # BLD AUTO: 0.07 X10*3/UL (ref 0–0.1)
BASOPHILS NFR BLD AUTO: 1.2 %
BILIRUB SERPL-MCNC: 0.3 MG/DL (ref 0–1.2)
BUN SERPL-MCNC: 10 MG/DL (ref 6–23)
CALCIUM SERPL-MCNC: 9.3 MG/DL (ref 8.6–10.3)
CHLORIDE SERPL-SCNC: 105 MMOL/L (ref 98–107)
CO2 SERPL-SCNC: 29 MMOL/L (ref 21–32)
CREAT SERPL-MCNC: 0.9 MG/DL (ref 0.5–1.05)
EGFRCR SERPLBLD CKD-EPI 2021: 76 ML/MIN/1.73M*2
EOSINOPHIL # BLD AUTO: 0.13 X10*3/UL (ref 0–0.7)
EOSINOPHIL NFR BLD AUTO: 2.3 %
ERYTHROCYTE [DISTWIDTH] IN BLOOD BY AUTOMATED COUNT: 12.8 % (ref 11.5–14.5)
GLUCOSE SERPL-MCNC: 82 MG/DL (ref 74–99)
HCT VFR BLD AUTO: 31.6 % (ref 36–46)
HGB BLD-MCNC: 10.3 G/DL (ref 12–16)
IMM GRANULOCYTES # BLD AUTO: 0.09 X10*3/UL (ref 0–0.7)
IMM GRANULOCYTES NFR BLD AUTO: 1.6 % (ref 0–0.9)
LDH SERPL L TO P-CCNC: 232 U/L (ref 84–246)
LYMPHOCYTES # BLD AUTO: 1.39 X10*3/UL (ref 1.2–4.8)
LYMPHOCYTES NFR BLD AUTO: 24.7 %
MCH RBC QN AUTO: 24.2 PG (ref 26–34)
MCHC RBC AUTO-ENTMCNC: 32.6 G/DL (ref 32–36)
MCV RBC AUTO: 74 FL (ref 80–100)
MONOCYTES # BLD AUTO: 0.44 X10*3/UL (ref 0.1–1)
MONOCYTES NFR BLD AUTO: 7.8 %
NEUTROPHILS # BLD AUTO: 3.5 X10*3/UL (ref 1.2–7.7)
NEUTROPHILS NFR BLD AUTO: 62.4 %
NRBC BLD-RTO: 0.4 /100 WBCS (ref 0–0)
PLATELET # BLD AUTO: 312 X10*3/UL (ref 150–450)
POTASSIUM SERPL-SCNC: 4.1 MMOL/L (ref 3.5–5.3)
PROT SERPL-MCNC: 7.4 G/DL (ref 6.4–8.2)
RBC # BLD AUTO: 4.25 X10*6/UL (ref 4–5.2)
SODIUM SERPL-SCNC: 140 MMOL/L (ref 136–145)
WBC # BLD AUTO: 5.6 X10*3/UL (ref 4.4–11.3)

## 2024-11-19 PROCEDURE — 85335 FACTOR INHIBITOR TEST: CPT

## 2024-11-19 PROCEDURE — 83615 LACTATE (LD) (LDH) ENZYME: CPT

## 2024-11-19 PROCEDURE — 80053 COMPREHEN METABOLIC PANEL: CPT

## 2024-11-19 PROCEDURE — 85025 COMPLETE CBC W/AUTO DIFF WBC: CPT

## 2024-11-19 PROCEDURE — 36415 COLL VENOUS BLD VENIPUNCTURE: CPT

## 2024-11-19 PROCEDURE — 83010 ASSAY OF HAPTOGLOBIN QUANT: CPT

## 2024-11-19 PROCEDURE — 85397 CLOTTING FUNCT ACTIVITY: CPT

## 2024-11-20 ENCOUNTER — SPECIALTY PHARMACY (OUTPATIENT)
Dept: PHARMACY | Facility: CLINIC | Age: 54
End: 2024-11-20

## 2024-11-20 LAB — HAPTOGLOB SERPL NEPH-MCNC: 150 MG/DL (ref 30–200)

## 2024-11-20 PROCEDURE — RXMED WILLOW AMBULATORY MEDICATION CHARGE

## 2024-11-22 ENCOUNTER — PHARMACY VISIT (OUTPATIENT)
Dept: PHARMACY | Facility: CLINIC | Age: 54
End: 2024-11-22
Payer: COMMERCIAL

## 2024-11-25 LAB
SCAN RESULT: ABNORMAL
VWF CP ACT/NOR PPP CHRO: <5 %
VWF CP INHIB PPP-ACNC: >8 INHIBITOR UNITS

## 2024-11-26 ENCOUNTER — LAB (OUTPATIENT)
Dept: LAB | Facility: LAB | Age: 54
End: 2024-11-26
Payer: COMMERCIAL

## 2024-11-26 DIAGNOSIS — M31.19 ACQUIRED TTP (MULTI): ICD-10-CM

## 2024-11-26 LAB
ALBUMIN SERPL BCP-MCNC: 4 G/DL (ref 3.4–5)
ALP SERPL-CCNC: 40 U/L (ref 33–110)
ALT SERPL W P-5'-P-CCNC: 13 U/L (ref 7–45)
ANION GAP SERPL CALC-SCNC: 10 MMOL/L (ref 10–20)
AST SERPL W P-5'-P-CCNC: 20 U/L (ref 9–39)
BASOPHILS # BLD AUTO: 0.05 X10*3/UL (ref 0–0.1)
BASOPHILS NFR BLD AUTO: 1.7 %
BILIRUB SERPL-MCNC: 0.3 MG/DL (ref 0–1.2)
BUN SERPL-MCNC: 11 MG/DL (ref 6–23)
CALCIUM SERPL-MCNC: 9.3 MG/DL (ref 8.6–10.3)
CHLORIDE SERPL-SCNC: 105 MMOL/L (ref 98–107)
CO2 SERPL-SCNC: 29 MMOL/L (ref 21–32)
CREAT SERPL-MCNC: 0.96 MG/DL (ref 0.5–1.05)
EGFRCR SERPLBLD CKD-EPI 2021: 70 ML/MIN/1.73M*2
EOSINOPHIL # BLD AUTO: 0.07 X10*3/UL (ref 0–0.7)
EOSINOPHIL NFR BLD AUTO: 2.3 %
ERYTHROCYTE [DISTWIDTH] IN BLOOD BY AUTOMATED COUNT: 13.1 % (ref 11.5–14.5)
GLUCOSE SERPL-MCNC: 83 MG/DL (ref 74–99)
HCT VFR BLD AUTO: 30.6 % (ref 36–46)
HGB BLD-MCNC: 10.1 G/DL (ref 12–16)
IMM GRANULOCYTES # BLD AUTO: 0.03 X10*3/UL (ref 0–0.7)
IMM GRANULOCYTES NFR BLD AUTO: 1 % (ref 0–0.9)
LDH SERPL L TO P-CCNC: 275 U/L (ref 84–246)
LYMPHOCYTES # BLD AUTO: 0.94 X10*3/UL (ref 1.2–4.8)
LYMPHOCYTES NFR BLD AUTO: 31.5 %
MCH RBC QN AUTO: 24.5 PG (ref 26–34)
MCHC RBC AUTO-ENTMCNC: 33 G/DL (ref 32–36)
MCV RBC AUTO: 74 FL (ref 80–100)
MONOCYTES # BLD AUTO: 0.66 X10*3/UL (ref 0.1–1)
MONOCYTES NFR BLD AUTO: 22.1 %
NEUTROPHILS # BLD AUTO: 1.23 X10*3/UL (ref 1.2–7.7)
NEUTROPHILS NFR BLD AUTO: 41.4 %
NRBC BLD-RTO: 0.7 /100 WBCS (ref 0–0)
PLATELET # BLD AUTO: 310 X10*3/UL (ref 150–450)
POTASSIUM SERPL-SCNC: 4 MMOL/L (ref 3.5–5.3)
PROT SERPL-MCNC: 7 G/DL (ref 6.4–8.2)
RBC # BLD AUTO: 4.13 X10*6/UL (ref 4–5.2)
SODIUM SERPL-SCNC: 140 MMOL/L (ref 136–145)
WBC # BLD AUTO: 3 X10*3/UL (ref 4.4–11.3)

## 2024-11-26 PROCEDURE — 80053 COMPREHEN METABOLIC PANEL: CPT

## 2024-11-26 PROCEDURE — 83010 ASSAY OF HAPTOGLOBIN QUANT: CPT

## 2024-11-26 PROCEDURE — 85335 FACTOR INHIBITOR TEST: CPT

## 2024-11-26 PROCEDURE — 85025 COMPLETE CBC W/AUTO DIFF WBC: CPT

## 2024-11-26 PROCEDURE — 85397 CLOTTING FUNCT ACTIVITY: CPT

## 2024-11-26 PROCEDURE — 36415 COLL VENOUS BLD VENIPUNCTURE: CPT

## 2024-11-26 PROCEDURE — 83615 LACTATE (LD) (LDH) ENZYME: CPT

## 2024-11-27 ENCOUNTER — SPECIALTY PHARMACY (OUTPATIENT)
Dept: PHARMACY | Facility: CLINIC | Age: 54
End: 2024-11-27

## 2024-11-27 LAB — HAPTOGLOB SERPL NEPH-MCNC: 142 MG/DL (ref 30–200)

## 2024-11-27 PROCEDURE — RXMED WILLOW AMBULATORY MEDICATION CHARGE

## 2024-11-29 ENCOUNTER — PHARMACY VISIT (OUTPATIENT)
Dept: PHARMACY | Facility: CLINIC | Age: 54
End: 2024-11-29
Payer: COMMERCIAL

## 2024-12-02 ENCOUNTER — LAB (OUTPATIENT)
Dept: LAB | Facility: HOSPITAL | Age: 54
End: 2024-12-02
Payer: COMMERCIAL

## 2024-12-02 ENCOUNTER — OFFICE VISIT (OUTPATIENT)
Dept: HEMATOLOGY/ONCOLOGY | Facility: HOSPITAL | Age: 54
End: 2024-12-02
Payer: COMMERCIAL

## 2024-12-02 VITALS
OXYGEN SATURATION: 100 % | WEIGHT: 180.3 LBS | SYSTOLIC BLOOD PRESSURE: 109 MMHG | TEMPERATURE: 95.7 F | BODY MASS INDEX: 29.75 KG/M2 | HEART RATE: 80 BPM | DIASTOLIC BLOOD PRESSURE: 62 MMHG | RESPIRATION RATE: 16 BRPM

## 2024-12-02 DIAGNOSIS — M31.19 ACQUIRED TTP (MULTI): Primary | ICD-10-CM

## 2024-12-02 DIAGNOSIS — M31.19 ACQUIRED TTP (MULTI): ICD-10-CM

## 2024-12-02 LAB
ALBUMIN SERPL BCP-MCNC: 4 G/DL (ref 3.4–5)
ALP SERPL-CCNC: 43 U/L (ref 33–110)
ALT SERPL W P-5'-P-CCNC: 13 U/L (ref 7–45)
ANION GAP SERPL CALC-SCNC: 14 MMOL/L (ref 10–20)
AST SERPL W P-5'-P-CCNC: 26 U/L (ref 9–39)
BASOPHILS # BLD AUTO: 0.05 X10*3/UL (ref 0–0.1)
BASOPHILS NFR BLD AUTO: 0.8 %
BILIRUB SERPL-MCNC: 0.3 MG/DL (ref 0–1.2)
BUN SERPL-MCNC: 11 MG/DL (ref 6–23)
CALCIUM SERPL-MCNC: 9.1 MG/DL (ref 8.6–10.3)
CHLORIDE SERPL-SCNC: 105 MMOL/L (ref 98–107)
CO2 SERPL-SCNC: 27 MMOL/L (ref 21–32)
CREAT SERPL-MCNC: 0.82 MG/DL (ref 0.5–1.05)
EGFRCR SERPLBLD CKD-EPI 2021: 85 ML/MIN/1.73M*2
EOSINOPHIL # BLD AUTO: 0.06 X10*3/UL (ref 0–0.7)
EOSINOPHIL NFR BLD AUTO: 0.9 %
ERYTHROCYTE [DISTWIDTH] IN BLOOD BY AUTOMATED COUNT: 13.2 % (ref 11.5–14.5)
GLUCOSE SERPL-MCNC: 88 MG/DL (ref 74–99)
HAPTOGLOB SERPL NEPH-MCNC: 150 MG/DL (ref 30–200)
HCT VFR BLD AUTO: 30.9 % (ref 36–46)
HGB BLD-MCNC: 10.5 G/DL (ref 12–16)
IMM GRANULOCYTES # BLD AUTO: 0.11 X10*3/UL (ref 0–0.7)
IMM GRANULOCYTES NFR BLD AUTO: 1.7 % (ref 0–0.9)
LDH SERPL L TO P-CCNC: 480 U/L (ref 84–246)
LYMPHOCYTES # BLD AUTO: 1.1 X10*3/UL (ref 1.2–4.8)
LYMPHOCYTES NFR BLD AUTO: 16.5 %
MCH RBC QN AUTO: 24.8 PG (ref 26–34)
MCHC RBC AUTO-ENTMCNC: 34 G/DL (ref 32–36)
MCV RBC AUTO: 73 FL (ref 80–100)
MONOCYTES # BLD AUTO: 0.89 X10*3/UL (ref 0.1–1)
MONOCYTES NFR BLD AUTO: 13.4 %
NEUTROPHILS # BLD AUTO: 4.44 X10*3/UL (ref 1.2–7.7)
NEUTROPHILS NFR BLD AUTO: 66.7 %
NRBC BLD-RTO: 0 /100 WBCS (ref 0–0)
PLATELET # BLD AUTO: 286 X10*3/UL (ref 150–450)
POTASSIUM SERPL-SCNC: 3.9 MMOL/L (ref 3.5–5.3)
PROT SERPL-MCNC: 7.3 G/DL (ref 6.4–8.2)
RBC # BLD AUTO: 4.23 X10*6/UL (ref 4–5.2)
SODIUM SERPL-SCNC: 142 MMOL/L (ref 136–145)
WBC # BLD AUTO: 6.7 X10*3/UL (ref 4.4–11.3)

## 2024-12-02 PROCEDURE — 83615 LACTATE (LD) (LDH) ENZYME: CPT

## 2024-12-02 PROCEDURE — 99214 OFFICE O/P EST MOD 30 MIN: CPT | Performed by: STUDENT IN AN ORGANIZED HEALTH CARE EDUCATION/TRAINING PROGRAM

## 2024-12-02 PROCEDURE — 85025 COMPLETE CBC W/AUTO DIFF WBC: CPT

## 2024-12-02 PROCEDURE — 80053 COMPREHEN METABOLIC PANEL: CPT

## 2024-12-02 PROCEDURE — 36415 COLL VENOUS BLD VENIPUNCTURE: CPT

## 2024-12-02 PROCEDURE — 83010 ASSAY OF HAPTOGLOBIN QUANT: CPT

## 2024-12-02 ASSESSMENT — PAIN SCALES - GENERAL: PAINLEVEL_OUTOF10: 0-NO PAIN

## 2024-12-02 NOTE — PROGRESS NOTES
Patient ID: Tere Carrion is a 54 y.o. female.  Referring Physician: No referring provider defined for this encounter.  Primary Care Provider: Modesta Gallego DO  Visit Type: Follow Up  Diagnosis: immune TTP    Therapy summary (diagnosis: iTTP):   Rituximab: 6/21, 6/28, 7/8, 7/15, to be completed 7/15/2024  Cablivi: 6/21-to be completed  Cyclophosphamide: 8/21/2024, 9/11/24, 10/2/24, 10/24/24, 11/13/2024, 12/4/2024      Subjective    HPI  54 y.o. female with a PMH significant for TTP in 1998/1999 (treated with prolonged (87) plasmapheresis, steroids, and immunosuppressants including vincristine and azathioprine, and splenectomy March 1999), reported sickle cell trait and alpha thalassemia trait, migraines.    Re TTP:   She was recently seen inpatient for immune TTP where she presented on 6/18/24 for 2 weeks of worsening generalized weakness, fatigue, migraines, hematuria, and new bruising with petechiae, with TTP. She was started on PLEX, did not receive Cablivi upfront 2/2 hematuria, which resolved over the last 3 days. She received PLEX x 3 (last on 6/21/24).    HIV, Hepatitis panel: Non-reactive  B12, folate, iron profile: Normal  T spot: Negative  Shiga toxin -ve   ELBERT -ve, SPEP work up negative for clonal protein   ADAMTS <5, inhibitor level 2.4    Re splenectomy, is followed by PCP for vaccination notes she is caught up at this time.   Notices fatigue since last 3 days, some skin bruising but no bleeding. Perimenopausal. Currently on pred 40mg, on taper.   Age appropriate cancer screening: mammo '23 due soon. [place] '20 due next year.   FMH: no cancer, mother had a UE DVT post-op, mother has many polyps, maternal GF had colon cancer, mother's sibs had colon cancer too   Social history: never smoker, no ETOH, no RDU.   08/21/24: Presents alone for follow-up.  The patient is being seen in treatment.  09/09/24: headaches left sided, since last 1 month daily, takes excedrin migraine daily occasionally  ibuprofen. Lightheaded when stands majority of the times. Couple days of nausea after the cytoxan.   24: no significant complaints, is tolerating cytoxan well. Dose noted to be at 400mg/m2 with no response in terms of inhibitor or [id number] level, escalated to 500mg/m2. Dose range reported up to 750mg/m2. D/w pt will likely need to complete 6 cycles.   10/24/24: presents with son. Asymptomatic. Has no particular complaints.   24: presents alone, notices some GI cramps on day 3 after dosing, but otherwise tolerates well.  24: asymptomatic, ANC lower end of normal, [id number] still undetectable at last check with high titer inhibitor.       Review of Systems - Oncology  10 point review of systems negative except as stated in HPI    Objective   Past Surgical History:   Procedure Laterality Date    BREAST BIOPSY      BREAST CYST EXCISION      BREAST SURGERY       SECTION, LOW TRANSVERSE      TUBAL LIGATION       Oncology History    No history exists.       BSA: 1.94 meters squared /62 (BP Location: Left arm, Patient Position: Sitting)   Pulse 80   Temp 35.4 °C (95.7 °F)   Resp 16   Wt 81.8 kg (180 lb 4.8 oz)   SpO2 100%   BMI 29.75 kg/m²   Physical Exam  Vitals reviewed.   Constitutional:       General: She is not in acute distress.     Appearance: She is not toxic-appearing.   HENT:      Head: Normocephalic and atraumatic.   Cardiovascular:      Rate and Rhythm: Normal rate and regular rhythm.   Pulmonary:      Effort: Pulmonary effort is normal.   Musculoskeletal:      Comments: No pedal edema   Skin:     Comments: No bruising    Neurological:      General: No focal deficit present.      Mental Status: She is oriented to person, place, and time.   Psychiatric:         Mood and Affect: Mood normal.       Assessment/Plan    54 y.o. female with a PMH significant for  TTP in  (treated with prolonged (87) plasmapheresis, steroids, and immunosuppressants including  vincristine and azathioprine, and splenectomy March 1999), reported sickle cell trait and alpha thalassemia trait [confirmed on retesting].     # Immune TTP: Patient was initially diagnosed in 1998-99, had prolonged plasmapheresis followed by immunosuppression with vincristine azathioprine and eventually splenectomy in March 1999, has been followed at [facility] since then and has had no evidence of relapse.  As noted above the patient presented with hematuria and was eventually diagnosed with immune TTP with an antibody titer of 2.4.  She was started on Cablivi and rituximab x1 cycle but had not immunologic response, thereafter was escalated to cyclophosphamide x5 cycles w/o response. I did not repeat the ritux as she had just completed the cycle. And has overlapped with her taper off prednisone.   Plan:   - ok to proceed with cycle 6 of cyclophosphamide 21 day cycles, consider alternate therapy thereafter cyclosporine vs bortezomib.   - will continue Cablivi until [id number] is >20%  - weekly CBC/diff and close monitoring for bleeding with thrombocytopenia from drug and TTP and cablivi   -The patient has been educated about the signs and symptoms of TTP and the process of contacting us or heading to an ER as needed.  - follow up next: 1 month  - labs prior to follow up: Noted above      Tree Walker MD

## 2024-12-04 ENCOUNTER — INFUSION (OUTPATIENT)
Dept: HEMATOLOGY/ONCOLOGY | Facility: HOSPITAL | Age: 54
End: 2024-12-04
Payer: COMMERCIAL

## 2024-12-04 VITALS
DIASTOLIC BLOOD PRESSURE: 55 MMHG | RESPIRATION RATE: 20 BRPM | WEIGHT: 181.22 LBS | SYSTOLIC BLOOD PRESSURE: 111 MMHG | BODY MASS INDEX: 30.19 KG/M2 | TEMPERATURE: 97.5 F | OXYGEN SATURATION: 100 % | HEART RATE: 81 BPM | HEIGHT: 65 IN

## 2024-12-04 DIAGNOSIS — M31.19 ACQUIRED TTP (MULTI): ICD-10-CM

## 2024-12-04 PROCEDURE — 96361 HYDRATE IV INFUSION ADD-ON: CPT | Mod: INF

## 2024-12-04 PROCEDURE — 2500000004 HC RX 250 GENERAL PHARMACY W/ HCPCS (ALT 636 FOR OP/ED): Mod: JZ | Performed by: STUDENT IN AN ORGANIZED HEALTH CARE EDUCATION/TRAINING PROGRAM

## 2024-12-04 PROCEDURE — 2500000004 HC RX 250 GENERAL PHARMACY W/ HCPCS (ALT 636 FOR OP/ED): Performed by: STUDENT IN AN ORGANIZED HEALTH CARE EDUCATION/TRAINING PROGRAM

## 2024-12-04 PROCEDURE — 96375 TX/PRO/DX INJ NEW DRUG ADDON: CPT | Mod: INF

## 2024-12-04 PROCEDURE — 96413 CHEMO IV INFUSION 1 HR: CPT

## 2024-12-04 RX ORDER — FAMOTIDINE 10 MG/ML
20 INJECTION INTRAVENOUS ONCE AS NEEDED
OUTPATIENT
Start: 2024-12-17

## 2024-12-04 RX ORDER — DEXAMETHASONE 6 MG/1
12 TABLET ORAL ONCE
Status: COMPLETED | OUTPATIENT
Start: 2024-12-04 | End: 2024-12-04

## 2024-12-04 RX ORDER — DEXAMETHASONE 6 MG/1
12 TABLET ORAL ONCE
OUTPATIENT
Start: 2024-12-17 | End: 2024-12-17

## 2024-12-04 RX ORDER — DIPHENHYDRAMINE HYDROCHLORIDE 50 MG/ML
50 INJECTION INTRAMUSCULAR; INTRAVENOUS AS NEEDED
OUTPATIENT
Start: 2024-12-17

## 2024-12-04 RX ORDER — ALBUTEROL SULFATE 0.83 MG/ML
3 SOLUTION RESPIRATORY (INHALATION) AS NEEDED
Status: DISCONTINUED | OUTPATIENT
Start: 2024-12-04 | End: 2024-12-04 | Stop reason: HOSPADM

## 2024-12-04 RX ORDER — HEPARIN SODIUM,PORCINE/PF 10 UNIT/ML
50 SYRINGE (ML) INTRAVENOUS AS NEEDED
OUTPATIENT
Start: 2024-12-04

## 2024-12-04 RX ORDER — ALBUTEROL SULFATE 0.83 MG/ML
3 SOLUTION RESPIRATORY (INHALATION) AS NEEDED
OUTPATIENT
Start: 2024-12-17

## 2024-12-04 RX ORDER — PALONOSETRON 0.05 MG/ML
0.25 INJECTION, SOLUTION INTRAVENOUS ONCE
Status: COMPLETED | OUTPATIENT
Start: 2024-12-04 | End: 2024-12-04

## 2024-12-04 RX ORDER — DIPHENHYDRAMINE HYDROCHLORIDE 50 MG/ML
50 INJECTION INTRAMUSCULAR; INTRAVENOUS AS NEEDED
Status: DISCONTINUED | OUTPATIENT
Start: 2024-12-04 | End: 2024-12-04 | Stop reason: HOSPADM

## 2024-12-04 RX ORDER — FAMOTIDINE 10 MG/ML
20 INJECTION INTRAVENOUS ONCE AS NEEDED
Status: DISCONTINUED | OUTPATIENT
Start: 2024-12-04 | End: 2024-12-04 | Stop reason: HOSPADM

## 2024-12-04 RX ORDER — PALONOSETRON 0.05 MG/ML
0.25 INJECTION, SOLUTION INTRAVENOUS ONCE
OUTPATIENT
Start: 2024-12-17

## 2024-12-04 RX ORDER — SODIUM CHLORIDE 9 MG/ML
250 INJECTION, SOLUTION INTRAVENOUS ONCE
OUTPATIENT
Start: 2024-12-17 | End: 2024-12-17

## 2024-12-04 RX ORDER — EPINEPHRINE 0.3 MG/.3ML
0.3 INJECTION SUBCUTANEOUS EVERY 5 MIN PRN
Status: DISCONTINUED | OUTPATIENT
Start: 2024-12-04 | End: 2024-12-04 | Stop reason: HOSPADM

## 2024-12-04 RX ORDER — HEPARIN 100 UNIT/ML
500 SYRINGE INTRAVENOUS AS NEEDED
OUTPATIENT
Start: 2024-12-04

## 2024-12-04 RX ORDER — EPINEPHRINE 0.3 MG/.3ML
0.3 INJECTION SUBCUTANEOUS EVERY 5 MIN PRN
OUTPATIENT
Start: 2024-12-17

## 2024-12-04 RX ORDER — SODIUM CHLORIDE 9 MG/ML
250 INJECTION, SOLUTION INTRAVENOUS ONCE
Status: COMPLETED | OUTPATIENT
Start: 2024-12-04 | End: 2024-12-04

## 2024-12-04 RX ORDER — SODIUM CHLORIDE 9 MG/ML
250 INJECTION, SOLUTION INTRAVENOUS ONCE
Status: CANCELLED | OUTPATIENT
Start: 2024-12-04 | End: 2024-12-04

## 2024-12-04 NOTE — PROGRESS NOTES
"Patient for non-oncology infusion of Cyclophosphamide. Patient reports only previous issues to be a \"burning\" sensation around her mouth and nose during infusion that clears \"within 15 minutes\" of completion. Patient reported similar symptoms today. Otherwise tolerated infusion with no problems. Discharged home with family, ambulated from department under own power, no acute distress noted.  "

## 2024-12-05 ENCOUNTER — APPOINTMENT (OUTPATIENT)
Dept: HEMATOLOGY/ONCOLOGY | Facility: HOSPITAL | Age: 54
End: 2024-12-05
Payer: COMMERCIAL

## 2024-12-05 ENCOUNTER — SPECIALTY PHARMACY (OUTPATIENT)
Dept: PHARMACY | Facility: CLINIC | Age: 54
End: 2024-12-05

## 2024-12-05 LAB
SCAN RESULT: ABNORMAL
VWF CP ACT/NOR PPP CHRO: <5 %
VWF CP INHIB PPP-ACNC: >8 INHIBITOR UNITS

## 2024-12-05 PROCEDURE — RXMED WILLOW AMBULATORY MEDICATION CHARGE

## 2024-12-09 ENCOUNTER — APPOINTMENT (OUTPATIENT)
Dept: HEMATOLOGY/ONCOLOGY | Facility: HOSPITAL | Age: 54
End: 2024-12-09
Payer: COMMERCIAL

## 2024-12-09 ENCOUNTER — PHARMACY VISIT (OUTPATIENT)
Dept: PHARMACY | Facility: CLINIC | Age: 54
End: 2024-12-09
Payer: COMMERCIAL

## 2024-12-10 ENCOUNTER — LAB (OUTPATIENT)
Dept: LAB | Facility: LAB | Age: 54
End: 2024-12-10
Payer: COMMERCIAL

## 2024-12-10 DIAGNOSIS — M31.19 ACQUIRED TTP (MULTI): ICD-10-CM

## 2024-12-10 LAB
ALBUMIN SERPL BCP-MCNC: 4 G/DL (ref 3.4–5)
ALP SERPL-CCNC: 48 U/L (ref 33–110)
ALT SERPL W P-5'-P-CCNC: 22 U/L (ref 7–45)
ANION GAP SERPL CALC-SCNC: 12 MMOL/L (ref 10–20)
AST SERPL W P-5'-P-CCNC: 23 U/L (ref 9–39)
BASOPHILS # BLD AUTO: 0.05 X10*3/UL (ref 0–0.1)
BASOPHILS NFR BLD AUTO: 0.7 %
BILIRUB SERPL-MCNC: 0.3 MG/DL (ref 0–1.2)
BUN SERPL-MCNC: 11 MG/DL (ref 6–23)
CALCIUM SERPL-MCNC: 9.5 MG/DL (ref 8.6–10.3)
CHLORIDE SERPL-SCNC: 107 MMOL/L (ref 98–107)
CO2 SERPL-SCNC: 27 MMOL/L (ref 21–32)
CREAT SERPL-MCNC: 0.82 MG/DL (ref 0.5–1.05)
EGFRCR SERPLBLD CKD-EPI 2021: 85 ML/MIN/1.73M*2
EOSINOPHIL # BLD AUTO: 0.18 X10*3/UL (ref 0–0.7)
EOSINOPHIL NFR BLD AUTO: 2.4 %
ERYTHROCYTE [DISTWIDTH] IN BLOOD BY AUTOMATED COUNT: 12.8 % (ref 11.5–14.5)
GLUCOSE SERPL-MCNC: 90 MG/DL (ref 74–99)
HCT VFR BLD AUTO: 30.1 % (ref 36–46)
HGB BLD-MCNC: 9.8 G/DL (ref 12–16)
IMM GRANULOCYTES # BLD AUTO: 0.04 X10*3/UL (ref 0–0.7)
IMM GRANULOCYTES NFR BLD AUTO: 0.5 % (ref 0–0.9)
LDH SERPL L TO P-CCNC: 191 U/L (ref 84–246)
LYMPHOCYTES # BLD AUTO: 1.19 X10*3/UL (ref 1.2–4.8)
LYMPHOCYTES NFR BLD AUTO: 16 %
MCH RBC QN AUTO: 24 PG (ref 26–34)
MCHC RBC AUTO-ENTMCNC: 32.6 G/DL (ref 32–36)
MCV RBC AUTO: 74 FL (ref 80–100)
MONOCYTES # BLD AUTO: 0.58 X10*3/UL (ref 0.1–1)
MONOCYTES NFR BLD AUTO: 7.8 %
NEUTROPHILS # BLD AUTO: 5.39 X10*3/UL (ref 1.2–7.7)
NEUTROPHILS NFR BLD AUTO: 72.6 %
NRBC BLD-RTO: 0.5 /100 WBCS (ref 0–0)
PLATELET # BLD AUTO: 319 X10*3/UL (ref 150–450)
POTASSIUM SERPL-SCNC: 3.8 MMOL/L (ref 3.5–5.3)
PROT SERPL-MCNC: 7 G/DL (ref 6.4–8.2)
RBC # BLD AUTO: 4.08 X10*6/UL (ref 4–5.2)
SODIUM SERPL-SCNC: 142 MMOL/L (ref 136–145)
WBC # BLD AUTO: 7.4 X10*3/UL (ref 4.4–11.3)

## 2024-12-10 PROCEDURE — 83010 ASSAY OF HAPTOGLOBIN QUANT: CPT

## 2024-12-10 PROCEDURE — 85397 CLOTTING FUNCT ACTIVITY: CPT

## 2024-12-10 PROCEDURE — 85335 FACTOR INHIBITOR TEST: CPT

## 2024-12-10 PROCEDURE — 83615 LACTATE (LD) (LDH) ENZYME: CPT

## 2024-12-10 PROCEDURE — 80053 COMPREHEN METABOLIC PANEL: CPT

## 2024-12-10 PROCEDURE — 85025 COMPLETE CBC W/AUTO DIFF WBC: CPT

## 2024-12-10 PROCEDURE — 36415 COLL VENOUS BLD VENIPUNCTURE: CPT

## 2024-12-11 LAB
HAPTOGLOB SERPL NEPH-MCNC: 132 MG/DL (ref 30–200)
VWF CP ACT/NOR PPP CHRO: 7 %
VWF CP INHIB PPP-ACNC: >8.5 INHIBITOR UNITS

## 2024-12-12 PROCEDURE — RXMED WILLOW AMBULATORY MEDICATION CHARGE

## 2024-12-13 ENCOUNTER — SPECIALTY PHARMACY (OUTPATIENT)
Dept: PHARMACY | Facility: CLINIC | Age: 54
End: 2024-12-13

## 2024-12-16 ENCOUNTER — PHARMACY VISIT (OUTPATIENT)
Dept: PHARMACY | Facility: CLINIC | Age: 54
End: 2024-12-16
Payer: COMMERCIAL

## 2024-12-17 ENCOUNTER — LAB (OUTPATIENT)
Dept: LAB | Facility: LAB | Age: 54
End: 2024-12-17
Payer: COMMERCIAL

## 2024-12-17 DIAGNOSIS — M31.19 ACQUIRED TTP (MULTI): ICD-10-CM

## 2024-12-17 LAB
ALBUMIN SERPL BCP-MCNC: 4 G/DL (ref 3.4–5)
ALP SERPL-CCNC: 49 U/L (ref 33–110)
ALT SERPL W P-5'-P-CCNC: 14 U/L (ref 7–45)
ANION GAP SERPL CALC-SCNC: 14 MMOL/L (ref 10–20)
AST SERPL W P-5'-P-CCNC: 16 U/L (ref 9–39)
BASOPHILS # BLD AUTO: 0.04 X10*3/UL (ref 0–0.1)
BASOPHILS NFR BLD AUTO: 0.8 %
BILIRUB SERPL-MCNC: 0.4 MG/DL (ref 0–1.2)
BUN SERPL-MCNC: 11 MG/DL (ref 6–23)
CALCIUM SERPL-MCNC: 9.5 MG/DL (ref 8.6–10.3)
CHLORIDE SERPL-SCNC: 106 MMOL/L (ref 98–107)
CO2 SERPL-SCNC: 25 MMOL/L (ref 21–32)
CREAT SERPL-MCNC: 0.87 MG/DL (ref 0.5–1.05)
EGFRCR SERPLBLD CKD-EPI 2021: 79 ML/MIN/1.73M*2
EOSINOPHIL # BLD AUTO: 0.15 X10*3/UL (ref 0–0.7)
EOSINOPHIL NFR BLD AUTO: 3.2 %
ERYTHROCYTE [DISTWIDTH] IN BLOOD BY AUTOMATED COUNT: 12.9 % (ref 11.5–14.5)
GLUCOSE SERPL-MCNC: 83 MG/DL (ref 74–99)
HCT VFR BLD AUTO: 30.4 % (ref 36–46)
HGB BLD-MCNC: 9.9 G/DL (ref 12–16)
IMM GRANULOCYTES # BLD AUTO: 0.01 X10*3/UL (ref 0–0.7)
IMM GRANULOCYTES NFR BLD AUTO: 0.2 % (ref 0–0.9)
LDH SERPL L TO P-CCNC: 157 U/L (ref 84–246)
LYMPHOCYTES # BLD AUTO: 1.08 X10*3/UL (ref 1.2–4.8)
LYMPHOCYTES NFR BLD AUTO: 22.8 %
MCH RBC QN AUTO: 23.9 PG (ref 26–34)
MCHC RBC AUTO-ENTMCNC: 32.6 G/DL (ref 32–36)
MCV RBC AUTO: 73 FL (ref 80–100)
MONOCYTES # BLD AUTO: 0.59 X10*3/UL (ref 0.1–1)
MONOCYTES NFR BLD AUTO: 12.5 %
NEUTROPHILS # BLD AUTO: 2.86 X10*3/UL (ref 1.2–7.7)
NEUTROPHILS NFR BLD AUTO: 60.5 %
NRBC BLD-RTO: 0 /100 WBCS (ref 0–0)
PLATELET # BLD AUTO: 355 X10*3/UL (ref 150–450)
POTASSIUM SERPL-SCNC: 3.7 MMOL/L (ref 3.5–5.3)
PROT SERPL-MCNC: 7 G/DL (ref 6.4–8.2)
RBC # BLD AUTO: 4.15 X10*6/UL (ref 4–5.2)
SODIUM SERPL-SCNC: 141 MMOL/L (ref 136–145)
WBC # BLD AUTO: 4.7 X10*3/UL (ref 4.4–11.3)

## 2024-12-17 PROCEDURE — 85335 FACTOR INHIBITOR TEST: CPT

## 2024-12-17 PROCEDURE — 85025 COMPLETE CBC W/AUTO DIFF WBC: CPT

## 2024-12-17 PROCEDURE — 85397 CLOTTING FUNCT ACTIVITY: CPT

## 2024-12-17 PROCEDURE — 83615 LACTATE (LD) (LDH) ENZYME: CPT

## 2024-12-17 PROCEDURE — 36415 COLL VENOUS BLD VENIPUNCTURE: CPT

## 2024-12-17 PROCEDURE — 83010 ASSAY OF HAPTOGLOBIN QUANT: CPT

## 2024-12-17 PROCEDURE — 80053 COMPREHEN METABOLIC PANEL: CPT

## 2024-12-18 LAB — HAPTOGLOB SERPL NEPH-MCNC: 160 MG/DL (ref 30–200)

## 2024-12-19 ENCOUNTER — OFFICE VISIT (OUTPATIENT)
Dept: HEMATOLOGY/ONCOLOGY | Facility: HOSPITAL | Age: 54
End: 2024-12-19
Payer: COMMERCIAL

## 2024-12-19 ENCOUNTER — SPECIALTY PHARMACY (OUTPATIENT)
Dept: PHARMACY | Facility: CLINIC | Age: 54
End: 2024-12-19

## 2024-12-19 VITALS
TEMPERATURE: 97.3 F | DIASTOLIC BLOOD PRESSURE: 62 MMHG | WEIGHT: 180.9 LBS | SYSTOLIC BLOOD PRESSURE: 116 MMHG | OXYGEN SATURATION: 99 % | RESPIRATION RATE: 16 BRPM | HEART RATE: 94 BPM | BODY MASS INDEX: 29.85 KG/M2

## 2024-12-19 DIAGNOSIS — L29.9 PRURITUS: Primary | ICD-10-CM

## 2024-12-19 DIAGNOSIS — M31.19 ACQUIRED TTP (MULTI): ICD-10-CM

## 2024-12-19 PROCEDURE — 99215 OFFICE O/P EST HI 40 MIN: CPT | Performed by: STUDENT IN AN ORGANIZED HEALTH CARE EDUCATION/TRAINING PROGRAM

## 2024-12-19 PROCEDURE — RXMED WILLOW AMBULATORY MEDICATION CHARGE

## 2024-12-19 RX ORDER — LEVOCETIRIZINE DIHYDROCHLORIDE 5 MG/1
5 TABLET, FILM COATED ORAL DAILY PRN
Qty: 30 TABLET | Refills: 0 | Status: SHIPPED | OUTPATIENT
Start: 2024-12-19 | End: 2025-12-19

## 2024-12-19 ASSESSMENT — PAIN SCALES - GENERAL: PAINLEVEL_OUTOF10: 0-NO PAIN

## 2024-12-19 NOTE — PROGRESS NOTES
Patient ID: Tere Carrion is a 54 y.o. female.  Referring Physician: No referring provider defined for this encounter.  Primary Care Provider: Modesta Gallego DO  Visit Type: Follow Up  Diagnosis: immune TTP    Therapy summary (diagnosis: iTTP):   Rituximab: 6/21, 6/28, 7/8, 7/15, to be completed 7/15/2024  Cablivi: 6/21-to be completed  Cyclophosphamide: 8/21/2024, 9/11/24, 10/2/24, 10/24/24, 11/13/2024, 12/4/2024      Subjective    HPI  54 y.o. female with a PMH significant for TTP in 1998/1999 (treated with prolonged (87) plasmapheresis, steroids, and immunosuppressants including vincristine and azathioprine, and splenectomy March 1999), reported sickle cell trait and alpha thalassemia trait, migraines.    Re TTP:   She was recently seen inpatient for immune TTP where she presented on 6/18/24 for 2 weeks of worsening generalized weakness, fatigue, migraines, hematuria, and new bruising with petechiae, with TTP. She was started on PLEX, did not receive Cablivi upfront 2/2 hematuria, which resolved over the last 3 days. She received PLEX x 3 (last on 6/21/24).    HIV, Hepatitis panel: Non-reactive  B12, folate, iron profile: Normal  T spot: Negative  Shiga toxin -ve   ELBERT -ve, SPEP work up negative for clonal protein   ADAMTS <5, inhibitor level 2.4    Re splenectomy, is followed by PCP for vaccination notes she is caught up at this time.   Notices fatigue since last 3 days, some skin bruising but no bleeding. Perimenopausal. Currently on pred 40mg, on taper.   Age appropriate cancer screening: mammo '23 due soon. [place] '20 due next year.   FMH: no cancer, mother had a UE DVT post-op, mother has many polyps, maternal GF had colon cancer, mother's sibs had colon cancer too   Social history: never smoker, no ETOH, no RDU.   08/21/24: Presents alone for follow-up.  The patient is being seen in treatment.  09/09/24: headaches left sided, since last 1 month daily, takes excedrin migraine daily occasionally  ibuprofen. Lightheaded when stands majority of the times. Couple days of nausea after the cytoxan.   24: no significant complaints, is tolerating cytoxan well. Dose noted to be at 400mg/m2 with no response in terms of inhibitor or [id number] level, escalated to 500mg/m2. Dose range reported up to 750mg/m2. D/w pt will likely need to complete 6 cycles.   10/24/24: presents with son. Asymptomatic. Has no particular complaints.   24: presents alone, notices some GI cramps on day 3 after dosing, but otherwise tolerates well.  24: asymptomatic, ANC lower end of normal, [id number] still undetectable at last check with high titer inhibitor.  24: pt remains asymptomatic, after initial elevation in [id number] (although this does not appear to be true as the inhibitor was quite high) there is no sustained response.       Review of Systems - Oncology  10 point review of systems negative except as stated in HPI    Objective   Past Surgical History:   Procedure Laterality Date    BREAST BIOPSY      BREAST CYST EXCISION      BREAST SURGERY       SECTION, LOW TRANSVERSE      TUBAL LIGATION       Oncology History    No history exists.       BSA: 1.94 meters squared /62 (BP Location: Left arm, Patient Position: Sitting)   Pulse 94   Temp 36.3 °C (97.3 °F) (Temporal)   Resp 16   Wt 82.1 kg (180 lb 14.4 oz)   SpO2 99%   BMI 29.85 kg/m²   Physical Exam  Vitals reviewed.   Constitutional:       General: She is not in acute distress.     Appearance: She is not toxic-appearing.   HENT:      Head: Normocephalic and atraumatic.   Cardiovascular:      Rate and Rhythm: Normal rate and regular rhythm.   Pulmonary:      Effort: Pulmonary effort is normal.   Musculoskeletal:      Comments: No pedal edema   Skin:     Comments: No bruising    Neurological:      General: No focal deficit present.      Mental Status: She is oriented to person, place, and time.   Psychiatric:         Mood and Affect: Mood  normal.       Assessment/Plan    54 y.o. female with a PMH significant for  TTP in 1998/1999 (treated with prolonged (87) plasmapheresis, steroids, and immunosuppressants including vincristine and azathioprine, and splenectomy March 1999), reported sickle cell trait and alpha thalassemia trait [confirmed on retesting].     # Immune TTP: Patient was initially diagnosed in 1998-99, had prolonged plasmapheresis followed by immunosuppression with vincristine azathioprine and eventually splenectomy in March 1999, has been followed at [facility] since then and has had no evidence of relapse.  As noted above the patient presented with hematuria and was eventually diagnosed with immune TTP with an antibody titer of 2.4.  She was started on Cablivi and rituximab x1 cycle but had not immunologic response, thereafter was escalated to cyclophosphamide x6 cycles w/o response. I did not repeat the ritux as she had just completed the cycle. And has overlapped with her taper off prednisone. Despite this the pt has an undetectable [id number] and elevated inhibitor. She is clinically asymptomatic but has remained on cablivi. We discussed alternate therapy including cyclosporine and bortezomib. Her recent flow showed a significant absence B cells there will not redose ritux, but per d/w pt will proceed with bortezomib.  Plan:   - will send PA for bortezomib and proceed with close monitoring 1 cycle at a time.   - will continue Cablivi until [id number] is >20%  - weekly CBC/diff and close monitoring for bleeding with thrombocytopenia from drug and TTP and cablivi   -The patient has been educated about the signs and symptoms of TTP and the process of contacting us or heading to an ER as needed.  - follow up next: 1 month  - labs prior to follow up: Noted above      Tree Walker MD

## 2024-12-20 ENCOUNTER — PHARMACY VISIT (OUTPATIENT)
Dept: PHARMACY | Facility: CLINIC | Age: 54
End: 2024-12-20
Payer: COMMERCIAL

## 2024-12-20 LAB
SCAN RESULT: ABNORMAL
VWF CP ACT/NOR PPP CHRO: <5 %
VWF CP INHIB PPP-ACNC: >8 INHIBITOR UNITS

## 2024-12-23 ENCOUNTER — LAB (OUTPATIENT)
Dept: LAB | Facility: LAB | Age: 54
End: 2024-12-23
Payer: COMMERCIAL

## 2024-12-23 DIAGNOSIS — M31.19 ACQUIRED TTP (MULTI): ICD-10-CM

## 2024-12-23 LAB
ALBUMIN SERPL BCP-MCNC: 4 G/DL (ref 3.4–5)
ALP SERPL-CCNC: 53 U/L (ref 33–110)
ALT SERPL W P-5'-P-CCNC: 14 U/L (ref 7–45)
ANION GAP SERPL CALC-SCNC: 11 MMOL/L (ref 10–20)
AST SERPL W P-5'-P-CCNC: 17 U/L (ref 9–39)
BASOPHILS # BLD AUTO: 0.05 X10*3/UL (ref 0–0.1)
BASOPHILS NFR BLD AUTO: 1.2 %
BILIRUB SERPL-MCNC: 0.4 MG/DL (ref 0–1.2)
BUN SERPL-MCNC: 10 MG/DL (ref 6–23)
CALCIUM SERPL-MCNC: 9.2 MG/DL (ref 8.6–10.3)
CHLORIDE SERPL-SCNC: 106 MMOL/L (ref 98–107)
CO2 SERPL-SCNC: 27 MMOL/L (ref 21–32)
CREAT SERPL-MCNC: 0.86 MG/DL (ref 0.5–1.05)
EGFRCR SERPLBLD CKD-EPI 2021: 80 ML/MIN/1.73M*2
EOSINOPHIL # BLD AUTO: 0.17 X10*3/UL (ref 0–0.7)
EOSINOPHIL NFR BLD AUTO: 4 %
ERYTHROCYTE [DISTWIDTH] IN BLOOD BY AUTOMATED COUNT: 13.2 % (ref 11.5–14.5)
GLUCOSE SERPL-MCNC: 83 MG/DL (ref 74–99)
HCT VFR BLD AUTO: 29.6 % (ref 36–46)
HGB BLD-MCNC: 9.6 G/DL (ref 12–16)
IMM GRANULOCYTES # BLD AUTO: 0.01 X10*3/UL (ref 0–0.7)
IMM GRANULOCYTES NFR BLD AUTO: 0.2 % (ref 0–0.9)
LDH SERPL L TO P-CCNC: 153 U/L (ref 84–246)
LYMPHOCYTES # BLD AUTO: 1.36 X10*3/UL (ref 1.2–4.8)
LYMPHOCYTES NFR BLD AUTO: 32.1 %
MCH RBC QN AUTO: 23.9 PG (ref 26–34)
MCHC RBC AUTO-ENTMCNC: 32.4 G/DL (ref 32–36)
MCV RBC AUTO: 74 FL (ref 80–100)
MONOCYTES # BLD AUTO: 0.62 X10*3/UL (ref 0.1–1)
MONOCYTES NFR BLD AUTO: 14.6 %
NEUTROPHILS # BLD AUTO: 2.03 X10*3/UL (ref 1.2–7.7)
NEUTROPHILS NFR BLD AUTO: 47.9 %
NRBC BLD-RTO: 0 /100 WBCS (ref 0–0)
PLATELET # BLD AUTO: 345 X10*3/UL (ref 150–450)
POTASSIUM SERPL-SCNC: 4 MMOL/L (ref 3.5–5.3)
PROT SERPL-MCNC: 6.9 G/DL (ref 6.4–8.2)
RBC # BLD AUTO: 4.02 X10*6/UL (ref 4–5.2)
SODIUM SERPL-SCNC: 140 MMOL/L (ref 136–145)
WBC # BLD AUTO: 4.2 X10*3/UL (ref 4.4–11.3)

## 2024-12-23 PROCEDURE — 85025 COMPLETE CBC W/AUTO DIFF WBC: CPT

## 2024-12-23 PROCEDURE — 83010 ASSAY OF HAPTOGLOBIN QUANT: CPT

## 2024-12-23 PROCEDURE — 85335 FACTOR INHIBITOR TEST: CPT

## 2024-12-23 PROCEDURE — 83615 LACTATE (LD) (LDH) ENZYME: CPT

## 2024-12-23 PROCEDURE — 85397 CLOTTING FUNCT ACTIVITY: CPT

## 2024-12-23 PROCEDURE — 80053 COMPREHEN METABOLIC PANEL: CPT

## 2024-12-24 ENCOUNTER — TELEPHONE (OUTPATIENT)
Dept: HEMATOLOGY/ONCOLOGY | Facility: HOSPITAL | Age: 54
End: 2024-12-24
Payer: COMMERCIAL

## 2024-12-24 LAB — HAPTOGLOB SERPL NEPH-MCNC: 156 MG/DL (ref 30–200)

## 2024-12-24 NOTE — TELEPHONE ENCOUNTER
Pt returned call and confirmed it was also her understanding that she would only receive 6 cycles of cyclophosphamide.  She believes next step will be bortezomib which will be confirmed once [id number] results are back.

## 2024-12-24 NOTE — TELEPHONE ENCOUNTER
RN left a message for the patient to call the office back. Scheduling contacted nursing because there is an order for the pt to received C7 of treatment this week. This RN was told by MD that patient was only to receive 6 cylces of cyclophosphamide. MD is out of the office today and RN just wants to confirm with the patient that MD did not talk with her about getting more cycles (plan last discussed was to possibly try a new treatment).

## 2024-12-26 PROCEDURE — RXMED WILLOW AMBULATORY MEDICATION CHARGE

## 2024-12-27 ENCOUNTER — HOSPITAL ENCOUNTER (OUTPATIENT)
Dept: RADIOLOGY | Facility: HOSPITAL | Age: 54
Discharge: HOME | End: 2024-12-27
Payer: COMMERCIAL

## 2024-12-27 DIAGNOSIS — Z12.39 BREAST CANCER SCREENING, HIGH RISK PATIENT: ICD-10-CM

## 2024-12-27 DIAGNOSIS — R92.30 DENSE BREAST TISSUE: ICD-10-CM

## 2024-12-27 PROCEDURE — 2550000001 HC RX 255 CONTRASTS: Performed by: NURSE PRACTITIONER

## 2024-12-27 PROCEDURE — 77049 MRI BREAST C-+ W/CAD BI: CPT

## 2024-12-27 PROCEDURE — A9575 INJ GADOTERATE MEGLUMI 0.1ML: HCPCS | Performed by: NURSE PRACTITIONER

## 2024-12-27 RX ORDER — GADOTERATE MEGLUMINE 376.9 MG/ML
16 INJECTION INTRAVENOUS
Status: COMPLETED | OUTPATIENT
Start: 2024-12-27 | End: 2024-12-27

## 2024-12-30 ENCOUNTER — PHARMACY VISIT (OUTPATIENT)
Dept: PHARMACY | Facility: CLINIC | Age: 54
End: 2024-12-30
Payer: COMMERCIAL

## 2024-12-31 ENCOUNTER — LAB (OUTPATIENT)
Dept: LAB | Facility: LAB | Age: 54
End: 2024-12-31
Payer: COMMERCIAL

## 2024-12-31 DIAGNOSIS — M31.19 ACQUIRED TTP (MULTI): ICD-10-CM

## 2024-12-31 LAB
ALBUMIN SERPL BCP-MCNC: 3.9 G/DL (ref 3.4–5)
ALP SERPL-CCNC: 50 U/L (ref 33–110)
ALT SERPL W P-5'-P-CCNC: 24 U/L (ref 7–45)
ANION GAP SERPL CALC-SCNC: 12 MMOL/L (ref 10–20)
AST SERPL W P-5'-P-CCNC: 29 U/L (ref 9–39)
BASOPHILS # BLD AUTO: 0.03 X10*3/UL (ref 0–0.1)
BASOPHILS NFR BLD AUTO: 0.6 %
BILIRUB SERPL-MCNC: 0.4 MG/DL (ref 0–1.2)
BUN SERPL-MCNC: 9 MG/DL (ref 6–23)
CALCIUM SERPL-MCNC: 9 MG/DL (ref 8.6–10.3)
CHLORIDE SERPL-SCNC: 105 MMOL/L (ref 98–107)
CO2 SERPL-SCNC: 27 MMOL/L (ref 21–32)
CREAT SERPL-MCNC: 0.86 MG/DL (ref 0.5–1.05)
EGFRCR SERPLBLD CKD-EPI 2021: 80 ML/MIN/1.73M*2
EOSINOPHIL # BLD AUTO: 0.15 X10*3/UL (ref 0–0.7)
EOSINOPHIL NFR BLD AUTO: 2.8 %
ERYTHROCYTE [DISTWIDTH] IN BLOOD BY AUTOMATED COUNT: 13.2 % (ref 11.5–14.5)
GLUCOSE SERPL-MCNC: 79 MG/DL (ref 74–99)
HCT VFR BLD AUTO: 29.4 % (ref 36–46)
HGB BLD-MCNC: 9.6 G/DL (ref 12–16)
IMM GRANULOCYTES # BLD AUTO: 0.02 X10*3/UL (ref 0–0.7)
IMM GRANULOCYTES NFR BLD AUTO: 0.4 % (ref 0–0.9)
LDH SERPL L TO P-CCNC: 147 U/L (ref 84–246)
LYMPHOCYTES # BLD AUTO: 1.04 X10*3/UL (ref 1.2–4.8)
LYMPHOCYTES NFR BLD AUTO: 19.7 %
MCH RBC QN AUTO: 23.8 PG (ref 26–34)
MCHC RBC AUTO-ENTMCNC: 32.7 G/DL (ref 32–36)
MCV RBC AUTO: 73 FL (ref 80–100)
MONOCYTES # BLD AUTO: 0.51 X10*3/UL (ref 0.1–1)
MONOCYTES NFR BLD AUTO: 9.7 %
NEUTROPHILS # BLD AUTO: 3.52 X10*3/UL (ref 1.2–7.7)
NEUTROPHILS NFR BLD AUTO: 66.8 %
NRBC BLD-RTO: 0 /100 WBCS (ref 0–0)
PLATELET # BLD AUTO: 306 X10*3/UL (ref 150–450)
POTASSIUM SERPL-SCNC: 4.2 MMOL/L (ref 3.5–5.3)
PROT SERPL-MCNC: 6.7 G/DL (ref 6.4–8.2)
RBC # BLD AUTO: 4.04 X10*6/UL (ref 4–5.2)
SCAN RESULT: ABNORMAL
SODIUM SERPL-SCNC: 140 MMOL/L (ref 136–145)
VWF CP ACT/NOR PPP CHRO: <5 %
VWF CP INHIB PPP-ACNC: 6.4 INHIBITOR UNITS
WBC # BLD AUTO: 5.3 X10*3/UL (ref 4.4–11.3)

## 2024-12-31 PROCEDURE — 83010 ASSAY OF HAPTOGLOBIN QUANT: CPT

## 2024-12-31 PROCEDURE — 85025 COMPLETE CBC W/AUTO DIFF WBC: CPT

## 2024-12-31 PROCEDURE — 85335 FACTOR INHIBITOR TEST: CPT

## 2024-12-31 PROCEDURE — 85397 CLOTTING FUNCT ACTIVITY: CPT

## 2024-12-31 PROCEDURE — 80053 COMPREHEN METABOLIC PANEL: CPT

## 2024-12-31 PROCEDURE — 83615 LACTATE (LD) (LDH) ENZYME: CPT

## 2025-01-01 LAB — HAPTOGLOB SERPL NEPH-MCNC: 146 MG/DL (ref 30–200)

## 2025-01-02 ENCOUNTER — SPECIALTY PHARMACY (OUTPATIENT)
Dept: PHARMACY | Facility: CLINIC | Age: 55
End: 2025-01-02

## 2025-01-02 DIAGNOSIS — M31.19 ACQUIRED TTP (MULTI): Primary | ICD-10-CM

## 2025-01-02 LAB
SCAN RESULT: ABNORMAL
VWF CP ACT/NOR PPP CHRO: <5 %
VWF CP INHIB PPP-ACNC: >8 INHIBITOR UNITS

## 2025-01-02 PROCEDURE — RXMED WILLOW AMBULATORY MEDICATION CHARGE

## 2025-01-02 RX ORDER — PROCHLORPERAZINE MALEATE 10 MG
10 TABLET ORAL EVERY 6 HOURS PRN
Qty: 30 TABLET | Refills: 5 | Status: SHIPPED | OUTPATIENT
Start: 2025-01-02

## 2025-01-02 RX ORDER — ACYCLOVIR 400 MG/1
400 TABLET ORAL 2 TIMES DAILY
Qty: 60 TABLET | Refills: 5 | Status: SHIPPED | OUTPATIENT
Start: 2025-01-02

## 2025-01-03 ENCOUNTER — SPECIALTY PHARMACY (OUTPATIENT)
Dept: PHARMACY | Facility: CLINIC | Age: 55
End: 2025-01-03

## 2025-01-03 NOTE — PROGRESS NOTES
[facility] Clinical Note    Tere Carrion is a 54 y.o. female, who is on the specialty pharmacy service for management of:  Blood Modifiers Core.    Tere Carrion is taking: Cablivi 11mg- once daily.      Tere was contacted on 1/3/2025 at 11:01 AM for a virtual pharmacy visit with encounter number [id number] from:   [facility]  [address] [place] [id number]  Dept: [phone]  Dept Fax: [phone]    Tere was offered a Telemedicine Video visit or Telephone visit.  Tere consented to a telephone visit, which was performed.    The most recent encounter visit with the referring prescriber Tree Du on 12/19/24 (Date) was reviewed.  Pharmacy will continue to collaborate in the care of this patient with the referring prescriber Tree Du.    General Assessment      Impression/Plan  IMPRESSION/PLAN:  Is patient high risk (potential patients:  pregnancy, geriatric, pediatric)?  no  Is laboratory follow-up needed? no  Is a clinical intervention needed? no  Next reassessment date? 4/3/25  Additional comments:     Refer to the encounter summary report for documentation details about patient counseling and education.      Medication Adherence    The importance of adherence was discussed with the patient and they were advised to take the medication as prescribed by their provider. Patient was encouraged to call their physician's office if they have a question regarding a missed dose.     QOL/Patient Satisfaction  Rate your quality of life on scale of 1-10: 9  Rate your satisfaction with  Specialty Pharmacy on scale of 1-10: 10 - Completely satisfied      Patient was advised to contact the pharmacy if there are any changes to their medication list, including prescriptions, OTC medications, herbal products, or supplements. Patient was advised of [facility]'s dispensing process, refill timeline, contact  information ([phone]), and patient management follow up. Patient confirmed understanding of education conducted during assessment. All patient questions and concerns were addressed to the best of my ability. Patient was encouraged to contact the specialty pharmacy with any questions or concerns.    Confirmed follow-up outreaches are properly scheduled and reviewed goals of therapy with the patient.        Jean Mukherjee, EricaD

## 2025-01-04 ENCOUNTER — PHARMACY VISIT (OUTPATIENT)
Dept: PHARMACY | Facility: CLINIC | Age: 55
End: 2025-01-04
Payer: COMMERCIAL

## 2025-01-07 ENCOUNTER — LAB (OUTPATIENT)
Dept: LAB | Facility: LAB | Age: 55
End: 2025-01-07
Payer: COMMERCIAL

## 2025-01-07 ENCOUNTER — TELEPHONE (OUTPATIENT)
Dept: ADMISSION | Facility: HOSPITAL | Age: 55
End: 2025-01-07

## 2025-01-07 DIAGNOSIS — M31.19 ACQUIRED TTP (MULTI): ICD-10-CM

## 2025-01-07 LAB
ALBUMIN SERPL BCP-MCNC: 4.2 G/DL (ref 3.4–5)
ALP SERPL-CCNC: 49 U/L (ref 33–110)
ALT SERPL W P-5'-P-CCNC: 21 U/L (ref 7–45)
ANION GAP SERPL CALC-SCNC: 13 MMOL/L (ref 10–20)
AST SERPL W P-5'-P-CCNC: 22 U/L (ref 9–39)
BASOPHILS # BLD AUTO: 0.04 X10*3/UL (ref 0–0.1)
BASOPHILS NFR BLD AUTO: 0.8 %
BILIRUB SERPL-MCNC: 0.4 MG/DL (ref 0–1.2)
BUN SERPL-MCNC: 13 MG/DL (ref 6–23)
CALCIUM SERPL-MCNC: 9.4 MG/DL (ref 8.6–10.3)
CHLORIDE SERPL-SCNC: 103 MMOL/L (ref 98–107)
CO2 SERPL-SCNC: 28 MMOL/L (ref 21–32)
CREAT SERPL-MCNC: 0.83 MG/DL (ref 0.5–1.05)
EGFRCR SERPLBLD CKD-EPI 2021: 83 ML/MIN/1.73M*2
EOSINOPHIL # BLD AUTO: 0.13 X10*3/UL (ref 0–0.7)
EOSINOPHIL NFR BLD AUTO: 2.5 %
ERYTHROCYTE [DISTWIDTH] IN BLOOD BY AUTOMATED COUNT: 13.7 % (ref 11.5–14.5)
GLUCOSE SERPL-MCNC: 79 MG/DL (ref 74–99)
HCT VFR BLD AUTO: 29.6 % (ref 36–46)
HGB BLD-MCNC: 9.4 G/DL (ref 12–16)
IMM GRANULOCYTES # BLD AUTO: 0.01 X10*3/UL (ref 0–0.7)
IMM GRANULOCYTES NFR BLD AUTO: 0.2 % (ref 0–0.9)
LDH SERPL L TO P-CCNC: 139 U/L (ref 84–246)
LYMPHOCYTES # BLD AUTO: 1.27 X10*3/UL (ref 1.2–4.8)
LYMPHOCYTES NFR BLD AUTO: 24.7 %
MCH RBC QN AUTO: 23.1 PG (ref 26–34)
MCHC RBC AUTO-ENTMCNC: 31.8 G/DL (ref 32–36)
MCV RBC AUTO: 73 FL (ref 80–100)
MONOCYTES # BLD AUTO: 0.63 X10*3/UL (ref 0.1–1)
MONOCYTES NFR BLD AUTO: 12.2 %
NEUTROPHILS # BLD AUTO: 3.07 X10*3/UL (ref 1.2–7.7)
NEUTROPHILS NFR BLD AUTO: 59.6 %
NRBC BLD-RTO: 0 /100 WBCS (ref 0–0)
PLATELET # BLD AUTO: 299 X10*3/UL (ref 150–450)
POTASSIUM SERPL-SCNC: 4.2 MMOL/L (ref 3.5–5.3)
PROT SERPL-MCNC: 7.2 G/DL (ref 6.4–8.2)
RBC # BLD AUTO: 4.07 X10*6/UL (ref 4–5.2)
SODIUM SERPL-SCNC: 140 MMOL/L (ref 136–145)
WBC # BLD AUTO: 5.2 X10*3/UL (ref 4.4–11.3)

## 2025-01-07 PROCEDURE — 80053 COMPREHEN METABOLIC PANEL: CPT

## 2025-01-07 PROCEDURE — 85025 COMPLETE CBC W/AUTO DIFF WBC: CPT

## 2025-01-07 PROCEDURE — 85335 FACTOR INHIBITOR TEST: CPT

## 2025-01-07 PROCEDURE — 85397 CLOTTING FUNCT ACTIVITY: CPT

## 2025-01-07 PROCEDURE — 83615 LACTATE (LD) (LDH) ENZYME: CPT

## 2025-01-07 PROCEDURE — 83010 ASSAY OF HAPTOGLOBIN QUANT: CPT

## 2025-01-07 NOTE — TELEPHONE ENCOUNTER
Pt called for clarification on her infusion schedule.  She thought she would receive bortezomib injections twice weekly?

## 2025-01-08 DIAGNOSIS — M31.19 ACQUIRED TTP (MULTI): ICD-10-CM

## 2025-01-08 LAB — HAPTOGLOB SERPL NEPH-MCNC: 148 MG/DL (ref 30–200)

## 2025-01-08 RX ORDER — CAPLACIZUMAB 11 MG
11 KIT INJECTION DAILY
Qty: 30 KIT | Refills: 1 | Status: SHIPPED | OUTPATIENT
Start: 2025-01-08

## 2025-01-08 NOTE — TELEPHONE ENCOUNTER
Per Dr. Walker, plan was changed and pt will have injections once weekly.  Pt notified, no further needs at this time.

## 2025-01-09 ENCOUNTER — SPECIALTY PHARMACY (OUTPATIENT)
Dept: PHARMACY | Facility: CLINIC | Age: 55
End: 2025-01-09

## 2025-01-09 PROCEDURE — RXMED WILLOW AMBULATORY MEDICATION CHARGE

## 2025-01-10 ENCOUNTER — PHARMACY VISIT (OUTPATIENT)
Dept: PHARMACY | Facility: CLINIC | Age: 55
End: 2025-01-10
Payer: COMMERCIAL

## 2025-01-13 ENCOUNTER — INFUSION (OUTPATIENT)
Dept: HEMATOLOGY/ONCOLOGY | Facility: HOSPITAL | Age: 55
End: 2025-01-13
Payer: COMMERCIAL

## 2025-01-13 VITALS
DIASTOLIC BLOOD PRESSURE: 64 MMHG | WEIGHT: 180.56 LBS | OXYGEN SATURATION: 100 % | BODY MASS INDEX: 29.79 KG/M2 | HEART RATE: 70 BPM | RESPIRATION RATE: 18 BRPM | TEMPERATURE: 97.5 F | SYSTOLIC BLOOD PRESSURE: 118 MMHG

## 2025-01-13 DIAGNOSIS — M31.19 ACQUIRED TTP (MULTI): ICD-10-CM

## 2025-01-13 LAB
SCAN RESULT: ABNORMAL
SCAN RESULT: ABNORMAL
VWF CP ACT/NOR PPP CHRO: <5 %
VWF CP ACT/NOR PPP CHRO: <5 %
VWF CP INHIB PPP-ACNC: 5.6 INHIBITOR UNITS
VWF CP INHIB PPP-ACNC: 5.6 INHIBITOR UNITS

## 2025-01-13 PROCEDURE — 96401 CHEMO ANTI-NEOPL SQ/IM: CPT

## 2025-01-13 PROCEDURE — 2500000004 HC RX 250 GENERAL PHARMACY W/ HCPCS (ALT 636 FOR OP/ED): Performed by: STUDENT IN AN ORGANIZED HEALTH CARE EDUCATION/TRAINING PROGRAM

## 2025-01-13 RX ORDER — ALBUTEROL SULFATE 0.83 MG/ML
3 SOLUTION RESPIRATORY (INHALATION) AS NEEDED
Status: DISCONTINUED | OUTPATIENT
Start: 2025-01-13 | End: 2025-01-13 | Stop reason: HOSPADM

## 2025-01-13 RX ORDER — FAMOTIDINE 10 MG/ML
20 INJECTION INTRAVENOUS ONCE AS NEEDED
Status: DISCONTINUED | OUTPATIENT
Start: 2025-01-13 | End: 2025-01-13 | Stop reason: HOSPADM

## 2025-01-13 RX ORDER — DIPHENHYDRAMINE HYDROCHLORIDE 50 MG/ML
50 INJECTION INTRAMUSCULAR; INTRAVENOUS AS NEEDED
Status: DISCONTINUED | OUTPATIENT
Start: 2025-01-13 | End: 2025-01-13 | Stop reason: HOSPADM

## 2025-01-13 RX ORDER — EPINEPHRINE 0.3 MG/.3ML
0.3 INJECTION SUBCUTANEOUS EVERY 5 MIN PRN
Status: DISCONTINUED | OUTPATIENT
Start: 2025-01-13 | End: 2025-01-13 | Stop reason: HOSPADM

## 2025-01-13 RX ORDER — PROCHLORPERAZINE MALEATE 10 MG
10 TABLET ORAL EVERY 6 HOURS PRN
Status: DISCONTINUED | OUTPATIENT
Start: 2025-01-13 | End: 2025-01-13 | Stop reason: HOSPADM

## 2025-01-13 RX ORDER — BORTEZOMIB 3.5 MG/1
1.3 INJECTION, POWDER, LYOPHILIZED, FOR SOLUTION INTRAVENOUS; SUBCUTANEOUS ONCE
Status: COMPLETED | OUTPATIENT
Start: 2025-01-13 | End: 2025-01-13

## 2025-01-13 RX ORDER — PROCHLORPERAZINE EDISYLATE 5 MG/ML
10 INJECTION INTRAMUSCULAR; INTRAVENOUS EVERY 6 HOURS PRN
Status: DISCONTINUED | OUTPATIENT
Start: 2025-01-13 | End: 2025-01-13 | Stop reason: HOSPADM

## 2025-01-13 RX ADMIN — BORTEZOMIB 2.5 MG: 3.5 INJECTION, POWDER, LYOPHILIZED, FOR SOLUTION INTRAVENOUS; SUBCUTANEOUS at 15:18

## 2025-01-13 ASSESSMENT — PAIN SCALES - GENERAL: PAINLEVEL_OUTOF10: 0-NO PAIN

## 2025-01-13 NOTE — PROGRESS NOTES
Pt here for D1C1 Velcade injection for TTP. Assessment completed. Pt voices fatigue but states this is nothing new. Chemo consent not needed d/t this being a non onc treatment. This RN verified this with Dr. Du, Bharti Watson, RN and Policy Stat. Pt tolerated injection to right lower abdomen without issue. Pt was observed for 2 hours post injection. VSS. Pt was discharged in stable condition.

## 2025-01-14 ENCOUNTER — LAB (OUTPATIENT)
Dept: LAB | Facility: LAB | Age: 55
End: 2025-01-14
Payer: COMMERCIAL

## 2025-01-14 DIAGNOSIS — M31.19 ACQUIRED TTP (MULTI): ICD-10-CM

## 2025-01-14 LAB
ALBUMIN SERPL BCP-MCNC: 4.1 G/DL (ref 3.4–5)
ALP SERPL-CCNC: 48 U/L (ref 33–110)
ALT SERPL W P-5'-P-CCNC: 18 U/L (ref 7–45)
ANION GAP SERPL CALC-SCNC: 12 MMOL/L (ref 10–20)
AST SERPL W P-5'-P-CCNC: 21 U/L (ref 9–39)
BASOPHILS # BLD AUTO: 0.05 X10*3/UL (ref 0–0.1)
BASOPHILS NFR BLD AUTO: 0.8 %
BILIRUB SERPL-MCNC: 0.4 MG/DL (ref 0–1.2)
BUN SERPL-MCNC: 14 MG/DL (ref 6–23)
CALCIUM SERPL-MCNC: 9.4 MG/DL (ref 8.6–10.3)
CHLORIDE SERPL-SCNC: 107 MMOL/L (ref 98–107)
CO2 SERPL-SCNC: 26 MMOL/L (ref 21–32)
CREAT SERPL-MCNC: 0.89 MG/DL (ref 0.5–1.05)
EGFRCR SERPLBLD CKD-EPI 2021: 77 ML/MIN/1.73M*2
EOSINOPHIL # BLD AUTO: 0.12 X10*3/UL (ref 0–0.7)
EOSINOPHIL NFR BLD AUTO: 2 %
ERYTHROCYTE [DISTWIDTH] IN BLOOD BY AUTOMATED COUNT: 13.9 % (ref 11.5–14.5)
GLUCOSE SERPL-MCNC: 75 MG/DL (ref 74–99)
HCT VFR BLD AUTO: 30.1 % (ref 36–46)
HGB BLD-MCNC: 9.6 G/DL (ref 12–16)
IMM GRANULOCYTES # BLD AUTO: 0.02 X10*3/UL (ref 0–0.7)
IMM GRANULOCYTES NFR BLD AUTO: 0.3 % (ref 0–0.9)
LDH SERPL L TO P-CCNC: 140 U/L (ref 84–246)
LYMPHOCYTES # BLD AUTO: 1.04 X10*3/UL (ref 1.2–4.8)
LYMPHOCYTES NFR BLD AUTO: 16.9 %
MCH RBC QN AUTO: 23.2 PG (ref 26–34)
MCHC RBC AUTO-ENTMCNC: 31.9 G/DL (ref 32–36)
MCV RBC AUTO: 73 FL (ref 80–100)
MONOCYTES # BLD AUTO: 0.82 X10*3/UL (ref 0.1–1)
MONOCYTES NFR BLD AUTO: 13.3 %
NEUTROPHILS # BLD AUTO: 4.1 X10*3/UL (ref 1.2–7.7)
NEUTROPHILS NFR BLD AUTO: 66.7 %
NRBC BLD-RTO: 0 /100 WBCS (ref 0–0)
PLATELET # BLD AUTO: 306 X10*3/UL (ref 150–450)
POTASSIUM SERPL-SCNC: 3.9 MMOL/L (ref 3.5–5.3)
PROT SERPL-MCNC: 7 G/DL (ref 6.4–8.2)
RBC # BLD AUTO: 4.13 X10*6/UL (ref 4–5.2)
SODIUM SERPL-SCNC: 141 MMOL/L (ref 136–145)
WBC # BLD AUTO: 6.2 X10*3/UL (ref 4.4–11.3)

## 2025-01-14 PROCEDURE — 85397 CLOTTING FUNCT ACTIVITY: CPT

## 2025-01-14 PROCEDURE — 85025 COMPLETE CBC W/AUTO DIFF WBC: CPT

## 2025-01-14 PROCEDURE — 83010 ASSAY OF HAPTOGLOBIN QUANT: CPT

## 2025-01-14 PROCEDURE — 85335 FACTOR INHIBITOR TEST: CPT

## 2025-01-14 PROCEDURE — 80053 COMPREHEN METABOLIC PANEL: CPT

## 2025-01-14 PROCEDURE — 83615 LACTATE (LD) (LDH) ENZYME: CPT

## 2025-01-15 LAB — HAPTOGLOB SERPL NEPH-MCNC: 138 MG/DL (ref 30–200)

## 2025-01-16 ENCOUNTER — SPECIALTY PHARMACY (OUTPATIENT)
Dept: PHARMACY | Facility: CLINIC | Age: 55
End: 2025-01-16

## 2025-01-16 PROCEDURE — RXMED WILLOW AMBULATORY MEDICATION CHARGE

## 2025-01-17 ENCOUNTER — PHARMACY VISIT (OUTPATIENT)
Dept: PHARMACY | Facility: CLINIC | Age: 55
End: 2025-01-17
Payer: COMMERCIAL

## 2025-01-20 ENCOUNTER — INFUSION (OUTPATIENT)
Dept: HEMATOLOGY/ONCOLOGY | Facility: HOSPITAL | Age: 55
End: 2025-01-20
Payer: COMMERCIAL

## 2025-01-20 VITALS
HEART RATE: 67 BPM | DIASTOLIC BLOOD PRESSURE: 62 MMHG | TEMPERATURE: 97.9 F | SYSTOLIC BLOOD PRESSURE: 115 MMHG | BODY MASS INDEX: 30.27 KG/M2 | RESPIRATION RATE: 18 BRPM | WEIGHT: 181.66 LBS | OXYGEN SATURATION: 100 % | HEIGHT: 65 IN

## 2025-01-20 DIAGNOSIS — M31.19 ACQUIRED TTP (MULTI): ICD-10-CM

## 2025-01-20 PROCEDURE — 2500000004 HC RX 250 GENERAL PHARMACY W/ HCPCS (ALT 636 FOR OP/ED): Performed by: STUDENT IN AN ORGANIZED HEALTH CARE EDUCATION/TRAINING PROGRAM

## 2025-01-20 PROCEDURE — 96401 CHEMO ANTI-NEOPL SQ/IM: CPT

## 2025-01-20 RX ORDER — EPINEPHRINE 0.3 MG/.3ML
0.3 INJECTION SUBCUTANEOUS EVERY 5 MIN PRN
Status: DISCONTINUED | OUTPATIENT
Start: 2025-01-20 | End: 2025-01-20 | Stop reason: HOSPADM

## 2025-01-20 RX ORDER — ALBUTEROL SULFATE 0.83 MG/ML
3 SOLUTION RESPIRATORY (INHALATION) AS NEEDED
Status: DISCONTINUED | OUTPATIENT
Start: 2025-01-20 | End: 2025-01-20 | Stop reason: HOSPADM

## 2025-01-20 RX ORDER — FAMOTIDINE 10 MG/ML
20 INJECTION INTRAVENOUS ONCE AS NEEDED
Status: DISCONTINUED | OUTPATIENT
Start: 2025-01-20 | End: 2025-01-20 | Stop reason: HOSPADM

## 2025-01-20 RX ORDER — PROCHLORPERAZINE MALEATE 10 MG
10 TABLET ORAL EVERY 6 HOURS PRN
Status: DISCONTINUED | OUTPATIENT
Start: 2025-01-20 | End: 2025-01-20 | Stop reason: HOSPADM

## 2025-01-20 RX ORDER — BORTEZOMIB 3.5 MG/1
1.3 INJECTION, POWDER, LYOPHILIZED, FOR SOLUTION INTRAVENOUS; SUBCUTANEOUS ONCE
Status: COMPLETED | OUTPATIENT
Start: 2025-01-20 | End: 2025-01-20

## 2025-01-20 RX ORDER — PROCHLORPERAZINE EDISYLATE 5 MG/ML
10 INJECTION INTRAMUSCULAR; INTRAVENOUS EVERY 6 HOURS PRN
Status: DISCONTINUED | OUTPATIENT
Start: 2025-01-20 | End: 2025-01-20 | Stop reason: HOSPADM

## 2025-01-20 RX ORDER — DIPHENHYDRAMINE HYDROCHLORIDE 50 MG/ML
50 INJECTION INTRAMUSCULAR; INTRAVENOUS AS NEEDED
Status: DISCONTINUED | OUTPATIENT
Start: 2025-01-20 | End: 2025-01-20 | Stop reason: HOSPADM

## 2025-01-20 RX ADMIN — BORTEZOMIB 2.5 MG: 3.5 INJECTION, POWDER, LYOPHILIZED, FOR SOLUTION INTRAVENOUS; SUBCUTANEOUS at 16:07

## 2025-01-20 NOTE — PROGRESS NOTES
Patient arrived ambulatory to infusion for scheduled tx of velcade injection. Denies any new or worsening sx. Tolerated injection without issue. Patient made aware of up coming appointment.Discharged in stable condition.

## 2025-01-21 ENCOUNTER — LAB (OUTPATIENT)
Dept: LAB | Facility: LAB | Age: 55
End: 2025-01-21
Payer: COMMERCIAL

## 2025-01-21 DIAGNOSIS — M31.19 ACQUIRED TTP (MULTI): ICD-10-CM

## 2025-01-21 LAB
ALBUMIN SERPL BCP-MCNC: 4.3 G/DL (ref 3.4–5)
ALP SERPL-CCNC: 51 U/L (ref 33–110)
ALT SERPL W P-5'-P-CCNC: 18 U/L (ref 7–45)
ANION GAP SERPL CALC-SCNC: 12 MMOL/L (ref 10–20)
AST SERPL W P-5'-P-CCNC: 21 U/L (ref 9–39)
BASOPHILS # BLD AUTO: 0.05 X10*3/UL (ref 0–0.1)
BASOPHILS NFR BLD AUTO: 0.6 %
BILIRUB SERPL-MCNC: 0.3 MG/DL (ref 0–1.2)
BUN SERPL-MCNC: 13 MG/DL (ref 6–23)
CALCIUM SERPL-MCNC: 9.5 MG/DL (ref 8.6–10.3)
CHLORIDE SERPL-SCNC: 105 MMOL/L (ref 98–107)
CO2 SERPL-SCNC: 28 MMOL/L (ref 21–32)
CREAT SERPL-MCNC: 0.9 MG/DL (ref 0.5–1.05)
EGFRCR SERPLBLD CKD-EPI 2021: 76 ML/MIN/1.73M*2
EOSINOPHIL # BLD AUTO: 0.16 X10*3/UL (ref 0–0.7)
EOSINOPHIL NFR BLD AUTO: 1.8 %
ERYTHROCYTE [DISTWIDTH] IN BLOOD BY AUTOMATED COUNT: 14 % (ref 11.5–14.5)
GLUCOSE SERPL-MCNC: 83 MG/DL (ref 74–99)
HCT VFR BLD AUTO: 30 % (ref 36–46)
HGB BLD-MCNC: 9.9 G/DL (ref 12–16)
IMM GRANULOCYTES # BLD AUTO: 0.03 X10*3/UL (ref 0–0.7)
IMM GRANULOCYTES NFR BLD AUTO: 0.3 % (ref 0–0.9)
LDH SERPL L TO P-CCNC: 151 U/L (ref 84–246)
LYMPHOCYTES # BLD AUTO: 1.08 X10*3/UL (ref 1.2–4.8)
LYMPHOCYTES NFR BLD AUTO: 12 %
MCH RBC QN AUTO: 24.1 PG (ref 26–34)
MCHC RBC AUTO-ENTMCNC: 33 G/DL (ref 32–36)
MCV RBC AUTO: 73 FL (ref 80–100)
MONOCYTES # BLD AUTO: 0.58 X10*3/UL (ref 0.1–1)
MONOCYTES NFR BLD AUTO: 6.5 %
NEUTROPHILS # BLD AUTO: 7.08 X10*3/UL (ref 1.2–7.7)
NEUTROPHILS NFR BLD AUTO: 78.8 %
NRBC BLD-RTO: 0.2 /100 WBCS (ref 0–0)
PLATELET # BLD AUTO: 290 X10*3/UL (ref 150–450)
POTASSIUM SERPL-SCNC: 3.8 MMOL/L (ref 3.5–5.3)
PROT SERPL-MCNC: 7.2 G/DL (ref 6.4–8.2)
RBC # BLD AUTO: 4.1 X10*6/UL (ref 4–5.2)
SCAN RESULT: ABNORMAL
SODIUM SERPL-SCNC: 141 MMOL/L (ref 136–145)
VWF CP ACT/NOR PPP CHRO: <5 %
VWF CP INHIB PPP-ACNC: 4.5 INHIBITOR UNITS
WBC # BLD AUTO: 9 X10*3/UL (ref 4.4–11.3)

## 2025-01-21 PROCEDURE — 85025 COMPLETE CBC W/AUTO DIFF WBC: CPT

## 2025-01-21 PROCEDURE — 83615 LACTATE (LD) (LDH) ENZYME: CPT

## 2025-01-21 PROCEDURE — 83010 ASSAY OF HAPTOGLOBIN QUANT: CPT

## 2025-01-21 PROCEDURE — 85397 CLOTTING FUNCT ACTIVITY: CPT

## 2025-01-21 PROCEDURE — 80053 COMPREHEN METABOLIC PANEL: CPT

## 2025-01-21 PROCEDURE — 85335 FACTOR INHIBITOR TEST: CPT

## 2025-01-22 LAB — HAPTOGLOB SERPL NEPH-MCNC: 128 MG/DL (ref 30–200)

## 2025-01-23 ENCOUNTER — SPECIALTY PHARMACY (OUTPATIENT)
Dept: PHARMACY | Facility: CLINIC | Age: 55
End: 2025-01-23

## 2025-01-23 PROCEDURE — RXMED WILLOW AMBULATORY MEDICATION CHARGE

## 2025-01-24 ENCOUNTER — PHARMACY VISIT (OUTPATIENT)
Dept: PHARMACY | Facility: CLINIC | Age: 55
End: 2025-01-24
Payer: COMMERCIAL

## 2025-01-27 ENCOUNTER — APPOINTMENT (OUTPATIENT)
Dept: HEMATOLOGY/ONCOLOGY | Facility: HOSPITAL | Age: 55
End: 2025-01-27
Payer: COMMERCIAL

## 2025-01-27 ENCOUNTER — INFUSION (OUTPATIENT)
Dept: HEMATOLOGY/ONCOLOGY | Facility: HOSPITAL | Age: 55
End: 2025-01-27
Payer: COMMERCIAL

## 2025-01-27 VITALS
BODY MASS INDEX: 29.87 KG/M2 | WEIGHT: 181 LBS | TEMPERATURE: 97.7 F | SYSTOLIC BLOOD PRESSURE: 105 MMHG | HEART RATE: 72 BPM | OXYGEN SATURATION: 98 % | RESPIRATION RATE: 18 BRPM | DIASTOLIC BLOOD PRESSURE: 61 MMHG

## 2025-01-27 DIAGNOSIS — M31.19 ACQUIRED TTP (MULTI): ICD-10-CM

## 2025-01-27 PROCEDURE — 96401 CHEMO ANTI-NEOPL SQ/IM: CPT

## 2025-01-27 PROCEDURE — 2500000001 HC RX 250 WO HCPCS SELF ADMINISTERED DRUGS (ALT 637 FOR MEDICARE OP): Performed by: STUDENT IN AN ORGANIZED HEALTH CARE EDUCATION/TRAINING PROGRAM

## 2025-01-27 PROCEDURE — 2500000004 HC RX 250 GENERAL PHARMACY W/ HCPCS (ALT 636 FOR OP/ED): Performed by: STUDENT IN AN ORGANIZED HEALTH CARE EDUCATION/TRAINING PROGRAM

## 2025-01-27 RX ORDER — DIPHENHYDRAMINE HYDROCHLORIDE 50 MG/ML
50 INJECTION INTRAMUSCULAR; INTRAVENOUS AS NEEDED
Status: DISCONTINUED | OUTPATIENT
Start: 2025-01-27 | End: 2025-01-27 | Stop reason: HOSPADM

## 2025-01-27 RX ORDER — BORTEZOMIB 3.5 MG/1
1.3 INJECTION, POWDER, LYOPHILIZED, FOR SOLUTION INTRAVENOUS; SUBCUTANEOUS ONCE
Status: COMPLETED | OUTPATIENT
Start: 2025-01-27 | End: 2025-01-27

## 2025-01-27 RX ORDER — PROCHLORPERAZINE EDISYLATE 5 MG/ML
10 INJECTION INTRAMUSCULAR; INTRAVENOUS EVERY 6 HOURS PRN
Status: DISCONTINUED | OUTPATIENT
Start: 2025-01-27 | End: 2025-01-27 | Stop reason: HOSPADM

## 2025-01-27 RX ORDER — ALBUTEROL SULFATE 0.83 MG/ML
3 SOLUTION RESPIRATORY (INHALATION) AS NEEDED
Status: DISCONTINUED | OUTPATIENT
Start: 2025-01-27 | End: 2025-01-27 | Stop reason: HOSPADM

## 2025-01-27 RX ORDER — EPINEPHRINE 0.3 MG/.3ML
0.3 INJECTION SUBCUTANEOUS EVERY 5 MIN PRN
Status: DISCONTINUED | OUTPATIENT
Start: 2025-01-27 | End: 2025-01-27 | Stop reason: HOSPADM

## 2025-01-27 RX ORDER — PROCHLORPERAZINE MALEATE 10 MG
10 TABLET ORAL EVERY 6 HOURS PRN
Status: DISCONTINUED | OUTPATIENT
Start: 2025-01-27 | End: 2025-01-27 | Stop reason: HOSPADM

## 2025-01-27 RX ORDER — FAMOTIDINE 10 MG/ML
20 INJECTION INTRAVENOUS ONCE AS NEEDED
Status: DISCONTINUED | OUTPATIENT
Start: 2025-01-27 | End: 2025-01-27 | Stop reason: HOSPADM

## 2025-01-27 RX ADMIN — PROCHLORPERAZINE MALEATE 10 MG: 10 TABLET ORAL at 09:56

## 2025-01-27 RX ADMIN — BORTEZOMIB 2.5 MG: 3.5 INJECTION, POWDER, LYOPHILIZED, FOR SOLUTION INTRAVENOUS; SUBCUTANEOUS at 10:31

## 2025-01-27 NOTE — PROGRESS NOTES
Patient arrives to Infusion for her Velcade,non oncology. Labs were good for treatment. She is a little nauseous, compazine was given. She tolerated her injection without any issue. She was discharged stable.

## 2025-01-28 ENCOUNTER — LAB (OUTPATIENT)
Dept: LAB | Facility: HOSPITAL | Age: 55
End: 2025-01-28
Payer: COMMERCIAL

## 2025-01-28 DIAGNOSIS — M31.19 ACQUIRED TTP (MULTI): ICD-10-CM

## 2025-01-28 LAB
ALBUMIN SERPL BCP-MCNC: 4.1 G/DL (ref 3.4–5)
ALP SERPL-CCNC: 45 U/L (ref 33–110)
ALT SERPL W P-5'-P-CCNC: 15 U/L (ref 7–45)
ANION GAP SERPL CALC-SCNC: 13 MMOL/L (ref 10–20)
AST SERPL W P-5'-P-CCNC: 21 U/L (ref 9–39)
BASOPHILS # BLD AUTO: 0.02 X10*3/UL (ref 0–0.1)
BASOPHILS NFR BLD AUTO: 0.3 %
BILIRUB SERPL-MCNC: 0.4 MG/DL (ref 0–1.2)
BUN SERPL-MCNC: 11 MG/DL (ref 6–23)
CALCIUM SERPL-MCNC: 9.4 MG/DL (ref 8.6–10.3)
CHLORIDE SERPL-SCNC: 105 MMOL/L (ref 98–107)
CO2 SERPL-SCNC: 27 MMOL/L (ref 21–32)
CREAT SERPL-MCNC: 0.91 MG/DL (ref 0.5–1.05)
EGFRCR SERPLBLD CKD-EPI 2021: 75 ML/MIN/1.73M*2
EOSINOPHIL # BLD AUTO: 0.09 X10*3/UL (ref 0–0.7)
EOSINOPHIL NFR BLD AUTO: 1.2 %
ERYTHROCYTE [DISTWIDTH] IN BLOOD BY AUTOMATED COUNT: 14.6 % (ref 11.5–14.5)
GLUCOSE SERPL-MCNC: 72 MG/DL (ref 74–99)
HCT VFR BLD AUTO: 28.4 % (ref 36–46)
HGB BLD-MCNC: 9.1 G/DL (ref 12–16)
IMM GRANULOCYTES # BLD AUTO: 0.02 X10*3/UL (ref 0–0.7)
IMM GRANULOCYTES NFR BLD AUTO: 0.3 % (ref 0–0.9)
LDH SERPL L TO P-CCNC: 185 U/L (ref 84–246)
LYMPHOCYTES # BLD AUTO: 1.16 X10*3/UL (ref 1.2–4.8)
LYMPHOCYTES NFR BLD AUTO: 15.8 %
MCH RBC QN AUTO: 23.5 PG (ref 26–34)
MCHC RBC AUTO-ENTMCNC: 32 G/DL (ref 32–36)
MCV RBC AUTO: 73 FL (ref 80–100)
MONOCYTES # BLD AUTO: 0.73 X10*3/UL (ref 0.1–1)
MONOCYTES NFR BLD AUTO: 9.9 %
NEUTROPHILS # BLD AUTO: 5.32 X10*3/UL (ref 1.2–7.7)
NEUTROPHILS NFR BLD AUTO: 72.5 %
NRBC BLD-RTO: 1.2 /100 WBCS (ref 0–0)
PLATELET # BLD AUTO: 191 X10*3/UL (ref 150–450)
POTASSIUM SERPL-SCNC: 4.7 MMOL/L (ref 3.5–5.3)
PROT SERPL-MCNC: 6.9 G/DL (ref 6.4–8.2)
RBC # BLD AUTO: 3.87 X10*6/UL (ref 4–5.2)
SODIUM SERPL-SCNC: 140 MMOL/L (ref 136–145)
WBC # BLD AUTO: 7.3 X10*3/UL (ref 4.4–11.3)

## 2025-01-28 PROCEDURE — 83615 LACTATE (LD) (LDH) ENZYME: CPT

## 2025-01-28 PROCEDURE — 83010 ASSAY OF HAPTOGLOBIN QUANT: CPT

## 2025-01-28 PROCEDURE — 85025 COMPLETE CBC W/AUTO DIFF WBC: CPT

## 2025-01-28 PROCEDURE — 36415 COLL VENOUS BLD VENIPUNCTURE: CPT

## 2025-01-28 PROCEDURE — 83010 ASSAY OF HAPTOGLOBIN QUANT: CPT | Mod: STJLAB

## 2025-01-28 PROCEDURE — 80053 COMPREHEN METABOLIC PANEL: CPT

## 2025-01-29 LAB — HAPTOGLOB SERPL NEPH-MCNC: 117 MG/DL (ref 30–200)

## 2025-01-30 ENCOUNTER — SPECIALTY PHARMACY (OUTPATIENT)
Dept: PHARMACY | Facility: CLINIC | Age: 55
End: 2025-01-30

## 2025-01-30 LAB
SCAN RESULT: ABNORMAL
VWF CP ACT/NOR PPP CHRO: <5 %
VWF CP INHIB PPP-ACNC: 4.5 INHIBITOR UNITS

## 2025-01-30 PROCEDURE — RXMED WILLOW AMBULATORY MEDICATION CHARGE

## 2025-01-31 ENCOUNTER — PHARMACY VISIT (OUTPATIENT)
Dept: PHARMACY | Facility: CLINIC | Age: 55
End: 2025-01-31
Payer: COMMERCIAL

## 2025-02-03 ENCOUNTER — LAB (OUTPATIENT)
Dept: LAB | Facility: CLINIC | Age: 55
End: 2025-02-03
Payer: COMMERCIAL

## 2025-02-03 ENCOUNTER — INFUSION (OUTPATIENT)
Dept: HEMATOLOGY/ONCOLOGY | Facility: CLINIC | Age: 55
End: 2025-02-03
Payer: COMMERCIAL

## 2025-02-03 ENCOUNTER — APPOINTMENT (OUTPATIENT)
Dept: HEMATOLOGY/ONCOLOGY | Facility: HOSPITAL | Age: 55
End: 2025-02-03
Payer: COMMERCIAL

## 2025-02-03 VITALS
HEART RATE: 69 BPM | DIASTOLIC BLOOD PRESSURE: 72 MMHG | WEIGHT: 181.99 LBS | OXYGEN SATURATION: 96 % | TEMPERATURE: 97.7 F | RESPIRATION RATE: 17 BRPM | BODY MASS INDEX: 30.03 KG/M2 | SYSTOLIC BLOOD PRESSURE: 119 MMHG

## 2025-02-03 DIAGNOSIS — D64.9 ANEMIA, UNSPECIFIED TYPE: ICD-10-CM

## 2025-02-03 DIAGNOSIS — M31.19 ACQUIRED TTP (MULTI): ICD-10-CM

## 2025-02-03 DIAGNOSIS — M31.19 ACQUIRED TTP (MULTI): Primary | ICD-10-CM

## 2025-02-03 LAB
ALBUMIN SERPL BCP-MCNC: 3.9 G/DL (ref 3.4–5)
ALP SERPL-CCNC: 44 U/L (ref 33–110)
ALT SERPL W P-5'-P-CCNC: 12 U/L (ref 7–45)
ANION GAP SERPL CALC-SCNC: 11 MMOL/L (ref 10–20)
AST SERPL W P-5'-P-CCNC: 15 U/L (ref 9–39)
BASOPHILS # BLD AUTO: 0.05 X10*3/UL (ref 0–0.1)
BASOPHILS NFR BLD AUTO: 0.9 %
BILIRUB SERPL-MCNC: 0.4 MG/DL (ref 0–1.2)
BUN SERPL-MCNC: 15 MG/DL (ref 6–23)
CALCIUM SERPL-MCNC: 9.1 MG/DL (ref 8.6–10.3)
CHLORIDE SERPL-SCNC: 107 MMOL/L (ref 98–107)
CO2 SERPL-SCNC: 29 MMOL/L (ref 21–32)
CREAT SERPL-MCNC: 0.82 MG/DL (ref 0.5–1.05)
EGFRCR SERPLBLD CKD-EPI 2021: 85 ML/MIN/1.73M*2
EOSINOPHIL # BLD AUTO: 0.14 X10*3/UL (ref 0–0.7)
EOSINOPHIL NFR BLD AUTO: 2.6 %
ERYTHROCYTE [DISTWIDTH] IN BLOOD BY AUTOMATED COUNT: 14.1 % (ref 11.5–14.5)
GLUCOSE SERPL-MCNC: 91 MG/DL (ref 74–99)
HAPTOGLOB SERPL NEPH-MCNC: 103 MG/DL (ref 30–200)
HCT VFR BLD AUTO: 26 % (ref 36–46)
HGB BLD-MCNC: 8.8 G/DL (ref 12–16)
HGB RETIC QN: 30 PG (ref 28–38)
IMM GRANULOCYTES # BLD AUTO: 0.01 X10*3/UL (ref 0–0.7)
IMM GRANULOCYTES NFR BLD AUTO: 0.2 % (ref 0–0.9)
IMMATURE RETIC FRACTION: 14.6 %
LDH SERPL L TO P-CCNC: 127 U/L (ref 84–246)
LYMPHOCYTES # BLD AUTO: 1.67 X10*3/UL (ref 1.2–4.8)
LYMPHOCYTES NFR BLD AUTO: 30.4 %
MCH RBC QN AUTO: 24.6 PG (ref 26–34)
MCHC RBC AUTO-ENTMCNC: 33.8 G/DL (ref 32–36)
MCV RBC AUTO: 73 FL (ref 80–100)
MONOCYTES # BLD AUTO: 0.64 X10*3/UL (ref 0.1–1)
MONOCYTES NFR BLD AUTO: 11.7 %
NEUTROPHILS # BLD AUTO: 2.98 X10*3/UL (ref 1.2–7.7)
NEUTROPHILS NFR BLD AUTO: 54.2 %
PLATELET # BLD AUTO: 213 X10*3/UL (ref 150–450)
POTASSIUM SERPL-SCNC: 3.9 MMOL/L (ref 3.5–5.3)
PROT SERPL-MCNC: 6.5 G/DL (ref 6.4–8.2)
RBC # BLD AUTO: 3.58 X10*6/UL (ref 4–5.2)
RETICS #: 0.12 X10*6/UL (ref 0.02–0.08)
RETICS/RBC NFR AUTO: 3.2 % (ref 0.5–2)
SODIUM SERPL-SCNC: 143 MMOL/L (ref 136–145)
WBC # BLD AUTO: 5.5 X10*3/UL (ref 4.4–11.3)

## 2025-02-03 PROCEDURE — 83010 ASSAY OF HAPTOGLOBIN QUANT: CPT

## 2025-02-03 PROCEDURE — 85045 AUTOMATED RETICULOCYTE COUNT: CPT

## 2025-02-03 PROCEDURE — 85025 COMPLETE CBC W/AUTO DIFF WBC: CPT

## 2025-02-03 PROCEDURE — 2500000004 HC RX 250 GENERAL PHARMACY W/ HCPCS (ALT 636 FOR OP/ED): Performed by: STUDENT IN AN ORGANIZED HEALTH CARE EDUCATION/TRAINING PROGRAM

## 2025-02-03 PROCEDURE — 85397 CLOTTING FUNCT ACTIVITY: CPT

## 2025-02-03 PROCEDURE — 36415 COLL VENOUS BLD VENIPUNCTURE: CPT

## 2025-02-03 PROCEDURE — 96401 CHEMO ANTI-NEOPL SQ/IM: CPT

## 2025-02-03 PROCEDURE — 83615 LACTATE (LD) (LDH) ENZYME: CPT

## 2025-02-03 PROCEDURE — 84075 ASSAY ALKALINE PHOSPHATASE: CPT

## 2025-02-03 RX ORDER — BORTEZOMIB 3.5 MG/1
1.3 INJECTION, POWDER, LYOPHILIZED, FOR SOLUTION INTRAVENOUS; SUBCUTANEOUS ONCE
Status: COMPLETED | OUTPATIENT
Start: 2025-02-03 | End: 2025-02-03

## 2025-02-03 RX ADMIN — BORTEZOMIB 2.5 MG: 3.5 INJECTION, POWDER, LYOPHILIZED, FOR SOLUTION INTRAVENOUS; SUBCUTANEOUS at 11:58

## 2025-02-03 ASSESSMENT — PAIN SCALES - GENERAL: PAINLEVEL_OUTOF10: 7

## 2025-02-03 NOTE — SIGNIFICANT EVENT
02/03/25 1117   Prechemo Checklist   Has the patient been in the hospital, ED, or urgent care since last date of service No   Chemo/Immuno Consent Completed and Signed Not applicable  (NON-ONC (TTP) - no consent needed)   Protocol/Indications Verified Yes   Confirmed to previous date/time of medication Yes   Compared to previous dose Yes   All medications are dated accurately Yes   Pregnancy Test Negative Not applicable  (pt did not being new regimen, therefore no pregnancy test needed at this time)   Parameters Met Yes  (ANC: 2.98, PLTs: 213, bili: 0.4)   Provider Name Dr. Du   BSA/Weight-Height Verified Yes   Dose Calculations Verified (current, total, cumulative) Yes

## 2025-02-04 LAB
SCAN RESULT: ABNORMAL
VWF CP ACT/NOR PPP CHRO: <5 %
VWF CP INHIB PPP-ACNC: 3 INHIBITOR UNITS

## 2025-02-05 ENCOUNTER — NURSE TRIAGE (OUTPATIENT)
Dept: ADMISSION | Facility: HOSPITAL | Age: 55
End: 2025-02-05
Payer: COMMERCIAL

## 2025-02-05 NOTE — TELEPHONE ENCOUNTER
Pt had infusion (velcade) on 2/3.   Tues midnite-2am started to have diarrhea/n/v. No blood or black tarry stools or coffee ground emesis. Cramping comes in waves. Denies fever, vision changes, body aches, chills. Has a slight headache and 2 episodes of dizziness when bending over.   Urine is yellow- darker than normal  Pt has not eaten much more than bites, trying to drink gatorade.   Pt only took zofran x 2 doses on Tues, nothing since. Encouraged use of zofran and compazine- rotating.   Next FUV and infusion 2/10

## 2025-02-06 NOTE — TELEPHONE ENCOUNTER
Spoke with pt this morning - she is feeling better, no further bouts of n/v/d. Cramping is improved. Was able to eat and drink a bit more yesterday.   Headache resolved.   No further concerns at this time.   Will call with any new/worsening concerns.

## 2025-02-07 ENCOUNTER — SPECIALTY PHARMACY (OUTPATIENT)
Dept: PHARMACY | Facility: CLINIC | Age: 55
End: 2025-02-07

## 2025-02-07 PROCEDURE — RXMED WILLOW AMBULATORY MEDICATION CHARGE

## 2025-02-08 LAB
SCAN RESULT: ABNORMAL
VWF CP ACT/NOR PPP CHRO: <5 %
VWF CP INHIB PPP-ACNC: 1.9 INHIBITOR UNITS

## 2025-02-10 ENCOUNTER — OFFICE VISIT (OUTPATIENT)
Dept: HEMATOLOGY/ONCOLOGY | Facility: HOSPITAL | Age: 55
End: 2025-02-10
Payer: COMMERCIAL

## 2025-02-10 ENCOUNTER — PHARMACY VISIT (OUTPATIENT)
Dept: PHARMACY | Facility: CLINIC | Age: 55
End: 2025-02-10
Payer: COMMERCIAL

## 2025-02-10 ENCOUNTER — LAB (OUTPATIENT)
Dept: LAB | Facility: HOSPITAL | Age: 55
End: 2025-02-10
Payer: COMMERCIAL

## 2025-02-10 ENCOUNTER — APPOINTMENT (OUTPATIENT)
Dept: HEMATOLOGY/ONCOLOGY | Facility: HOSPITAL | Age: 55
End: 2025-02-10
Payer: COMMERCIAL

## 2025-02-10 VITALS
HEART RATE: 85 BPM | SYSTOLIC BLOOD PRESSURE: 114 MMHG | RESPIRATION RATE: 16 BRPM | TEMPERATURE: 96.4 F | WEIGHT: 179.7 LBS | DIASTOLIC BLOOD PRESSURE: 64 MMHG | BODY MASS INDEX: 29.65 KG/M2 | OXYGEN SATURATION: 98 %

## 2025-02-10 DIAGNOSIS — L29.9 PRURITUS: ICD-10-CM

## 2025-02-10 DIAGNOSIS — M31.19 ACQUIRED TTP (MULTI): ICD-10-CM

## 2025-02-10 LAB
ALBUMIN SERPL BCP-MCNC: 4.1 G/DL (ref 3.4–5)
ALP SERPL-CCNC: 42 U/L (ref 33–110)
ALT SERPL W P-5'-P-CCNC: 10 U/L (ref 7–45)
ANION GAP SERPL CALC-SCNC: 12 MMOL/L (ref 10–20)
AST SERPL W P-5'-P-CCNC: 15 U/L (ref 9–39)
BASOPHILS # BLD AUTO: 0.02 X10*3/UL (ref 0–0.1)
BASOPHILS NFR BLD AUTO: 0.3 %
BILIRUB SERPL-MCNC: 0.6 MG/DL (ref 0–1.2)
BUN SERPL-MCNC: 12 MG/DL (ref 6–23)
CALCIUM SERPL-MCNC: 9.4 MG/DL (ref 8.6–10.3)
CHLORIDE SERPL-SCNC: 104 MMOL/L (ref 98–107)
CO2 SERPL-SCNC: 30 MMOL/L (ref 21–32)
CREAT SERPL-MCNC: 0.88 MG/DL (ref 0.5–1.05)
EGFRCR SERPLBLD CKD-EPI 2021: 78 ML/MIN/1.73M*2
EOSINOPHIL # BLD AUTO: 0.08 X10*3/UL (ref 0–0.7)
EOSINOPHIL NFR BLD AUTO: 1.4 %
ERYTHROCYTE [DISTWIDTH] IN BLOOD BY AUTOMATED COUNT: 14.7 % (ref 11.5–14.5)
GLUCOSE SERPL-MCNC: 88 MG/DL (ref 74–99)
HAPTOGLOB SERPL NEPH-MCNC: 109 MG/DL (ref 30–200)
HCT VFR BLD AUTO: 26.9 % (ref 36–46)
HGB BLD-MCNC: 9.2 G/DL (ref 12–16)
IMM GRANULOCYTES # BLD AUTO: 0.03 X10*3/UL (ref 0–0.7)
IMM GRANULOCYTES NFR BLD AUTO: 0.5 % (ref 0–0.9)
LDH SERPL L TO P-CCNC: 163 U/L (ref 84–246)
LYMPHOCYTES # BLD AUTO: 1.08 X10*3/UL (ref 1.2–4.8)
LYMPHOCYTES NFR BLD AUTO: 18.4 %
MCH RBC QN AUTO: 24.9 PG (ref 26–34)
MCHC RBC AUTO-ENTMCNC: 34.2 G/DL (ref 32–36)
MCV RBC AUTO: 73 FL (ref 80–100)
MONOCYTES # BLD AUTO: 0.65 X10*3/UL (ref 0.1–1)
MONOCYTES NFR BLD AUTO: 11.1 %
NEUTROPHILS # BLD AUTO: 4 X10*3/UL (ref 1.2–7.7)
NEUTROPHILS NFR BLD AUTO: 68.3 %
NRBC BLD-RTO: 6.1 /100 WBCS (ref 0–0)
PLATELET # BLD AUTO: 237 X10*3/UL (ref 150–450)
POTASSIUM SERPL-SCNC: 3.1 MMOL/L (ref 3.5–5.3)
PROT SERPL-MCNC: 7.2 G/DL (ref 6.4–8.2)
RBC # BLD AUTO: 3.7 X10*6/UL (ref 4–5.2)
SODIUM SERPL-SCNC: 143 MMOL/L (ref 136–145)
WBC # BLD AUTO: 5.9 X10*3/UL (ref 4.4–11.3)

## 2025-02-10 PROCEDURE — 80053 COMPREHEN METABOLIC PANEL: CPT

## 2025-02-10 PROCEDURE — 83010 ASSAY OF HAPTOGLOBIN QUANT: CPT

## 2025-02-10 PROCEDURE — 83615 LACTATE (LD) (LDH) ENZYME: CPT

## 2025-02-10 PROCEDURE — 99214 OFFICE O/P EST MOD 30 MIN: CPT | Performed by: STUDENT IN AN ORGANIZED HEALTH CARE EDUCATION/TRAINING PROGRAM

## 2025-02-10 PROCEDURE — 85025 COMPLETE CBC W/AUTO DIFF WBC: CPT

## 2025-02-10 PROCEDURE — 84238 ASSAY NONENDOCRINE RECEPTOR: CPT

## 2025-02-10 PROCEDURE — 36415 COLL VENOUS BLD VENIPUNCTURE: CPT

## 2025-02-10 RX ORDER — ONDANSETRON 4 MG/1
4 TABLET, FILM COATED ORAL EVERY 8 HOURS PRN
Qty: 30 TABLET | Refills: 1 | Status: SHIPPED | OUTPATIENT
Start: 2025-02-10

## 2025-02-10 ASSESSMENT — PAIN SCALES - GENERAL: PAINLEVEL_OUTOF10: 0-NO PAIN

## 2025-02-10 NOTE — PROGRESS NOTES
MD is holding velcade treatment today and asked to have patient come back in 2 weeks 2/24 for FUV and to resume treatment

## 2025-02-10 NOTE — PROGRESS NOTES
Patient ID: Tere Carrion is a 54 y.o. female.  Referring Physician: Tree Du MD  [address] [place] [address]  Primary Care Provider: Modesta Gallego DO  Visit Type: Follow Up  Diagnosis: immune TTP    Therapy summary (diagnosis: iTTP):   Rituximab: 6/21, 6/28, 7/8, 7/15, to be completed 7/15/2024  Cablivi: 6/21-to be completed  Cyclophosphamide: 8/21/2024, 9/11/24, 10/2/24, 10/24/24, 11/13/2024, 12/4/2024  Bortezomib: 1/13/2025-2/3/2025      Subjective    HPI  54 y.o. female with a PMH significant for TTP in 1998/1999 (treated with prolonged (87) plasmapheresis, steroids, and immunosuppressants including vincristine and azathioprine, and splenectomy March 1999), reported sickle cell trait and alpha thalassemia trait, migraines.    Re TTP:   She was recently seen inpatient for immune TTP where she presented on 6/18/24 for 2 weeks of worsening generalized weakness, fatigue, migraines, hematuria, and new bruising with petechiae, with TTP. She was started on PLEX, did not receive Cablivi upfront 2/2 hematuria, which resolved over the last 3 days. She received PLEX x 3 (last on 6/21/24).    HIV, Hepatitis panel: Non-reactive  B12, folate, iron profile: Normal  T spot: Negative  Shiga toxin -ve   ELBERT -ve, SPEP work up negative for clonal protein   ADAMTS <5, inhibitor level 2.4--> >8.0    Re splenectomy, is followed by PCP for vaccination notes she is caught up at this time.   Notices fatigue since last 3 days, some skin bruising but no bleeding. Perimenopausal. Currently on pred 40mg, on taper.   Age appropriate cancer screening: mammo '23 due soon. [place] '20 due next year.   FMH: no cancer, mother had a UE DVT post-op, mother has many polyps, maternal GF had colon cancer, mother's sibs had colon cancer too   Social history: never smoker, no ETOH, no RDU.   08/21/24: Presents alone for follow-up.  The patient is being seen in treatment.  09/09/24: headaches left sided, since last 1 month daily,  takes excedrin migraine daily occasionally ibuprofen. Lightheaded when stands majority of the times. Couple days of nausea after the cytoxan.   24: no significant complaints, is tolerating cytoxan well. Dose noted to be at 400mg/m2 with no response in terms of inhibitor or LIKHZM26 level, escalated to 500mg/m2. Dose range reported up to 750mg/m2. D/w pt will likely need to complete 6 cycles.   10/24/24: presents with son. Asymptomatic. Has no particular complaints.   24: presents alone, notices some GI cramps on day 3 after dosing, but otherwise tolerates well.  24: asymptomatic, ANC lower end of normal, [id number] still undetectable at last check with high titer inhibitor.  24: pt remains asymptomatic, after initial elevation in OAFTXX16 (although this does not appear to be true as the inhibitor was quite high) there is no sustained response.   02/10/25: noticed diarrhea around midnight day of infusion, this time also nausea, abdominal pain, decreased appetite, did not do any viral testing. Did feel the same as she has had every dose. Noticed some nosebleeding yesterday, some spotting through the day.       Review of Systems - Oncology  10 point review of systems negative except as stated in HPI    Objective   Past Surgical History:   Procedure Laterality Date    BREAST BIOPSY      BREAST CYST EXCISION      BREAST SURGERY       SECTION, LOW TRANSVERSE  2004    TUBAL LIGATION  2004     Oncology History    No history exists.       BSA: 1.94 meters squared /64 (BP Location: Right arm, Patient Position: Sitting, BP Cuff Size: Adult)   Pulse 85   Temp 35.8 °C (96.4 °F) (Skin)   Resp 16   Wt 81.5 kg (179 lb 11.2 oz)   SpO2 98%   BMI 29.65 kg/m²   Physical Exam  Vitals reviewed.   Constitutional:       General: She is not in acute distress.     Appearance: She is not toxic-appearing.   HENT:      Head: Normocephalic and atraumatic.   Cardiovascular:      Rate and Rhythm: Normal  rate and regular rhythm.   Pulmonary:      Effort: Pulmonary effort is normal.   Skin:     Comments: No bruising    Neurological:      General: No focal deficit present.      Mental Status: She is oriented to person, place, and time.   Psychiatric:         Mood and Affect: Mood normal.        07/08/24 11:29 07/15/24 13:20 08/27/24 15:07 12/02/24 10:41 12/23/24 15:57 12/31/24 15:02 01/21/25 15:48 01/28/25 15:44 02/03/25 10:34   [id number] Activity Value <5 (L) <5 (L) <5 (L) 7 (L) <5 (L) <5 (L) <5 (L) <5 (L) <5 (L)   XRLUHB96 Inhibitor Value >8.0 (H) >8.0 (H) >8.0 (H) >8.5 (H) 6.4 (H) 5.6 (H) 4.5 (H) 3.0 (H) 1.9 (H)       Assessment/Plan    54 y.o. female with a PMH significant for  TTP in 1998/1999 (treated with prolonged (87) plasmapheresis, steroids, and immunosuppressants including vincristine and azathioprine, and splenectomy March 1999), reported sickle cell trait and alpha thalassemia trait [confirmed on retesting].     # Immune TTP: Patient was initially diagnosed in 1998-99, had prolonged plasmapheresis followed by immunosuppression with vincristine, azathioprine and eventually splenectomy in March 1999, has been followed at [facility] since then and has had no evidence of relapse.  As noted above the patient presented with hematuria and was eventually diagnosed with immune TTP with an antibody titer of 2.4--> >8.0.  She was started on Cablivi and rituximab x1 cycle, then cyclophosphamide x6 cycles with no change in UZXACG66 and inhibitor levels. Maintained on cablivi and is clinically asymptomatic.   ,  We discussed alternate therapy including cyclosporine and bortezomib. Her recent flow showed a significant absence B cells, therefore did not redose ritux, and instead started bortezomib. Has noted many GI s/s after dosing for about 48hrs but then full recovery. Inhibitor has been dropping for the first time since we started treating her.   Plan:   - will continue Cablivi until BGNKCA55 is >20%, but will skip today,  will call us back if she notices bleeding and we can do alternate day dosing.   - pause bortezomib for 2 weeks while she recovers and [place] [facility] [place]  - weekly CBC/diff and close monitoring for bleeding with thrombocytopenia from drug and TTP and cablivi   -The patient has been educated about the signs and symptoms of TTP and the process of contacting us or heading to an ER as needed.  - follow up next: 2 weeks   - labs prior to follow up: Noted above      Tree Walker MD

## 2025-02-11 LAB — STFR SERPL-MCNC: 3.7 MG/L (ref 1.9–4.4)

## 2025-02-13 ENCOUNTER — SPECIALTY PHARMACY (OUTPATIENT)
Dept: PHARMACY | Facility: CLINIC | Age: 55
End: 2025-02-13

## 2025-02-13 PROCEDURE — RXMED WILLOW AMBULATORY MEDICATION CHARGE

## 2025-02-14 ENCOUNTER — PHARMACY VISIT (OUTPATIENT)
Dept: PHARMACY | Facility: CLINIC | Age: 55
End: 2025-02-14
Payer: COMMERCIAL

## 2025-02-17 ENCOUNTER — APPOINTMENT (OUTPATIENT)
Dept: HEMATOLOGY/ONCOLOGY | Facility: HOSPITAL | Age: 55
End: 2025-02-17
Payer: COMMERCIAL

## 2025-02-17 ENCOUNTER — APPOINTMENT (OUTPATIENT)
Dept: HEMATOLOGY/ONCOLOGY | Facility: CLINIC | Age: 55
End: 2025-02-17
Payer: COMMERCIAL

## 2025-02-18 ENCOUNTER — LAB (OUTPATIENT)
Dept: LAB | Facility: HOSPITAL | Age: 55
End: 2025-02-18
Payer: COMMERCIAL

## 2025-02-18 DIAGNOSIS — M31.19 ACQUIRED TTP (MULTI): Primary | ICD-10-CM

## 2025-02-18 LAB
ALBUMIN SERPL BCP-MCNC: 4.4 G/DL (ref 3.4–5)
ALP SERPL-CCNC: 46 U/L (ref 33–110)
ALT SERPL W P-5'-P-CCNC: 11 U/L (ref 7–45)
ANION GAP SERPL CALC-SCNC: 12 MMOL/L (ref 10–20)
AST SERPL W P-5'-P-CCNC: 17 U/L (ref 9–39)
BASOPHILS # BLD AUTO: 0.04 X10*3/UL (ref 0–0.1)
BASOPHILS NFR BLD AUTO: 0.6 %
BILIRUB SERPL-MCNC: 0.4 MG/DL (ref 0–1.2)
BUN SERPL-MCNC: 11 MG/DL (ref 6–23)
CALCIUM SERPL-MCNC: 9.4 MG/DL (ref 8.6–10.3)
CHLORIDE SERPL-SCNC: 105 MMOL/L (ref 98–107)
CO2 SERPL-SCNC: 28 MMOL/L (ref 21–32)
CREAT SERPL-MCNC: 0.94 MG/DL (ref 0.5–1.05)
EGFRCR SERPLBLD CKD-EPI 2021: 72 ML/MIN/1.73M*2
EOSINOPHIL # BLD AUTO: 0.12 X10*3/UL (ref 0–0.7)
EOSINOPHIL NFR BLD AUTO: 1.8 %
ERYTHROCYTE [DISTWIDTH] IN BLOOD BY AUTOMATED COUNT: 15.4 % (ref 11.5–14.5)
GLUCOSE SERPL-MCNC: 74 MG/DL (ref 74–99)
HCT VFR BLD AUTO: 29.2 % (ref 36–46)
HGB BLD-MCNC: 9.8 G/DL (ref 12–16)
HOLD SPECIMEN: NORMAL
HOLD SPECIMEN: NORMAL
IMM GRANULOCYTES # BLD AUTO: 0.06 X10*3/UL (ref 0–0.7)
IMM GRANULOCYTES NFR BLD AUTO: 0.9 % (ref 0–0.9)
LDH SERPL L TO P-CCNC: 198 U/L (ref 84–246)
LYMPHOCYTES # BLD AUTO: 1.24 X10*3/UL (ref 1.2–4.8)
LYMPHOCYTES NFR BLD AUTO: 18.5 %
MCH RBC QN AUTO: 24.7 PG (ref 26–34)
MCHC RBC AUTO-ENTMCNC: 33.6 G/DL (ref 32–36)
MCV RBC AUTO: 74 FL (ref 80–100)
MONOCYTES # BLD AUTO: 0.58 X10*3/UL (ref 0.1–1)
MONOCYTES NFR BLD AUTO: 8.7 %
NEUTROPHILS # BLD AUTO: 4.66 X10*3/UL (ref 1.2–7.7)
NEUTROPHILS NFR BLD AUTO: 69.5 %
NRBC BLD-RTO: 0.3 /100 WBCS (ref 0–0)
PLATELET # BLD AUTO: 413 X10*3/UL (ref 150–450)
POTASSIUM SERPL-SCNC: 3.8 MMOL/L (ref 3.5–5.3)
PROT SERPL-MCNC: 7.3 G/DL (ref 6.4–8.2)
RBC # BLD AUTO: 3.96 X10*6/UL (ref 4–5.2)
SODIUM SERPL-SCNC: 141 MMOL/L (ref 136–145)
WBC # BLD AUTO: 6.7 X10*3/UL (ref 4.4–11.3)

## 2025-02-18 PROCEDURE — 83010 ASSAY OF HAPTOGLOBIN QUANT: CPT

## 2025-02-18 PROCEDURE — 83615 LACTATE (LD) (LDH) ENZYME: CPT

## 2025-02-18 PROCEDURE — 85025 COMPLETE CBC W/AUTO DIFF WBC: CPT

## 2025-02-18 PROCEDURE — 80053 COMPREHEN METABOLIC PANEL: CPT

## 2025-02-19 LAB — HAPTOGLOB SERPL NEPH-MCNC: 103 MG/DL (ref 30–200)

## 2025-02-20 PROCEDURE — RXMED WILLOW AMBULATORY MEDICATION CHARGE

## 2025-02-24 ENCOUNTER — INFUSION (OUTPATIENT)
Dept: HEMATOLOGY/ONCOLOGY | Facility: HOSPITAL | Age: 55
End: 2025-02-24
Payer: COMMERCIAL

## 2025-02-24 ENCOUNTER — PHARMACY VISIT (OUTPATIENT)
Dept: PHARMACY | Facility: CLINIC | Age: 55
End: 2025-02-24
Payer: COMMERCIAL

## 2025-02-24 ENCOUNTER — APPOINTMENT (OUTPATIENT)
Dept: HEMATOLOGY/ONCOLOGY | Facility: CLINIC | Age: 55
End: 2025-02-24
Payer: COMMERCIAL

## 2025-02-24 ENCOUNTER — OFFICE VISIT (OUTPATIENT)
Dept: HEMATOLOGY/ONCOLOGY | Facility: HOSPITAL | Age: 55
End: 2025-02-24
Payer: COMMERCIAL

## 2025-02-24 ENCOUNTER — LAB (OUTPATIENT)
Dept: LAB | Facility: HOSPITAL | Age: 55
End: 2025-02-24
Payer: COMMERCIAL

## 2025-02-24 ENCOUNTER — APPOINTMENT (OUTPATIENT)
Dept: HEMATOLOGY/ONCOLOGY | Facility: HOSPITAL | Age: 55
End: 2025-02-24
Payer: COMMERCIAL

## 2025-02-24 VITALS
SYSTOLIC BLOOD PRESSURE: 113 MMHG | HEART RATE: 79 BPM | DIASTOLIC BLOOD PRESSURE: 68 MMHG | RESPIRATION RATE: 16 BRPM | TEMPERATURE: 96.8 F | WEIGHT: 179.8 LBS | OXYGEN SATURATION: 100 % | BODY MASS INDEX: 29.67 KG/M2

## 2025-02-24 DIAGNOSIS — L29.9 PRURITUS: ICD-10-CM

## 2025-02-24 DIAGNOSIS — M31.19 ACQUIRED TTP (MULTI): ICD-10-CM

## 2025-02-24 LAB
ALBUMIN SERPL BCP-MCNC: 4.2 G/DL (ref 3.4–5)
ALP SERPL-CCNC: 52 U/L (ref 33–110)
ALT SERPL W P-5'-P-CCNC: 13 U/L (ref 7–45)
ANION GAP SERPL CALC-SCNC: 12 MMOL/L (ref 10–20)
AST SERPL W P-5'-P-CCNC: 18 U/L (ref 9–39)
BASOPHILS # BLD AUTO: 0.04 X10*3/UL (ref 0–0.1)
BASOPHILS NFR BLD AUTO: 0.8 %
BILIRUB SERPL-MCNC: 0.4 MG/DL (ref 0–1.2)
BUN SERPL-MCNC: 12 MG/DL (ref 6–23)
CALCIUM SERPL-MCNC: 9.1 MG/DL (ref 8.6–10.3)
CHLORIDE SERPL-SCNC: 105 MMOL/L (ref 98–107)
CO2 SERPL-SCNC: 30 MMOL/L (ref 21–32)
CREAT SERPL-MCNC: 0.76 MG/DL (ref 0.5–1.05)
EGFRCR SERPLBLD CKD-EPI 2021: >90 ML/MIN/1.73M*2
EOSINOPHIL # BLD AUTO: 0.09 X10*3/UL (ref 0–0.7)
EOSINOPHIL NFR BLD AUTO: 1.7 %
ERYTHROCYTE [DISTWIDTH] IN BLOOD BY AUTOMATED COUNT: 15.3 % (ref 11.5–14.5)
FERRITIN SERPL-MCNC: 92 NG/ML (ref 8–150)
GLUCOSE SERPL-MCNC: 93 MG/DL (ref 74–99)
HAPTOGLOB SERPL NEPH-MCNC: 117 MG/DL (ref 30–200)
HCT VFR BLD AUTO: 28.2 % (ref 36–46)
HGB BLD-MCNC: 9.7 G/DL (ref 12–16)
IMM GRANULOCYTES # BLD AUTO: 0.05 X10*3/UL (ref 0–0.7)
IMM GRANULOCYTES NFR BLD AUTO: 1 % (ref 0–0.9)
IRON SATN MFR SERPL: 20 % (ref 25–45)
IRON SERPL-MCNC: 70 UG/DL (ref 35–150)
LDH SERPL L TO P-CCNC: 207 U/L (ref 84–246)
LYMPHOCYTES # BLD AUTO: 1.22 X10*3/UL (ref 1.2–4.8)
LYMPHOCYTES NFR BLD AUTO: 23.6 %
MCH RBC QN AUTO: 25.1 PG (ref 26–34)
MCHC RBC AUTO-ENTMCNC: 34.4 G/DL (ref 32–36)
MCV RBC AUTO: 73 FL (ref 80–100)
MONOCYTES # BLD AUTO: 0.51 X10*3/UL (ref 0.1–1)
MONOCYTES NFR BLD AUTO: 9.8 %
NEUTROPHILS # BLD AUTO: 3.27 X10*3/UL (ref 1.2–7.7)
NEUTROPHILS NFR BLD AUTO: 63.1 %
NRBC BLD-RTO: 0 /100 WBCS (ref 0–0)
PLATELET # BLD AUTO: 344 X10*3/UL (ref 150–450)
POTASSIUM SERPL-SCNC: 3.8 MMOL/L (ref 3.5–5.3)
PROT SERPL-MCNC: 7.4 G/DL (ref 6.4–8.2)
RBC # BLD AUTO: 3.86 X10*6/UL (ref 4–5.2)
SODIUM SERPL-SCNC: 143 MMOL/L (ref 136–145)
TIBC SERPL-MCNC: 350 UG/DL (ref 240–445)
UIBC SERPL-MCNC: 280 UG/DL (ref 110–370)
WBC # BLD AUTO: 5.2 X10*3/UL (ref 4.4–11.3)

## 2025-02-24 PROCEDURE — 83615 LACTATE (LD) (LDH) ENZYME: CPT

## 2025-02-24 PROCEDURE — 80053 COMPREHEN METABOLIC PANEL: CPT

## 2025-02-24 PROCEDURE — 83540 ASSAY OF IRON: CPT

## 2025-02-24 PROCEDURE — 36415 COLL VENOUS BLD VENIPUNCTURE: CPT

## 2025-02-24 PROCEDURE — 99213 OFFICE O/P EST LOW 20 MIN: CPT | Performed by: STUDENT IN AN ORGANIZED HEALTH CARE EDUCATION/TRAINING PROGRAM

## 2025-02-24 PROCEDURE — 96401 CHEMO ANTI-NEOPL SQ/IM: CPT

## 2025-02-24 PROCEDURE — 85025 COMPLETE CBC W/AUTO DIFF WBC: CPT

## 2025-02-24 PROCEDURE — 2500000004 HC RX 250 GENERAL PHARMACY W/ HCPCS (ALT 636 FOR OP/ED): Performed by: STUDENT IN AN ORGANIZED HEALTH CARE EDUCATION/TRAINING PROGRAM

## 2025-02-24 PROCEDURE — 82728 ASSAY OF FERRITIN: CPT

## 2025-02-24 PROCEDURE — 83010 ASSAY OF HAPTOGLOBIN QUANT: CPT

## 2025-02-24 RX ORDER — ALBUTEROL SULFATE 0.83 MG/ML
3 SOLUTION RESPIRATORY (INHALATION) AS NEEDED
Status: CANCELLED | OUTPATIENT
Start: 2025-02-24

## 2025-02-24 RX ORDER — PROCHLORPERAZINE EDISYLATE 5 MG/ML
10 INJECTION INTRAMUSCULAR; INTRAVENOUS EVERY 6 HOURS PRN
Status: CANCELLED | OUTPATIENT
Start: 2025-02-24

## 2025-02-24 RX ORDER — PROCHLORPERAZINE MALEATE 10 MG
10 TABLET ORAL EVERY 6 HOURS PRN
Status: CANCELLED | OUTPATIENT
Start: 2025-02-24

## 2025-02-24 RX ORDER — BORTEZOMIB 3.5 MG/1
1.3 INJECTION, POWDER, LYOPHILIZED, FOR SOLUTION INTRAVENOUS; SUBCUTANEOUS ONCE
OUTPATIENT
Start: 2025-03-24

## 2025-02-24 RX ORDER — EPINEPHRINE 0.3 MG/.3ML
0.3 INJECTION SUBCUTANEOUS EVERY 5 MIN PRN
OUTPATIENT
Start: 2025-03-10

## 2025-02-24 RX ORDER — BORTEZOMIB 3.5 MG/1
1.3 INJECTION, POWDER, LYOPHILIZED, FOR SOLUTION INTRAVENOUS; SUBCUTANEOUS ONCE
Status: CANCELLED | OUTPATIENT
Start: 2025-03-03

## 2025-02-24 RX ORDER — ONDANSETRON HYDROCHLORIDE 8 MG/1
4 TABLET, FILM COATED ORAL ONCE
Status: CANCELLED | OUTPATIENT
Start: 2025-03-03

## 2025-02-24 RX ORDER — PROCHLORPERAZINE EDISYLATE 5 MG/ML
10 INJECTION INTRAMUSCULAR; INTRAVENOUS EVERY 6 HOURS PRN
Status: CANCELLED | OUTPATIENT
Start: 2025-03-03

## 2025-02-24 RX ORDER — FAMOTIDINE 10 MG/ML
20 INJECTION, SOLUTION INTRAVENOUS ONCE AS NEEDED
OUTPATIENT
Start: 2025-03-24

## 2025-02-24 RX ORDER — DIPHENHYDRAMINE HYDROCHLORIDE 50 MG/ML
50 INJECTION INTRAMUSCULAR; INTRAVENOUS AS NEEDED
Status: CANCELLED | OUTPATIENT
Start: 2025-03-03

## 2025-02-24 RX ORDER — BORTEZOMIB 3.5 MG/1
1.3 INJECTION, POWDER, LYOPHILIZED, FOR SOLUTION INTRAVENOUS; SUBCUTANEOUS ONCE
OUTPATIENT
Start: 2025-03-10

## 2025-02-24 RX ORDER — ALBUTEROL SULFATE 0.83 MG/ML
3 SOLUTION RESPIRATORY (INHALATION) AS NEEDED
OUTPATIENT
Start: 2025-03-10

## 2025-02-24 RX ORDER — ONDANSETRON HYDROCHLORIDE 8 MG/1
4 TABLET, FILM COATED ORAL ONCE
OUTPATIENT
Start: 2025-03-24

## 2025-02-24 RX ORDER — EPINEPHRINE 0.3 MG/.3ML
0.3 INJECTION SUBCUTANEOUS EVERY 5 MIN PRN
Status: CANCELLED | OUTPATIENT
Start: 2025-03-03

## 2025-02-24 RX ORDER — PROCHLORPERAZINE MALEATE 10 MG
10 TABLET ORAL EVERY 6 HOURS PRN
OUTPATIENT
Start: 2025-03-24

## 2025-02-24 RX ORDER — PROCHLORPERAZINE EDISYLATE 5 MG/ML
10 INJECTION INTRAMUSCULAR; INTRAVENOUS EVERY 6 HOURS PRN
OUTPATIENT
Start: 2025-03-24

## 2025-02-24 RX ORDER — FAMOTIDINE 10 MG/ML
20 INJECTION, SOLUTION INTRAVENOUS ONCE AS NEEDED
OUTPATIENT
Start: 2025-03-10

## 2025-02-24 RX ORDER — EPINEPHRINE 0.3 MG/.3ML
0.3 INJECTION SUBCUTANEOUS EVERY 5 MIN PRN
OUTPATIENT
Start: 2025-03-24

## 2025-02-24 RX ORDER — ONDANSETRON HYDROCHLORIDE 8 MG/1
4 TABLET, FILM COATED ORAL ONCE
OUTPATIENT
Start: 2025-03-10

## 2025-02-24 RX ORDER — FAMOTIDINE 10 MG/ML
20 INJECTION, SOLUTION INTRAVENOUS ONCE AS NEEDED
Status: CANCELLED | OUTPATIENT
Start: 2025-02-24

## 2025-02-24 RX ORDER — BORTEZOMIB 3.5 MG/1
1.3 INJECTION, POWDER, LYOPHILIZED, FOR SOLUTION INTRAVENOUS; SUBCUTANEOUS ONCE
Status: COMPLETED | OUTPATIENT
Start: 2025-02-24 | End: 2025-02-24

## 2025-02-24 RX ORDER — BORTEZOMIB 3.5 MG/1
1.3 INJECTION, POWDER, LYOPHILIZED, FOR SOLUTION INTRAVENOUS; SUBCUTANEOUS ONCE
Status: CANCELLED | OUTPATIENT
Start: 2025-02-24

## 2025-02-24 RX ORDER — PROCHLORPERAZINE MALEATE 10 MG
10 TABLET ORAL EVERY 6 HOURS PRN
OUTPATIENT
Start: 2025-03-10

## 2025-02-24 RX ORDER — ONDANSETRON HYDROCHLORIDE 8 MG/1
4 TABLET, FILM COATED ORAL ONCE
Status: CANCELLED | OUTPATIENT
Start: 2025-02-24

## 2025-02-24 RX ORDER — EPINEPHRINE 0.3 MG/.3ML
0.3 INJECTION SUBCUTANEOUS EVERY 5 MIN PRN
Status: CANCELLED | OUTPATIENT
Start: 2025-02-24

## 2025-02-24 RX ORDER — DIPHENHYDRAMINE HYDROCHLORIDE 50 MG/ML
50 INJECTION INTRAMUSCULAR; INTRAVENOUS AS NEEDED
Status: CANCELLED | OUTPATIENT
Start: 2025-02-24

## 2025-02-24 RX ORDER — ONDANSETRON 4 MG/1
4 TABLET, FILM COATED ORAL ONCE
Status: COMPLETED | OUTPATIENT
Start: 2025-02-24 | End: 2025-02-24

## 2025-02-24 RX ORDER — ALBUTEROL SULFATE 0.83 MG/ML
3 SOLUTION RESPIRATORY (INHALATION) AS NEEDED
Status: CANCELLED | OUTPATIENT
Start: 2025-03-03

## 2025-02-24 RX ORDER — DIPHENHYDRAMINE HYDROCHLORIDE 50 MG/ML
50 INJECTION INTRAMUSCULAR; INTRAVENOUS AS NEEDED
OUTPATIENT
Start: 2025-03-24

## 2025-02-24 RX ORDER — PROCHLORPERAZINE EDISYLATE 5 MG/ML
10 INJECTION INTRAMUSCULAR; INTRAVENOUS EVERY 6 HOURS PRN
OUTPATIENT
Start: 2025-03-10

## 2025-02-24 RX ORDER — DIPHENHYDRAMINE HYDROCHLORIDE 50 MG/ML
50 INJECTION INTRAMUSCULAR; INTRAVENOUS AS NEEDED
OUTPATIENT
Start: 2025-03-10

## 2025-02-24 RX ORDER — ALBUTEROL SULFATE 0.83 MG/ML
3 SOLUTION RESPIRATORY (INHALATION) AS NEEDED
OUTPATIENT
Start: 2025-03-24

## 2025-02-24 RX ORDER — PROCHLORPERAZINE MALEATE 10 MG
10 TABLET ORAL EVERY 6 HOURS PRN
Status: CANCELLED | OUTPATIENT
Start: 2025-03-03

## 2025-02-24 RX ORDER — FAMOTIDINE 10 MG/ML
20 INJECTION, SOLUTION INTRAVENOUS ONCE AS NEEDED
Status: CANCELLED | OUTPATIENT
Start: 2025-03-03

## 2025-02-24 RX ADMIN — ONDANSETRON HYDROCHLORIDE 4 MG: 4 TABLET, FILM COATED ORAL at 13:16

## 2025-02-24 RX ADMIN — BORTEZOMIB 2.5 MG: 3.5 INJECTION, POWDER, LYOPHILIZED, FOR SOLUTION INTRAVENOUS; SUBCUTANEOUS at 13:16

## 2025-02-24 ASSESSMENT — PAIN SCALES - GENERAL: PAINLEVEL_OUTOF10: 0-NO PAIN

## 2025-02-24 NOTE — PROGRESS NOTES
Patient arrived to infusion for velcade. Patient tolerated infusion well without incident. Schedule reviewed with patient. Patient off unit independently.

## 2025-02-24 NOTE — PROGRESS NOTES
Patient ID: Tere Carrion is a 54 y.o. female.  Referring Physician: Tree Du MD  [address] [place] [address]  Primary Care Provider: Modesta Gallego DO  Visit Type: Follow Up  Diagnosis: immune TTP    Therapy summary (diagnosis: iTTP):   Rituximab: 6/21, 6/28, 7/8, 7/15, to be completed 7/15/2024  Cablivi: 6/21-to be completed  Cyclophosphamide: 8/21/2024, 9/11/24, 10/2/24, 10/24/24, 11/13/2024, 12/4/2024  Bortezomib: 1/13/2025-2/3/2025, 02/24/25        Subjective    HPI  54 y.o. female with a PMH significant for TTP in 1998/1999 (treated with prolonged (87) plasmapheresis, steroids, and immunosuppressants including vincristine and azathioprine, and splenectomy March 1999), reported sickle cell trait and alpha thalassemia trait, migraines.    Re TTP:   She was recently seen inpatient for immune TTP where she presented on 6/18/24 for 2 weeks of worsening generalized weakness, fatigue, migraines, hematuria, and new bruising with petechiae, with TTP. She was started on PLEX, did not receive Cablivi upfront 2/2 hematuria, which resolved over the last 3 days. She received PLEX x 3 (last on 6/21/24).    HIV, Hepatitis panel: Non-reactive  B12, folate, iron profile: Normal  T spot: Negative  Shiga toxin -ve   ELBERT -ve, SPEP work up negative for clonal protein   ADAMTS <5, inhibitor level 2.4--> >8.0    Re splenectomy, is followed by PCP for vaccination notes she is caught up at this time.   Notices fatigue since last 3 days, some skin bruising but no bleeding. Perimenopausal. Currently on pred 40mg, on taper.   Age appropriate cancer screening: mammo '23 due soon. [place] '20 due next year.   FMH: no cancer, mother had a UE DVT post-op, mother has many polyps, maternal GF had colon cancer, mother's sibs had colon cancer too   Social history: never smoker, no ETOH, no RDU.   08/21/24: Presents alone for follow-up.  The patient is being seen in treatment.  09/09/24: headaches left sided, since last 1  month daily, takes excedrin migraine daily occasionally ibuprofen. Lightheaded when stands majority of the times. Couple days of nausea after the cytoxan.   24: no significant complaints, is tolerating cytoxan well. Dose noted to be at 400mg/m2 with no response in terms of inhibitor or ODTTRZ65 level, escalated to 500mg/m2. Dose range reported up to 750mg/m2. D/w pt will likely need to complete 6 cycles.   10/24/24: presents with son. Asymptomatic. Has no particular complaints.   24: presents alone, notices some GI cramps on day 3 after dosing, but otherwise tolerates well.  24: asymptomatic, ANC lower end of normal, [id number] still undetectable at last check with high titer inhibitor.  24: pt remains asymptomatic, after initial elevation in IUNNMT78 (although this does not appear to be true as the inhibitor was quite high) there is no sustained response.   02/10/25: noticed diarrhea around midnight day of infusion, this time also nausea, abdominal pain, decreased appetite, did not do any viral testing. Did feel the same as she has had every dose. Noticed some nosebleeding yesterday, some spotting through the day.       Review of Systems - Oncology  10 point review of systems negative except as stated in HPI    Objective   Past Surgical History:   Procedure Laterality Date   • BREAST BIOPSY     • BREAST CYST EXCISION     • BREAST SURGERY     •  SECTION, LOW TRANSVERSE     • TUBAL LIGATION       Oncology History    No history exists.       BSA: 1.94 meters squared /68 (BP Location: Right arm, Patient Position: Sitting, BP Cuff Size: Adult)   Pulse 79   Temp 36 °C (96.8 °F) (Skin)   Resp 16   Wt 81.6 kg (179 lb 12.8 oz)   SpO2 100%   BMI 29.67 kg/m²   Physical Exam  Vitals reviewed.   Constitutional:       General: She is not in acute distress.     Appearance: She is not toxic-appearing.   HENT:      Head: Normocephalic and atraumatic.   Cardiovascular:      Rate and  Rhythm: Normal rate and regular rhythm.   Pulmonary:      Effort: Pulmonary effort is normal.   Skin:     Comments: No bruising    Neurological:      General: No focal deficit present.      Mental Status: She is oriented to person, place, and time.   Psychiatric:         Mood and Affect: Mood normal.      07/08/24 11:29 07/15/24 13:20 08/27/24 15:07 12/02/24 10:41 12/23/24 15:57 12/31/24 15:02 01/21/25 15:48 01/28/25 15:44 02/03/25 10:34   RLXRHA51 Activity Value <5 (L) <5 (L) <5 (L) 7 (L) <5 (L) <5 (L) <5 (L) <5 (L) <5 (L)   ZHYANO42 Inhibitor Value >8.0 (H) >8.0 (H) >8.0 (H) >8.5 (H) 6.4 (H) 5.6 (H) 4.5 (H) 3.0 (H) 1.9 (H)       Assessment/Plan    54 y.o. female with a PMH significant for  TTP in 1998/1999 (treated with prolonged (87) plasmapheresis, steroids, and immunosuppressants including vincristine and azathioprine, and splenectomy March 1999), reported sickle cell trait and alpha thalassemia trait [confirmed on retesting].     # Immune TTP: Patient was initially diagnosed in 1998-99, had prolonged plasmapheresis followed by immunosuppression with vincristine, azathioprine and eventually splenectomy in March 1999, has been followed at [facility] since then and has had no evidence of relapse.  As noted above the patient presented with hematuria and was eventually diagnosed with immune TTP with an antibody titer of 2.4--> >8.0.  She was started on Cablivi and rituximab x1 cycle, then cyclophosphamide x6 cycles with no change in SYSCQA83 and inhibitor levels. Maintained on cablivi and is clinically asymptomatic.   ,  We discussed alternate therapy including cyclosporine and bortezomib. Her recent flow showed a significant absence B cells, therefore did not redose ritux, and instead started bortezomib. Has noted many GI s/s after dosing for about 48hrs but then full recovery. Inhibitor has been dropping for the first time since we started treating her.   Plan:   - will continue Cablivi until HAUVYD47 is >20%, but  will skip today, will call us back if she notices bleeding and we can do alternate day dosing.   - pause bortezomib for 2 weeks while she recovers and [place] [facility]  - weekly CBC/diff and close monitoring for bleeding with thrombocytopenia from drug and TTP and cablivi   -The patient has been educated about the signs and symptoms of TTP and the process of contacting us or heading to an ER as needed.  - follow up next: 2 weeks   - labs prior to follow up: Noted above      Tree Walker MD

## 2025-02-27 ENCOUNTER — SPECIALTY PHARMACY (OUTPATIENT)
Dept: PHARMACY | Facility: CLINIC | Age: 55
End: 2025-02-27

## 2025-02-27 PROCEDURE — RXMED WILLOW AMBULATORY MEDICATION CHARGE

## 2025-02-28 ENCOUNTER — PHARMACY VISIT (OUTPATIENT)
Dept: PHARMACY | Facility: CLINIC | Age: 55
End: 2025-02-28
Payer: COMMERCIAL

## 2025-03-03 ENCOUNTER — LAB (OUTPATIENT)
Dept: LAB | Facility: CLINIC | Age: 55
End: 2025-03-03
Payer: COMMERCIAL

## 2025-03-03 ENCOUNTER — INFUSION (OUTPATIENT)
Dept: HEMATOLOGY/ONCOLOGY | Facility: CLINIC | Age: 55
End: 2025-03-03
Payer: COMMERCIAL

## 2025-03-03 ENCOUNTER — APPOINTMENT (OUTPATIENT)
Dept: HEMATOLOGY/ONCOLOGY | Facility: HOSPITAL | Age: 55
End: 2025-03-03
Payer: COMMERCIAL

## 2025-03-03 VITALS
TEMPERATURE: 96.3 F | RESPIRATION RATE: 16 BRPM | OXYGEN SATURATION: 97 % | BODY MASS INDEX: 29.36 KG/M2 | DIASTOLIC BLOOD PRESSURE: 71 MMHG | SYSTOLIC BLOOD PRESSURE: 119 MMHG | HEART RATE: 81 BPM | WEIGHT: 177.91 LBS

## 2025-03-03 DIAGNOSIS — R82.998 FROTHY URINE: Primary | ICD-10-CM

## 2025-03-03 DIAGNOSIS — M31.19 ACQUIRED TTP (MULTI): ICD-10-CM

## 2025-03-03 LAB
ALBUMIN SERPL BCP-MCNC: 4.1 G/DL (ref 3.4–5)
ALP SERPL-CCNC: 46 U/L (ref 33–110)
ALT SERPL W P-5'-P-CCNC: 10 U/L (ref 7–45)
ANION GAP SERPL CALC-SCNC: 12 MMOL/L (ref 10–20)
AST SERPL W P-5'-P-CCNC: 15 U/L (ref 9–39)
BASOPHILS # BLD AUTO: 0.03 X10*3/UL (ref 0–0.1)
BASOPHILS NFR BLD AUTO: 0.7 %
BILIRUB SERPL-MCNC: 0.4 MG/DL (ref 0–1.2)
BUN SERPL-MCNC: 10 MG/DL (ref 6–23)
CALCIUM SERPL-MCNC: 9.3 MG/DL (ref 8.6–10.3)
CHLORIDE SERPL-SCNC: 105 MMOL/L (ref 98–107)
CO2 SERPL-SCNC: 29 MMOL/L (ref 21–32)
CREAT SERPL-MCNC: 0.79 MG/DL (ref 0.5–1.05)
EGFRCR SERPLBLD CKD-EPI 2021: 88 ML/MIN/1.73M*2
EOSINOPHIL # BLD AUTO: 0.07 X10*3/UL (ref 0–0.7)
EOSINOPHIL NFR BLD AUTO: 1.6 %
ERYTHROCYTE [DISTWIDTH] IN BLOOD BY AUTOMATED COUNT: 14.4 % (ref 11.5–14.5)
GLUCOSE SERPL-MCNC: 85 MG/DL (ref 74–99)
HAPTOGLOB SERPL NEPH-MCNC: 102 MG/DL (ref 30–200)
HCT VFR BLD AUTO: 26.8 % (ref 36–46)
HGB BLD-MCNC: 9.2 G/DL (ref 12–16)
IMM GRANULOCYTES # BLD AUTO: 0.01 X10*3/UL (ref 0–0.7)
IMM GRANULOCYTES NFR BLD AUTO: 0.2 % (ref 0–0.9)
LDH SERPL L TO P-CCNC: 169 U/L (ref 84–246)
LYMPHOCYTES # BLD AUTO: 1.93 X10*3/UL (ref 1.2–4.8)
LYMPHOCYTES NFR BLD AUTO: 43.3 %
MCH RBC QN AUTO: 25.1 PG (ref 26–34)
MCHC RBC AUTO-ENTMCNC: 34.3 G/DL (ref 32–36)
MCV RBC AUTO: 73 FL (ref 80–100)
MONOCYTES # BLD AUTO: 0.62 X10*3/UL (ref 0.1–1)
MONOCYTES NFR BLD AUTO: 13.9 %
NEUTROPHILS # BLD AUTO: 1.8 X10*3/UL (ref 1.2–7.7)
NEUTROPHILS NFR BLD AUTO: 40.3 %
PLATELET # BLD AUTO: 243 X10*3/UL (ref 150–450)
POTASSIUM SERPL-SCNC: 3.7 MMOL/L (ref 3.5–5.3)
PROT SERPL-MCNC: 6.8 G/DL (ref 6.4–8.2)
RBC # BLD AUTO: 3.67 X10*6/UL (ref 4–5.2)
SODIUM SERPL-SCNC: 142 MMOL/L (ref 136–145)
WBC # BLD AUTO: 4.5 X10*3/UL (ref 4.4–11.3)

## 2025-03-03 PROCEDURE — 2500000004 HC RX 250 GENERAL PHARMACY W/ HCPCS (ALT 636 FOR OP/ED): Performed by: STUDENT IN AN ORGANIZED HEALTH CARE EDUCATION/TRAINING PROGRAM

## 2025-03-03 PROCEDURE — 83615 LACTATE (LD) (LDH) ENZYME: CPT

## 2025-03-03 PROCEDURE — 96401 CHEMO ANTI-NEOPL SQ/IM: CPT

## 2025-03-03 PROCEDURE — 85025 COMPLETE CBC W/AUTO DIFF WBC: CPT

## 2025-03-03 PROCEDURE — 84075 ASSAY ALKALINE PHOSPHATASE: CPT

## 2025-03-03 PROCEDURE — 83010 ASSAY OF HAPTOGLOBIN QUANT: CPT

## 2025-03-03 PROCEDURE — 36415 COLL VENOUS BLD VENIPUNCTURE: CPT

## 2025-03-03 RX ORDER — FAMOTIDINE 10 MG/ML
20 INJECTION, SOLUTION INTRAVENOUS ONCE AS NEEDED
Status: DISCONTINUED | OUTPATIENT
Start: 2025-03-03 | End: 2025-03-03 | Stop reason: HOSPADM

## 2025-03-03 RX ORDER — ALBUTEROL SULFATE 0.83 MG/ML
3 SOLUTION RESPIRATORY (INHALATION) AS NEEDED
Status: DISCONTINUED | OUTPATIENT
Start: 2025-03-03 | End: 2025-03-03 | Stop reason: HOSPADM

## 2025-03-03 RX ORDER — EPINEPHRINE 0.3 MG/.3ML
0.3 INJECTION SUBCUTANEOUS EVERY 5 MIN PRN
Status: DISCONTINUED | OUTPATIENT
Start: 2025-03-03 | End: 2025-03-03 | Stop reason: HOSPADM

## 2025-03-03 RX ORDER — DIPHENHYDRAMINE HYDROCHLORIDE 50 MG/ML
50 INJECTION INTRAMUSCULAR; INTRAVENOUS AS NEEDED
Status: DISCONTINUED | OUTPATIENT
Start: 2025-03-03 | End: 2025-03-03 | Stop reason: HOSPADM

## 2025-03-03 RX ORDER — ONDANSETRON 4 MG/1
4 TABLET, FILM COATED ORAL ONCE
Status: COMPLETED | OUTPATIENT
Start: 2025-03-03 | End: 2025-03-03

## 2025-03-03 RX ORDER — PROCHLORPERAZINE MALEATE 10 MG
10 TABLET ORAL EVERY 6 HOURS PRN
Status: DISCONTINUED | OUTPATIENT
Start: 2025-03-03 | End: 2025-03-03 | Stop reason: HOSPADM

## 2025-03-03 RX ORDER — PROCHLORPERAZINE EDISYLATE 5 MG/ML
10 INJECTION INTRAMUSCULAR; INTRAVENOUS EVERY 6 HOURS PRN
Status: DISCONTINUED | OUTPATIENT
Start: 2025-03-03 | End: 2025-03-03 | Stop reason: HOSPADM

## 2025-03-03 RX ORDER — BORTEZOMIB 3.5 MG/1
1.3 INJECTION, POWDER, LYOPHILIZED, FOR SOLUTION INTRAVENOUS; SUBCUTANEOUS ONCE
Status: COMPLETED | OUTPATIENT
Start: 2025-03-03 | End: 2025-03-03

## 2025-03-03 RX ADMIN — ONDANSETRON HYDROCHLORIDE 4 MG: 4 TABLET, FILM COATED ORAL at 10:49

## 2025-03-03 RX ADMIN — BORTEZOMIB 2.5 MG: 3.5 INJECTION, POWDER, LYOPHILIZED, FOR SOLUTION INTRAVENOUS; SUBCUTANEOUS at 11:31

## 2025-03-03 ASSESSMENT — PAIN SCALES - GENERAL: PAINLEVEL_OUTOF10: 0-NO PAIN

## 2025-03-04 DIAGNOSIS — M31.19 ACQUIRED TTP (MULTI): ICD-10-CM

## 2025-03-04 PROCEDURE — RXMED WILLOW AMBULATORY MEDICATION CHARGE

## 2025-03-04 RX ORDER — CAPLACIZUMAB 11 MG
11 KIT INJECTION DAILY
Qty: 30 KIT | Refills: 1 | Status: SHIPPED | OUTPATIENT
Start: 2025-03-04

## 2025-03-07 ENCOUNTER — SPECIALTY PHARMACY (OUTPATIENT)
Dept: PHARMACY | Facility: CLINIC | Age: 55
End: 2025-03-07

## 2025-03-07 LAB
ADAMTS13 ANTIBODY VALUE: <9 ARBITARY UNITS
SCAN RESULT: ABNORMAL
VWF CP ACT/NOR PPP CHRO: 9 %
VWF CP INHIB PPP-ACNC: <0.4 INHIBITOR UNITS

## 2025-03-10 ENCOUNTER — TELEPHONE (OUTPATIENT)
Dept: HEMATOLOGY/ONCOLOGY | Facility: HOSPITAL | Age: 55
End: 2025-03-10

## 2025-03-10 ENCOUNTER — INFUSION (OUTPATIENT)
Dept: HEMATOLOGY/ONCOLOGY | Facility: CLINIC | Age: 55
End: 2025-03-10
Payer: COMMERCIAL

## 2025-03-10 ENCOUNTER — PHARMACY VISIT (OUTPATIENT)
Dept: PHARMACY | Facility: CLINIC | Age: 55
End: 2025-03-10
Payer: COMMERCIAL

## 2025-03-10 ENCOUNTER — LAB (OUTPATIENT)
Dept: LAB | Facility: CLINIC | Age: 55
End: 2025-03-10
Payer: COMMERCIAL

## 2025-03-10 VITALS
WEIGHT: 177.25 LBS | TEMPERATURE: 96.8 F | OXYGEN SATURATION: 97 % | BODY MASS INDEX: 29.25 KG/M2 | DIASTOLIC BLOOD PRESSURE: 71 MMHG | SYSTOLIC BLOOD PRESSURE: 106 MMHG | HEART RATE: 86 BPM | RESPIRATION RATE: 16 BRPM

## 2025-03-10 DIAGNOSIS — M31.19 ACQUIRED TTP (MULTI): ICD-10-CM

## 2025-03-10 DIAGNOSIS — R82.998 FROTHY URINE: ICD-10-CM

## 2025-03-10 DIAGNOSIS — M31.19 ACQUIRED TTP (MULTI): Primary | ICD-10-CM

## 2025-03-10 LAB
ALBUMIN SERPL BCP-MCNC: 4.1 G/DL (ref 3.4–5)
ALP SERPL-CCNC: 40 U/L (ref 33–110)
ALT SERPL W P-5'-P-CCNC: 10 U/L (ref 7–45)
ANION GAP SERPL CALC-SCNC: 11 MMOL/L (ref 10–20)
APPEARANCE UR: CLEAR
AST SERPL W P-5'-P-CCNC: 16 U/L (ref 9–39)
BACTERIA #/AREA URNS AUTO: ABNORMAL /HPF
BASOPHILS # BLD AUTO: 0.07 X10*3/UL (ref 0–0.1)
BASOPHILS NFR BLD AUTO: 1.2 %
BILIRUB SERPL-MCNC: 0.5 MG/DL (ref 0–1.2)
BILIRUB UR STRIP.AUTO-MCNC: NEGATIVE MG/DL
BUN SERPL-MCNC: 12 MG/DL (ref 6–23)
CALCIUM SERPL-MCNC: 9.1 MG/DL (ref 8.6–10.3)
CHLORIDE SERPL-SCNC: 105 MMOL/L (ref 98–107)
CO2 SERPL-SCNC: 29 MMOL/L (ref 21–32)
COLOR UR: YELLOW
CREAT SERPL-MCNC: 0.85 MG/DL (ref 0.5–1.05)
EGFRCR SERPLBLD CKD-EPI 2021: 81 ML/MIN/1.73M*2
EOSINOPHIL # BLD AUTO: 0.1 X10*3/UL (ref 0–0.7)
EOSINOPHIL NFR BLD AUTO: 1.8 %
ERYTHROCYTE [DISTWIDTH] IN BLOOD BY AUTOMATED COUNT: 15.4 % (ref 11.5–14.5)
GIANT PLATELETS BLD QL SMEAR: NORMAL
GLUCOSE SERPL-MCNC: 86 MG/DL (ref 74–99)
GLUCOSE UR STRIP.AUTO-MCNC: NORMAL MG/DL
HCT VFR BLD AUTO: 27 % (ref 36–46)
HGB BLD-MCNC: 9.2 G/DL (ref 12–16)
HOLD SPECIMEN: NORMAL
HOLD SPECIMEN: NORMAL
HYALINE CASTS #/AREA URNS AUTO: ABNORMAL /LPF
HYPOCHROMIA BLD QL SMEAR: NORMAL
IMM GRANULOCYTES # BLD AUTO: 0.01 X10*3/UL (ref 0–0.7)
IMM GRANULOCYTES NFR BLD AUTO: 0.2 % (ref 0–0.9)
KETONES UR STRIP.AUTO-MCNC: NEGATIVE MG/DL
LEUKOCYTE ESTERASE UR QL STRIP.AUTO: NEGATIVE
LYMPHOCYTES # BLD AUTO: 2.12 X10*3/UL (ref 1.2–4.8)
LYMPHOCYTES NFR BLD AUTO: 37.4 %
MCH RBC QN AUTO: 25.6 PG (ref 26–34)
MCHC RBC AUTO-ENTMCNC: 34.1 G/DL (ref 32–36)
MCV RBC AUTO: 75 FL (ref 80–100)
MONOCYTES # BLD AUTO: 0.57 X10*3/UL (ref 0.1–1)
MONOCYTES NFR BLD AUTO: 10.1 %
MUCOUS THREADS #/AREA URNS AUTO: ABNORMAL /LPF
NEUTROPHILS # BLD AUTO: 2.8 X10*3/UL (ref 1.2–7.7)
NEUTROPHILS NFR BLD AUTO: 49.3 %
NITRITE UR QL STRIP.AUTO: NEGATIVE
NRBC BLD-RTO: ABNORMAL /100{WBCS}
OVALOCYTES BLD QL SMEAR: NORMAL
PH UR STRIP.AUTO: 6 [PH]
PLATELET # BLD AUTO: 233 X10*3/UL (ref 150–450)
POTASSIUM SERPL-SCNC: 4.1 MMOL/L (ref 3.5–5.3)
PROT SERPL-MCNC: 6.5 G/DL (ref 6.4–8.2)
PROT UR STRIP.AUTO-MCNC: ABNORMAL MG/DL
RBC # BLD AUTO: 3.59 X10*6/UL (ref 4–5.2)
RBC # UR STRIP.AUTO: ABNORMAL MG/DL
RBC #/AREA URNS AUTO: ABNORMAL /HPF
RBC MORPH BLD: NORMAL
SODIUM SERPL-SCNC: 141 MMOL/L (ref 136–145)
SP GR UR STRIP.AUTO: 1.02
SQUAMOUS #/AREA URNS AUTO: ABNORMAL /HPF
UROBILINOGEN UR STRIP.AUTO-MCNC: NORMAL MG/DL
WBC # BLD AUTO: 5.7 X10*3/UL (ref 4.4–11.3)
WBC #/AREA URNS AUTO: ABNORMAL /HPF

## 2025-03-10 PROCEDURE — 81001 URINALYSIS AUTO W/SCOPE: CPT

## 2025-03-10 PROCEDURE — 2500000004 HC RX 250 GENERAL PHARMACY W/ HCPCS (ALT 636 FOR OP/ED): Performed by: STUDENT IN AN ORGANIZED HEALTH CARE EDUCATION/TRAINING PROGRAM

## 2025-03-10 PROCEDURE — 96401 CHEMO ANTI-NEOPL SQ/IM: CPT

## 2025-03-10 PROCEDURE — 36415 COLL VENOUS BLD VENIPUNCTURE: CPT

## 2025-03-10 PROCEDURE — 84075 ASSAY ALKALINE PHOSPHATASE: CPT

## 2025-03-10 PROCEDURE — 85025 COMPLETE CBC W/AUTO DIFF WBC: CPT

## 2025-03-10 RX ORDER — BORTEZOMIB 3.5 MG/1
1.3 INJECTION, POWDER, LYOPHILIZED, FOR SOLUTION INTRAVENOUS; SUBCUTANEOUS ONCE
Status: COMPLETED | OUTPATIENT
Start: 2025-03-10 | End: 2025-03-10

## 2025-03-10 RX ORDER — ONDANSETRON 4 MG/1
4 TABLET, FILM COATED ORAL ONCE
Status: COMPLETED | OUTPATIENT
Start: 2025-03-10 | End: 2025-03-10

## 2025-03-10 RX ADMIN — ONDANSETRON HYDROCHLORIDE 4 MG: 4 TABLET, FILM COATED ORAL at 14:12

## 2025-03-10 RX ADMIN — BORTEZOMIB 2.5 MG: 3.5 INJECTION, POWDER, LYOPHILIZED, FOR SOLUTION INTRAVENOUS; SUBCUTANEOUS at 14:29

## 2025-03-10 ASSESSMENT — PAIN SCALES - GENERAL: PAINLEVEL_OUTOF10: 0-NO PAIN

## 2025-03-10 NOTE — SIGNIFICANT EVENT
03/10/25 1402   Prechemo Checklist   Has the patient been in the hospital, ED, or urgent care since last date of service No   Chemo/Immuno Consent Completed and Signed Not applicable  (NON-ONC (TTP - no consent needed))   Protocol/Indications Verified Yes   Confirmed to previous date/time of medication Yes  (C2D15 today)   Compared to previous dose Yes   All medications are dated accurately Yes   Pregnancy Test Negative Not applicable   Parameters Met Yes  (ANC: 2.80, PLTs: 233, bili: 0.5)   BSA/Weight-Height Verified Yes   Dose Calculations Verified (current, total, cumulative) Yes

## 2025-03-10 NOTE — TELEPHONE ENCOUNTER
----- Message from Tree Du sent at 3/8/2025 12:03 AM EST -----  Can you please let her know her OXMLRA35 is detectable, continue cablivi until >20%

## 2025-03-10 NOTE — TELEPHONE ENCOUNTER
RN called and left a message for the patient that mychart message will be sent to her to go over message from Dr. Walker. RN advised to call the office back with any questions.

## 2025-03-12 PROCEDURE — RXMED WILLOW AMBULATORY MEDICATION CHARGE

## 2025-03-13 ENCOUNTER — SPECIALTY PHARMACY (OUTPATIENT)
Dept: PHARMACY | Facility: CLINIC | Age: 55
End: 2025-03-13

## 2025-03-13 ENCOUNTER — PHARMACY VISIT (OUTPATIENT)
Dept: PHARMACY | Facility: CLINIC | Age: 55
End: 2025-03-13
Payer: COMMERCIAL

## 2025-03-13 PROCEDURE — RXMED WILLOW AMBULATORY MEDICATION CHARGE

## 2025-03-17 LAB
ADAMTS13 ANTIBODY VALUE: <9 ARBITARY UNITS
SCAN RESULT: ABNORMAL
VWF CP ACT/NOR PPP CHRO: 6 %
VWF CP INHIB PPP-ACNC: <0.4 INHIBITOR UNITS

## 2025-03-18 LAB
ADAMTS13 ANTIBODY VALUE: <9 ARBITARY UNITS
SCAN RESULT: ABNORMAL
VWF CP ACT/NOR PPP CHRO: 14 %
VWF CP INHIB PPP-ACNC: <0.4 INHIBITOR UNITS

## 2025-03-19 ENCOUNTER — TELEPHONE (OUTPATIENT)
Dept: HEMATOLOGY/ONCOLOGY | Facility: HOSPITAL | Age: 55
End: 2025-03-19

## 2025-03-19 NOTE — TELEPHONE ENCOUNTER
"Called patient regarding plan for next treatment scheduled for 3/24/25. She states that she is in [place] visiting family. She reports difficulty with a \"sty\" in her eye that started Sunday PM. Crusting on her eye in the morning and tenderness to palpation. She thinks that it is slowly improving with warm compresses applied several times a day. Encouraged Tere to have Urgent Care evaluate if signs and symptoms not resolved by tomorrow AM. She agrees with plan and confirmed that she has no other concerns at this time. Tere states she is planning for treatment as scheduled on 3/24/25.   "

## 2025-03-20 PROCEDURE — RXMED WILLOW AMBULATORY MEDICATION CHARGE

## 2025-03-21 ENCOUNTER — PHARMACY VISIT (OUTPATIENT)
Dept: PHARMACY | Facility: CLINIC | Age: 55
End: 2025-03-21
Payer: COMMERCIAL

## 2025-03-21 NOTE — TELEPHONE ENCOUNTER
patient calls to update she did have a virtual urgent care appt and was diagnosed bacterial eye infection, was prescribed with erythromycin ointment for eye x4 times daily for 14 days. She does repot a little bit of relief and less crusting today.  She is following up on if she should proceed with appt for infusion Monday 3/24?

## 2025-03-23 ENCOUNTER — OFFICE VISIT (OUTPATIENT)
Dept: URGENT CARE | Age: 55
End: 2025-03-23
Payer: COMMERCIAL

## 2025-03-23 VITALS
HEART RATE: 85 BPM | DIASTOLIC BLOOD PRESSURE: 64 MMHG | WEIGHT: 177 LBS | HEIGHT: 64 IN | RESPIRATION RATE: 16 BRPM | TEMPERATURE: 97.6 F | BODY MASS INDEX: 30.22 KG/M2 | OXYGEN SATURATION: 98 % | SYSTOLIC BLOOD PRESSURE: 96 MMHG

## 2025-03-23 DIAGNOSIS — H01.004 BLEPHARITIS OF LEFT UPPER EYELID, UNSPECIFIED TYPE: Primary | ICD-10-CM

## 2025-03-23 PROCEDURE — 3008F BODY MASS INDEX DOCD: CPT | Performed by: NURSE PRACTITIONER

## 2025-03-23 PROCEDURE — 1036F TOBACCO NON-USER: CPT | Performed by: NURSE PRACTITIONER

## 2025-03-23 PROCEDURE — 99203 OFFICE O/P NEW LOW 30 MIN: CPT | Performed by: NURSE PRACTITIONER

## 2025-03-23 RX ORDER — BACITRACIN 500 [USP'U]/G
0.5 OINTMENT OPHTHALMIC 3 TIMES DAILY
Qty: 3.5 G | Refills: 0 | Status: SHIPPED | OUTPATIENT
Start: 2025-03-23 | End: 2025-04-02

## 2025-03-23 ASSESSMENT — ENCOUNTER SYMPTOMS
PSYCHIATRIC NEGATIVE: 1
HEMATOLOGIC/LYMPHATIC NEGATIVE: 1
ALLERGIC/IMMUNOLOGIC NEGATIVE: 1
PHOTOPHOBIA: 0
GASTROINTESTINAL NEGATIVE: 1
RESPIRATORY NEGATIVE: 1
MUSCULOSKELETAL NEGATIVE: 1
EYE PAIN: 1
CONSTITUTIONAL NEGATIVE: 1
PALPITATIONS: 0
ENDOCRINE NEGATIVE: 1
NEUROLOGICAL NEGATIVE: 1

## 2025-03-23 ASSESSMENT — VISUAL ACUITY: OU: 1

## 2025-03-23 ASSESSMENT — PAIN SCALES - GENERAL: PAINLEVEL_OUTOF10: 7

## 2025-03-23 NOTE — PROGRESS NOTES
Subjective   Patient ID: Tere Carrion is a 55 y.o. female. They present today with a chief complaint of Eye Problem (Concerns started on 3/15/25 with redness. Was seen and given an antibiotic. Used antib on Thursday & Friday. Now c/o swelling, pain, discharge and burning in lt eye).    History of Present Illness    Eye Problem  Associated symptoms: no photophobia        Pt presents to urgent care with c/o    bilateral Upper eyelid swelling for the past 3 days .   Patient reports pain along eyelash line.  Patient states she was seen via virtual visit at another urgent care 3 days ago and was diagnosed with blepharitis.  She was given prescription erythromycin eye ointment which she only used for 2 days.  States left upper eyelid swelling has gotten worse.  She thinks she may be allergic to the erythromycin ointment.   She denies visual changes.  Reports when she wakes up in the morning she has a lot of crusting around both eyes.Pt denies CP, SOB, palpitations, fevers, abd pain, n/v/d, sick contacts, recent travel.        Past Medical History  Allergies as of 2025 - Reviewed 2025   Allergen Reaction Noted    Shellfish derived Anaphylaxis 2023    Latex Hives 2005    Vancomycin Hives 2000       (Not in a hospital admission)       Past Medical History:   Diagnosis Date    Anemia     Anxiety     Atypical ductal hyperplasia, breast     Breast cyst     Clotting disorder (Multi)     Fibrocystic breast     Headache     Sickle cell anemia (Multi) Sickle cell trait    Urinary tract infection        Past Surgical History:   Procedure Laterality Date    BREAST BIOPSY      BREAST CYST EXCISION      BREAST SURGERY       SECTION, LOW TRANSVERSE      TUBAL LIGATION          reports that she has never smoked. She has never used smokeless tobacco. She reports that she does not drink alcohol and does not use drugs.    Review of Systems  Review of Systems   Constitutional: Negative.   "  HENT: Negative.     Eyes:  Positive for pain. Negative for photophobia and visual disturbance.   Respiratory: Negative.     Cardiovascular:  Negative for chest pain and palpitations.   Gastrointestinal: Negative.    Endocrine: Negative.    Genitourinary: Negative.    Musculoskeletal: Negative.    Skin: Negative.    Allergic/Immunologic: Negative.    Neurological: Negative.    Hematological: Negative.    Psychiatric/Behavioral: Negative.     All other systems reviewed and are negative.                                 Objective    Vitals:    03/23/25 1846   BP: 96/64   Pulse: 85   Resp: 16   Temp: 36.4 °C (97.6 °F)   TempSrc: Skin   SpO2: 98%   Weight: 80.3 kg (177 lb)   Height: 1.626 m (5' 4\")     No LMP recorded. (Menstrual status: Menopausal).    Physical Exam  Vitals and nursing note reviewed.   Constitutional:       General: She is not in acute distress.     Appearance: Normal appearance. She is not ill-appearing or toxic-appearing.   HENT:      Head: Atraumatic.      Mouth/Throat:      Mouth: Mucous membranes are moist.      Pharynx: Oropharynx is clear.   Eyes:      General: Vision grossly intact. Gaze aligned appropriately.      Extraocular Movements: Extraocular movements intact.      Conjunctiva/sclera: Conjunctivae normal.      Pupils: Pupils are equal, round, and reactive to light.   Cardiovascular:      Rate and Rhythm: Normal rate.   Pulmonary:      Effort: Pulmonary effort is normal.   Skin:     General: Skin is warm and dry.   Neurological:      General: No focal deficit present.      Mental Status: She is alert and oriented to person, place, and time.   Psychiatric:         Mood and Affect: Mood normal.         Behavior: Behavior normal.         Thought Content: Thought content normal.         Procedures    Point of Care Test & Imaging Results from this visit  No results found for this visit on 03/23/25.   No results found.    Diagnostic study results (if any) were reviewed by Mariaelena Botello, " APRN-CNP.    Assessment/Plan   Allergies, medications, history, and pertinent labs/EKGs/Imaging reviewed by BERNARDINO Galarza.     Medical Decision Making   Patient presents with swelling and tenderness to palpation of the left upper eyelid.  Patient with minimal swelling to right upper eyelid which she states has been progressively improving.  She has been using warm compresses.  She was prescribed erythromycin eye ointment but only used it 2 times and symptoms did not improve.  She actually states left eye swelling has gotten progressively worse and she is concerned she may be allergic to the erythromycin ointment.  Does not appear to be allergic in nature at this time.  It appears to be worsening blepharitis.   Symptoms are localized to upper eyelid only.  there is no redness or swelling around the eye to the cheek or the face.  Will give referral to ophthalmology for follow-up.  Will DC home with prescription bacitracin ophthalmic ointment instead.At time of discharge patient was clinically well-appearing and HDS for outpatient management. The patient was educated regarding diagnosis, supportive care, OTC and Rx medications. The patient was given the opportunity to ask questions prior to discharge.  They verbalized understanding of my discussion of the plans for treatment, expected course, indications to return to  or seek further evaluation in ED, and the need for timely follow up as directed.   They were provided with a work/school excuse if requested.       Orders and Diagnoses  Diagnoses and all orders for this visit:  Blepharitis of left upper eyelid, unspecified type  -     bacitracin ophthalmic ointment; Apply 0.5 inches to left eye 3 times a day for 10 days. Apply Amount per Dose: 0.5 inch (~1 cm) per dose.  -     Referral to Ophthalmology; Future      Medical Admin Record      Patient disposition: Home    Electronically signed by BERNARDINO Galarza  7:50 PM

## 2025-03-24 ENCOUNTER — LAB (OUTPATIENT)
Dept: LAB | Facility: CLINIC | Age: 55
End: 2025-03-24
Payer: COMMERCIAL

## 2025-03-24 ENCOUNTER — INFUSION (OUTPATIENT)
Dept: HEMATOLOGY/ONCOLOGY | Facility: CLINIC | Age: 55
End: 2025-03-24
Payer: COMMERCIAL

## 2025-03-24 VITALS
HEART RATE: 92 BPM | SYSTOLIC BLOOD PRESSURE: 120 MMHG | TEMPERATURE: 96.6 F | DIASTOLIC BLOOD PRESSURE: 73 MMHG | BODY MASS INDEX: 30.73 KG/M2 | WEIGHT: 179.01 LBS | RESPIRATION RATE: 16 BRPM | OXYGEN SATURATION: 95 %

## 2025-03-24 DIAGNOSIS — M31.19 ACQUIRED TTP (MULTI): ICD-10-CM

## 2025-03-24 DIAGNOSIS — M31.19 ACQUIRED TTP (MULTI): Primary | ICD-10-CM

## 2025-03-24 LAB
ALBUMIN SERPL BCP-MCNC: 3.8 G/DL (ref 3.4–5)
ALP SERPL-CCNC: 35 U/L (ref 33–110)
ALT SERPL W P-5'-P-CCNC: 8 U/L (ref 7–45)
ANION GAP SERPL CALC-SCNC: 11 MMOL/L (ref 10–20)
AST SERPL W P-5'-P-CCNC: 13 U/L (ref 9–39)
BASOPHILS # BLD AUTO: 0.02 X10*3/UL (ref 0–0.1)
BASOPHILS NFR BLD AUTO: 0.4 %
BILIRUB SERPL-MCNC: 0.5 MG/DL (ref 0–1.2)
BUN SERPL-MCNC: 9 MG/DL (ref 6–23)
CALCIUM SERPL-MCNC: 8.6 MG/DL (ref 8.6–10.3)
CHLORIDE SERPL-SCNC: 109 MMOL/L (ref 98–107)
CO2 SERPL-SCNC: 27 MMOL/L (ref 21–32)
CREAT SERPL-MCNC: 0.75 MG/DL (ref 0.5–1.05)
EGFRCR SERPLBLD CKD-EPI 2021: >90 ML/MIN/1.73M*2
EOSINOPHIL # BLD AUTO: 0.13 X10*3/UL (ref 0–0.7)
EOSINOPHIL NFR BLD AUTO: 2.6 %
ERYTHROCYTE [DISTWIDTH] IN BLOOD BY AUTOMATED COUNT: 15.5 % (ref 11.5–14.5)
GLUCOSE SERPL-MCNC: 90 MG/DL (ref 74–99)
HCT VFR BLD AUTO: 24.7 % (ref 36–46)
HGB BLD-MCNC: 8.4 G/DL (ref 12–16)
IMM GRANULOCYTES # BLD AUTO: 0.01 X10*3/UL (ref 0–0.7)
IMM GRANULOCYTES NFR BLD AUTO: 0.2 % (ref 0–0.9)
LYMPHOCYTES # BLD AUTO: 1.33 X10*3/UL (ref 1.2–4.8)
LYMPHOCYTES NFR BLD AUTO: 26.5 %
MCH RBC QN AUTO: 25.7 PG (ref 26–34)
MCHC RBC AUTO-ENTMCNC: 34 G/DL (ref 32–36)
MCV RBC AUTO: 76 FL (ref 80–100)
MONOCYTES # BLD AUTO: 0.6 X10*3/UL (ref 0.1–1)
MONOCYTES NFR BLD AUTO: 12 %
NEUTROPHILS # BLD AUTO: 2.92 X10*3/UL (ref 1.2–7.7)
NEUTROPHILS NFR BLD AUTO: 58.3 %
PLATELET # BLD AUTO: 328 X10*3/UL (ref 150–450)
POTASSIUM SERPL-SCNC: 3.8 MMOL/L (ref 3.5–5.3)
PROT SERPL-MCNC: 6.5 G/DL (ref 6.4–8.2)
RBC # BLD AUTO: 3.27 X10*6/UL (ref 4–5.2)
SODIUM SERPL-SCNC: 143 MMOL/L (ref 136–145)
WBC # BLD AUTO: 5 X10*3/UL (ref 4.4–11.3)

## 2025-03-24 PROCEDURE — 2500000004 HC RX 250 GENERAL PHARMACY W/ HCPCS (ALT 636 FOR OP/ED): Performed by: STUDENT IN AN ORGANIZED HEALTH CARE EDUCATION/TRAINING PROGRAM

## 2025-03-24 PROCEDURE — 85025 COMPLETE CBC W/AUTO DIFF WBC: CPT

## 2025-03-24 PROCEDURE — 36415 COLL VENOUS BLD VENIPUNCTURE: CPT

## 2025-03-24 PROCEDURE — 80053 COMPREHEN METABOLIC PANEL: CPT

## 2025-03-24 PROCEDURE — 96401 CHEMO ANTI-NEOPL SQ/IM: CPT

## 2025-03-24 RX ORDER — ONDANSETRON 4 MG/1
4 TABLET, FILM COATED ORAL ONCE
Status: COMPLETED | OUTPATIENT
Start: 2025-03-24 | End: 2025-03-24

## 2025-03-24 RX ORDER — BORTEZOMIB 3.5 MG/1
1.3 INJECTION, POWDER, LYOPHILIZED, FOR SOLUTION INTRAVENOUS; SUBCUTANEOUS ONCE
Status: COMPLETED | OUTPATIENT
Start: 2025-03-24 | End: 2025-03-24

## 2025-03-24 RX ADMIN — ONDANSETRON HYDROCHLORIDE 4 MG: 4 TABLET, FILM COATED ORAL at 11:03

## 2025-03-24 RX ADMIN — BORTEZOMIB 2.5 MG: 3.5 INJECTION, POWDER, LYOPHILIZED, FOR SOLUTION INTRAVENOUS; SUBCUTANEOUS at 11:40

## 2025-03-24 ASSESSMENT — PAIN SCALES - GENERAL: PAINLEVEL_OUTOF10: 6

## 2025-03-24 NOTE — SIGNIFICANT EVENT
03/24/25 [facility]   Prechemo Checklist   Has the patient been in the hospital, ED, or urgent care since last date of service No   Chemo/Immuno Consent Completed and Signed Not applicable  (NON-ONC (TTP - no consent needed))   Protocol/Indications Verified Yes   Confirmed to previous date/time of medication Yes  (C2D22 today)   Compared to previous dose Yes   All medications are dated accurately Yes   Pregnancy Test Negative   ((pt did not begin new regimen, therefore no pregnancy test needed at this time))   Parameters Met Yes  (ANC: 2.92, PLTs: 328, bili: 0.5)   BSA/Weight-Height Verified Yes   Dose Calculations Verified (current, total, cumulative) Yes

## 2025-03-26 ENCOUNTER — NURSE TRIAGE (OUTPATIENT)
Dept: ADMISSION | Facility: HOSPITAL | Age: 55
End: 2025-03-26
Payer: COMMERCIAL

## 2025-03-26 NOTE — TELEPHONE ENCOUNTER
Tere called to report that she had scant amount of blood when she wiped her nose this morning and if she blows her nose she does have some scant blood mixed in with the mucous from the night nostril.     She also noted some gum bleeding this morning while brushing her teeth. She rinsed her mouth out for ~1 minute and the bleeding stopped.     In addition to the bleeding, she noticed a new bruise on the back of her right thigh about 4 inches in diameter which she first noticed on Sunday night. She did not do anything to that area to cause the bruise.     Takes Cablivi 11mg daily.    Only new medication is Bacitracin ointment for an eye infection.     Patient denies any fever, shortness of breath or chest pain. Denies any other sx of bleeding. No headache or dizziness. Weekly CBCs with most recent completed on 3/24.     Team- any concerns or recommendations at this time?     Dr. Walker is out so secure chat sent to Dr. Alonso.         Additional Information   Commented on: Where are you having bleeding?     Nose and gums   Commented on: Are you taking any blood thinners?     Cablivi daily    Protocols used: Bleeding

## 2025-03-26 NOTE — TELEPHONE ENCOUNTER
Tere aware to hold tomorrow's dose of Cablivi since she already took today's dose. She will be waiting for any further instructions as well.

## 2025-03-27 PROCEDURE — RXMED WILLOW AMBULATORY MEDICATION CHARGE

## 2025-03-27 NOTE — TELEPHONE ENCOUNTER
I called Tere and advised someone would be contacting her tomorrow to check in/assess her bleeding. As of this morning she has not noticed any new bleeding and knows to hold today's dose of Cablivi.   No further questions or concerns at this time.

## 2025-03-28 ENCOUNTER — HOSPITAL ENCOUNTER (EMERGENCY)
Facility: HOSPITAL | Age: 55
Discharge: HOME | End: 2025-03-28
Attending: STUDENT IN AN ORGANIZED HEALTH CARE EDUCATION/TRAINING PROGRAM
Payer: COMMERCIAL

## 2025-03-28 ENCOUNTER — APPOINTMENT (OUTPATIENT)
Dept: RADIOLOGY | Facility: HOSPITAL | Age: 55
End: 2025-03-28
Payer: COMMERCIAL

## 2025-03-28 ENCOUNTER — PHARMACY VISIT (OUTPATIENT)
Dept: PHARMACY | Facility: CLINIC | Age: 55
End: 2025-03-28
Payer: COMMERCIAL

## 2025-03-28 VITALS
WEIGHT: 179 LBS | BODY MASS INDEX: 30.56 KG/M2 | DIASTOLIC BLOOD PRESSURE: 63 MMHG | OXYGEN SATURATION: 98 % | HEIGHT: 64 IN | SYSTOLIC BLOOD PRESSURE: 132 MMHG | TEMPERATURE: 97.9 F | HEART RATE: 67 BPM | RESPIRATION RATE: 17 BRPM

## 2025-03-28 DIAGNOSIS — L03.213 PRESEPTAL CELLULITIS: Primary | ICD-10-CM

## 2025-03-28 LAB
ALBUMIN SERPL BCP-MCNC: 3.8 G/DL (ref 3.4–5)
ALP SERPL-CCNC: 41 U/L (ref 33–110)
ALT SERPL W P-5'-P-CCNC: 14 U/L (ref 7–45)
ANION GAP SERPL CALC-SCNC: 10 MMOL/L (ref 10–20)
AST SERPL W P-5'-P-CCNC: 36 U/L (ref 9–39)
BASOPHILS # BLD MANUAL: 0 X10*3/UL (ref 0–0.1)
BASOPHILS NFR BLD MANUAL: 0 %
BILIRUB SERPL-MCNC: 0.4 MG/DL (ref 0–1.2)
BUN SERPL-MCNC: 11 MG/DL (ref 6–23)
BURR CELLS BLD QL SMEAR: NORMAL
CALCIUM SERPL-MCNC: 8.3 MG/DL (ref 8.6–10.3)
CHLORIDE SERPL-SCNC: 105 MMOL/L (ref 98–107)
CO2 SERPL-SCNC: 27 MMOL/L (ref 21–32)
CREAT SERPL-MCNC: 0.75 MG/DL (ref 0.5–1.05)
EGFRCR SERPLBLD CKD-EPI 2021: >90 ML/MIN/1.73M*2
EOSINOPHIL # BLD MANUAL: 0 X10*3/UL (ref 0–0.7)
EOSINOPHIL NFR BLD MANUAL: 0 %
ERYTHROCYTE [DISTWIDTH] IN BLOOD BY AUTOMATED COUNT: 16.2 % (ref 11.5–14.5)
GLUCOSE SERPL-MCNC: 98 MG/DL (ref 74–99)
HCT VFR BLD AUTO: 27.2 % (ref 36–46)
HGB BLD-MCNC: 8.9 G/DL (ref 12–16)
IMM GRANULOCYTES # BLD AUTO: 0.03 X10*3/UL (ref 0–0.7)
IMM GRANULOCYTES NFR BLD AUTO: 0.5 % (ref 0–0.9)
LYMPHOCYTES # BLD MANUAL: 2.43 X10*3/UL (ref 1.2–4.8)
LYMPHOCYTES NFR BLD MANUAL: 38 %
MCH RBC QN AUTO: 24.8 PG (ref 26–34)
MCHC RBC AUTO-ENTMCNC: 32.7 G/DL (ref 32–36)
MCV RBC AUTO: 76 FL (ref 80–100)
MONOCYTES # BLD MANUAL: 0.58 X10*3/UL (ref 0.1–1)
MONOCYTES NFR BLD MANUAL: 9 %
NEUTS SEG # BLD MANUAL: 3.39 X10*3/UL (ref 1.2–7)
NEUTS SEG NFR BLD MANUAL: 53 %
NRBC BLD-RTO: 13.6 /100 WBCS (ref 0–0)
PLATELET # BLD AUTO: 412 X10*3/UL (ref 150–450)
POLYCHROMASIA BLD QL SMEAR: NORMAL
POTASSIUM SERPL-SCNC: 5 MMOL/L (ref 3.5–5.3)
PROT SERPL-MCNC: 7 G/DL (ref 6.4–8.2)
RBC # BLD AUTO: 3.59 X10*6/UL (ref 4–5.2)
RBC MORPH BLD: NORMAL
SCHISTOCYTES BLD QL SMEAR: NORMAL
SODIUM SERPL-SCNC: 137 MMOL/L (ref 136–145)
TARGETS BLD QL SMEAR: NORMAL
TOTAL CELLS COUNTED BLD: 100
WBC # BLD AUTO: 6.4 X10*3/UL (ref 4.4–11.3)

## 2025-03-28 PROCEDURE — 2550000001 HC RX 255 CONTRASTS: Performed by: STUDENT IN AN ORGANIZED HEALTH CARE EDUCATION/TRAINING PROGRAM

## 2025-03-28 PROCEDURE — 85027 COMPLETE CBC AUTOMATED: CPT

## 2025-03-28 PROCEDURE — 36415 COLL VENOUS BLD VENIPUNCTURE: CPT

## 2025-03-28 PROCEDURE — 96374 THER/PROPH/DIAG INJ IV PUSH: CPT | Mod: 59 | Performed by: STUDENT IN AN ORGANIZED HEALTH CARE EDUCATION/TRAINING PROGRAM

## 2025-03-28 PROCEDURE — 2500000001 HC RX 250 WO HCPCS SELF ADMINISTERED DRUGS (ALT 637 FOR MEDICARE OP)

## 2025-03-28 PROCEDURE — 99285 EMERGENCY DEPT VISIT HI MDM: CPT | Mod: 25 | Performed by: STUDENT IN AN ORGANIZED HEALTH CARE EDUCATION/TRAINING PROGRAM

## 2025-03-28 PROCEDURE — 70481 CT ORBIT/EAR/FOSSA W/DYE: CPT

## 2025-03-28 PROCEDURE — 85007 BL SMEAR W/DIFF WBC COUNT: CPT

## 2025-03-28 PROCEDURE — 70481 CT ORBIT/EAR/FOSSA W/DYE: CPT | Performed by: STUDENT IN AN ORGANIZED HEALTH CARE EDUCATION/TRAINING PROGRAM

## 2025-03-28 PROCEDURE — 2500000004 HC RX 250 GENERAL PHARMACY W/ HCPCS (ALT 636 FOR OP/ED)

## 2025-03-28 PROCEDURE — 99285 EMERGENCY DEPT VISIT HI MDM: CPT | Performed by: STUDENT IN AN ORGANIZED HEALTH CARE EDUCATION/TRAINING PROGRAM

## 2025-03-28 PROCEDURE — 84075 ASSAY ALKALINE PHOSPHATASE: CPT

## 2025-03-28 RX ORDER — KETOROLAC TROMETHAMINE 15 MG/ML
15 INJECTION, SOLUTION INTRAMUSCULAR; INTRAVENOUS ONCE
Status: COMPLETED | OUTPATIENT
Start: 2025-03-28 | End: 2025-03-28

## 2025-03-28 RX ORDER — ONDANSETRON HYDROCHLORIDE 2 MG/ML
4 INJECTION, SOLUTION INTRAVENOUS ONCE
Status: DISCONTINUED | OUTPATIENT
Start: 2025-03-28 | End: 2025-03-28

## 2025-03-28 RX ORDER — MORPHINE SULFATE 4 MG/ML
4 INJECTION, SOLUTION INTRAMUSCULAR; INTRAVENOUS ONCE
Status: DISCONTINUED | OUTPATIENT
Start: 2025-03-28 | End: 2025-03-28

## 2025-03-28 RX ORDER — AMOXICILLIN AND CLAVULANATE POTASSIUM 875; 125 MG/1; MG/1
1 TABLET, FILM COATED ORAL ONCE
Status: COMPLETED | OUTPATIENT
Start: 2025-03-28 | End: 2025-03-28

## 2025-03-28 RX ORDER — AMOXICILLIN AND CLAVULANATE POTASSIUM 875; 125 MG/1; MG/1
1 TABLET, FILM COATED ORAL EVERY 12 HOURS
Qty: 14 TABLET | Refills: 0 | Status: SHIPPED | OUTPATIENT
Start: 2025-03-28 | End: 2025-04-04

## 2025-03-28 RX ADMIN — AMOXICILLIN AND CLAVULANATE POTASSIUM 1 TABLET: 875; 125 TABLET, FILM COATED ORAL at 20:43

## 2025-03-28 RX ADMIN — IOHEXOL 50 ML: 350 INJECTION, SOLUTION INTRAVENOUS at 18:40

## 2025-03-28 RX ADMIN — KETOROLAC TROMETHAMINE 15 MG: 15 INJECTION, SOLUTION INTRAMUSCULAR; INTRAVENOUS at 19:55

## 2025-03-28 ASSESSMENT — COLUMBIA-SUICIDE SEVERITY RATING SCALE - C-SSRS
6. HAVE YOU EVER DONE ANYTHING, STARTED TO DO ANYTHING, OR PREPARED TO DO ANYTHING TO END YOUR LIFE?: NO
2. HAVE YOU ACTUALLY HAD ANY THOUGHTS OF KILLING YOURSELF?: NO
1. IN THE PAST MONTH, HAVE YOU WISHED YOU WERE DEAD OR WISHED YOU COULD GO TO SLEEP AND NOT WAKE UP?: NO

## 2025-03-28 ASSESSMENT — VISUAL ACUITY
OS: 20/13
OU: 20/10
OD: 20/15

## 2025-03-28 ASSESSMENT — PAIN SCALES - GENERAL
PAINLEVEL_OUTOF10: 3
PAINLEVEL_OUTOF10: 8

## 2025-03-28 ASSESSMENT — PAIN - FUNCTIONAL ASSESSMENT: PAIN_FUNCTIONAL_ASSESSMENT: 0-10

## 2025-03-28 NOTE — ED TRIAGE NOTES
Pt reports worsening bilateral eye infection since 3/16 and has been on both topical and oral antibiotics and states symptoms are worsening. Pt reports 4/10 pain currently.

## 2025-03-28 NOTE — ED PROVIDER NOTES
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  This started almost 2 weeks ago and is progressively worsening stents despite being on erythromycin and then later bacitracin.  She notes pain with extraocular movements.  The conjunctival injection and discharge have improved somewhat.  However the periorbital swelling and pain have worsened.  She also notes red bumps on both of her upper eyelids.  She denies fever, sweats, chills, visual changes, headache, runny nose, congestion, chest pain, shortness of breath, nausea, vomiting.    Nursing Notes were reviewed.    PAST MEDICAL HISTORY     Past Medical History:   Diagnosis Date    Anemia     Anxiety     Atypical ductal hyperplasia, breast     Breast cyst     Clotting disorder (Multi)     Fibrocystic breast     Headache     Sickle cell anemia (Multi) Sickle cell trait    Urinary tract infection          SURGICAL HISTORY       Past Surgical History:   Procedure Laterality Date    BREAST BIOPSY      BREAST CYST EXCISION      BREAST SURGERY       SECTION, LOW TRANSVERSE  2004    TUBAL LIGATION           CURRENT MEDICATIONS       Previous Medications    ACYCLOVIR (ZOVIRAX) 400 MG TABLET    Take 1 tablet (400 mg) by mouth 2 times a day.    BACITRACIN OPHTHALMIC OINTMENT    Apply 0.5 inches to left eye 3 times a day for 10 days. Apply Amount per Dose: 0.5 inch (~1 cm) per dose.    CAPLACIZUMAB (CABLIVI) 11 MG INJECTION    Inject 11 mg under the skin once daily.    LEVOCETIRIZINE (XYZAL) 5 MG TABLET    Take 1 tablet (5 mg) by mouth once daily as needed for allergies (cutaneous prutitus).     METOPROLOL SUCCINATE XL (TOPROL-XL) 25 MG 24 HR TABLET    Take 1 tablet (25 mg) by mouth once daily. Do not crush or chew.    OLOPATADINE (PATADAY) 0.2 % OPHTHALMIC SOLUTION    Administer 1 drop into both eyes once daily.    ONDANSETRON (ZOFRAN) 4 MG TABLET    Take 1 tablet (4 mg) by mouth every 8 hours if needed for nausea or vomiting.    PANTOPRAZOLE (PROTONIX) 40 MG EC TABLET    Take 1 tablet (40 mg) by mouth once daily in the morning. Take before meals. Do not crush, chew, or split.    PROCHLORPERAZINE (COMPAZINE) 10 MG TABLET    Take 1 tablet (10 mg) by mouth every 6 hours if needed for nausea or vomiting.    SERTRALINE (ZOLOFT) 50 MG TABLET    Take 1 tablet (50 mg) by mouth once daily.    TAMOXIFEN (NOLVADEX) 10 MG TABLET    Take 1 tablet (10 mg total) by mouth once daily.       ALLERGIES     Shellfish derived, Latex, and Vancomycin    FAMILY HISTORY       Family History   Problem Relation Name Age of Onset    Asthma Mother Bruna     Diabetes Mother Bruna     Hypertension Mother Bruna     Cancer Maternal Grandfather Gavin     Colon cancer Maternal Grandfather Gavin     Hypertension Maternal Grandmother Ana     Cancer Mother's Sister Stacey     Cancer Mother's Brother Javier     Cancer Mother's Sister Pat     Cancer Mother's Brother Naeem     Diabetes Brother Teneuss     Hypertension Brother Teneuss     Hypertension Brother Martin     Learning disabilities Son Sanchez           SOCIAL HISTORY       Social History     Socioeconomic History    Marital status:    Tobacco Use    Smoking status: Never    Smokeless tobacco: Never   Vaping Use    Vaping status: Never Used   Substance and Sexual Activity    Alcohol use: Never    Drug use: Never    Sexual activity: Yes     Partners: Male     Birth control/protection: Female Sterilization     Social Drivers of Health     Financial Resource Strain: Low Risk  (6/20/2024)    Overall Financial Resource Strain (CARDIA)     Difficulty of Paying Living  Expenses: Not hard at all   Food Insecurity: Patient Declined (1/13/2022)    Received from [email or web address]    Hunger Vital Sign     Worried About Running Out of Food in the Last Year: Patient declined     Ran Out of Food in the Last Year: Patient declined   Transportation Needs: No Transportation Needs (6/20/2024)    PRAPARE - Transportation     Lack of Transportation (Medical): No     Lack of Transportation (Non-Medical): No   Physical Activity: Patient Declined (1/13/2022)    Received from [email or web address]    Exercise Vital Sign     Days of Exercise per Week: Patient declined     Minutes of Exercise per Session: Patient declined   Stress: Patient Declined (1/13/2022)    Received from [email or web address]    [employer] - Occupational Stress Questionnaire     Feeling of Stress : Patient declined   Social Connections: Patient Declined (1/13/2022)    Received from [email or web address]    Social Connection and Isolation Panel [NHANES]     Frequency of Communication with Friends and Family: Patient declined     Frequency of Social Gatherings with Friends and Family: Patient declined     Attends Rastafari Services: Patient declined     Active Member of Clubs or Organizations: Patient declined     Attends Club or Organization Meetings: Patient declined     Marital Status: Patient declined   Intimate Partner Violence: Patient Declined (1/13/2022)    Received from [email or web address]    Humiliation, Afraid, Rape, and Kick questionnaire     Fear of Current or Ex-Partner: Patient declined     Emotionally Abused: Patient declined     Physically Abused: Patient declined     Sexually Abused: Patient declined   Housing Stability: Low Risk  (6/20/2024)    Housing Stability Vital Sign     Unable to Pay for Housing in the Last Year: No     Number of Places Lived in the Last Year: 1     Unstable Housing in the Last Year: No       SCREENINGS                        PHYSICAL  EXAM    (up to 7 for level 4, 8 or more for level 5)     ED Triage Vitals [03/28/25 1531]   Temperature Heart Rate Respirations BP   36.4 °C (97.5 °F) 87 17 117/57      Pulse Ox Temp Source Heart Rate Source Patient Position   100 % Temporal Monitor --      BP Location FiO2 (%)     Right arm --       Physical Exam  Constitutional:       General: She is not in acute distress.     Appearance: She is not ill-appearing.   HENT:      Head: Normocephalic and atraumatic.      Nose: Nose normal.      Mouth/Throat:      Mouth: Mucous membranes are moist.      Pharynx: Oropharynx is clear.   Eyes:      Extraocular Movements: Extraocular movements intact.      Pupils: Pupils are equal, round, and reactive to light.      Comments: Mildly injected conjunctiva bilaterally scant amount of yellow discharge.  Notable periorbital swelling.  She has a stye on both upper eyelids.   Cardiovascular:      Rate and Rhythm: Normal rate and regular rhythm.      Heart sounds: Normal heart sounds.   Pulmonary:      Effort: Pulmonary effort is normal. No respiratory distress.      Breath sounds: Normal breath sounds.   Abdominal:      General: There is no distension.      Palpations: Abdomen is soft.   Musculoskeletal:         General: No deformity or signs of injury.      Cervical back: Normal range of motion and neck supple.      Right lower leg: No edema.      Left lower leg: No edema.   Skin:     General: Skin is warm and dry.   Neurological:      General: No focal deficit present.          DIAGNOSTIC RESULTS     LABS:  Labs Reviewed   CBC WITH AUTO DIFFERENTIAL - Abnormal       Result Value    WBC 6.4      nRBC 13.6 (*)     RBC 3.59 (*)     Hemoglobin 8.9 (*)     Hematocrit 27.2 (*)     MCV 76 (*)     MCH 24.8 (*)     MCHC 32.7      RDW 16.2 (*)     Platelets 412      Immature Granulocytes %, Automated 0.5      Immature Granulocytes Absolute, Automated 0.03      Narrative:     The previously reported component Neutrophils % is no longer  being reported.  The previously reported component Lymphocytes % is no longer being reported.  The previously reported component Monocytes % is no longer being reported.  The previously   reported component Eosinophils % is no longer being reported.  The previously reported component Basophils % is no longer being reported.  The previously reported component Absolute Neutrophils is no longer being reported.  The previously reported   component Absolute Lymphocytes is no longer being reported.  The previously reported component Absolute Monocytes is no longer being reported.  The previously reported component Absolute Eosinophils is no longer being reported.  The previously reported   component Absolute Basophils is no longer being reported.   COMPREHENSIVE METABOLIC PANEL - Abnormal    Glucose 98      Sodium 137      Potassium 5.0      Chloride 105      Bicarbonate 27      Anion Gap 10      Urea Nitrogen 11      Creatinine 0.75      eGFR >90      Calcium 8.3 (*)     Albumin 3.8      Alkaline Phosphatase 41      Total Protein 7.0      AST 36      Bilirubin, Total 0.4      ALT 14     MANUAL DIFFERENTIAL    Neutrophils %, Manual 53.0      Lymphocytes %, Manual 38.0      Monocytes %, Manual 9.0      Eosinophils %, Manual 0.0      Basophils %, Manual 0.0      Seg Neutrophils Absolute, Manual 3.39      Lymphocytes Absolute, Manual 2.43      Monocytes Absolute, Manual 0.58      Eosinophils Absolute, Manual 0.00      Basophils Absolute, Manual 0.00      Total Cells Counted 100      RBC Morphology See Below      Polychromasia Mild      RBC Fragments Few      Target Cells Many      Warren Cells Few         All other labs were within normal range or not returned as of this dictation.    Imaging  CT orbit w IV contrast   Final Result   Mild soft tissue thickening and inflammatory changes present in the   preseptal spaces bilaterally which may represent preseptal   cellulitis. Correlate with physical exam.        Otherwise, the  globes, intraconal space, and extraconal spaces are   clear.        MACRO:   None        Signed by: Merritt Fu 3/28/2025 7:14 PM   Dictation workstation:   [id number]           Procedures  Procedures     EMERGENCY DEPARTMENT COURSE/MDM:     Diagnoses as of 03/28/25 2034   Preseptal cellulitis        Medical Decision Making  History obtained for the patient.  Records including labs, imaging, notes independently reviewed by me.  Presentation concerning for possible preseptal versus orbital cellulitis.  Patient given Toradol with improvement in pain.  Basic labs were obtained.  CBC notable for stable chronic anemia of hemoglobin 8.9.  No leukocytosis.  CMP unremarkable.  CT orbit with IV contrast consistent with preseptal cellulitis.  Given that the patient has TTP and is on multiple Biologics, we discussed the situation with hematology.  Patient should continue to hold her daily Cablivi.  She will follow-up with her hematologist next week and discuss when to resume her weekly biologic.  Patient given first dose of Augmentin with their go ahead and discharged home in satisfactory condition with a prescription for the same instructions on supportive measures.  All questions answered and return precautions discussed    Patient and or family in agreement and understanding of treatment plan.  All questions answered.      I reviewed the case with the attending ED physician. The attending ED physician agrees with the plan. Patient and/or patient´s representative was counseled regarding labs, imaging, likely diagnosis, and plan. All questions were answered.    ED Medications administered this visit:    Medications   ondansetron (Zofran) injection 4 mg (4 mg intravenous Not Given 3/28/25 1806)   morphine injection 4 mg (4 mg intravenous Not Given 3/28/25 1806)   amoxicillin-pot clavulanate (Augmentin) 875-125 mg per tablet 1 tablet (has no administration in time range)   iohexol (OMNIPaque) 350 mg iodine/mL solution 50 mL  (50 mL intravenous Given 3/28/25 1840)   ketorolac (Toradol) injection 15 mg (15 mg intravenous Given 3/28/25 1955)       New Prescriptions from this visit:    New Prescriptions    AMOXICILLIN-POT CLAVULANATE (AUGMENTIN) 875-125 MG TABLET    Take 1 tablet by mouth every 12 hours for 7 days.       Follow-up:  Tree Du MD  [id number] [id number] [phone]      Keep your upcoming appointment        Final Impression:   1. Preseptal cellulitis          (Please note that portions of this note were completed with a voice recognition program.  Efforts were made to edit the dictations but occasionally words are mis-transcribed.)     Mp Lyles MD  Resident  03/28/25 2035

## 2025-03-28 NOTE — TELEPHONE ENCOUNTER
RN called the patient back and informed her that NP Casal said to continue to hold the Cablivi through the weekend until she sees Dr. Walker on Monday and he decides about restarting it. She also said getting her eyes evaluated in the ED is a good idea. The patient verbalized understanding and said she will call over the weekend if she has any urgent updates.

## 2025-03-28 NOTE — TELEPHONE ENCOUNTER
RN called the patient to check in to see how she is doing today. She denies any signs of bleeding: no new bruises, no gum or nose bleeding. She said she still has the 4 inch bruise on her thigh but seems to be changing color and healing. She said that her eyes, however, are bothering her. They are irritated, red and swollen. She said it was mostly just her left eye originally and now both eyes are red and swollen. She said she has been taking the Bacitracin but it is not helping. She said she is planning on going to the ED shortly to have them evaluate her eyes since they are worse. RN said she would speak to NP Casal about Kendra.

## 2025-03-29 NOTE — DISCHARGE INSTRUCTIONS
Please take the Augmentin as prescribed.  You may have Tylenol and ibuprofen as needed for pain.  Call your hematologist office on Monday.  In the meantime, continue holding the Cablivi.  They will discuss when to resume your weekly injectable at your appointment.  If you develop worsening pain after couple days of treatment, visual changes, or other worrisome symptoms, return to the ER.

## 2025-03-31 ENCOUNTER — APPOINTMENT (OUTPATIENT)
Dept: HEMATOLOGY/ONCOLOGY | Facility: HOSPITAL | Age: 55
End: 2025-03-31
Payer: COMMERCIAL

## 2025-03-31 ENCOUNTER — LAB (OUTPATIENT)
Dept: LAB | Facility: HOSPITAL | Age: 55
End: 2025-03-31
Payer: COMMERCIAL

## 2025-03-31 ENCOUNTER — OFFICE VISIT (OUTPATIENT)
Dept: HEMATOLOGY/ONCOLOGY | Facility: HOSPITAL | Age: 55
End: 2025-03-31
Payer: COMMERCIAL

## 2025-03-31 VITALS
HEART RATE: 88 BPM | SYSTOLIC BLOOD PRESSURE: 122 MMHG | DIASTOLIC BLOOD PRESSURE: 77 MMHG | RESPIRATION RATE: 16 BRPM | WEIGHT: 180.3 LBS | BODY MASS INDEX: 30.95 KG/M2 | TEMPERATURE: 97.7 F | OXYGEN SATURATION: 99 %

## 2025-03-31 DIAGNOSIS — M31.19 ACQUIRED TTP (MULTI): ICD-10-CM

## 2025-03-31 DIAGNOSIS — L03.213 PRESEPTAL CELLULITIS OF LEFT EYE: ICD-10-CM

## 2025-03-31 DIAGNOSIS — L03.213 PRESEPTAL CELLULITIS OF RIGHT EYE: ICD-10-CM

## 2025-03-31 DIAGNOSIS — R60.0 PERIORBITAL EDEMA OF BOTH EYES: Primary | ICD-10-CM

## 2025-03-31 LAB
ALBUMIN SERPL BCP-MCNC: 4.1 G/DL (ref 3.4–5)
ALP SERPL-CCNC: 50 U/L (ref 33–110)
ALT SERPL W P-5'-P-CCNC: 11 U/L (ref 7–45)
ANION GAP SERPL CALC-SCNC: 13 MMOL/L (ref 10–20)
AST SERPL W P-5'-P-CCNC: 17 U/L (ref 9–39)
BASOPHILS # BLD AUTO: 0.03 X10*3/UL (ref 0–0.1)
BASOPHILS NFR BLD AUTO: 0.5 %
BILIRUB SERPL-MCNC: 0.4 MG/DL (ref 0–1.2)
BUN SERPL-MCNC: 10 MG/DL (ref 6–23)
BURR CELLS BLD QL SMEAR: NORMAL
CALCIUM SERPL-MCNC: 9.2 MG/DL (ref 8.6–10.3)
CHLORIDE SERPL-SCNC: 107 MMOL/L (ref 98–107)
CO2 SERPL-SCNC: 27 MMOL/L (ref 21–32)
CREAT SERPL-MCNC: 0.85 MG/DL (ref 0.5–1.05)
EGFRCR SERPLBLD CKD-EPI 2021: 81 ML/MIN/1.73M*2
EOSINOPHIL # BLD AUTO: 0.13 X10*3/UL (ref 0–0.7)
EOSINOPHIL NFR BLD AUTO: 2 %
ERYTHROCYTE [DISTWIDTH] IN BLOOD BY AUTOMATED COUNT: 15.5 % (ref 11.5–14.5)
GIANT PLATELETS BLD QL SMEAR: NORMAL
GLUCOSE SERPL-MCNC: 99 MG/DL (ref 74–99)
HCT VFR BLD AUTO: 26.9 % (ref 36–46)
HGB BLD-MCNC: 9.1 G/DL (ref 12–16)
HOWELL-JOLLY BOD BLD QL SMEAR: PRESENT
HYPOCHROMIA BLD QL SMEAR: NORMAL
IMM GRANULOCYTES # BLD AUTO: 0.04 X10*3/UL (ref 0–0.7)
IMM GRANULOCYTES NFR BLD AUTO: 0.6 % (ref 0–0.9)
LYMPHOCYTES # BLD AUTO: 1.2 X10*3/UL (ref 1.2–4.8)
LYMPHOCYTES NFR BLD AUTO: 18.8 %
MCH RBC QN AUTO: 25.6 PG (ref 26–34)
MCHC RBC AUTO-ENTMCNC: 33.8 G/DL (ref 32–36)
MCV RBC AUTO: 76 FL (ref 80–100)
MONOCYTES # BLD AUTO: 0.66 X10*3/UL (ref 0.1–1)
MONOCYTES NFR BLD AUTO: 10.3 %
NEUTROPHILS # BLD AUTO: 4.32 X10*3/UL (ref 1.2–7.7)
NEUTROPHILS NFR BLD AUTO: 67.8 %
NRBC BLD-RTO: 2 /100 WBCS (ref 0–0)
OVALOCYTES BLD QL SMEAR: NORMAL
PLATELET # BLD AUTO: 280 X10*3/UL (ref 150–450)
POLYCHROMASIA BLD QL SMEAR: NORMAL
POTASSIUM SERPL-SCNC: 3.8 MMOL/L (ref 3.5–5.3)
PROT SERPL-MCNC: 7.1 G/DL (ref 6.4–8.2)
RBC # BLD AUTO: 3.56 X10*6/UL (ref 4–5.2)
RBC MORPH BLD: NORMAL
SCHISTOCYTES BLD QL SMEAR: NORMAL
SODIUM SERPL-SCNC: 143 MMOL/L (ref 136–145)
TARGETS BLD QL SMEAR: NORMAL
WBC # BLD AUTO: 6.4 X10*3/UL (ref 4.4–11.3)

## 2025-03-31 PROCEDURE — 36415 COLL VENOUS BLD VENIPUNCTURE: CPT

## 2025-03-31 PROCEDURE — 80053 COMPREHEN METABOLIC PANEL: CPT

## 2025-03-31 PROCEDURE — 99214 OFFICE O/P EST MOD 30 MIN: CPT | Performed by: STUDENT IN AN ORGANIZED HEALTH CARE EDUCATION/TRAINING PROGRAM

## 2025-03-31 PROCEDURE — 85025 COMPLETE CBC W/AUTO DIFF WBC: CPT

## 2025-03-31 ASSESSMENT — PAIN SCALES - GENERAL: PAINLEVEL_OUTOF10: 5

## 2025-03-31 NOTE — PROGRESS NOTES
Patient ID: Tere Carrion is a 55 y.o. female.  Referring Physician: No referring provider defined for this encounter.  Primary Care Provider: Modesta Gallego DO  Visit Type: Follow Up  Diagnosis: immune TTP    Therapy summary (diagnosis: iTTP):   Rituximab: 6/21, 6/28, 7/8, 7/15, to be completed 7/15/2024  Cablivi: 6/21-to be completed  Cyclophosphamide: 8/21/2024, 9/11/24, 10/2/24, 10/24/24, 11/13/2024, 12/4/2024  Bortezomib: 1/13/2025-2/3/2025, 02/24/25-        Subjective    HPI  55 y.o. female with a PMH significant for TTP in 1998/1999 (treated with prolonged (87) plasmapheresis, steroids, and immunosuppressants including vincristine and azathioprine, and splenectomy March 1999), reported sickle cell trait and alpha thalassemia trait, migraines.    Re TTP:   She was recently seen inpatient for immune TTP where she presented on 6/18/24 for 2 weeks of worsening generalized weakness, fatigue, migraines, hematuria, and new bruising with petechiae, with TTP. She was started on PLEX, did not receive Cablivi upfront 2/2 hematuria, which resolved over the last 3 days. She received PLEX x 3 (last on 6/21/24).    HIV, Hepatitis panel: Non-reactive  B12, folate, iron profile: Normal  T spot: Negative  Shiga toxin -ve   ELBERT -ve, SPEP work up negative for clonal protein   ADAMTS <5, inhibitor level 2.4--> >8.0    Re splenectomy, is followed by PCP for vaccination notes she is caught up at this time.   Notices fatigue since last 3 days, some skin bruising but no bleeding. Perimenopausal. Currently on pred 40mg, on taper.   Age appropriate cancer screening: mammo '23 due soon. [place] '20 due next year.   FMH: no cancer, mother had a UE DVT post-op, mother has many polyps, maternal GF had colon cancer, mother's sibs had colon cancer too   Social history: never smoker, no ETOH, no RDU.   08/21/24: Presents alone for follow-up.  The patient is being seen in treatment.  09/09/24: headaches left sided, since last 1 month  daily, takes excedrin migraine daily occasionally ibuprofen. Lightheaded when stands majority of the times. Couple days of nausea after the cytoxan.   24: no significant complaints, is tolerating cytoxan well. Dose noted to be at 400mg/m2 with no response in terms of inhibitor or [id number] level, escalated to 500mg/m2. Dose range reported up to 750mg/m2. D/w pt will likely need to complete 6 cycles.   10/24/24: presents with son. Asymptomatic. Has no particular complaints.   24: presents alone, notices some GI cramps on day 3 after dosing, but otherwise tolerates well.  24: asymptomatic, ANC lower end of normal, HEKTBR00 still undetectable at last check with high titer inhibitor.  24: pt remains asymptomatic, after initial elevation in [id number] (although this does not appear to be true as the inhibitor was quite high) there is no sustained response.   02/10/25: noticed diarrhea around midnight day of infusion, this time also nausea, abdominal pain, decreased appetite, did not do any viral testing. Did feel the same as she has had every dose. Noticed some nosebleeding yesterday, some spotting through the day.   25: asymptomatic, appears to have recovered from last cycle.   25: has preseptal orbital inflammation with has been on bacitracin, erythromycin topically and PO augmentin. Has not been able to get through scheduling to see a ophthalmologist. Has been off cablivi for 4 days after she reported having bruising on inner right thigh followed by a nose bleed.       Review of Systems - Oncology  10 point review of systems negative except as stated in HPI    Objective   Past Surgical History:   Procedure Laterality Date    BREAST BIOPSY      BREAST CYST EXCISION      BREAST SURGERY       SECTION, LOW TRANSVERSE  2004    TUBAL LIGATION  2004     Oncology History    No history exists.       BSA: 1.92 meters squared /77 (BP Location: Right arm, Patient Position: Sitting, BP  Cuff Size: Adult)   Pulse 88   Temp 36.5 °C (97.7 °F) (Skin)   Resp 16   Wt 81.8 kg (180 lb 4.8 oz)   SpO2 99%   BMI 30.95 kg/m²   Physical Exam  Vitals reviewed.   Constitutional:       General: She is not in acute distress.     Appearance: She is not toxic-appearing.   HENT:      Head: Normocephalic and atraumatic.   Cardiovascular:      Rate and Rhythm: Normal rate and regular rhythm.   Pulmonary:      Effort: Pulmonary effort is normal.   Skin:     Comments: No bruising    Neurological:      General: No focal deficit present.      Mental Status: She is oriented to person, place, and time.   Psychiatric:         Mood and Affect: Mood normal.        07/08/24 11:29 07/15/24 13:20 08/27/24 15:07 12/02/24 10:41 12/23/24 15:57 12/31/24 15:02 01/21/25 15:48 01/28/25 15:44 02/03/25 10:34   [id number] Activity Value <5 (L) <5 (L) <5 (L) 7 (L) <5 (L) <5 (L) <5 (L) <5 (L) <5 (L)   XFBWQW09 Inhibitor Value >8.0 (H) >8.0 (H) >8.0 (H) >8.5 (H) 6.4 (H) 5.6 (H) 4.5 (H) 3.0 (H) 1.9 (H)       Assessment/Plan    55 y.o. female with a PMH significant for  TTP in 1998/1999 (treated with prolonged (87) plasmapheresis, steroids, and immunosuppressants including vincristine and azathioprine, and splenectomy March 1999), reported sickle cell trait and alpha thalassemia trait [confirmed on retesting].     # Immune TTP: Patient was initially diagnosed in 1998-99, had prolonged plasmapheresis followed by immunosuppression with vincristine, azathioprine and eventually splenectomy in March 1999, has been followed at [facility] since then and has had no evidence of relapse.  As noted above the patient presented with hematuria and was eventually diagnosed with immune TTP with an antibody titer of 2.4--> >8.0.  She was started on Cablivi and rituximab x1 cycle, then cyclophosphamide x6 cycles with no change in IBPNST70 and inhibitor levels. Maintained on cablivi and is clinically asymptomatic.     We discussed alternate therapy including  cyclosporine and bortezomib. Her recent flow showed a significant absence B cells, therefore did not redose ritux, and instead started bortezomib. Has noted many GI s/s after dosing for about 48hrs but then full recovery. Inhibitor has decreased and her factor levels are incrementing albeit gradually.   Plan:   - will continue Cablivi until IAPOCQ64 is >20%, skip if there is evidence of bleeding, do alternate day dosing for now.   - hold treatment today   - hold bortezomib cycle 4  - cablivi to be held for 48hrs before ophthal apt  - weekly CBC/diff and close monitoring for bleeding with thrombocytopenia from drug and TTP and cablivi   -The patient has been educated about the signs and symptoms of TTP and the process of contacting us or heading to an ER as needed.  - follow up next: 2 weeks   - labs prior to follow up: Noted above      Tree Walker MD

## 2025-04-03 ENCOUNTER — SPECIALTY PHARMACY (OUTPATIENT)
Dept: PHARMACY | Facility: CLINIC | Age: 55
End: 2025-04-03

## 2025-04-03 PROCEDURE — RXMED WILLOW AMBULATORY MEDICATION CHARGE

## 2025-04-03 NOTE — PROGRESS NOTES
[facility] Clinical Note  Patient Reassessment     Introduction  Tere Carrion is a 55 y.o. female who is on the specialty pharmacy service for management of: Blood Modifiers Core.      [facility] supplied medication: Cablivi - Inject 11 mg under the skin once daily        Duration of therapy: Patient/Prescriber Specific- Until  [id number] is > 20%    The most recent encounter visit with the referring prescriber Tree uD on 3/31/25 was reviewed.  Pharmacy will continue to collaborate in the care of this patient with the referring prescriber.    Discussion  Tere was contacted on 4/3/2025 at 9:23 AM for a pharmacy visit with encounter number [id number] from:   [facility]  [address] [place] [id number]  Dept: [phone]  Dept Fax: [phone]  Tere consented to a/an Telephone visit, which was performed.    Efficacy  Patient has developed new symptoms of condition: No  Patient/caregiver feels medication is affecting the disease state: Patient notes it is doing what it is supposed to be doing.    Goals  Provided education on goals and possible outcomes of therapy:  Adherence with therapy  Timely completion of appropriate labs  Timely and appropriate follow up with provider  Identify and address medication interactions with presciption medications, OTC medications and supplements  Optimize or maintain quality of life  Patient specific goal - Reduce disease burden of  acquired TTP.   Patient has documented target(s) for goals of therapy: No        Tolerance  Patient has experienced side effects from this medication: Yes - pt noted bruising and nosebleed which required Cablivi be held for 4 days.  Changes to current therapy regimen: Yes - Pt is taking Cablivi every other day.    The follow-up timeline was discussed. Every person responds to and reacts to therapy differently. Patient should be assessed for efficacy and tolerability in  approximately: 3 months            Adherence      The importance of adherence was discussed and patient/caregiver was advised to take the medication as prescribed by their provider. Encouraged patient/caregiver to call physician's office or specialty pharmacy if they have a question regarding a missed dose.    General Assessment  Changes to home medications, OTCs or supplements: Yes - pt reports starting amoxicillin.  Current Outpatient Medications   Medication Sig Dispense Refill    acyclovir (Zovirax) 400 mg tablet Take 1 tablet (400 mg) by mouth 2 times a day. 60 tablet 5    amoxicillin-pot clavulanate (Augmentin) 875-125 mg tablet Take 1 tablet by mouth every 12 hours for 7 days. 14 tablet 0    caplacizumab (Cablivi) 11 mg injection Inject 11 mg under the skin once daily. 30 kit 1    levocetirizine (Xyzal) 5 mg tablet Take 1 tablet (5 mg) by mouth once daily as needed for allergies (cutaneous prutitus). 30 tablet 0    metoprolol succinate XL (Toprol-XL) 25 mg 24 hr tablet Take 1 tablet (25 mg) by mouth once daily. Do not crush or chew. 90 tablet 3    olopatadine (Pataday) 0.2 % ophthalmic solution Administer 1 drop into both eyes once daily. 2.5 mL 5    ondansetron (Zofran) 4 mg tablet Take 1 tablet (4 mg) by mouth every 8 hours if needed for nausea or vomiting. 30 tablet 1    pantoprazole (ProtoNix) 40 mg EC tablet Take 1 tablet (40 mg) by mouth once daily in the morning. Take before meals. Do not crush, chew, or split. 30 tablet 11    prochlorperazine (Compazine) 10 mg tablet Take 1 tablet (10 mg) by mouth every 6 hours if needed for nausea or vomiting. 30 tablet 5    sertraline (Zoloft) 50 mg tablet Take 1 tablet (50 mg) by mouth once daily. 90 tablet 3    tamoxifen (Nolvadex) 10 mg tablet Take 1 tablet (10 mg total) by mouth once daily. 90 tablet 3     No current facility-administered medications for this visit.     Reported new allergies: No  Reported new medical conditions: No  Additional monitoring  reviewed: [id number] activity level = 14% (3/10/25)  Is laboratory follow up needed? Per provider    Advised to contact the pharmacy if there are any changes to the patient's medication list, including prescriptions, OTC medications, herbal products, or supplements.    Impression/Plan  This patient has not been identified as high risk due to Lack of high risk qualifiers.  The following action was taken:N/A               Provided contact information ([phone]) for [facility] and reviewed dispensing process, refill timeline and patient management follow up. Confirmed understanding of education conducted during assessment. All questions and concerns were addressed and patient/caregiver was encouraged to reach out for additional questions or concerns.    Based on the patient's diagnosis, medication list, progress towards goals, adherence, tolerance, and medication list, medication remains appropriate: Therapy remains appropriate (I attest)    Harry Triplett, PharmD

## 2025-04-07 ENCOUNTER — APPOINTMENT (OUTPATIENT)
Dept: OPHTHALMOLOGY | Age: 55
End: 2025-04-07
Payer: COMMERCIAL

## 2025-04-07 ENCOUNTER — TELEPHONE (OUTPATIENT)
Dept: HEMATOLOGY/ONCOLOGY | Facility: HOSPITAL | Age: 55
End: 2025-04-07

## 2025-04-07 ENCOUNTER — LAB (OUTPATIENT)
Dept: LAB | Facility: CLINIC | Age: 55
End: 2025-04-07
Payer: COMMERCIAL

## 2025-04-07 ENCOUNTER — INFUSION (OUTPATIENT)
Dept: HEMATOLOGY/ONCOLOGY | Facility: CLINIC | Age: 55
End: 2025-04-07
Payer: COMMERCIAL

## 2025-04-07 VITALS
BODY MASS INDEX: 30.69 KG/M2 | TEMPERATURE: 96.6 F | SYSTOLIC BLOOD PRESSURE: 101 MMHG | DIASTOLIC BLOOD PRESSURE: 67 MMHG | RESPIRATION RATE: 16 BRPM | HEART RATE: 94 BPM | WEIGHT: 178.79 LBS | OXYGEN SATURATION: 96 %

## 2025-04-07 DIAGNOSIS — M31.19 ACQUIRED TTP (MULTI): ICD-10-CM

## 2025-04-07 DIAGNOSIS — H00.14 CHALAZION OF LEFT UPPER EYELID: ICD-10-CM

## 2025-04-07 DIAGNOSIS — H02.88B MEIBOMIAN GLAND DISEASE OF UPPER AND LOWER EYELIDS OF BOTH EYES: Primary | ICD-10-CM

## 2025-04-07 DIAGNOSIS — H00.11 CHALAZION RIGHT UPPER EYELID: ICD-10-CM

## 2025-04-07 DIAGNOSIS — M31.19 ACQUIRED TTP (MULTI): Primary | ICD-10-CM

## 2025-04-07 DIAGNOSIS — H02.88A MEIBOMIAN GLAND DISEASE OF UPPER AND LOWER EYELIDS OF BOTH EYES: Primary | ICD-10-CM

## 2025-04-07 LAB
ALBUMIN SERPL BCP-MCNC: 4.4 G/DL (ref 3.4–5)
ALP SERPL-CCNC: 40 U/L (ref 33–110)
ALT SERPL W P-5'-P-CCNC: 12 U/L (ref 7–45)
ANION GAP SERPL CALC-SCNC: 11 MMOL/L (ref 10–20)
AST SERPL W P-5'-P-CCNC: 17 U/L (ref 9–39)
BASOPHILS # BLD AUTO: 0.03 X10*3/UL (ref 0–0.1)
BASOPHILS NFR BLD AUTO: 0.5 %
BILIRUB SERPL-MCNC: 0.4 MG/DL (ref 0–1.2)
BUN SERPL-MCNC: 12 MG/DL (ref 6–23)
CALCIUM SERPL-MCNC: 9.3 MG/DL (ref 8.6–10.3)
CHLORIDE SERPL-SCNC: 105 MMOL/L (ref 98–107)
CO2 SERPL-SCNC: 29 MMOL/L (ref 21–32)
CREAT SERPL-MCNC: 0.85 MG/DL (ref 0.5–1.05)
EGFRCR SERPLBLD CKD-EPI 2021: 81 ML/MIN/1.73M*2
EOSINOPHIL # BLD AUTO: 0.12 X10*3/UL (ref 0–0.7)
EOSINOPHIL NFR BLD AUTO: 2.1 %
ERYTHROCYTE [DISTWIDTH] IN BLOOD BY AUTOMATED COUNT: 14.8 % (ref 11.5–14.5)
GLUCOSE SERPL-MCNC: 83 MG/DL (ref 74–99)
HCT VFR BLD AUTO: 29.6 % (ref 36–46)
HGB BLD-MCNC: 9.9 G/DL (ref 12–16)
IMM GRANULOCYTES # BLD AUTO: 0.01 X10*3/UL (ref 0–0.7)
IMM GRANULOCYTES NFR BLD AUTO: 0.2 % (ref 0–0.9)
LYMPHOCYTES # BLD AUTO: 1.52 X10*3/UL (ref 1.2–4.8)
LYMPHOCYTES NFR BLD AUTO: 26.3 %
MCH RBC QN AUTO: 25.8 PG (ref 26–34)
MCHC RBC AUTO-ENTMCNC: 33.4 G/DL (ref 32–36)
MCV RBC AUTO: 77 FL (ref 80–100)
MONOCYTES # BLD AUTO: 0.38 X10*3/UL (ref 0.1–1)
MONOCYTES NFR BLD AUTO: 6.6 %
NEUTROPHILS # BLD AUTO: 3.73 X10*3/UL (ref 1.2–7.7)
NEUTROPHILS NFR BLD AUTO: 64.3 %
PLATELET # BLD AUTO: 341 X10*3/UL (ref 150–450)
POTASSIUM SERPL-SCNC: 3.5 MMOL/L (ref 3.5–5.3)
PROT SERPL-MCNC: 7.5 G/DL (ref 6.4–8.2)
RBC # BLD AUTO: 3.83 X10*6/UL (ref 4–5.2)
SODIUM SERPL-SCNC: 141 MMOL/L (ref 136–145)
WBC # BLD AUTO: 5.8 X10*3/UL (ref 4.4–11.3)

## 2025-04-07 PROCEDURE — 85025 COMPLETE CBC W/AUTO DIFF WBC: CPT

## 2025-04-07 PROCEDURE — 36415 COLL VENOUS BLD VENIPUNCTURE: CPT

## 2025-04-07 PROCEDURE — 80053 COMPREHEN METABOLIC PANEL: CPT

## 2025-04-07 PROCEDURE — 2500000004 HC RX 250 GENERAL PHARMACY W/ HCPCS (ALT 636 FOR OP/ED): Performed by: STUDENT IN AN ORGANIZED HEALTH CARE EDUCATION/TRAINING PROGRAM

## 2025-04-07 PROCEDURE — 96401 CHEMO ANTI-NEOPL SQ/IM: CPT

## 2025-04-07 PROCEDURE — 99203 OFFICE O/P NEW LOW 30 MIN: CPT | Performed by: OPHTHALMOLOGY

## 2025-04-07 RX ORDER — PROCHLORPERAZINE EDISYLATE 5 MG/ML
10 INJECTION INTRAMUSCULAR; INTRAVENOUS EVERY 6 HOURS PRN
OUTPATIENT
Start: 2025-04-15

## 2025-04-07 RX ORDER — ALBUTEROL SULFATE 0.83 MG/ML
3 SOLUTION RESPIRATORY (INHALATION) AS NEEDED
OUTPATIENT
Start: 2025-04-22

## 2025-04-07 RX ORDER — EPINEPHRINE 0.3 MG/.3ML
0.3 INJECTION SUBCUTANEOUS EVERY 5 MIN PRN
OUTPATIENT
Start: 2025-04-15

## 2025-04-07 RX ORDER — DIPHENHYDRAMINE HYDROCHLORIDE 50 MG/ML
50 INJECTION, SOLUTION INTRAMUSCULAR; INTRAVENOUS AS NEEDED
OUTPATIENT
Start: 2025-04-22

## 2025-04-07 RX ORDER — FAMOTIDINE 10 MG/ML
20 INJECTION, SOLUTION INTRAVENOUS ONCE AS NEEDED
OUTPATIENT
Start: 2025-04-22

## 2025-04-07 RX ORDER — EPINEPHRINE 0.3 MG/.3ML
0.3 INJECTION SUBCUTANEOUS EVERY 5 MIN PRN
Status: CANCELLED | OUTPATIENT
Start: 2025-04-07

## 2025-04-07 RX ORDER — ALBUTEROL SULFATE 0.83 MG/ML
3 SOLUTION RESPIRATORY (INHALATION) AS NEEDED
OUTPATIENT
Start: 2025-04-15

## 2025-04-07 RX ORDER — FAMOTIDINE 10 MG/ML
20 INJECTION, SOLUTION INTRAVENOUS ONCE AS NEEDED
OUTPATIENT
Start: 2025-04-08

## 2025-04-07 RX ORDER — PROCHLORPERAZINE MALEATE 10 MG
10 TABLET ORAL EVERY 6 HOURS PRN
OUTPATIENT
Start: 2025-04-15

## 2025-04-07 RX ORDER — EPINEPHRINE 0.3 MG/.3ML
0.3 INJECTION SUBCUTANEOUS EVERY 5 MIN PRN
OUTPATIENT
Start: 2025-04-08

## 2025-04-07 RX ORDER — ALBUTEROL SULFATE 0.83 MG/ML
3 SOLUTION RESPIRATORY (INHALATION) AS NEEDED
Status: CANCELLED | OUTPATIENT
Start: 2025-04-07

## 2025-04-07 RX ORDER — PROCHLORPERAZINE EDISYLATE 5 MG/ML
10 INJECTION INTRAMUSCULAR; INTRAVENOUS EVERY 6 HOURS PRN
Status: CANCELLED | OUTPATIENT
Start: 2025-04-07

## 2025-04-07 RX ORDER — DIPHENHYDRAMINE HYDROCHLORIDE 50 MG/ML
50 INJECTION, SOLUTION INTRAMUSCULAR; INTRAVENOUS AS NEEDED
Status: CANCELLED | OUTPATIENT
Start: 2025-04-07

## 2025-04-07 RX ORDER — PROCHLORPERAZINE MALEATE 10 MG
10 TABLET ORAL EVERY 6 HOURS PRN
OUTPATIENT
Start: 2025-04-22

## 2025-04-07 RX ORDER — PROCHLORPERAZINE MALEATE 10 MG
10 TABLET ORAL EVERY 6 HOURS PRN
Status: CANCELLED | OUTPATIENT
Start: 2025-04-07

## 2025-04-07 RX ORDER — ONDANSETRON 4 MG/1
4 TABLET, FILM COATED ORAL ONCE
Status: COMPLETED | OUTPATIENT
Start: 2025-04-07 | End: 2025-04-07

## 2025-04-07 RX ORDER — EPINEPHRINE 0.3 MG/.3ML
0.3 INJECTION SUBCUTANEOUS EVERY 5 MIN PRN
OUTPATIENT
Start: 2025-04-22

## 2025-04-07 RX ORDER — ONDANSETRON 4 MG/1
4 TABLET, FILM COATED ORAL ONCE
OUTPATIENT
Start: 2025-04-22 | End: 2025-04-21

## 2025-04-07 RX ORDER — ONDANSETRON 4 MG/1
4 TABLET, FILM COATED ORAL ONCE
OUTPATIENT
Start: 2025-04-15 | End: 2025-04-14

## 2025-04-07 RX ORDER — FAMOTIDINE 10 MG/ML
20 INJECTION, SOLUTION INTRAVENOUS ONCE AS NEEDED
OUTPATIENT
Start: 2025-04-15

## 2025-04-07 RX ORDER — PROCHLORPERAZINE MALEATE 10 MG
10 TABLET ORAL EVERY 6 HOURS PRN
OUTPATIENT
Start: 2025-04-08

## 2025-04-07 RX ORDER — ONDANSETRON 4 MG/1
4 TABLET, FILM COATED ORAL ONCE
OUTPATIENT
Start: 2025-04-08 | End: 2025-04-07

## 2025-04-07 RX ORDER — DIPHENHYDRAMINE HYDROCHLORIDE 50 MG/ML
50 INJECTION, SOLUTION INTRAMUSCULAR; INTRAVENOUS AS NEEDED
OUTPATIENT
Start: 2025-04-08

## 2025-04-07 RX ORDER — BACITRACIN ZINC AND POLYMYXIN B SULFATE 500; 10000 [USP'U]/G; [USP'U]/G
OINTMENT OPHTHALMIC NIGHTLY
Qty: 3.5 G | Refills: 0 | Status: SHIPPED | OUTPATIENT
Start: 2025-04-07 | End: 2025-04-17

## 2025-04-07 RX ORDER — DIPHENHYDRAMINE HYDROCHLORIDE 50 MG/ML
50 INJECTION, SOLUTION INTRAMUSCULAR; INTRAVENOUS AS NEEDED
OUTPATIENT
Start: 2025-04-15

## 2025-04-07 RX ORDER — BORTEZOMIB 3.5 MG/1
1.3 INJECTION, POWDER, LYOPHILIZED, FOR SOLUTION INTRAVENOUS; SUBCUTANEOUS ONCE
Status: COMPLETED | OUTPATIENT
Start: 2025-04-07 | End: 2025-04-07

## 2025-04-07 RX ORDER — BORTEZOMIB 3.5 MG/1
1.3 INJECTION, POWDER, LYOPHILIZED, FOR SOLUTION INTRAVENOUS; SUBCUTANEOUS ONCE
OUTPATIENT
Start: 2025-04-08

## 2025-04-07 RX ORDER — ALBUTEROL SULFATE 0.83 MG/ML
3 SOLUTION RESPIRATORY (INHALATION) AS NEEDED
OUTPATIENT
Start: 2025-04-08

## 2025-04-07 RX ORDER — FAMOTIDINE 10 MG/ML
20 INJECTION, SOLUTION INTRAVENOUS ONCE AS NEEDED
Status: CANCELLED | OUTPATIENT
Start: 2025-04-07

## 2025-04-07 RX ORDER — PROCHLORPERAZINE EDISYLATE 5 MG/ML
10 INJECTION INTRAMUSCULAR; INTRAVENOUS EVERY 6 HOURS PRN
OUTPATIENT
Start: 2025-04-22

## 2025-04-07 RX ORDER — BORTEZOMIB 3.5 MG/1
1.3 INJECTION, POWDER, LYOPHILIZED, FOR SOLUTION INTRAVENOUS; SUBCUTANEOUS ONCE
OUTPATIENT
Start: 2025-04-22

## 2025-04-07 RX ORDER — BORTEZOMIB 3.5 MG/1
1.3 INJECTION, POWDER, LYOPHILIZED, FOR SOLUTION INTRAVENOUS; SUBCUTANEOUS ONCE
OUTPATIENT
Start: 2025-04-15

## 2025-04-07 RX ORDER — PROCHLORPERAZINE EDISYLATE 5 MG/ML
10 INJECTION INTRAMUSCULAR; INTRAVENOUS EVERY 6 HOURS PRN
OUTPATIENT
Start: 2025-04-08

## 2025-04-07 RX ADMIN — BORTEZOMIB 2.5 MG: 1 INJECTION, POWDER, LYOPHILIZED, FOR SOLUTION INTRAVENOUS; SUBCUTANEOUS at 15:31

## 2025-04-07 RX ADMIN — ONDANSETRON HYDROCHLORIDE 4 MG: 4 TABLET, FILM COATED ORAL at 15:06

## 2025-04-07 ASSESSMENT — ENCOUNTER SYMPTOMS
GASTROINTESTINAL NEGATIVE: 0
EYES NEGATIVE: 1
NEUROLOGICAL NEGATIVE: 0
ALLERGIC/IMMUNOLOGIC NEGATIVE: 0
RESPIRATORY NEGATIVE: 0
MUSCULOSKELETAL NEGATIVE: 0
PSYCHIATRIC NEGATIVE: 0
CONSTITUTIONAL NEGATIVE: 0
HEMATOLOGIC/LYMPHATIC NEGATIVE: 0
CARDIOVASCULAR NEGATIVE: 0
ENDOCRINE NEGATIVE: 0

## 2025-04-07 ASSESSMENT — VISUAL ACUITY
METHOD: SNELLEN - LINEAR
OS_CC: 20/20
OD_CC: 20/20
CORRECTION_TYPE: GLASSES
OD_CC+: -2

## 2025-04-07 ASSESSMENT — EXTERNAL EXAM - LEFT EYE: OS_EXAM: NORMAL

## 2025-04-07 ASSESSMENT — EXTERNAL EXAM - RIGHT EYE: OD_EXAM: NORMAL

## 2025-04-07 ASSESSMENT — PAIN SCALES - GENERAL: PAINLEVEL_OUTOF10: 0-NO PAIN

## 2025-04-07 NOTE — TELEPHONE ENCOUNTER
RN called the patient to see how her eye symptoms are. She said that she saw ophthalmology today and they gave her some suggestions of how to improve symptoms and said she has blocked glands. RN checked with MD Walker about infusion appt today and he said patient should proceed with it since her prelim [id number] is 6. RN informed patient and she said she is heading to infusion center now. MD said he will review her chart and may either do another cycle or come up with a different plan. Patient made aware.

## 2025-04-07 NOTE — SIGNIFICANT EVENT
04/07/25 1432   Prechemo Checklist   Has the patient been in the hospital, ED, or urgent care since last date of service Yes  (ED on 3/28/25 d/t eye infections - has seen Dr. Walker since ED visit)   Chemo/Immuno Consent Completed and Signed Not applicable  (NON-ONC (TTP - no consent needed))   Protocol/Indications Verified Yes   Confirmed to previous date/time of medication Yes  (last dose on 3/24 (held on 3/31 d/t eye infections) - C3D1 today)   Compared to previous dose Yes   All medications are dated accurately Yes   Pregnancy Test Negative Not applicable  (h/o tubal ligation in 2004)   Parameters Met Yes  (labs today - ANC: 3.73, PLTs: 341, bili: 0.4)   BSA/Weight-Height Verified Yes   Dose Calculations Verified (current, total, cumulative) Yes

## 2025-04-07 NOTE — PROGRESS NOTES
Assessment/Plan   Diagnoses and all orders for this visit:  Meibomian gland disease of upper and lower eyelids of both eyes  Significant meibomian gland dysfunction (MGD) on exam, I recommended using Warm compresses Twice daily for 10 min (Vania mask)    Keep bacitracin-polymyxin B (Polysporin) ophthalmic ointment; Apply to both eyes once daily at bedtime for 10 days.  Chalazion of left upper and lower eyelid  Chalazion right upper eyelid  Recent onset with preceptal cellulitis element based on the ED visit, got better after taking Augmentin.    Warm compresses  See Dr. Mccall in 4 weeks.

## 2025-04-08 ENCOUNTER — PHARMACY VISIT (OUTPATIENT)
Dept: PHARMACY | Facility: CLINIC | Age: 55
End: 2025-04-08
Payer: COMMERCIAL

## 2025-04-08 DIAGNOSIS — M31.19 ACQUIRED TTP (MULTI): ICD-10-CM

## 2025-04-08 RX ORDER — CAPLACIZUMAB 11 MG
11 KIT INJECTION EVERY OTHER DAY
Qty: 16 KIT | Refills: 0 | Status: SHIPPED | OUTPATIENT
Start: 2025-04-08

## 2025-04-11 ENCOUNTER — APPOINTMENT (OUTPATIENT)
Dept: OPHTHALMOLOGY | Facility: CLINIC | Age: 55
End: 2025-04-11
Payer: COMMERCIAL

## 2025-04-14 ENCOUNTER — LAB (OUTPATIENT)
Dept: LAB | Facility: CLINIC | Age: 55
End: 2025-04-14
Payer: COMMERCIAL

## 2025-04-14 ENCOUNTER — INFUSION (OUTPATIENT)
Dept: HEMATOLOGY/ONCOLOGY | Facility: CLINIC | Age: 55
End: 2025-04-14
Payer: COMMERCIAL

## 2025-04-14 ENCOUNTER — NURSE TRIAGE (OUTPATIENT)
Dept: ADMISSION | Facility: HOSPITAL | Age: 55
End: 2025-04-14
Payer: COMMERCIAL

## 2025-04-14 VITALS
RESPIRATION RATE: 16 BRPM | OXYGEN SATURATION: 97 % | HEART RATE: 104 BPM | WEIGHT: 176.92 LBS | TEMPERATURE: 97.2 F | BODY MASS INDEX: 30.37 KG/M2 | DIASTOLIC BLOOD PRESSURE: 70 MMHG | SYSTOLIC BLOOD PRESSURE: 107 MMHG

## 2025-04-14 DIAGNOSIS — M31.19 ACQUIRED TTP (MULTI): ICD-10-CM

## 2025-04-14 LAB
ALBUMIN SERPL BCP-MCNC: 4 G/DL (ref 3.4–5)
ALP SERPL-CCNC: 40 U/L (ref 33–110)
ALT SERPL W P-5'-P-CCNC: 11 U/L (ref 7–45)
ANION GAP SERPL CALC-SCNC: 11 MMOL/L (ref 10–20)
AST SERPL W P-5'-P-CCNC: 14 U/L (ref 9–39)
BASOPHILS # BLD AUTO: 0.05 X10*3/UL (ref 0–0.1)
BASOPHILS NFR BLD AUTO: 0.6 %
BILIRUB SERPL-MCNC: 0.6 MG/DL (ref 0–1.2)
BUN SERPL-MCNC: 10 MG/DL (ref 6–23)
CALCIUM SERPL-MCNC: 9.1 MG/DL (ref 8.6–10.3)
CHLORIDE SERPL-SCNC: 106 MMOL/L (ref 98–107)
CO2 SERPL-SCNC: 28 MMOL/L (ref 21–32)
CREAT SERPL-MCNC: 0.8 MG/DL (ref 0.5–1.05)
EGFRCR SERPLBLD CKD-EPI 2021: 87 ML/MIN/1.73M*2
EOSINOPHIL # BLD AUTO: 0.08 X10*3/UL (ref 0–0.7)
EOSINOPHIL NFR BLD AUTO: 1 %
ERYTHROCYTE [DISTWIDTH] IN BLOOD BY AUTOMATED COUNT: 14.4 % (ref 11.5–14.5)
GLUCOSE SERPL-MCNC: 89 MG/DL (ref 74–99)
HCT VFR BLD AUTO: 27.6 % (ref 36–46)
HGB BLD-MCNC: 9.2 G/DL (ref 12–16)
IMM GRANULOCYTES # BLD AUTO: 0.01 X10*3/UL (ref 0–0.7)
IMM GRANULOCYTES NFR BLD AUTO: 0.1 % (ref 0–0.9)
LYMPHOCYTES # BLD AUTO: 1.8 X10*3/UL (ref 1.2–4.8)
LYMPHOCYTES NFR BLD AUTO: 21.9 %
MCH RBC QN AUTO: 25.6 PG (ref 26–34)
MCHC RBC AUTO-ENTMCNC: 33.3 G/DL (ref 32–36)
MCV RBC AUTO: 77 FL (ref 80–100)
MONOCYTES # BLD AUTO: 0.55 X10*3/UL (ref 0.1–1)
MONOCYTES NFR BLD AUTO: 6.7 %
NEUTROPHILS # BLD AUTO: 5.73 X10*3/UL (ref 1.2–7.7)
NEUTROPHILS NFR BLD AUTO: 69.7 %
PLATELET # BLD AUTO: 259 X10*3/UL (ref 150–450)
POTASSIUM SERPL-SCNC: 3.7 MMOL/L (ref 3.5–5.3)
PROT SERPL-MCNC: 6.9 G/DL (ref 6.4–8.2)
RBC # BLD AUTO: 3.6 X10*6/UL (ref 4–5.2)
SODIUM SERPL-SCNC: 141 MMOL/L (ref 136–145)
WBC # BLD AUTO: 8.2 X10*3/UL (ref 4.4–11.3)

## 2025-04-14 PROCEDURE — 85025 COMPLETE CBC W/AUTO DIFF WBC: CPT

## 2025-04-14 PROCEDURE — 80053 COMPREHEN METABOLIC PANEL: CPT

## 2025-04-14 PROCEDURE — 36415 COLL VENOUS BLD VENIPUNCTURE: CPT

## 2025-04-14 RX ORDER — BORTEZOMIB 3.5 MG/1
1.3 INJECTION, POWDER, LYOPHILIZED, FOR SOLUTION INTRAVENOUS; SUBCUTANEOUS ONCE
Status: DISCONTINUED | OUTPATIENT
Start: 2025-04-14 | End: 2025-04-14 | Stop reason: ALTCHOICE

## 2025-04-14 RX ORDER — ONDANSETRON 4 MG/1
4 TABLET, FILM COATED ORAL ONCE
Status: DISCONTINUED | OUTPATIENT
Start: 2025-04-14 | End: 2025-04-14 | Stop reason: ALTCHOICE

## 2025-04-14 ASSESSMENT — PAIN SCALES - GENERAL: PAINLEVEL_OUTOF10: 4

## 2025-04-14 NOTE — SIGNIFICANT EVENT
04/14/25 1428   Prechemo Checklist   Has the patient been in the hospital, ED, or urgent care since last date of service No   Chemo/Immuno Consent Completed and Signed Not applicable  (NON-ONC (TTP - no consent needed))   Protocol/Indications Verified Yes   Confirmed to previous date/time of medication Yes  (C3D8 today)   Compared to previous dose Yes   All medications are dated accurately Yes   Pregnancy Test Negative Not applicable  (h/o tubal ligation in 2004; not a new tx regimen)   Parameters Met Yes  (labs today - ANC: 5.73, PLTs: 259, bili: 0.6  Taken)   BSA/Weight-Height Verified Yes   Dose Calculations Verified (current, total, cumulative) Yes

## 2025-04-14 NOTE — TELEPHONE ENCOUNTER
Tere notified Dr. Walker would like her to contact her PCP for UA/management of urinary symptoms. Patient verbalized understanding and has no further questions or concerns at this time.

## 2025-04-14 NOTE — TELEPHONE ENCOUNTER
Tere is coming in today for labs and infusion. She is asking if team can add on a UA as her urine is cloudy and has a strong odor to it.   No fever. No dysuria. No vaginal discharge. She does note some urgency that started a couple of days ago.   No further sx to report.

## 2025-04-16 LAB
ADAMTS13 ANTIBODY VALUE: <9 ARBITARY UNITS
SCAN RESULT: ABNORMAL
VWF CP ACT/NOR PPP CHRO: 6 %
VWF CP INHIB PPP-ACNC: 0.4 INHIBITOR UNITS

## 2025-04-21 ENCOUNTER — APPOINTMENT (OUTPATIENT)
Dept: HEMATOLOGY/ONCOLOGY | Facility: CLINIC | Age: 55
End: 2025-04-21
Payer: COMMERCIAL

## 2025-04-21 ENCOUNTER — LAB (OUTPATIENT)
Dept: LAB | Facility: CLINIC | Age: 55
End: 2025-04-21
Payer: COMMERCIAL

## 2025-04-21 DIAGNOSIS — M31.19 ACQUIRED TTP (MULTI): ICD-10-CM

## 2025-04-21 LAB
ALBUMIN SERPL BCP-MCNC: 4.2 G/DL (ref 3.4–5)
ALP SERPL-CCNC: 44 U/L (ref 33–110)
ALT SERPL W P-5'-P-CCNC: 11 U/L (ref 7–45)
ANION GAP SERPL CALC-SCNC: 10 MMOL/L (ref 10–20)
AST SERPL W P-5'-P-CCNC: 16 U/L (ref 9–39)
BASOPHILS # BLD AUTO: 0.02 X10*3/UL (ref 0–0.1)
BASOPHILS NFR BLD AUTO: 0.3 %
BILIRUB SERPL-MCNC: 0.4 MG/DL (ref 0–1.2)
BUN SERPL-MCNC: 15 MG/DL (ref 6–23)
CALCIUM SERPL-MCNC: 9.4 MG/DL (ref 8.6–10.3)
CHLORIDE SERPL-SCNC: 104 MMOL/L (ref 98–107)
CO2 SERPL-SCNC: 29 MMOL/L (ref 21–32)
CREAT SERPL-MCNC: 0.85 MG/DL (ref 0.5–1.05)
EGFRCR SERPLBLD CKD-EPI 2021: 81 ML/MIN/1.73M*2
EOSINOPHIL # BLD AUTO: 0.14 X10*3/UL (ref 0–0.7)
EOSINOPHIL NFR BLD AUTO: 2.1 %
ERYTHROCYTE [DISTWIDTH] IN BLOOD BY AUTOMATED COUNT: 14.2 % (ref 11.5–14.5)
GLUCOSE SERPL-MCNC: 97 MG/DL (ref 74–99)
HCT VFR BLD AUTO: 28.5 % (ref 36–46)
HGB BLD-MCNC: 9.5 G/DL (ref 12–16)
IMM GRANULOCYTES # BLD AUTO: 0.01 X10*3/UL (ref 0–0.7)
IMM GRANULOCYTES NFR BLD AUTO: 0.2 % (ref 0–0.9)
LYMPHOCYTES # BLD AUTO: 1.7 X10*3/UL (ref 1.2–4.8)
LYMPHOCYTES NFR BLD AUTO: 25.9 %
MCH RBC QN AUTO: 25.7 PG (ref 26–34)
MCHC RBC AUTO-ENTMCNC: 33.3 G/DL (ref 32–36)
MCV RBC AUTO: 77 FL (ref 80–100)
MONOCYTES # BLD AUTO: 0.5 X10*3/UL (ref 0.1–1)
MONOCYTES NFR BLD AUTO: 7.6 %
NEUTROPHILS # BLD AUTO: 4.19 X10*3/UL (ref 1.2–7.7)
NEUTROPHILS NFR BLD AUTO: 63.9 %
PLATELET # BLD AUTO: 313 X10*3/UL (ref 150–450)
POTASSIUM SERPL-SCNC: 3.7 MMOL/L (ref 3.5–5.3)
PROT SERPL-MCNC: 7.3 G/DL (ref 6.4–8.2)
RBC # BLD AUTO: 3.69 X10*6/UL (ref 4–5.2)
SODIUM SERPL-SCNC: 139 MMOL/L (ref 136–145)
WBC # BLD AUTO: 6.6 X10*3/UL (ref 4.4–11.3)

## 2025-04-21 PROCEDURE — 80053 COMPREHEN METABOLIC PANEL: CPT

## 2025-04-21 PROCEDURE — 85025 COMPLETE CBC W/AUTO DIFF WBC: CPT

## 2025-04-21 PROCEDURE — 36415 COLL VENOUS BLD VENIPUNCTURE: CPT

## 2025-04-28 ENCOUNTER — LAB (OUTPATIENT)
Dept: LAB | Facility: HOSPITAL | Age: 55
End: 2025-04-28
Payer: COMMERCIAL

## 2025-04-28 ENCOUNTER — OFFICE VISIT (OUTPATIENT)
Dept: HEMATOLOGY/ONCOLOGY | Facility: HOSPITAL | Age: 55
End: 2025-04-28
Payer: COMMERCIAL

## 2025-04-28 VITALS
OXYGEN SATURATION: 98 % | TEMPERATURE: 96.3 F | RESPIRATION RATE: 16 BRPM | HEART RATE: 72 BPM | WEIGHT: 176.6 LBS | DIASTOLIC BLOOD PRESSURE: 65 MMHG | SYSTOLIC BLOOD PRESSURE: 107 MMHG | BODY MASS INDEX: 30.31 KG/M2

## 2025-04-28 DIAGNOSIS — H01.006 BLEPHARITIS OF BOTH EYES, UNSPECIFIED EYELID, UNSPECIFIED TYPE: Primary | ICD-10-CM

## 2025-04-28 DIAGNOSIS — M31.19 ACQUIRED TTP (MULTI): ICD-10-CM

## 2025-04-28 DIAGNOSIS — H01.003 BLEPHARITIS OF BOTH EYES, UNSPECIFIED EYELID, UNSPECIFIED TYPE: Primary | ICD-10-CM

## 2025-04-28 DIAGNOSIS — H00.16 CHALAZION OF BOTH EYES: ICD-10-CM

## 2025-04-28 DIAGNOSIS — H00.13 CHALAZION OF BOTH EYES: ICD-10-CM

## 2025-04-28 LAB
ADAMTS13 ANTIBODY VALUE: <9 ARBITARY UNITS
ALBUMIN SERPL BCP-MCNC: 4 G/DL (ref 3.4–5)
ALP SERPL-CCNC: 47 U/L (ref 33–110)
ALT SERPL W P-5'-P-CCNC: 15 U/L (ref 7–45)
ANION GAP SERPL CALC-SCNC: 12 MMOL/L (ref 10–20)
AST SERPL W P-5'-P-CCNC: 19 U/L (ref 9–39)
BASOPHILS # BLD AUTO: 0.04 X10*3/UL (ref 0–0.1)
BASOPHILS NFR BLD AUTO: 0.7 %
BILIRUB SERPL-MCNC: 0.4 MG/DL (ref 0–1.2)
BUN SERPL-MCNC: 16 MG/DL (ref 6–23)
CALCIUM SERPL-MCNC: 9.4 MG/DL (ref 8.6–10.3)
CHLORIDE SERPL-SCNC: 105 MMOL/L (ref 98–107)
CO2 SERPL-SCNC: 29 MMOL/L (ref 21–32)
CREAT SERPL-MCNC: 0.91 MG/DL (ref 0.5–1.05)
EGFRCR SERPLBLD CKD-EPI 2021: 74 ML/MIN/1.73M*2
EOSINOPHIL # BLD AUTO: 0.12 X10*3/UL (ref 0–0.7)
EOSINOPHIL NFR BLD AUTO: 2.1 %
ERYTHROCYTE [DISTWIDTH] IN BLOOD BY AUTOMATED COUNT: 13.7 % (ref 11.5–14.5)
GLUCOSE SERPL-MCNC: 92 MG/DL (ref 74–99)
HCT VFR BLD AUTO: 28.2 % (ref 36–46)
HGB BLD-MCNC: 9.4 G/DL (ref 12–16)
IMM GRANULOCYTES # BLD AUTO: 0.01 X10*3/UL (ref 0–0.7)
IMM GRANULOCYTES NFR BLD AUTO: 0.2 % (ref 0–0.9)
LYMPHOCYTES # BLD AUTO: 1.72 X10*3/UL (ref 1.2–4.8)
LYMPHOCYTES NFR BLD AUTO: 29.6 %
MCH RBC QN AUTO: 25.3 PG (ref 26–34)
MCHC RBC AUTO-ENTMCNC: 33.3 G/DL (ref 32–36)
MCV RBC AUTO: 76 FL (ref 80–100)
MONOCYTES # BLD AUTO: 0.48 X10*3/UL (ref 0.1–1)
MONOCYTES NFR BLD AUTO: 8.2 %
NEUTROPHILS # BLD AUTO: 3.45 X10*3/UL (ref 1.2–7.7)
NEUTROPHILS NFR BLD AUTO: 59.2 %
NRBC BLD-RTO: 0 /100 WBCS (ref 0–0)
PLATELET # BLD AUTO: 289 X10*3/UL (ref 150–450)
POTASSIUM SERPL-SCNC: 3.7 MMOL/L (ref 3.5–5.3)
PROT SERPL-MCNC: 7.1 G/DL (ref 6.4–8.2)
RBC # BLD AUTO: 3.71 X10*6/UL (ref 4–5.2)
SCAN RESULT: ABNORMAL
SODIUM SERPL-SCNC: 142 MMOL/L (ref 136–145)
VWF CP ACT/NOR PPP CHRO: <5 %
VWF CP INHIB PPP-ACNC: <0.4 INHIBITOR UNITS
WBC # BLD AUTO: 5.8 X10*3/UL (ref 4.4–11.3)

## 2025-04-28 PROCEDURE — 80053 COMPREHEN METABOLIC PANEL: CPT

## 2025-04-28 PROCEDURE — 36415 COLL VENOUS BLD VENIPUNCTURE: CPT

## 2025-04-28 PROCEDURE — 99214 OFFICE O/P EST MOD 30 MIN: CPT | Performed by: STUDENT IN AN ORGANIZED HEALTH CARE EDUCATION/TRAINING PROGRAM

## 2025-04-28 PROCEDURE — 85025 COMPLETE CBC W/AUTO DIFF WBC: CPT

## 2025-04-28 ASSESSMENT — PAIN SCALES - GENERAL: PAINLEVEL_OUTOF10: 0-NO PAIN

## 2025-04-28 NOTE — PROGRESS NOTES
Patient ID: Tere Carrion is a 55 y.o. female.  Referring Physician: Tree Du MD  [id number] [place] [address]  Primary Care Provider: Modesta Gallego DO  Visit Type: Follow Up  Diagnosis: immune TTP    Therapy summary (diagnosis: iTTP):   Rituximab: 6/21, 6/28, 7/8, 7/15, to be completed 7/15/2024  Cablivi: 6/21-to be completed  Cyclophosphamide: 8/21/2024, 9/11/24, 10/2/24, 10/24/24, 11/13/2024, 12/4/2024  Bortezomib: 1/13/2025-2/3/2025, 02/24/25-4/7 discontinued as she had eye complications         Subjective    HPI  55 y.o. female with a PMH significant for TTP in 1998/1999 (treated with prolonged (87) plasmapheresis, steroids, and immunosuppressants including vincristine and azathioprine, and splenectomy March 1999), reported sickle cell trait and alpha thalassemia trait, migraines.    Re TTP:   She was recently seen inpatient for immune TTP where she presented on 6/18/24 for 2 weeks of worsening generalized weakness, fatigue, migraines, hematuria, and new bruising with petechiae, with TTP. She was started on PLEX, did not receive Cablivi upfront 2/2 hematuria, which resolved over the last 3 days. She received PLEX x 3 (last on 6/21/24).    HIV, Hepatitis panel: Non-reactive  B12, folate, iron profile: Normal  T spot: Negative  Shiga toxin -ve   ELBERT -ve, SPEP work up negative for clonal protein   ADAMTS <5, inhibitor level 2.4--> >8.0    Re splenectomy, is followed by PCP for vaccination notes she is caught up at this time.   Notices fatigue since last 3 days, some skin bruising but no bleeding. Perimenopausal. Currently on pred 40mg, on taper.   Age appropriate cancer screening: mammo '23 due soon. [place] '20 due next year.   FMH: no cancer, mother had a UE DVT post-op, mother has many polyps, maternal GF had colon cancer, mother's sibs had colon cancer too   Social history: never smoker, no ETOH, no RDU.   08/21/24: Presents alone for follow-up.  The patient is being seen in  treatment.  24: headaches left sided, since last 1 month daily, takes excedrin migraine daily occasionally ibuprofen. Lightheaded when stands majority of the times. Couple days of nausea after the cytoxan.   24: no significant complaints, is tolerating cytoxan well. Dose noted to be at 400mg/m2 with no response in terms of inhibitor or GUOWHI29 level, escalated to 500mg/m2. Dose range reported up to 750mg/m2. D/w pt will likely need to complete 6 cycles.   10/24/24: presents with son. Asymptomatic. Has no particular complaints.   24: presents alone, notices some GI cramps on day 3 after dosing, but otherwise tolerates well.  24: asymptomatic, ANC lower end of normal, [id number] still undetectable at last check with high titer inhibitor.  24: pt remains asymptomatic, after initial elevation in [id number] (although this does not appear to be true as the inhibitor was quite high) there is no sustained response.   02/10/25: noticed diarrhea around midnight day of infusion, this time also nausea, abdominal pain, decreased appetite, did not do any viral testing. Did feel the same as she has had every dose. Noticed some nosebleeding yesterday, some spotting through the day.   25: asymptomatic, appears to have recovered from last cycle.   25: has preseptal orbital inflammation with has been on bacitracin, erythromycin topically and PO augmentin. Has not been able to get through scheduling to see a ophthalmologist. Has been off cablivi for 4 days after she reported having bruising on inner right thigh followed by a nose bleed.   25:     Review of Systems - Oncology  10 point review of systems negative except as stated in HPI    Objective   Past Surgical History:   Procedure Laterality Date    BREAST BIOPSY      BREAST CYST EXCISION      BREAST SURGERY       SECTION, LOW TRANSVERSE  2004    TUBAL LIGATION  2004     Oncology History    No history exists.       BSA: 1.9 meters  squared /65 (BP Location: Right arm, Patient Position: Sitting, BP Cuff Size: Adult)   Pulse 72   Temp 35.7 °C (96.3 °F) (Skin)   Resp 16   Wt 80.1 kg (176 lb 9.6 oz)   SpO2 98%   BMI 30.31 kg/m²   Physical Exam  Vitals reviewed.   Constitutional:       General: She is not in acute distress.     Appearance: She is not toxic-appearing.   HENT:      Head: Normocephalic and atraumatic.   Cardiovascular:      Rate and Rhythm: Normal rate and regular rhythm.   Pulmonary:      Effort: Pulmonary effort is normal.   Skin:     Comments: No bruising    Neurological:      General: No focal deficit present.      Mental Status: She is oriented to person, place, and time.   Psychiatric:         Mood and Affect: Mood normal.        07/08/24 11:29 07/15/24 13:20 08/27/24 15:07 12/02/24 10:41 12/23/24 15:57 12/31/24 15:02 01/21/25 15:48 01/28/25 15:44 02/03/25 10:34   [id number] Activity Value <5 (L) <5 (L) <5 (L) 7 (L) <5 (L) <5 (L) <5 (L) <5 (L) <5 (L)   DVQSUG15 Inhibitor Value >8.0 (H) >8.0 (H) >8.0 (H) >8.5 (H) 6.4 (H) 5.6 (H) 4.5 (H) 3.0 (H) 1.9 (H)       Assessment/Plan    55 y.o. female with a PMH significant for  TTP in 1998/1999 (treated with prolonged (87) plasmapheresis, steroids, and immunosuppressants including vincristine and azathioprine, and splenectomy March 1999), reported sickle cell trait and alpha thalassemia trait [confirmed on retesting].     # Immune TTP: Patient was initially diagnosed in 1998-99, had prolonged plasmapheresis followed by immunosuppression with vincristine, azathioprine and eventually splenectomy in March 1999, has been followed at [facility] since then and has had no evidence of relapse.  As noted above the patient presented with hematuria and was eventually diagnosed with immune TTP with an antibody titer of 2.4--> >8.0.  She was started on Cablivi and rituximab x1 cycle, then cyclophosphamide x6 cycles with no change in PUARIT38 and inhibitor levels. Maintained on cablivi and is  clinically asymptomatic.     We discussed alternate therapy including cyclosporine and bortezomib. Her recent flow showed a significant absence B cells, therefore did not redose ritux, and instead started bortezomib. Has noted many GI s/s after dosing for about 48hrs but then full recovery. Inhibitor has decreased and her factor levels are incrementing albeit gradually.   Plan:   - will continue Cablivi until KNPNYY28 is >20%, skip if there is evidence of bleeding, do alternate day dosing for now.   - hold treatment today   - hold bortezomib cycle 4  - cablivi to be held for 48hrs before ophthal apt  - weekly CBC/diff and close monitoring for bleeding with thrombocytopenia from drug and TTP and cablivi   -The patient has been educated about the signs and symptoms of TTP and the process of contacting us or heading to an ER as needed.  - follow up next: 2 weeks   - labs prior to follow up: Noted above      Tree Walker MD

## 2025-04-30 LAB
ADAMTS13 ANTIBODY VALUE: <9 ARBITARY UNITS
ADAMTS13 ANTIBODY VALUE: <9 ARBITARY UNITS
SCAN RESULT: ABNORMAL
SCAN RESULT: ABNORMAL
VWF CP ACT/NOR PPP CHRO: 11 %
VWF CP ACT/NOR PPP CHRO: <5 %
VWF CP INHIB PPP-ACNC: <0.4 INHIBITOR UNITS
VWF CP INHIB PPP-ACNC: <0.4 INHIBITOR UNITS

## 2025-05-01 PROCEDURE — RXMED WILLOW AMBULATORY MEDICATION CHARGE

## 2025-05-05 ENCOUNTER — PHARMACY VISIT (OUTPATIENT)
Dept: PHARMACY | Facility: CLINIC | Age: 55
End: 2025-05-05
Payer: COMMERCIAL

## 2025-05-05 ENCOUNTER — APPOINTMENT (OUTPATIENT)
Dept: HEMATOLOGY/ONCOLOGY | Facility: HOSPITAL | Age: 55
End: 2025-05-05
Payer: COMMERCIAL

## 2025-05-05 ENCOUNTER — TELEPHONE (OUTPATIENT)
Dept: ADMISSION | Facility: HOSPITAL | Age: 55
End: 2025-05-05
Payer: COMMERCIAL

## 2025-05-05 LAB
ADAMTS13 ANTIBODY VALUE: <9 ARBITARY UNITS
SCAN RESULT: ABNORMAL
VWF CP ACT/NOR PPP CHRO: <5 %
VWF CP INHIB PPP-ACNC: <0.4 INHIBITOR UNITS

## 2025-05-05 NOTE — TELEPHONE ENCOUNTER
Pt thought infusion today was cancelled and she was to start on oral medication.   Secure chat sent to team to clarify.

## 2025-05-05 NOTE — TELEPHONE ENCOUNTER
RN called the patient and informed her that the treatment is still on hold per MD note. MD also noted he wanted to see her 2 weeks after her last FUV so RN scheduled appt for 5/12 with patient. Patient asked if MD wanted to start new medication prior to follow up or if they will discuss next week.

## 2025-05-06 ENCOUNTER — LAB (OUTPATIENT)
Dept: LAB | Facility: CLINIC | Age: 55
End: 2025-05-06
Payer: COMMERCIAL

## 2025-05-06 DIAGNOSIS — M31.19 ACQUIRED TTP (MULTI): ICD-10-CM

## 2025-05-06 LAB
ALBUMIN SERPL BCP-MCNC: 3.9 G/DL (ref 3.4–5)
ALP SERPL-CCNC: 38 U/L (ref 33–110)
ALT SERPL W P-5'-P-CCNC: 13 U/L (ref 7–45)
ANION GAP SERPL CALC-SCNC: 9 MMOL/L (ref 10–20)
AST SERPL W P-5'-P-CCNC: 15 U/L (ref 9–39)
BASOPHILS # BLD AUTO: 0.03 X10*3/UL (ref 0–0.1)
BASOPHILS NFR BLD AUTO: 0.6 %
BILIRUB SERPL-MCNC: 0.4 MG/DL (ref 0–1.2)
BUN SERPL-MCNC: 8 MG/DL (ref 6–23)
CALCIUM SERPL-MCNC: 9.2 MG/DL (ref 8.6–10.3)
CHLORIDE SERPL-SCNC: 107 MMOL/L (ref 98–107)
CO2 SERPL-SCNC: 28 MMOL/L (ref 21–32)
CREAT SERPL-MCNC: 0.86 MG/DL (ref 0.5–1.05)
EGFRCR SERPLBLD CKD-EPI 2021: 79 ML/MIN/1.73M*2
EOSINOPHIL # BLD AUTO: 0.08 X10*3/UL (ref 0–0.7)
EOSINOPHIL NFR BLD AUTO: 1.6 %
ERYTHROCYTE [DISTWIDTH] IN BLOOD BY AUTOMATED COUNT: 13.1 % (ref 11.5–14.5)
GLUCOSE SERPL-MCNC: 96 MG/DL (ref 74–99)
HCT VFR BLD AUTO: 27.5 % (ref 36–46)
HGB BLD-MCNC: 9.2 G/DL (ref 12–16)
IMM GRANULOCYTES # BLD AUTO: 0 X10*3/UL (ref 0–0.7)
IMM GRANULOCYTES NFR BLD AUTO: 0 % (ref 0–0.9)
LYMPHOCYTES # BLD AUTO: 1.62 X10*3/UL (ref 1.2–4.8)
LYMPHOCYTES NFR BLD AUTO: 33.1 %
MCH RBC QN AUTO: 25.4 PG (ref 26–34)
MCHC RBC AUTO-ENTMCNC: 33.5 G/DL (ref 32–36)
MCV RBC AUTO: 76 FL (ref 80–100)
MONOCYTES # BLD AUTO: 0.4 X10*3/UL (ref 0.1–1)
MONOCYTES NFR BLD AUTO: 8.2 %
NEUTROPHILS # BLD AUTO: 2.77 X10*3/UL (ref 1.2–7.7)
NEUTROPHILS NFR BLD AUTO: 56.5 %
NRBC BLD-RTO: ABNORMAL /100{WBCS}
PLATELET # BLD AUTO: 274 X10*3/UL (ref 150–450)
POTASSIUM SERPL-SCNC: 3.8 MMOL/L (ref 3.5–5.3)
PROT SERPL-MCNC: 6.8 G/DL (ref 6.4–8.2)
RBC # BLD AUTO: 3.62 X10*6/UL (ref 4–5.2)
SODIUM SERPL-SCNC: 140 MMOL/L (ref 136–145)
WBC # BLD AUTO: 4.9 X10*3/UL (ref 4.4–11.3)

## 2025-05-06 PROCEDURE — 85025 COMPLETE CBC W/AUTO DIFF WBC: CPT

## 2025-05-06 PROCEDURE — 36415 COLL VENOUS BLD VENIPUNCTURE: CPT

## 2025-05-06 PROCEDURE — 80053 COMPREHEN METABOLIC PANEL: CPT

## 2025-05-08 PROCEDURE — RXMED WILLOW AMBULATORY MEDICATION CHARGE

## 2025-05-09 ENCOUNTER — PHARMACY VISIT (OUTPATIENT)
Dept: PHARMACY | Facility: CLINIC | Age: 55
End: 2025-05-09
Payer: COMMERCIAL

## 2025-05-12 ENCOUNTER — OFFICE VISIT (OUTPATIENT)
Dept: HEMATOLOGY/ONCOLOGY | Facility: HOSPITAL | Age: 55
End: 2025-05-12
Payer: COMMERCIAL

## 2025-05-12 ENCOUNTER — LAB (OUTPATIENT)
Dept: LAB | Facility: HOSPITAL | Age: 55
End: 2025-05-12
Payer: COMMERCIAL

## 2025-05-12 VITALS
WEIGHT: 177.91 LBS | OXYGEN SATURATION: 100 % | HEART RATE: 74 BPM | TEMPERATURE: 96.6 F | DIASTOLIC BLOOD PRESSURE: 74 MMHG | RESPIRATION RATE: 20 BRPM | BODY MASS INDEX: 30.54 KG/M2 | SYSTOLIC BLOOD PRESSURE: 106 MMHG

## 2025-05-12 DIAGNOSIS — M31.19 ACQUIRED TTP (MULTI): ICD-10-CM

## 2025-05-12 DIAGNOSIS — M31.19 ACQUIRED TTP (MULTI): Primary | ICD-10-CM

## 2025-05-12 LAB
ALBUMIN SERPL BCP-MCNC: 3.9 G/DL (ref 3.4–5)
ALP SERPL-CCNC: 42 U/L (ref 33–110)
ALT SERPL W P-5'-P-CCNC: 16 U/L (ref 7–45)
ANION GAP SERPL CALC-SCNC: 11 MMOL/L (ref 10–20)
AST SERPL W P-5'-P-CCNC: 20 U/L (ref 9–39)
BASOPHILS # BLD AUTO: 0.03 X10*3/UL (ref 0–0.1)
BASOPHILS NFR BLD AUTO: 0.7 %
BILIRUB SERPL-MCNC: 0.4 MG/DL (ref 0–1.2)
BUN SERPL-MCNC: 12 MG/DL (ref 6–23)
CALCIUM SERPL-MCNC: 8.9 MG/DL (ref 8.6–10.3)
CHLORIDE SERPL-SCNC: 106 MMOL/L (ref 98–107)
CO2 SERPL-SCNC: 28 MMOL/L (ref 21–32)
CREAT SERPL-MCNC: 0.83 MG/DL (ref 0.5–1.05)
EGFRCR SERPLBLD CKD-EPI 2021: 83 ML/MIN/1.73M*2
EOSINOPHIL # BLD AUTO: 0.11 X10*3/UL (ref 0–0.7)
EOSINOPHIL NFR BLD AUTO: 2.5 %
ERYTHROCYTE [DISTWIDTH] IN BLOOD BY AUTOMATED COUNT: 13.1 % (ref 11.5–14.5)
GLUCOSE SERPL-MCNC: 86 MG/DL (ref 74–99)
HCT VFR BLD AUTO: 28.4 % (ref 36–46)
HGB BLD-MCNC: 9.5 G/DL (ref 12–16)
IMM GRANULOCYTES # BLD AUTO: 0.01 X10*3/UL (ref 0–0.7)
IMM GRANULOCYTES NFR BLD AUTO: 0.2 % (ref 0–0.9)
LYMPHOCYTES # BLD AUTO: 1.17 X10*3/UL (ref 1.2–4.8)
LYMPHOCYTES NFR BLD AUTO: 26.7 %
MCH RBC QN AUTO: 25.3 PG (ref 26–34)
MCHC RBC AUTO-ENTMCNC: 33.5 G/DL (ref 32–36)
MCV RBC AUTO: 76 FL (ref 80–100)
MONOCYTES # BLD AUTO: 0.34 X10*3/UL (ref 0.1–1)
MONOCYTES NFR BLD AUTO: 7.8 %
NEUTROPHILS # BLD AUTO: 2.72 X10*3/UL (ref 1.2–7.7)
NEUTROPHILS NFR BLD AUTO: 62.1 %
NRBC BLD-RTO: 0 /100 WBCS (ref 0–0)
PLATELET # BLD AUTO: 260 X10*3/UL (ref 150–450)
POTASSIUM SERPL-SCNC: 3.9 MMOL/L (ref 3.5–5.3)
PROT SERPL-MCNC: 7 G/DL (ref 6.4–8.2)
RBC # BLD AUTO: 3.76 X10*6/UL (ref 4–5.2)
SODIUM SERPL-SCNC: 141 MMOL/L (ref 136–145)
WBC # BLD AUTO: 4.4 X10*3/UL (ref 4.4–11.3)

## 2025-05-12 PROCEDURE — 99214 OFFICE O/P EST MOD 30 MIN: CPT | Performed by: STUDENT IN AN ORGANIZED HEALTH CARE EDUCATION/TRAINING PROGRAM

## 2025-05-12 PROCEDURE — 85025 COMPLETE CBC W/AUTO DIFF WBC: CPT

## 2025-05-12 PROCEDURE — 80053 COMPREHEN METABOLIC PANEL: CPT

## 2025-05-12 PROCEDURE — 36415 COLL VENOUS BLD VENIPUNCTURE: CPT

## 2025-05-12 RX ORDER — AZATHIOPRINE 75 MG/1
75 TABLET ORAL DAILY
Qty: 30 TABLET | Refills: 0 | Status: SHIPPED | OUTPATIENT
Start: 2025-05-12 | End: 2025-05-20

## 2025-05-12 ASSESSMENT — PAIN SCALES - GENERAL: PAINLEVEL_OUTOF10: 5

## 2025-05-12 NOTE — PROGRESS NOTES
Patient ID: Tere Carrion is a 55 y.o. female.  Referring Physician: No referring provider defined for this encounter.  Primary Care Provider: Modesta Gallego DO  Visit Type: Follow Up  Diagnosis: immune TTP    Therapy summary (diagnosis: iTTP):   Rituximab: 6/21, 6/28, 7/8, 7/15, to be completed 7/15/2024  Cablivi: 6/21-to be completed  Cyclophosphamide: 8/21/2024, 9/11/24, 10/2/24, 10/24/24, 11/13/2024, 12/4/2024  Bortezomib: 1/13/2025-2/3/2025, 02/24/25-4/7 discontinued as she had eye complications       Subjective    HPI  55 y.o. female with a PMH significant for TTP in 1998/1999 (treated with prolonged (87) plasmapheresis, steroids, and immunosuppressants including vincristine and azathioprine, and splenectomy March 1999), reported sickle cell trait and alpha thalassemia trait, migraines.    Re TTP:   She was recently seen inpatient for immune TTP where she presented on 6/18/24 for 2 weeks of worsening generalized weakness, fatigue, migraines, hematuria, and new bruising with petechiae, with TTP. She was started on PLEX, did not receive Cablivi upfront 2/2 hematuria, which resolved over the last 3 days. She received PLEX x 3 (last on 6/21/24).    HIV, Hepatitis panel: Non-reactive  B12, folate, iron profile: Normal  T spot: Negative  Shiga toxin -ve   ELBERT -ve, SPEP work up negative for clonal protein   ADAMTS <5, inhibitor level 2.4--> >8.0    Re splenectomy, is followed by PCP for vaccination notes she is caught up at this time.   Notices fatigue since last 3 days, some skin bruising but no bleeding. Perimenopausal. Currently on pred 40mg, on taper.   Age appropriate cancer screening: mammo '22 due soon. [place] '19 due next year.   FMH: no cancer, mother had a UE DVT post-op, mother has many polyps, maternal GF had colon cancer, mother's sibs had colon cancer too   Social history: never smoker, no ETOH, no RDU.   08/21/24: Presents alone for follow-up.  The patient is being seen in treatment.  09/09/24:  headaches left sided, since last 1 month daily, takes excedrin migraine daily occasionally ibuprofen. Lightheaded when stands majority of the times. Couple days of nausea after the cytoxan.   24: no significant complaints, is tolerating cytoxan well. Dose noted to be at 400mg/m2 with no response in terms of inhibitor or [id number] level, escalated to 500mg/m2. Dose range reported up to 750mg/m2. D/w pt will likely need to complete 6 cycles.   10/24/24: presents with son. Asymptomatic. Has no particular complaints.   24: presents alone, notices some GI cramps on day 3 after dosing, but otherwise tolerates well.  24: asymptomatic, ANC lower end of normal, SOCDFU83 still undetectable at last check with high titer inhibitor.  24: pt remains asymptomatic, after initial elevation in PJPWCV01 (although this does not appear to be true as the inhibitor was quite high) there is no sustained response.   02/10/25: noticed diarrhea around midnight day of infusion, this time also nausea, abdominal pain, decreased appetite, did not do any viral testing. Did feel the same as she has had every dose. Noticed some nosebleeding yesterday, some spotting through the day.   25: asymptomatic, appears to have recovered from last cycle.   25: has preseptal orbital inflammation with has been on bacitracin, erythromycin topically and PO augmentin. Has not been able to get through scheduling to see a ophthalmologist. Has been off cablivi for 4 days after she reported having bruising on inner right thigh followed by a nose bleed.   25: her eye swelling and chalazia are improving.   25:     Review of Systems - Oncology  10 point review of systems negative except as stated in HPI    Objective   Past Surgical History:   Procedure Laterality Date    BREAST BIOPSY      BREAST CYST EXCISION      BREAST SURGERY       SECTION, LOW TRANSVERSE  2004    TUBAL LIGATION  2004     Oncology History    No  history exists.       BSA: 1.91 meters squared /74   Pulse 74   Temp 35.9 °C (96.6 °F) (Core)   Resp 20   Wt 80.7 kg (177 lb 14.6 oz)   SpO2 100%   BMI 30.54 kg/m²   Physical Exam  Vitals reviewed.   Constitutional:       General: She is not in acute distress.     Appearance: She is not toxic-appearing.   HENT:      Head: Normocephalic and atraumatic.   Cardiovascular:      Rate and Rhythm: Normal rate and regular rhythm.   Pulmonary:      Effort: Pulmonary effort is normal.   Skin:     Comments: No bruising    Neurological:      General: No focal deficit present.      Mental Status: She is oriented to person, place, and time.   Psychiatric:         Mood and Affect: Mood normal.      07/08/24 11:29 07/15/24 13:20 12/02/24 10:41 12/23/24 15:57 12/31/24 15:02 01/21/25 15:48 01/28/25 15:44 02/03/25 10:34 3/10/25 4/14/25 4/28/25   [id number] Activity Value <5 (L) <5 (L) 7 (L) <5 (L) <5 (L) <5 (L) <5 (L) <5 (L) 14 11 <5   SCUMOC45 Inhibitor Value >8.0 (H) >8.0 (H) >8.5 (H) 6.4 (H) 5.6 (H) 4.5 (H) 3.0 (H) 1.9 (H) <0.4 <0.4 <0.4       Assessment/Plan    55 y.o. female with a PMH significant for  TTP in 1998/1999 (treated with prolonged (87) plasmapheresis, steroids, and immunosuppressants including vincristine and azathioprine, and splenectomy March 1999), reported sickle cell trait and alpha thalassemia trait [confirmed on retesting].     # Immune TTP: Patient was initially diagnosed in 1998-99, had prolonged plasmapheresis followed by immunosuppression with vincristine, azathioprine and eventually splenectomy in March 1999, has been followed at [facility] since then and has had no evidence of relapse.  It is to be noted as this predated the [id number] testing, this is a presumed diagnosis. Recent presentation with hematuria eventually diagnosed with immune TTP with an antibody titer of 2.4--> >8.5. since then has cycled through various lines of therapy without significant remission. I have kept her on Cablivi as a  means to prevent a clinical relapse while we get her disease in remission. After a briefly detectable TAFRPI20 level with bortezomib, we had to interrupt therapy due to recurrent, diffuse and severe chalazia and blepharitis. Her WNZCGB26 are improved w/o changes in therapy. We discussed azathioprine and pt consented.   Plan:   - script sent for azathioprine pending PA   - will continue Cablivi every other day due to bleeding events, until ATJMFF67 is >20%, skip if there is evidence of bleeding, do alternate day dosing for now.   - weekly CBC/diff and close monitoring for bleeding with thrombocytopenia from drug and TTP and cablivi   -The patient has been educated about the signs and symptoms of TTP and the process of contacting us or heading to an ER as needed.  - follow up next: 2 weeks   - labs prior to follow up: Noted above    Of note: repeat testing has shown HBTMHA35 level of 9, with undetectable inhibitor. Will send PA but await retesting, if the IRDEVT27 continues to improve will hold off on further therapy.       Tree Walker MD

## 2025-05-15 PROCEDURE — RXMED WILLOW AMBULATORY MEDICATION CHARGE

## 2025-05-16 ENCOUNTER — PHARMACY VISIT (OUTPATIENT)
Dept: PHARMACY | Facility: CLINIC | Age: 55
End: 2025-05-16
Payer: COMMERCIAL

## 2025-05-19 ENCOUNTER — TELEPHONE (OUTPATIENT)
Dept: HEMATOLOGY/ONCOLOGY | Facility: HOSPITAL | Age: 55
End: 2025-05-19
Payer: COMMERCIAL

## 2025-05-19 NOTE — TELEPHONE ENCOUNTER
RN called the patient and informed her that AFRRNL26 test came back at 9 and MD Walker would like her to get the script for azathioprine but wait to start it until he tells her to incase labs improve on their own. She verbalized understanding.

## 2025-05-19 NOTE — TELEPHONE ENCOUNTER
----- Message from Tree Du sent at 5/17/2025  5:17 PM EDT -----  I am sure Karly has seen this too, we will proceed with the azathioprine PA but hold off on starting.   ----- Message -----  From: Lab, Background User  Sent: 5/6/2025   2:37 PM EDT  To: Tree Du MD

## 2025-05-20 ENCOUNTER — LAB (OUTPATIENT)
Dept: LAB | Facility: CLINIC | Age: 55
End: 2025-05-20
Payer: COMMERCIAL

## 2025-05-20 DIAGNOSIS — M31.19 ACQUIRED TTP (MULTI): ICD-10-CM

## 2025-05-20 LAB
ALBUMIN SERPL BCP-MCNC: 4 G/DL (ref 3.4–5)
ALP SERPL-CCNC: 43 U/L (ref 33–110)
ALT SERPL W P-5'-P-CCNC: 17 U/L (ref 7–45)
ANION GAP SERPL CALC-SCNC: 11 MMOL/L (ref 10–20)
AST SERPL W P-5'-P-CCNC: 18 U/L (ref 9–39)
BASOPHILS # BLD AUTO: 0.02 X10*3/UL (ref 0–0.1)
BASOPHILS NFR BLD AUTO: 0.4 %
BILIRUB SERPL-MCNC: 0.4 MG/DL (ref 0–1.2)
BUN SERPL-MCNC: 12 MG/DL (ref 6–23)
CALCIUM SERPL-MCNC: 9.3 MG/DL (ref 8.6–10.3)
CHLORIDE SERPL-SCNC: 107 MMOL/L (ref 98–107)
CO2 SERPL-SCNC: 27 MMOL/L (ref 21–32)
CREAT SERPL-MCNC: 0.92 MG/DL (ref 0.5–1.05)
EGFRCR SERPLBLD CKD-EPI 2021: 73 ML/MIN/1.73M*2
EOSINOPHIL # BLD AUTO: 0.1 X10*3/UL (ref 0–0.7)
EOSINOPHIL NFR BLD AUTO: 2.1 %
ERYTHROCYTE [DISTWIDTH] IN BLOOD BY AUTOMATED COUNT: 12.7 % (ref 11.5–14.5)
GLUCOSE SERPL-MCNC: 74 MG/DL (ref 74–99)
HCT VFR BLD AUTO: 28.4 % (ref 36–46)
HGB BLD-MCNC: 9.6 G/DL (ref 12–16)
HYPOCHROMIA BLD QL SMEAR: NORMAL
IMM GRANULOCYTES # BLD AUTO: 0 X10*3/UL (ref 0–0.7)
IMM GRANULOCYTES NFR BLD AUTO: 0 % (ref 0–0.9)
LYMPHOCYTES # BLD AUTO: 1.54 X10*3/UL (ref 1.2–4.8)
LYMPHOCYTES NFR BLD AUTO: 32.6 %
MCH RBC QN AUTO: 25.5 PG (ref 26–34)
MCHC RBC AUTO-ENTMCNC: 33.8 G/DL (ref 32–36)
MCV RBC AUTO: 76 FL (ref 80–100)
MONOCYTES # BLD AUTO: 0.42 X10*3/UL (ref 0.1–1)
MONOCYTES NFR BLD AUTO: 8.9 %
NEUTROPHILS # BLD AUTO: 2.65 X10*3/UL (ref 1.2–7.7)
NEUTROPHILS NFR BLD AUTO: 56 %
NRBC BLD-RTO: ABNORMAL /100{WBCS}
OVALOCYTES BLD QL SMEAR: NORMAL
PLATELET # BLD AUTO: 269 X10*3/UL (ref 150–450)
POLYCHROMASIA BLD QL SMEAR: NORMAL
POTASSIUM SERPL-SCNC: 3.7 MMOL/L (ref 3.5–5.3)
PROT SERPL-MCNC: 7 G/DL (ref 6.4–8.2)
RBC # BLD AUTO: 3.76 X10*6/UL (ref 4–5.2)
RBC MORPH BLD: NORMAL
SODIUM SERPL-SCNC: 141 MMOL/L (ref 136–145)
TARGETS BLD QL SMEAR: NORMAL
WBC # BLD AUTO: 4.7 X10*3/UL (ref 4.4–11.3)

## 2025-05-20 PROCEDURE — 85025 COMPLETE CBC W/AUTO DIFF WBC: CPT

## 2025-05-20 PROCEDURE — 36415 COLL VENOUS BLD VENIPUNCTURE: CPT

## 2025-05-20 PROCEDURE — 80053 COMPREHEN METABOLIC PANEL: CPT

## 2025-05-20 RX ORDER — AZATHIOPRINE 50 MG/1
50 TABLET ORAL DAILY
Qty: 30 TABLET | Refills: 0 | Status: SHIPPED | OUTPATIENT
Start: 2025-05-20 | End: 2025-05-20 | Stop reason: SDUPTHER

## 2025-05-20 RX ORDER — AZATHIOPRINE 50 MG/1
50 TABLET ORAL DAILY
Qty: 30 TABLET | Refills: 0 | Status: SHIPPED | OUTPATIENT
Start: 2025-05-20

## 2025-05-21 ENCOUNTER — SPECIALTY PHARMACY (OUTPATIENT)
Dept: PHARMACY | Facility: CLINIC | Age: 55
End: 2025-05-21

## 2025-05-21 PROCEDURE — RXMED WILLOW AMBULATORY MEDICATION CHARGE

## 2025-05-22 ENCOUNTER — PHARMACY VISIT (OUTPATIENT)
Dept: PHARMACY | Facility: CLINIC | Age: 55
End: 2025-05-22
Payer: COMMERCIAL

## 2025-05-22 ENCOUNTER — APPOINTMENT (OUTPATIENT)
Dept: OPHTHALMOLOGY | Facility: CLINIC | Age: 55
End: 2025-05-22
Payer: COMMERCIAL

## 2025-05-22 DIAGNOSIS — H00.11 CHALAZION RIGHT UPPER EYELID: ICD-10-CM

## 2025-05-22 DIAGNOSIS — H00.12 CHALAZION OF RIGHT LOWER EYELID: ICD-10-CM

## 2025-05-22 DIAGNOSIS — H02.88B MEIBOMIAN GLAND DISEASE OF UPPER AND LOWER EYELIDS OF BOTH EYES: Primary | ICD-10-CM

## 2025-05-22 DIAGNOSIS — H00.14 CHALAZION OF LEFT UPPER EYELID: ICD-10-CM

## 2025-05-22 DIAGNOSIS — H02.88A MEIBOMIAN GLAND DISEASE OF UPPER AND LOWER EYELIDS OF BOTH EYES: Primary | ICD-10-CM

## 2025-05-22 PROCEDURE — RXMED WILLOW AMBULATORY MEDICATION CHARGE

## 2025-05-22 PROCEDURE — 99213 OFFICE O/P EST LOW 20 MIN: CPT

## 2025-05-22 RX ORDER — DOXYCYCLINE 100 MG/1
100 CAPSULE ORAL 2 TIMES DAILY
Qty: 28 CAPSULE | Refills: 0 | Status: SHIPPED | OUTPATIENT
Start: 2025-05-22 | End: 2025-06-05

## 2025-05-22 RX ORDER — NEOMYCIN SULFATE, POLYMYXIN B SULFATE, AND DEXAMETHASONE 3.5; 10000; 1 MG/G; [USP'U]/G; MG/G
OINTMENT OPHTHALMIC
Qty: 3.5 G | Refills: 0 | Status: SHIPPED | OUTPATIENT
Start: 2025-05-22

## 2025-05-22 ASSESSMENT — ENCOUNTER SYMPTOMS
ALLERGIC/IMMUNOLOGIC NEGATIVE: 0
PSYCHIATRIC NEGATIVE: 0
RESPIRATORY NEGATIVE: 0
EYES NEGATIVE: 1
GASTROINTESTINAL NEGATIVE: 0
NEUROLOGICAL NEGATIVE: 0
CONSTITUTIONAL NEGATIVE: 0
HEMATOLOGIC/LYMPHATIC NEGATIVE: 0
ENDOCRINE NEGATIVE: 0
MUSCULOSKELETAL NEGATIVE: 0
CARDIOVASCULAR NEGATIVE: 0

## 2025-05-22 ASSESSMENT — VISUAL ACUITY
METHOD: SNELLEN - LINEAR
OS_CC: 20/20
OD_CC: 20/20

## 2025-05-22 ASSESSMENT — TONOMETRY
OD_IOP_MMHG: 12
IOP_METHOD: GOLDMANN APPLANATION
OS_IOP_MMHG: 12

## 2025-05-22 ASSESSMENT — EXTERNAL EXAM - RIGHT EYE: OD_EXAM: NORMAL

## 2025-05-22 ASSESSMENT — EXTERNAL EXAM - LEFT EYE: OS_EXAM: NORMAL

## 2025-05-22 NOTE — PROGRESS NOTES
Mild resolving chalazion of bilateral upper eyelids. No suspicious features such as madarosis, ulceration, or telangiectasias. Not pregnant. No hx of POAG. Start doxycycline 100mg PO bid x 2 weeks, maxitrol ointment bid x 2 weeks, and warm compresses bid x 4 weeks. No known allergy to doxycycline. Discussed risks of doxycycline including GI upset and photosensitivity, among others. Return in 3-4 weeks for reassessment, or sooner with any changes or worsening.     Will need PA for CPT 65345, ICD H00.11, H00.12, and H00.14

## 2025-05-23 ENCOUNTER — PHARMACY VISIT (OUTPATIENT)
Dept: PHARMACY | Facility: CLINIC | Age: 55
End: 2025-05-23
Payer: COMMERCIAL

## 2025-05-28 ENCOUNTER — LAB (OUTPATIENT)
Dept: LAB | Facility: CLINIC | Age: 55
End: 2025-05-28
Payer: COMMERCIAL

## 2025-05-28 DIAGNOSIS — M31.19 ACQUIRED TTP (MULTI): ICD-10-CM

## 2025-05-28 DIAGNOSIS — M31.19 ACQUIRED TTP (MULTI): Primary | ICD-10-CM

## 2025-05-28 LAB
ALBUMIN SERPL BCP-MCNC: 4 G/DL (ref 3.4–5)
ALP SERPL-CCNC: 43 U/L (ref 33–110)
ALT SERPL W P-5'-P-CCNC: 15 U/L (ref 7–45)
ANION GAP SERPL CALC-SCNC: 10 MMOL/L (ref 10–20)
AST SERPL W P-5'-P-CCNC: 17 U/L (ref 9–39)
BASOPHILS # BLD AUTO: 0.03 X10*3/UL (ref 0–0.1)
BASOPHILS NFR BLD AUTO: 0.5 %
BILIRUB SERPL-MCNC: 0.4 MG/DL (ref 0–1.2)
BUN SERPL-MCNC: 14 MG/DL (ref 6–23)
CALCIUM SERPL-MCNC: 9.1 MG/DL (ref 8.6–10.3)
CHLORIDE SERPL-SCNC: 108 MMOL/L (ref 98–107)
CO2 SERPL-SCNC: 27 MMOL/L (ref 21–32)
CREAT SERPL-MCNC: 0.82 MG/DL (ref 0.5–1.05)
EGFRCR SERPLBLD CKD-EPI 2021: 84 ML/MIN/1.73M*2
EOSINOPHIL # BLD AUTO: 0.09 X10*3/UL (ref 0–0.7)
EOSINOPHIL NFR BLD AUTO: 1.6 %
ERYTHROCYTE [DISTWIDTH] IN BLOOD BY AUTOMATED COUNT: 12.5 % (ref 11.5–14.5)
GLUCOSE SERPL-MCNC: 84 MG/DL (ref 74–99)
HCT VFR BLD AUTO: 29 % (ref 36–46)
HGB BLD-MCNC: 9.7 G/DL (ref 12–16)
IMM GRANULOCYTES # BLD AUTO: 0.01 X10*3/UL (ref 0–0.7)
IMM GRANULOCYTES NFR BLD AUTO: 0.2 % (ref 0–0.9)
LYMPHOCYTES # BLD AUTO: 1.53 X10*3/UL (ref 1.2–4.8)
LYMPHOCYTES NFR BLD AUTO: 26.9 %
MCH RBC QN AUTO: 25.1 PG (ref 26–34)
MCHC RBC AUTO-ENTMCNC: 33.4 G/DL (ref 32–36)
MCV RBC AUTO: 75 FL (ref 80–100)
MONOCYTES # BLD AUTO: 0.37 X10*3/UL (ref 0.1–1)
MONOCYTES NFR BLD AUTO: 6.5 %
NEUTROPHILS # BLD AUTO: 3.65 X10*3/UL (ref 1.2–7.7)
NEUTROPHILS NFR BLD AUTO: 64.3 %
PLATELET # BLD AUTO: 294 X10*3/UL (ref 150–450)
POTASSIUM SERPL-SCNC: 3.8 MMOL/L (ref 3.5–5.3)
PROT SERPL-MCNC: 7 G/DL (ref 6.4–8.2)
RBC # BLD AUTO: 3.87 X10*6/UL (ref 4–5.2)
SODIUM SERPL-SCNC: 141 MMOL/L (ref 136–145)
WBC # BLD AUTO: 5.7 X10*3/UL (ref 4.4–11.3)

## 2025-05-28 PROCEDURE — 36415 COLL VENOUS BLD VENIPUNCTURE: CPT

## 2025-05-28 PROCEDURE — 85025 COMPLETE CBC W/AUTO DIFF WBC: CPT

## 2025-05-28 PROCEDURE — 80053 COMPREHEN METABOLIC PANEL: CPT

## 2025-05-29 DIAGNOSIS — M31.19 ACQUIRED TTP (MULTI): ICD-10-CM

## 2025-05-29 LAB — HOLD SPECIMEN: NORMAL

## 2025-05-29 RX ORDER — CAPLACIZUMAB 11 MG
11 KIT INJECTION EVERY OTHER DAY
Qty: 16 KIT | Refills: 0 | Status: SHIPPED | OUTPATIENT
Start: 2025-05-29

## 2025-05-30 ENCOUNTER — TELEPHONE (OUTPATIENT)
Dept: HEMATOLOGY/ONCOLOGY | Facility: HOSPITAL | Age: 55
End: 2025-05-30
Payer: COMMERCIAL

## 2025-05-30 PROCEDURE — RXMED WILLOW AMBULATORY MEDICATION CHARGE

## 2025-05-30 NOTE — TELEPHONE ENCOUNTER
RN called the patient to check to see if she received her azathioprine medication. She said she did receive but was not sure if she needs to start it. She wanted to know if more NRPQDS44 tests had resulted since 5/6. RN called [facility] to see if they have any results. They faxed them to office and RN sent to MD. MD said he will call the patient to go over plan. Patient aware to hold the med until she speaks with MD.

## 2025-06-02 ENCOUNTER — PHARMACY VISIT (OUTPATIENT)
Dept: PHARMACY | Facility: CLINIC | Age: 55
End: 2025-06-02
Payer: COMMERCIAL

## 2025-06-03 ENCOUNTER — LAB (OUTPATIENT)
Dept: LAB | Facility: CLINIC | Age: 55
End: 2025-06-03
Payer: COMMERCIAL

## 2025-06-03 DIAGNOSIS — M31.19 ACQUIRED TTP (MULTI): ICD-10-CM

## 2025-06-03 LAB
ALBUMIN SERPL BCP-MCNC: 4.1 G/DL (ref 3.4–5)
ALP SERPL-CCNC: 48 U/L (ref 33–110)
ALT SERPL W P-5'-P-CCNC: 14 U/L (ref 7–45)
ANION GAP SERPL CALC-SCNC: 11 MMOL/L (ref 10–20)
AST SERPL W P-5'-P-CCNC: 17 U/L (ref 9–39)
BASOPHILS # BLD AUTO: 0.03 X10*3/UL (ref 0–0.1)
BASOPHILS NFR BLD AUTO: 0.6 %
BILIRUB SERPL-MCNC: 0.5 MG/DL (ref 0–1.2)
BUN SERPL-MCNC: 12 MG/DL (ref 6–23)
CALCIUM SERPL-MCNC: 9.5 MG/DL (ref 8.6–10.3)
CHLORIDE SERPL-SCNC: 105 MMOL/L (ref 98–107)
CO2 SERPL-SCNC: 28 MMOL/L (ref 21–32)
CREAT SERPL-MCNC: 0.95 MG/DL (ref 0.5–1.05)
EGFRCR SERPLBLD CKD-EPI 2021: 70 ML/MIN/1.73M*2
EOSINOPHIL # BLD AUTO: 0.1 X10*3/UL (ref 0–0.7)
EOSINOPHIL NFR BLD AUTO: 2.1 %
ERYTHROCYTE [DISTWIDTH] IN BLOOD BY AUTOMATED COUNT: 12.3 % (ref 11.5–14.5)
GLUCOSE SERPL-MCNC: 86 MG/DL (ref 74–99)
HCT VFR BLD AUTO: 30.3 % (ref 36–46)
HGB BLD-MCNC: 10.1 G/DL (ref 12–16)
IMM GRANULOCYTES # BLD AUTO: 0.01 X10*3/UL (ref 0–0.7)
IMM GRANULOCYTES NFR BLD AUTO: 0.2 % (ref 0–0.9)
LYMPHOCYTES # BLD AUTO: 1.49 X10*3/UL (ref 1.2–4.8)
LYMPHOCYTES NFR BLD AUTO: 30.8 %
MCH RBC QN AUTO: 25 PG (ref 26–34)
MCHC RBC AUTO-ENTMCNC: 33.3 G/DL (ref 32–36)
MCV RBC AUTO: 75 FL (ref 80–100)
MONOCYTES # BLD AUTO: 0.38 X10*3/UL (ref 0.1–1)
MONOCYTES NFR BLD AUTO: 7.9 %
NEUTROPHILS # BLD AUTO: 2.82 X10*3/UL (ref 1.2–7.7)
NEUTROPHILS NFR BLD AUTO: 58.4 %
PLATELET # BLD AUTO: 264 X10*3/UL (ref 150–450)
POTASSIUM SERPL-SCNC: 3.7 MMOL/L (ref 3.5–5.3)
PROT SERPL-MCNC: 7.2 G/DL (ref 6.4–8.2)
RBC # BLD AUTO: 4.04 X10*6/UL (ref 4–5.2)
SODIUM SERPL-SCNC: 140 MMOL/L (ref 136–145)
WBC # BLD AUTO: 4.8 X10*3/UL (ref 4.4–11.3)

## 2025-06-03 PROCEDURE — 36415 COLL VENOUS BLD VENIPUNCTURE: CPT

## 2025-06-03 PROCEDURE — 84075 ASSAY ALKALINE PHOSPHATASE: CPT

## 2025-06-03 PROCEDURE — 85025 COMPLETE CBC W/AUTO DIFF WBC: CPT

## 2025-06-05 PROCEDURE — RXMED WILLOW AMBULATORY MEDICATION CHARGE

## 2025-06-06 ENCOUNTER — PHARMACY VISIT (OUTPATIENT)
Dept: PHARMACY | Facility: CLINIC | Age: 55
End: 2025-06-06
Payer: COMMERCIAL

## 2025-06-10 ENCOUNTER — LAB (OUTPATIENT)
Dept: LAB | Facility: CLINIC | Age: 55
End: 2025-06-10
Payer: COMMERCIAL

## 2025-06-10 DIAGNOSIS — M31.19 ACQUIRED TTP (MULTI): ICD-10-CM

## 2025-06-10 LAB
ADAMTS13 ANTIBODY VALUE: <9 ARBITARY UNITS
ADAMTS13 ANTIBODY VALUE: <9 ARBITARY UNITS
ALBUMIN SERPL BCP-MCNC: 4 G/DL (ref 3.4–5)
ALP SERPL-CCNC: 38 U/L (ref 33–110)
ALT SERPL W P-5'-P-CCNC: 14 U/L (ref 7–45)
ANION GAP SERPL CALC-SCNC: 13 MMOL/L (ref 10–20)
AST SERPL W P-5'-P-CCNC: 22 U/L (ref 9–39)
BASOPHILS # BLD AUTO: 0.02 X10*3/UL (ref 0–0.1)
BASOPHILS NFR BLD AUTO: 0.4 %
BILIRUB SERPL-MCNC: 0.4 MG/DL (ref 0–1.2)
BUN SERPL-MCNC: 11 MG/DL (ref 6–23)
CALCIUM SERPL-MCNC: 9.2 MG/DL (ref 8.6–10.3)
CHLORIDE SERPL-SCNC: 106 MMOL/L (ref 98–107)
CO2 SERPL-SCNC: 26 MMOL/L (ref 21–32)
CREAT SERPL-MCNC: 0.89 MG/DL (ref 0.5–1.05)
EGFRCR SERPLBLD CKD-EPI 2021: 76 ML/MIN/1.73M*2
EOSINOPHIL # BLD AUTO: 0.1 X10*3/UL (ref 0–0.7)
EOSINOPHIL NFR BLD AUTO: 1.8 %
ERYTHROCYTE [DISTWIDTH] IN BLOOD BY AUTOMATED COUNT: 12.5 % (ref 11.5–14.5)
GLUCOSE SERPL-MCNC: 130 MG/DL (ref 74–99)
HCT VFR BLD AUTO: 29.4 % (ref 36–46)
HGB BLD-MCNC: 9.8 G/DL (ref 12–16)
IMM GRANULOCYTES # BLD AUTO: 0 X10*3/UL (ref 0–0.7)
IMM GRANULOCYTES NFR BLD AUTO: 0 % (ref 0–0.9)
LYMPHOCYTES # BLD AUTO: 1.53 X10*3/UL (ref 1.2–4.8)
LYMPHOCYTES NFR BLD AUTO: 27.3 %
MCH RBC QN AUTO: 25 PG (ref 26–34)
MCHC RBC AUTO-ENTMCNC: 33.3 G/DL (ref 32–36)
MCV RBC AUTO: 75 FL (ref 80–100)
MONOCYTES # BLD AUTO: 0.32 X10*3/UL (ref 0.1–1)
MONOCYTES NFR BLD AUTO: 5.7 %
NEUTROPHILS # BLD AUTO: 3.63 X10*3/UL (ref 1.2–7.7)
NEUTROPHILS NFR BLD AUTO: 64.8 %
PLATELET # BLD AUTO: 265 X10*3/UL (ref 150–450)
POTASSIUM SERPL-SCNC: 4.7 MMOL/L (ref 3.5–5.3)
PROT SERPL-MCNC: 7 G/DL (ref 6.4–8.2)
RBC # BLD AUTO: 3.92 X10*6/UL (ref 4–5.2)
SCAN RESULT: ABNORMAL
SCAN RESULT: ABNORMAL
SODIUM SERPL-SCNC: 140 MMOL/L (ref 136–145)
VWF CP ACT/NOR PPP CHRO: 12 %
VWF CP ACT/NOR PPP CHRO: 13 %
VWF CP INHIB PPP-ACNC: <0.4 INHIBITOR UNITS
VWF CP INHIB PPP-ACNC: <0.4 INHIBITOR UNITS
WBC # BLD AUTO: 5.6 X10*3/UL (ref 4.4–11.3)

## 2025-06-10 PROCEDURE — 82374 ASSAY BLOOD CARBON DIOXIDE: CPT

## 2025-06-10 PROCEDURE — 85025 COMPLETE CBC W/AUTO DIFF WBC: CPT

## 2025-06-10 PROCEDURE — 84075 ASSAY ALKALINE PHOSPHATASE: CPT

## 2025-06-10 PROCEDURE — 36415 COLL VENOUS BLD VENIPUNCTURE: CPT

## 2025-06-12 ENCOUNTER — TELEPHONE (OUTPATIENT)
Dept: HEMATOLOGY/ONCOLOGY | Facility: HOSPITAL | Age: 55
End: 2025-06-12
Payer: COMMERCIAL

## 2025-06-12 PROCEDURE — RXMED WILLOW AMBULATORY MEDICATION CHARGE

## 2025-06-12 NOTE — TELEPHONE ENCOUNTER
RN called the patient per MD Walker's request and told her that he would like for her to start the azathioprine since her most recent KZDGSH72 result came back at 12. She verbalized understanding and said she will start it today.

## 2025-06-13 ENCOUNTER — PHARMACY VISIT (OUTPATIENT)
Dept: PHARMACY | Facility: CLINIC | Age: 55
End: 2025-06-13
Payer: COMMERCIAL

## 2025-06-17 ENCOUNTER — LAB (OUTPATIENT)
Dept: LAB | Facility: CLINIC | Age: 55
End: 2025-06-17
Payer: COMMERCIAL

## 2025-06-17 DIAGNOSIS — M31.19 ACQUIRED TTP (MULTI): ICD-10-CM

## 2025-06-17 LAB
ALBUMIN SERPL BCP-MCNC: 4.1 G/DL (ref 3.4–5)
ALP SERPL-CCNC: 44 U/L (ref 33–110)
ALT SERPL W P-5'-P-CCNC: 15 U/L (ref 7–45)
ANION GAP SERPL CALC-SCNC: 12 MMOL/L (ref 10–20)
AST SERPL W P-5'-P-CCNC: 17 U/L (ref 9–39)
BASOPHILS # BLD AUTO: 0.03 X10*3/UL (ref 0–0.1)
BASOPHILS NFR BLD AUTO: 0.5 %
BILIRUB SERPL-MCNC: 0.4 MG/DL (ref 0–1.2)
BUN SERPL-MCNC: 10 MG/DL (ref 6–23)
CALCIUM SERPL-MCNC: 9.2 MG/DL (ref 8.6–10.3)
CHLORIDE SERPL-SCNC: 106 MMOL/L (ref 98–107)
CO2 SERPL-SCNC: 27 MMOL/L (ref 21–32)
CREAT SERPL-MCNC: 0.92 MG/DL (ref 0.5–1.05)
EGFRCR SERPLBLD CKD-EPI 2021: 73 ML/MIN/1.73M*2
EOSINOPHIL # BLD AUTO: 0.12 X10*3/UL (ref 0–0.7)
EOSINOPHIL NFR BLD AUTO: 2 %
ERYTHROCYTE [DISTWIDTH] IN BLOOD BY AUTOMATED COUNT: 12.5 % (ref 11.5–14.5)
GLUCOSE SERPL-MCNC: 84 MG/DL (ref 74–99)
HCT VFR BLD AUTO: 30.1 % (ref 36–46)
HGB BLD-MCNC: 9.9 G/DL (ref 12–16)
IMM GRANULOCYTES # BLD AUTO: 0.01 X10*3/UL (ref 0–0.7)
IMM GRANULOCYTES NFR BLD AUTO: 0.2 % (ref 0–0.9)
LYMPHOCYTES # BLD AUTO: 1.7 X10*3/UL (ref 1.2–4.8)
LYMPHOCYTES NFR BLD AUTO: 28.2 %
MCH RBC QN AUTO: 24.9 PG (ref 26–34)
MCHC RBC AUTO-ENTMCNC: 32.9 G/DL (ref 32–36)
MCV RBC AUTO: 76 FL (ref 80–100)
MONOCYTES # BLD AUTO: 0.48 X10*3/UL (ref 0.1–1)
MONOCYTES NFR BLD AUTO: 8 %
NEUTROPHILS # BLD AUTO: 3.69 X10*3/UL (ref 1.2–7.7)
NEUTROPHILS NFR BLD AUTO: 61.1 %
PLATELET # BLD AUTO: 276 X10*3/UL (ref 150–450)
POTASSIUM SERPL-SCNC: 3.9 MMOL/L (ref 3.5–5.3)
PROT SERPL-MCNC: 7 G/DL (ref 6.4–8.2)
RBC # BLD AUTO: 3.98 X10*6/UL (ref 4–5.2)
SODIUM SERPL-SCNC: 141 MMOL/L (ref 136–145)
WBC # BLD AUTO: 6 X10*3/UL (ref 4.4–11.3)

## 2025-06-17 PROCEDURE — 84075 ASSAY ALKALINE PHOSPHATASE: CPT

## 2025-06-17 PROCEDURE — 85025 COMPLETE CBC W/AUTO DIFF WBC: CPT

## 2025-06-17 PROCEDURE — 36415 COLL VENOUS BLD VENIPUNCTURE: CPT

## 2025-06-17 PROCEDURE — 85397 CLOTTING FUNCT ACTIVITY: CPT

## 2025-06-17 RX ORDER — AZATHIOPRINE 50 MG/1
50 TABLET ORAL DAILY
Qty: 30 TABLET | Refills: 1 | Status: SHIPPED | OUTPATIENT
Start: 2025-06-17

## 2025-06-19 PROCEDURE — RXMED WILLOW AMBULATORY MEDICATION CHARGE

## 2025-06-20 ENCOUNTER — PHARMACY VISIT (OUTPATIENT)
Dept: PHARMACY | Facility: CLINIC | Age: 55
End: 2025-06-20
Payer: COMMERCIAL

## 2025-06-20 LAB
SCAN RESULT: ABNORMAL
VWF CP ACT/NOR PPP CHRO: 15 %
VWF CP INHIB PPP-ACNC: <0.4 INHIBITOR UNITS

## 2025-06-23 ENCOUNTER — PHARMACY VISIT (OUTPATIENT)
Dept: PHARMACY | Facility: CLINIC | Age: 55
End: 2025-06-23

## 2025-06-24 ENCOUNTER — LAB (OUTPATIENT)
Dept: LAB | Facility: CLINIC | Age: 55
End: 2025-06-24
Payer: COMMERCIAL

## 2025-06-24 ENCOUNTER — NURSE TRIAGE (OUTPATIENT)
Dept: ADMISSION | Facility: HOSPITAL | Age: 55
End: 2025-06-24
Payer: COMMERCIAL

## 2025-06-24 ENCOUNTER — TELEPHONE (OUTPATIENT)
Dept: HEMATOLOGY/ONCOLOGY | Facility: HOSPITAL | Age: 55
End: 2025-06-24
Payer: COMMERCIAL

## 2025-06-24 DIAGNOSIS — M31.19 ACQUIRED TTP (MULTI): Primary | ICD-10-CM

## 2025-06-24 DIAGNOSIS — M31.19 ACQUIRED TTP (MULTI): ICD-10-CM

## 2025-06-24 LAB
ALBUMIN SERPL BCP-MCNC: 4 G/DL (ref 3.4–5)
ALP SERPL-CCNC: 46 U/L (ref 33–110)
ALT SERPL W P-5'-P-CCNC: 15 U/L (ref 7–45)
ANION GAP SERPL CALC-SCNC: 11 MMOL/L (ref 10–20)
AST SERPL W P-5'-P-CCNC: 18 U/L (ref 9–39)
BASOPHILS # BLD AUTO: 0.04 X10*3/UL (ref 0–0.1)
BASOPHILS NFR BLD AUTO: 0.8 %
BILIRUB SERPL-MCNC: 0.3 MG/DL (ref 0–1.2)
BUN SERPL-MCNC: 11 MG/DL (ref 6–23)
CALCIUM SERPL-MCNC: 9.4 MG/DL (ref 8.6–10.3)
CHLORIDE SERPL-SCNC: 107 MMOL/L (ref 98–107)
CO2 SERPL-SCNC: 27 MMOL/L (ref 21–32)
CREAT SERPL-MCNC: 0.88 MG/DL (ref 0.5–1.05)
EGFRCR SERPLBLD CKD-EPI 2021: 77 ML/MIN/1.73M*2
EOSINOPHIL # BLD AUTO: 0.14 X10*3/UL (ref 0–0.7)
EOSINOPHIL NFR BLD AUTO: 2.7 %
ERYTHROCYTE [DISTWIDTH] IN BLOOD BY AUTOMATED COUNT: 12.9 % (ref 11.5–14.5)
GLUCOSE SERPL-MCNC: 100 MG/DL (ref 74–99)
HCT VFR BLD AUTO: 30.1 % (ref 36–46)
HGB BLD-MCNC: 9.9 G/DL (ref 12–16)
IMM GRANULOCYTES # BLD AUTO: 0.01 X10*3/UL (ref 0–0.7)
IMM GRANULOCYTES NFR BLD AUTO: 0.2 % (ref 0–0.9)
LYMPHOCYTES # BLD AUTO: 1.37 X10*3/UL (ref 1.2–4.8)
LYMPHOCYTES NFR BLD AUTO: 26.8 %
MCH RBC QN AUTO: 24.9 PG (ref 26–34)
MCHC RBC AUTO-ENTMCNC: 32.9 G/DL (ref 32–36)
MCV RBC AUTO: 76 FL (ref 80–100)
MONOCYTES # BLD AUTO: 0.38 X10*3/UL (ref 0.1–1)
MONOCYTES NFR BLD AUTO: 7.4 %
NEUTROPHILS # BLD AUTO: 3.17 X10*3/UL (ref 1.2–7.7)
NEUTROPHILS NFR BLD AUTO: 62.1 %
PLATELET # BLD AUTO: 288 X10*3/UL (ref 150–450)
POTASSIUM SERPL-SCNC: 3.9 MMOL/L (ref 3.5–5.3)
PROT SERPL-MCNC: 7.1 G/DL (ref 6.4–8.2)
RBC # BLD AUTO: 3.97 X10*6/UL (ref 4–5.2)
SODIUM SERPL-SCNC: 141 MMOL/L (ref 136–145)
WBC # BLD AUTO: 5.1 X10*3/UL (ref 4.4–11.3)

## 2025-06-24 PROCEDURE — 80053 COMPREHEN METABOLIC PANEL: CPT

## 2025-06-24 PROCEDURE — 36415 COLL VENOUS BLD VENIPUNCTURE: CPT

## 2025-06-24 PROCEDURE — 85025 COMPLETE CBC W/AUTO DIFF WBC: CPT

## 2025-06-24 NOTE — TELEPHONE ENCOUNTER
RN partner is going to call the pt on Thursday to see how she is doing before deciding to skip a dose of Cablivi. Pt is agreeable & said she will wait for Maria C to call her on Thursday.

## 2025-06-24 NOTE — TELEPHONE ENCOUNTER
Pt called wanting to let Dr. Walker know that she has 2 new bruises, one on each forearm. Also, on Sunday she woke up & noticed she had a broken blood vessel in her eye. She said other than her normal fatigue, she feels okay. Denies dizziness, bleeding, and all other sx. She had her labs drawn today- results are pending. Message sent to the team.

## 2025-06-24 NOTE — TELEPHONE ENCOUNTER
Per MD, skip a dose of cablivi and dose next on Thursday. Pt said she already took her dose today and takes it every other day. I will clarify with MD

## 2025-06-25 LAB
ADAMTS13 ANTIBODY VALUE: <9 ARBITARY UNITS
SCAN RESULT: ABNORMAL
VWF CP ACT/NOR PPP CHRO: 15 %
VWF CP INHIB PPP-ACNC: <0.4 INHIBITOR UNITS

## 2025-06-26 DIAGNOSIS — M31.19 ACQUIRED TTP (MULTI): ICD-10-CM

## 2025-06-26 RX ORDER — CAPLACIZUMAB 11 MG
11 KIT INJECTION EVERY OTHER DAY
Qty: 16 KIT | Refills: 0 | Status: CANCELLED | OUTPATIENT
Start: 2025-06-26

## 2025-06-26 NOTE — TELEPHONE ENCOUNTER
RN called the patient to follow up on bruising/ eye redness. She said her bruises look like they have been improving and her eye still has some redness but is looking better. RN sent message to MD Walker to update and check to see if she can take her Cablivi today.

## 2025-06-26 NOTE — TELEPHONE ENCOUNTER
RN called the patient back and informed her that MD Walker said to wait until tomorrow morning to take the next dose of Cablivi. She verbalized understanding.

## 2025-06-27 ENCOUNTER — PHARMACY VISIT (OUTPATIENT)
Dept: PHARMACY | Facility: CLINIC | Age: 55
End: 2025-06-27
Payer: COMMERCIAL

## 2025-06-27 DIAGNOSIS — M31.19 ACQUIRED TTP (MULTI): ICD-10-CM

## 2025-06-27 LAB
SCAN RESULT: ABNORMAL
VWF CP ACT/NOR PPP CHRO: 13 %
VWF CP INHIB PPP-ACNC: <0.4 INHIBITOR UNITS

## 2025-06-27 PROCEDURE — RXMED WILLOW AMBULATORY MEDICATION CHARGE

## 2025-06-27 RX ORDER — CAPLACIZUMAB 11 MG
11 KIT INJECTION EVERY OTHER DAY
Qty: 16 KIT | Refills: 2 | Status: SHIPPED | OUTPATIENT
Start: 2025-06-27

## 2025-07-01 ENCOUNTER — LAB (OUTPATIENT)
Dept: LAB | Facility: CLINIC | Age: 55
End: 2025-07-01
Payer: COMMERCIAL

## 2025-07-01 DIAGNOSIS — R00.2 PALPITATION: ICD-10-CM

## 2025-07-01 DIAGNOSIS — M31.19 ACQUIRED TTP (MULTI): ICD-10-CM

## 2025-07-01 LAB
ALBUMIN SERPL BCP-MCNC: 4 G/DL (ref 3.4–5)
ALP SERPL-CCNC: 45 U/L (ref 33–110)
ALT SERPL W P-5'-P-CCNC: 12 U/L (ref 7–45)
ANION GAP SERPL CALC-SCNC: 10 MMOL/L (ref 10–20)
AST SERPL W P-5'-P-CCNC: 15 U/L (ref 9–39)
BASOPHILS # BLD AUTO: 0.03 X10*3/UL (ref 0–0.1)
BASOPHILS NFR BLD AUTO: 0.6 %
BILIRUB SERPL-MCNC: 0.3 MG/DL (ref 0–1.2)
BUN SERPL-MCNC: 15 MG/DL (ref 6–23)
CALCIUM SERPL-MCNC: 9.1 MG/DL (ref 8.6–10.3)
CHLORIDE SERPL-SCNC: 108 MMOL/L (ref 98–107)
CO2 SERPL-SCNC: 28 MMOL/L (ref 21–32)
CREAT SERPL-MCNC: 0.78 MG/DL (ref 0.5–1.05)
EGFRCR SERPLBLD CKD-EPI 2021: 89 ML/MIN/1.73M*2
EOSINOPHIL # BLD AUTO: 0.13 X10*3/UL (ref 0–0.7)
EOSINOPHIL NFR BLD AUTO: 2.7 %
ERYTHROCYTE [DISTWIDTH] IN BLOOD BY AUTOMATED COUNT: 12.7 % (ref 11.5–14.5)
GLUCOSE SERPL-MCNC: 91 MG/DL (ref 74–99)
HCT VFR BLD AUTO: 28.5 % (ref 36–46)
HGB BLD-MCNC: 9.5 G/DL (ref 12–16)
IMM GRANULOCYTES # BLD AUTO: 0.01 X10*3/UL (ref 0–0.7)
IMM GRANULOCYTES NFR BLD AUTO: 0.2 % (ref 0–0.9)
LYMPHOCYTES # BLD AUTO: 1.32 X10*3/UL (ref 1.2–4.8)
LYMPHOCYTES NFR BLD AUTO: 26.9 %
MCH RBC QN AUTO: 25.1 PG (ref 26–34)
MCHC RBC AUTO-ENTMCNC: 33.3 G/DL (ref 32–36)
MCV RBC AUTO: 75 FL (ref 80–100)
MONOCYTES # BLD AUTO: 0.36 X10*3/UL (ref 0.1–1)
MONOCYTES NFR BLD AUTO: 7.3 %
NEUTROPHILS # BLD AUTO: 3.05 X10*3/UL (ref 1.2–7.7)
NEUTROPHILS NFR BLD AUTO: 62.3 %
PLATELET # BLD AUTO: 254 X10*3/UL (ref 150–450)
POTASSIUM SERPL-SCNC: 3.9 MMOL/L (ref 3.5–5.3)
PROT SERPL-MCNC: 7 G/DL (ref 6.4–8.2)
RBC # BLD AUTO: 3.78 X10*6/UL (ref 4–5.2)
SODIUM SERPL-SCNC: 142 MMOL/L (ref 136–145)
WBC # BLD AUTO: 4.9 X10*3/UL (ref 4.4–11.3)

## 2025-07-01 PROCEDURE — 80053 COMPREHEN METABOLIC PANEL: CPT

## 2025-07-01 PROCEDURE — RXMED WILLOW AMBULATORY MEDICATION CHARGE

## 2025-07-01 PROCEDURE — 36415 COLL VENOUS BLD VENIPUNCTURE: CPT

## 2025-07-01 PROCEDURE — 85025 COMPLETE CBC W/AUTO DIFF WBC: CPT

## 2025-07-01 RX ORDER — METOPROLOL SUCCINATE 25 MG/1
25 TABLET, EXTENDED RELEASE ORAL DAILY
Qty: 90 TABLET | Refills: 3 | Status: SHIPPED | OUTPATIENT
Start: 2025-07-01 | End: 2026-07-01

## 2025-07-01 RX ORDER — METOPROLOL SUCCINATE 25 MG/1
25 TABLET, EXTENDED RELEASE ORAL DAILY
Qty: 90 TABLET | Refills: 3 | Status: CANCELLED | OUTPATIENT
Start: 2025-07-01 | End: 2026-07-01

## 2025-07-02 ENCOUNTER — PHARMACY VISIT (OUTPATIENT)
Dept: PHARMACY | Facility: CLINIC | Age: 55
End: 2025-07-02
Payer: COMMERCIAL

## 2025-07-02 LAB
ADAMTS13 ANTIBODY VALUE: <9 ARBITARY UNITS
SCAN RESULT: ABNORMAL
VWF CP ACT/NOR PPP CHRO: 13 %
VWF CP INHIB PPP-ACNC: <0.4 INHIBITOR UNITS

## 2025-07-03 LAB
SCAN RESULT: ABNORMAL
VWF CP ACT/NOR PPP CHRO: 7 %

## 2025-07-03 PROCEDURE — RXMED WILLOW AMBULATORY MEDICATION CHARGE

## 2025-07-07 ENCOUNTER — SPECIALTY PHARMACY (OUTPATIENT)
Dept: PHARMACY | Facility: CLINIC | Age: 55
End: 2025-07-07

## 2025-07-07 ENCOUNTER — PHARMACY VISIT (OUTPATIENT)
Dept: PHARMACY | Facility: CLINIC | Age: 55
End: 2025-07-07
Payer: COMMERCIAL

## 2025-07-07 LAB
SCAN RESULT: ABNORMAL
VWF CP ACT/NOR PPP CHRO: 7 %
VWF CP INHIB PPP-ACNC: <0.4 INHIBITOR UNITS

## 2025-07-07 NOTE — PROGRESS NOTES
[facility] Clinical Note  Patient Reassessment     Introduction  Tere Carrion is a 55 y.o. female who is on the specialty pharmacy service for management of: Blood Modifiers Core.      [facility] supplied medication: Cablivi 11mg under the skin every other day     Duration of therapy: Until drug toxicity or progression    The most recent encounter visit with the referring prescriber Dr. Tree Du on 5/12/25 was reviewed.  Pharmacy will continue to collaborate in the care of this patient with the referring prescriber.    Discussion  Tere was contacted on 7/7/2025 at 2:05 PM for a pharmacy visit with encounter number [id number] from:   [facility]  [address] [place] [id number]  Dept: [phone]  Dept Fax: [phone]  Tere consented to a/an Telephone visit, which was performed.    Efficacy  Patient has developed new symptoms of condition: No  Patient/caregiver feels medication is affecting the disease state: Yes    Goals  Provided education on goals and possible outcomes of therapy:  Adherence with therapy  Timely completion of appropriate labs  Timely and appropriate follow up with provider  Identify and address medication interactions with presciption medications, OTC medications and supplements  Optimize or maintain quality of life    Tolerance  Patient has experienced side effects from this medication: No  Changes to current therapy regimen: No    The follow-up timeline was discussed. Every person responds to and reacts to therapy differently. Patient should be assessed for efficacy and tolerability in approximately: 6 months     Adherence  Patient Information  Informant: Self (Patient)  Demonstrates Understanding of Importance of Adherence: Yes  Does the patient have any barriers to self-administration (including physical and mental?): No  Adherence Tools Used: Medication list  Medication Information  Medication: caplacizumab-yhdp  (Kendra)  Patient Reported Missed Doses in the Last 4 Weeks: 0  Estimated Medication Adherence Level: %  Adherence Estimation Source: Claims history  Barriers to Adherence: No Problems identified   The importance of adherence was discussed and patient/caregiver was advised to take the medication as prescribed by their provider. Encouraged patient/caregiver to call physician's office or specialty pharmacy if they have a question regarding a missed dose.    General Assessment  Changes to home medications, OTCs or supplements: No  Current Medications[1]  Reported new allergies: No  Reported new medical conditions: No  Additional monitoring reviewed: Oncology - CBC-diff:   Lab Results   Component Value Date    WBC 4.9 07/01/2025    RBC 3.78 (L) 07/01/2025    HGB 9.5 (L) 07/01/2025    HCT 28.5 (L) 07/01/2025    MCV 75 (L) 07/01/2025    MCHC 33.3 07/01/2025     07/01/2025    RDW 12.7 07/01/2025    NEUTOPHILPCT 62.3 07/01/2025    IGPCT 0.2 07/01/2025    LYMPHOPCT 26.9 07/01/2025    MONOPCT 7.3 07/01/2025    EOSPCT 2.7 07/01/2025    BASOPCT 0.6 07/01/2025    NEUTROABS 3.05 07/01/2025    LYMPHSABS 1.32 07/01/2025    MONOSABS 0.36 07/01/2025    EOSABS 0.13 07/01/2025    BASOSABS 0.03 07/01/2025    and CMP:   Lab Results   Component Value Date    GLUCOSE 91 07/01/2025     07/01/2025    K 3.9 07/01/2025     (H) 07/01/2025    CO2 28 07/01/2025    ANIONGAP 10 07/01/2025    BUN 15 07/01/2025    CREATININE 0.78 07/01/2025    GFRF 80 07/18/2023    CALCIUM 9.1 07/01/2025    ALBUMIN 4.0 07/01/2025    ALKPHOS 45 07/01/2025    PROT 7.0 07/01/2025    AST 15 07/01/2025    BILITOT 0.3 07/01/2025    ALT 12 07/01/2025     Is laboratory follow up needed? No    Advised to contact the pharmacy if there are any changes to the patient's medication list, including prescriptions, OTC medications, herbal products, or supplements.    Impression/Plan  This patient has not been identified as high risk due to Lack of high risk  qualifiers.  The following action was taken:N/A    QOL/Patient Satisfaction  Rate your quality of life on scale of 1-10: 7  Rate your satisfaction with  Specialty Pharmacy on scale of 1-10: 9    Provided contact information ([phone]) for [facility] and reviewed dispensing process, refill timeline and patient management follow up. Confirmed understanding of education conducted during assessment. All questions and concerns were addressed and patient/caregiver was encouraged to reach out for additional questions or concerns.    Based on the patient's diagnosis, medication list, progress towards goals, adherence, tolerance, and medication list, medication remains appropriate: Therapy remains appropriate (I attest)    Inocencio Goodwin, [facility]       [1]   Current Outpatient Medications   Medication Sig Dispense Refill    azaTHIOprine (Imuran) 50 mg tablet Take 1 tablet (50 mg) by mouth once daily. 30 tablet 1    caplacizumab (Cablivi) 11 mg injection Inject 11 mg under the skin every other day. 16 kit 2    levocetirizine (Xyzal) 5 mg tablet Take 1 tablet (5 mg) by mouth once daily as needed for allergies (cutaneous prutitus). (Patient not taking: Reported on 5/12/2025) 30 tablet 0    metoprolol succinate XL (Toprol-XL) 25 mg 24 hr tablet Take 1 tablet (25 mg) by mouth once daily. Do not crush or chew. 90 tablet 3    neomycin-polymyxin B-dexameth (Polydex) 3.5 mg/g-10,000 unit/g-0.1 % ointment ophthalmic ointment Apply ~1/4 inch strip to both eyes at morning and bedtime x 2 weeks 3.5 g 0    olopatadine (Pataday) 0.2 % ophthalmic solution Administer 1 drop into both eyes once daily. 2.5 mL 5    ondansetron (Zofran) 4 mg tablet Take 1 tablet (4 mg) by mouth every 8 hours if needed for nausea or vomiting. 30 tablet 1    pantoprazole (ProtoNix) 40 mg EC tablet Take 1 tablet (40 mg) by mouth once daily in the morning. Take before meals. Do not crush, chew, or split. (Patient not taking:  Reported on 5/12/2025) 30 tablet 11    sertraline (Zoloft) 50 mg tablet Take 1 tablet (50 mg) by mouth once daily. 90 tablet 3    tamoxifen (Nolvadex) 10 mg tablet Take 1 tablet (10 mg total) by mouth once daily. 90 tablet 3     No current facility-administered medications for this visit.

## 2025-07-08 ENCOUNTER — LAB (OUTPATIENT)
Dept: LAB | Facility: CLINIC | Age: 55
End: 2025-07-08
Payer: COMMERCIAL

## 2025-07-08 DIAGNOSIS — M31.19 ACQUIRED TTP (MULTI): ICD-10-CM

## 2025-07-08 LAB
ALBUMIN SERPL BCP-MCNC: 4 G/DL (ref 3.4–5)
ALP SERPL-CCNC: 42 U/L (ref 33–110)
ALT SERPL W P-5'-P-CCNC: 13 U/L (ref 7–45)
ANION GAP SERPL CALC-SCNC: 11 MMOL/L (ref 10–20)
AST SERPL W P-5'-P-CCNC: 18 U/L (ref 9–39)
BASOPHILS # BLD AUTO: 0.02 X10*3/UL (ref 0–0.1)
BASOPHILS NFR BLD AUTO: 0.3 %
BILIRUB SERPL-MCNC: 0.4 MG/DL (ref 0–1.2)
BUN SERPL-MCNC: 16 MG/DL (ref 6–23)
CALCIUM SERPL-MCNC: 9.3 MG/DL (ref 8.6–10.3)
CHLORIDE SERPL-SCNC: 106 MMOL/L (ref 98–107)
CO2 SERPL-SCNC: 28 MMOL/L (ref 21–32)
CREAT SERPL-MCNC: 0.94 MG/DL (ref 0.5–1.05)
EGFRCR SERPLBLD CKD-EPI 2021: 71 ML/MIN/1.73M*2
EOSINOPHIL # BLD AUTO: 0.15 X10*3/UL (ref 0–0.7)
EOSINOPHIL NFR BLD AUTO: 2.6 %
ERYTHROCYTE [DISTWIDTH] IN BLOOD BY AUTOMATED COUNT: 12.8 % (ref 11.5–14.5)
GLUCOSE SERPL-MCNC: 97 MG/DL (ref 74–99)
HCT VFR BLD AUTO: 29.1 % (ref 36–46)
HGB BLD-MCNC: 9.7 G/DL (ref 12–16)
IMM GRANULOCYTES # BLD AUTO: 0 X10*3/UL (ref 0–0.7)
IMM GRANULOCYTES NFR BLD AUTO: 0 % (ref 0–0.9)
LYMPHOCYTES # BLD AUTO: 1.33 X10*3/UL (ref 1.2–4.8)
LYMPHOCYTES NFR BLD AUTO: 23.1 %
MCH RBC QN AUTO: 24.8 PG (ref 26–34)
MCHC RBC AUTO-ENTMCNC: 33.3 G/DL (ref 32–36)
MCV RBC AUTO: 74 FL (ref 80–100)
MONOCYTES # BLD AUTO: 0.42 X10*3/UL (ref 0.1–1)
MONOCYTES NFR BLD AUTO: 7.3 %
NEUTROPHILS # BLD AUTO: 3.83 X10*3/UL (ref 1.2–7.7)
NEUTROPHILS NFR BLD AUTO: 66.7 %
PLATELET # BLD AUTO: 300 X10*3/UL (ref 150–450)
POTASSIUM SERPL-SCNC: 3.8 MMOL/L (ref 3.5–5.3)
PROT SERPL-MCNC: 7 G/DL (ref 6.4–8.2)
RBC # BLD AUTO: 3.91 X10*6/UL (ref 4–5.2)
SODIUM SERPL-SCNC: 141 MMOL/L (ref 136–145)
WBC # BLD AUTO: 5.8 X10*3/UL (ref 4.4–11.3)

## 2025-07-08 PROCEDURE — 80053 COMPREHEN METABOLIC PANEL: CPT

## 2025-07-08 PROCEDURE — 85025 COMPLETE CBC W/AUTO DIFF WBC: CPT

## 2025-07-08 PROCEDURE — 36415 COLL VENOUS BLD VENIPUNCTURE: CPT

## 2025-07-10 ENCOUNTER — APPOINTMENT (OUTPATIENT)
Facility: CLINIC | Age: 55
End: 2025-07-10
Payer: COMMERCIAL

## 2025-07-10 PROCEDURE — RXMED WILLOW AMBULATORY MEDICATION CHARGE

## 2025-07-11 LAB
SCAN RESULT: ABNORMAL
VWF CP ACT/NOR PPP CHRO: 12 %
VWF CP INHIB PPP-ACNC: <0.4 INHIBITOR UNITS

## 2025-07-14 ENCOUNTER — PHARMACY VISIT (OUTPATIENT)
Dept: PHARMACY | Facility: CLINIC | Age: 55
End: 2025-07-14
Payer: COMMERCIAL

## 2025-07-15 ENCOUNTER — LAB (OUTPATIENT)
Dept: LAB | Facility: CLINIC | Age: 55
End: 2025-07-15
Payer: COMMERCIAL

## 2025-07-15 DIAGNOSIS — M31.19 ACQUIRED TTP (MULTI): ICD-10-CM

## 2025-07-15 LAB
ADAMTS13 ANTIBODY VALUE: <9 ARBITARY UNITS
ADAMTS13 ANTIBODY VALUE: <9 ARBITARY UNITS
ALBUMIN SERPL BCP-MCNC: 4.1 G/DL (ref 3.4–5)
ALP SERPL-CCNC: 41 U/L (ref 33–110)
ALT SERPL W P-5'-P-CCNC: 16 U/L (ref 7–45)
ANION GAP SERPL CALC-SCNC: 11 MMOL/L (ref 10–20)
AST SERPL W P-5'-P-CCNC: 20 U/L (ref 9–39)
BASOPHILS # BLD AUTO: 0.02 X10*3/UL (ref 0–0.1)
BASOPHILS NFR BLD AUTO: 0.4 %
BILIRUB SERPL-MCNC: 0.3 MG/DL (ref 0–1.2)
BUN SERPL-MCNC: 9 MG/DL (ref 6–23)
CALCIUM SERPL-MCNC: 9.4 MG/DL (ref 8.6–10.3)
CHLORIDE SERPL-SCNC: 107 MMOL/L (ref 98–107)
CO2 SERPL-SCNC: 27 MMOL/L (ref 21–32)
CREAT SERPL-MCNC: 0.88 MG/DL (ref 0.5–1.05)
EGFRCR SERPLBLD CKD-EPI 2021: 77 ML/MIN/1.73M*2
EOSINOPHIL # BLD AUTO: 0.1 X10*3/UL (ref 0–0.7)
EOSINOPHIL NFR BLD AUTO: 1.9 %
ERYTHROCYTE [DISTWIDTH] IN BLOOD BY AUTOMATED COUNT: 13 % (ref 11.5–14.5)
GLUCOSE SERPL-MCNC: 101 MG/DL (ref 74–99)
HCT VFR BLD AUTO: 29.5 % (ref 36–46)
HGB BLD-MCNC: 9.6 G/DL (ref 12–16)
IMM GRANULOCYTES # BLD AUTO: 0.01 X10*3/UL (ref 0–0.7)
IMM GRANULOCYTES NFR BLD AUTO: 0.2 % (ref 0–0.9)
LYMPHOCYTES # BLD AUTO: 1.12 X10*3/UL (ref 1.2–4.8)
LYMPHOCYTES NFR BLD AUTO: 20.8 %
MCH RBC QN AUTO: 24.5 PG (ref 26–34)
MCHC RBC AUTO-ENTMCNC: 32.5 G/DL (ref 32–36)
MCV RBC AUTO: 75 FL (ref 80–100)
MONOCYTES # BLD AUTO: 0.32 X10*3/UL (ref 0.1–1)
MONOCYTES NFR BLD AUTO: 5.9 %
NEUTROPHILS # BLD AUTO: 3.81 X10*3/UL (ref 1.2–7.7)
NEUTROPHILS NFR BLD AUTO: 70.8 %
PLATELET # BLD AUTO: 295 X10*3/UL (ref 150–450)
POTASSIUM SERPL-SCNC: 3.7 MMOL/L (ref 3.5–5.3)
PROT SERPL-MCNC: 7 G/DL (ref 6.4–8.2)
RBC # BLD AUTO: 3.92 X10*6/UL (ref 4–5.2)
SCAN RESULT: ABNORMAL
SCAN RESULT: ABNORMAL
SODIUM SERPL-SCNC: 141 MMOL/L (ref 136–145)
VWF CP ACT/NOR PPP CHRO: 12 %
VWF CP ACT/NOR PPP CHRO: 7 %
VWF CP INHIB PPP-ACNC: <0.4 INHIBITOR UNITS
VWF CP INHIB PPP-ACNC: <0.4 INHIBITOR UNITS
WBC # BLD AUTO: 5.4 X10*3/UL (ref 4.4–11.3)

## 2025-07-15 PROCEDURE — 85025 COMPLETE CBC W/AUTO DIFF WBC: CPT

## 2025-07-15 PROCEDURE — 36415 COLL VENOUS BLD VENIPUNCTURE: CPT

## 2025-07-15 PROCEDURE — 84075 ASSAY ALKALINE PHOSPHATASE: CPT

## 2025-07-16 NOTE — PROGRESS NOTES
Patient ID: Tere Carrion is a 55 y.o. female.  Referring Physician: Tree Walker MD  [address] [place] [address]  Primary Care Provider: Modesta Gallego DO  Interval hx:  The pt was previously followed by Dr. Walker and I am seeing for the first time.    Since last visit she has felt fatigued. She denies having had unexplained wt loss, fevers, chills, sweats, palpable NAVEED, cough, nausea, vomiting, diarrhea, mouth sores, skin rashes, chest/abd/back pains, neuropathic symptoms, headaches, nosebleeds, GI,  or GYN bleeding, vision or hearing complaints or feeling depressed.    PMHx:  1) TTP (see below)  2) Alpha thal trait  3) Sickle cell trait  4) Migraines    SocHx:  ,4 kids. Works at [employer] as an [employer]. No drugs, EtOH or cigarette use    FamHx:  No blood d/o's; maternal grandfather and 3 of her siblings had colon cancer    Meds:  Were reviewed    All:  LATEX  VANCOMYCIN  SHELLFISH    Physical Exam  General: Ambulatory, looked comfortable, not requiring supplemental oxygen    Vital Signs   /60; P= 77; afebrile; wt= 82.3 kg  HEENT: no oral lesions, tonsillar or tongue enlargement; Neck: no masses; Lymph nodes: no palpable cervical, supraclavicular, axillary, epitrochear or inguinal nodes; Heart: no murmurs; Lungs: clear; Abd: soft, +bowel sounds, non-tender, no palpable liver or spleen; Skin: no rashes; Ext's: No LE edema; Neuro: alert, answered questions appropriately, 5/5 motor strength upper and lower extremities    Performance Status:  Symptomatic; fully ambulatory      Assessment/recommendations:    55 year woman with a hx of acquired TTP [dx'd in 1998/1999, treated with prolonged plasma exchanges at [facility], steroids, and immunosuppressants including vincristine and azathioprine, s/p splenectomy in 3/1999; remained in remission until  6/2024 she developed hematuria, severe anemia, t'penia, elevated LDH and was admitted at  w/relapsed TTP with  an ADAMTS <5, inhibitor level 2.4  and was treated then w/PLEX, steroids, then 4 weeks of rituximab (completed on 7/15/25) and caplazizumab; however, on 7/23/25 the ADAMTS inhib titer ruben to 8 (ADAMTS activity <5%) and remained elevated through August, so on 8/21/25 she was started on cytoxan and received 6 cycles (while being tapered off prednisone) but w/o a response in inhibitor level (>8) and was continued on caplacizumab; on 1/20/25 she was started on bortezomib weekly (off/on) through 4/2025; in 2/2025 the inhib titer responded ((titer was <0.4) ad ADAMTS level was up to 9%; however, this was interrupted due to recurrent, diffuse and severe chalazia and blepharitis and in late 4/2025 the ADAMTS level dropped to <5% so bortez was stopped; in 6/2025 (6/11/25) she was started on azathioprine at 50mg/d].     As for her relapsed immune/acquired TTP:  Most recent ADAMTS activity was up to12% (7/8) w/an undetectable inhibitor (level <0.4). She started azathioprine on 6/11/25. Will increase dose to 100mg/d and every other day cablivi injections (previously was switched to every other day due to bleeding).     As for her being post-splenectomy:  Should have on hand prn augmentin script for if she develops a fever    As for her being on tamoxifen:  Is on for breast ca ppx    She may follow-up w/me in 4 weeks

## 2025-07-17 ENCOUNTER — OFFICE VISIT (OUTPATIENT)
Dept: HEMATOLOGY/ONCOLOGY | Facility: HOSPITAL | Age: 55
End: 2025-07-17
Payer: COMMERCIAL

## 2025-07-17 VITALS
DIASTOLIC BLOOD PRESSURE: 60 MMHG | TEMPERATURE: 97 F | WEIGHT: 181.4 LBS | RESPIRATION RATE: 16 BRPM | SYSTOLIC BLOOD PRESSURE: 109 MMHG | HEART RATE: 77 BPM | OXYGEN SATURATION: 97 % | BODY MASS INDEX: 31.14 KG/M2

## 2025-07-17 DIAGNOSIS — M31.19 ACQUIRED TTP (MULTI): ICD-10-CM

## 2025-07-17 PROCEDURE — 1036F TOBACCO NON-USER: CPT | Performed by: INTERNAL MEDICINE

## 2025-07-17 PROCEDURE — RXMED WILLOW AMBULATORY MEDICATION CHARGE

## 2025-07-17 PROCEDURE — 99215 OFFICE O/P EST HI 40 MIN: CPT | Performed by: INTERNAL MEDICINE

## 2025-07-17 RX ORDER — AMOXICILLIN AND CLAVULANATE POTASSIUM 875; 125 MG/1; MG/1
875 TABLET, FILM COATED ORAL 2 TIMES DAILY
Qty: 4 TABLET | Refills: 0 | Status: SHIPPED | OUTPATIENT
Start: 2025-07-17

## 2025-07-17 RX ORDER — AZATHIOPRINE 50 MG/1
100 TABLET ORAL DAILY
Qty: 60 TABLET | Refills: 1 | Status: SHIPPED | OUTPATIENT
Start: 2025-07-17

## 2025-07-17 ASSESSMENT — PAIN SCALES - GENERAL: PAINLEVEL_OUTOF10: 0-NO PAIN

## 2025-07-18 ENCOUNTER — PHARMACY VISIT (OUTPATIENT)
Dept: PHARMACY | Facility: CLINIC | Age: 55
End: 2025-07-18
Payer: COMMERCIAL

## 2025-07-18 ENCOUNTER — SPECIALTY PHARMACY (OUTPATIENT)
Dept: PHARMACY | Facility: CLINIC | Age: 55
End: 2025-07-18

## 2025-07-18 LAB
SCAN RESULT: ABNORMAL
VWF CP ACT/NOR PPP CHRO: 13 %
VWF CP INHIB PPP-ACNC: <0.4 INHIBITOR UNITS

## 2025-07-21 ENCOUNTER — OFFICE VISIT (OUTPATIENT)
Dept: URGENT CARE | Age: 55
End: 2025-07-21
Payer: COMMERCIAL

## 2025-07-21 VITALS
DIASTOLIC BLOOD PRESSURE: 69 MMHG | OXYGEN SATURATION: 98 % | HEIGHT: 64 IN | BODY MASS INDEX: 30.9 KG/M2 | HEART RATE: 64 BPM | RESPIRATION RATE: 16 BRPM | TEMPERATURE: 97.4 F | WEIGHT: 181 LBS | SYSTOLIC BLOOD PRESSURE: 105 MMHG

## 2025-07-21 DIAGNOSIS — T63.481A LOCAL REACTION TO INSECT STING, ACCIDENTAL OR UNINTENTIONAL, INITIAL ENCOUNTER: Primary | ICD-10-CM

## 2025-07-21 RX ORDER — FAMOTIDINE 20 MG/1
20 TABLET, FILM COATED ORAL 2 TIMES DAILY
Qty: 14 TABLET | Refills: 0 | Status: SHIPPED | OUTPATIENT
Start: 2025-07-21 | End: 2025-07-28

## 2025-07-21 RX ORDER — PREDNISONE 20 MG/1
20 TABLET ORAL 2 TIMES DAILY
Qty: 14 TABLET | Refills: 0 | Status: SHIPPED | OUTPATIENT
Start: 2025-07-21 | End: 2025-07-28

## 2025-07-21 NOTE — PROGRESS NOTES
"Subjective   Patient ID: Tere Carrion is a 55 y.o. female who presents for Insect Bite (Bee sting /Right thigh /Happened yesterday/Itchy, Warm to touch ).  HPI  Patient presents for insect sting.  Patient suffered a bee sting to the right medial thigh yesterday.  Patient reports pain, erythema, and pruritus in the sting area.  No reported attempted conservative management.  No other similar areas of concern.  No other complaints.    Review of Systems    Constitutional:  See HPI   Integumentary: See HPI  Neurologic:  Alert and oriented X4, No numbness, No tingling.    All other systems are negative     Objective     /69   Pulse 64   Temp 36.3 °C (97.4 °F)   Resp 16   Ht 1.626 m (5' 4\")   Wt 82.1 kg (181 lb)   SpO2 98%   BMI 31.07 kg/m²     Physical Exam    General:  Alert and oriented, No acute distress.    Eye:  Pupils are equal, round and reactive to light, Normal conjunctiva.    HENT:  Normocephalic,   Neck:  Supple    Respiratory: Respirations are non-labored   Musculoskeletal: Normal ROM and strength  Integumentary: Right medial thigh with a small erythematous wound consistent with the same; emanating from this laterally there is a several centimeter area of blanchable erythema and pruritus  Neurologic:  Alert, Oriented, Normal sensory, Cranial Nerves II-XII are grossly intact  Psychiatric:  Cooperative, Appropriate mood & affect.    Assessment/Plan   Exam is consistent with local reaction to bee sting.  Prescriptions for prednisone and Pepcid.  Patient's clinical presentation is otherwise unremarkable at this time. Patient is discharged with instructions to follow-up with primary care or seek emergency medical attention for worsening symptoms or any new concerns.  Problem List Items Addressed This Visit    None  Visit Diagnoses         Local reaction to insect sting, accidental or unintentional, initial encounter    -  Primary    Relevant Medications    predniSONE (Deltasone) 20 mg tablet    " famotidine (Pepcid) 20 mg tablet            Final diagnoses:   [T69.580A] Local reaction to insect sting, accidental or unintentional, initial encounter

## 2025-07-22 ENCOUNTER — LAB (OUTPATIENT)
Dept: LAB | Facility: CLINIC | Age: 55
End: 2025-07-22
Payer: COMMERCIAL

## 2025-07-22 DIAGNOSIS — M31.19 ACQUIRED TTP (MULTI): ICD-10-CM

## 2025-07-22 LAB
ADAMTS13 ANTIBODY VALUE: <9 ARBITARY UNITS
ALBUMIN SERPL BCP-MCNC: 4 G/DL (ref 3.4–5)
ALP SERPL-CCNC: 45 U/L (ref 33–110)
ALT SERPL W P-5'-P-CCNC: 13 U/L (ref 7–45)
ANION GAP SERPL CALC-SCNC: 9 MMOL/L (ref 10–20)
AST SERPL W P-5'-P-CCNC: 20 U/L (ref 9–39)
BASOPHILS # BLD AUTO: 0.02 X10*3/UL (ref 0–0.1)
BASOPHILS NFR BLD AUTO: 0.4 %
BILIRUB SERPL-MCNC: 0.4 MG/DL (ref 0–1.2)
BUN SERPL-MCNC: 13 MG/DL (ref 6–23)
CALCIUM SERPL-MCNC: 9 MG/DL (ref 8.6–10.3)
CHLORIDE SERPL-SCNC: 108 MMOL/L (ref 98–107)
CO2 SERPL-SCNC: 28 MMOL/L (ref 21–32)
CREAT SERPL-MCNC: 0.8 MG/DL (ref 0.5–1.05)
EGFRCR SERPLBLD CKD-EPI 2021: 87 ML/MIN/1.73M*2
EOSINOPHIL # BLD AUTO: 0.08 X10*3/UL (ref 0–0.7)
EOSINOPHIL NFR BLD AUTO: 1.7 %
ERYTHROCYTE [DISTWIDTH] IN BLOOD BY AUTOMATED COUNT: 13 % (ref 11.5–14.5)
GLUCOSE SERPL-MCNC: 94 MG/DL (ref 74–99)
HCT VFR BLD AUTO: 29.2 % (ref 36–46)
HGB BLD-MCNC: 9.7 G/DL (ref 12–16)
IMM GRANULOCYTES # BLD AUTO: 0 X10*3/UL (ref 0–0.7)
IMM GRANULOCYTES NFR BLD AUTO: 0 % (ref 0–0.9)
LYMPHOCYTES # BLD AUTO: 1.18 X10*3/UL (ref 1.2–4.8)
LYMPHOCYTES NFR BLD AUTO: 24.4 %
MCH RBC QN AUTO: 24.9 PG (ref 26–34)
MCHC RBC AUTO-ENTMCNC: 33.2 G/DL (ref 32–36)
MCV RBC AUTO: 75 FL (ref 80–100)
MONOCYTES # BLD AUTO: 0.39 X10*3/UL (ref 0.1–1)
MONOCYTES NFR BLD AUTO: 8.1 %
NEUTROPHILS # BLD AUTO: 3.17 X10*3/UL (ref 1.2–7.7)
NEUTROPHILS NFR BLD AUTO: 65.4 %
NRBC BLD-RTO: ABNORMAL /100{WBCS}
PLATELET # BLD AUTO: 279 X10*3/UL (ref 150–450)
POLYCHROMASIA BLD QL SMEAR: NORMAL
POTASSIUM SERPL-SCNC: 3.8 MMOL/L (ref 3.5–5.3)
PROT SERPL-MCNC: 6.9 G/DL (ref 6.4–8.2)
RBC # BLD AUTO: 3.9 X10*6/UL (ref 4–5.2)
RBC MORPH BLD: NORMAL
SCAN RESULT: ABNORMAL
SCHISTOCYTES BLD QL SMEAR: NORMAL
SODIUM SERPL-SCNC: 141 MMOL/L (ref 136–145)
TARGETS BLD QL SMEAR: NORMAL
VWF CP ACT/NOR PPP CHRO: 13 %
VWF CP INHIB PPP-ACNC: <0.4 INHIBITOR UNITS
WBC # BLD AUTO: 4.8 X10*3/UL (ref 4.4–11.3)

## 2025-07-22 PROCEDURE — 85025 COMPLETE CBC W/AUTO DIFF WBC: CPT

## 2025-07-22 PROCEDURE — 80053 COMPREHEN METABOLIC PANEL: CPT

## 2025-07-22 PROCEDURE — 36415 COLL VENOUS BLD VENIPUNCTURE: CPT

## 2025-07-24 ENCOUNTER — APPOINTMENT (OUTPATIENT)
Dept: OPHTHALMOLOGY | Facility: CLINIC | Age: 55
End: 2025-07-24
Payer: COMMERCIAL

## 2025-07-25 LAB
SCAN RESULT: ABNORMAL
VWF CP ACT/NOR PPP CHRO: 10 %
VWF CP INHIB PPP-ACNC: <0.4 INHIBITOR UNITS

## 2025-07-28 ENCOUNTER — LAB (OUTPATIENT)
Dept: LAB | Facility: CLINIC | Age: 55
End: 2025-07-28
Payer: COMMERCIAL

## 2025-07-28 ENCOUNTER — APPOINTMENT (OUTPATIENT)
Dept: PRIMARY CARE | Facility: CLINIC | Age: 55
End: 2025-07-28
Payer: COMMERCIAL

## 2025-07-28 VITALS
HEART RATE: 78 BPM | SYSTOLIC BLOOD PRESSURE: 112 MMHG | TEMPERATURE: 97.9 F | DIASTOLIC BLOOD PRESSURE: 60 MMHG | WEIGHT: 180 LBS | OXYGEN SATURATION: 97 % | HEIGHT: 64 IN | RESPIRATION RATE: 14 BRPM | BODY MASS INDEX: 30.73 KG/M2

## 2025-07-28 DIAGNOSIS — F51.01 PRIMARY INSOMNIA: ICD-10-CM

## 2025-07-28 DIAGNOSIS — E53.8 VITAMIN B12 DEFICIENCY: ICD-10-CM

## 2025-07-28 DIAGNOSIS — E55.9 VITAMIN D DEFICIENCY: ICD-10-CM

## 2025-07-28 DIAGNOSIS — M31.19 ACQUIRED TTP (MULTI): ICD-10-CM

## 2025-07-28 DIAGNOSIS — E78.2 MIXED HYPERLIPIDEMIA: ICD-10-CM

## 2025-07-28 DIAGNOSIS — Z00.00 WELLNESS EXAMINATION: Primary | ICD-10-CM

## 2025-07-28 DIAGNOSIS — Z12.11 COLON CANCER SCREENING: ICD-10-CM

## 2025-07-28 DIAGNOSIS — R53.83 OTHER FATIGUE: ICD-10-CM

## 2025-07-28 DIAGNOSIS — D57.3 SICKLE CELL TRAIT: ICD-10-CM

## 2025-07-28 DIAGNOSIS — F41.9 ANXIETY: ICD-10-CM

## 2025-07-28 LAB
25(OH)D3 SERPL-MCNC: 26 NG/ML (ref 30–100)
ALBUMIN SERPL BCP-MCNC: 4.4 G/DL (ref 3.4–5)
ALP SERPL-CCNC: 40 U/L (ref 33–110)
ALT SERPL W P-5'-P-CCNC: 12 U/L (ref 7–45)
ANION GAP SERPL CALC-SCNC: 10 MMOL/L (ref 10–20)
AST SERPL W P-5'-P-CCNC: 15 U/L (ref 9–39)
BASOPHILS # BLD AUTO: 0.02 X10*3/UL (ref 0–0.1)
BASOPHILS NFR BLD AUTO: 0.3 %
BILIRUB SERPL-MCNC: 0.4 MG/DL (ref 0–1.2)
BUN SERPL-MCNC: 11 MG/DL (ref 6–23)
CALCIUM SERPL-MCNC: 9.6 MG/DL (ref 8.6–10.3)
CHLORIDE SERPL-SCNC: 106 MMOL/L (ref 98–107)
CHOLEST SERPL-MCNC: 233 MG/DL (ref 0–199)
CHOLESTEROL/HDL RATIO: 3.5
CO2 SERPL-SCNC: 30 MMOL/L (ref 21–32)
CREAT SERPL-MCNC: 0.92 MG/DL (ref 0.5–1.05)
EGFRCR SERPLBLD CKD-EPI 2021: 73 ML/MIN/1.73M*2
EOSINOPHIL # BLD AUTO: 0.13 X10*3/UL (ref 0–0.7)
EOSINOPHIL NFR BLD AUTO: 1.8 %
ERYTHROCYTE [DISTWIDTH] IN BLOOD BY AUTOMATED COUNT: 13 % (ref 11.5–14.5)
GLUCOSE SERPL-MCNC: 101 MG/DL (ref 74–99)
HCT VFR BLD AUTO: 30.1 % (ref 36–46)
HDLC SERPL-MCNC: 66.5 MG/DL
HGB BLD-MCNC: 9.9 G/DL (ref 12–16)
IMM GRANULOCYTES # BLD AUTO: 0.01 X10*3/UL (ref 0–0.7)
IMM GRANULOCYTES NFR BLD AUTO: 0.1 % (ref 0–0.9)
LDLC SERPL CALC-MCNC: 132 MG/DL
LYMPHOCYTES # BLD AUTO: 1.27 X10*3/UL (ref 1.2–4.8)
LYMPHOCYTES NFR BLD AUTO: 17.6 %
MCH RBC QN AUTO: 24.7 PG (ref 26–34)
MCHC RBC AUTO-ENTMCNC: 32.9 G/DL (ref 32–36)
MCV RBC AUTO: 75 FL (ref 80–100)
MONOCYTES # BLD AUTO: 0.42 X10*3/UL (ref 0.1–1)
MONOCYTES NFR BLD AUTO: 5.8 %
NEUTROPHILS # BLD AUTO: 5.37 X10*3/UL (ref 1.2–7.7)
NEUTROPHILS NFR BLD AUTO: 74.4 %
NON HDL CHOLESTEROL: 167 MG/DL (ref 0–149)
NRBC BLD-RTO: ABNORMAL /100{WBCS}
PLATELET # BLD AUTO: 295 X10*3/UL (ref 150–450)
POTASSIUM SERPL-SCNC: 3.8 MMOL/L (ref 3.5–5.3)
PROT SERPL-MCNC: 7.4 G/DL (ref 6.4–8.2)
RBC # BLD AUTO: 4.01 X10*6/UL (ref 4–5.2)
SODIUM SERPL-SCNC: 142 MMOL/L (ref 136–145)
TRIGL SERPL-MCNC: 171 MG/DL (ref 0–149)
TSH SERPL-ACNC: 1.81 MIU/L (ref 0.44–3.98)
VIT B12 SERPL-MCNC: 400 PG/ML (ref 211–911)
VLDL: 34 MG/DL (ref 0–40)
WBC # BLD AUTO: 7.2 X10*3/UL (ref 4.4–11.3)

## 2025-07-28 PROCEDURE — 1036F TOBACCO NON-USER: CPT | Performed by: INTERNAL MEDICINE

## 2025-07-28 PROCEDURE — 36415 COLL VENOUS BLD VENIPUNCTURE: CPT | Performed by: INTERNAL MEDICINE

## 2025-07-28 PROCEDURE — 84075 ASSAY ALKALINE PHOSPHATASE: CPT

## 2025-07-28 PROCEDURE — 82607 VITAMIN B-12: CPT | Performed by: INTERNAL MEDICINE

## 2025-07-28 PROCEDURE — 82306 VITAMIN D 25 HYDROXY: CPT | Performed by: INTERNAL MEDICINE

## 2025-07-28 PROCEDURE — 99396 PREV VISIT EST AGE 40-64: CPT | Performed by: INTERNAL MEDICINE

## 2025-07-28 PROCEDURE — 84443 ASSAY THYROID STIM HORMONE: CPT | Performed by: INTERNAL MEDICINE

## 2025-07-28 PROCEDURE — 85397 CLOTTING FUNCT ACTIVITY: CPT

## 2025-07-28 PROCEDURE — 85025 COMPLETE CBC W/AUTO DIFF WBC: CPT

## 2025-07-28 PROCEDURE — 80061 LIPID PANEL: CPT | Performed by: INTERNAL MEDICINE

## 2025-07-28 PROCEDURE — 3008F BODY MASS INDEX DOCD: CPT | Performed by: INTERNAL MEDICINE

## 2025-07-28 RX ORDER — TRAZODONE HYDROCHLORIDE 50 MG/1
50 TABLET ORAL NIGHTLY PRN
Qty: 30 TABLET | Refills: 3 | Status: SHIPPED | OUTPATIENT
Start: 2025-07-28 | End: 2026-07-28

## 2025-07-28 RX ORDER — SERTRALINE HYDROCHLORIDE 50 MG/1
50 TABLET, FILM COATED ORAL DAILY
Qty: 90 TABLET | Refills: 3 | Status: SHIPPED | OUTPATIENT
Start: 2025-07-28 | End: 2026-07-28

## 2025-07-28 ASSESSMENT — PROMIS GLOBAL HEALTH SCALE
RATE_GENERAL_HEALTH: FAIR
RATE_QUALITY_OF_LIFE: GOOD
EMOTIONAL_PROBLEMS: SOMETIMES
RATE_AVERAGE_PAIN: 1
CARRYOUT_PHYSICAL_ACTIVITIES: MOSTLY
RATE_AVERAGE_FATIGUE: MODERATE
CARRYOUT_SOCIAL_ACTIVITIES: GOOD
RATE_SOCIAL_SATISFACTION: FAIR
RATE_PHYSICAL_HEALTH: FAIR
RATE_MENTAL_HEALTH: FAIR

## 2025-07-28 ASSESSMENT — PATIENT HEALTH QUESTIONNAIRE - PHQ9
1. LITTLE INTEREST OR PLEASURE IN DOING THINGS: NOT AT ALL
2. FEELING DOWN, DEPRESSED OR HOPELESS: NOT AT ALL
SUM OF ALL RESPONSES TO PHQ9 QUESTIONS 1 AND 2: 0

## 2025-07-28 NOTE — PROGRESS NOTES
"Subjective    Tere Carrion is a 55 y.o. female who presents for Annual Exam.  HPI    Pap 2024  Mammogram scheduled   Colonoscopy 2020, due this year     Tdap 2016  Shingirx 2022  Prevnar 2016  Pneumovax 2022    She is still getting treatment. She is doing well with TTP  She does not get the  flu shot due to a hx of vaginal bleeding.  Her bones ache. She is fatigued  She has a terrible time sleeping. Wakes up after falling asleep. Gets a few hours a yoseph.     Review of Systems   All other systems reviewed and are negative.        Objective     /60 (BP Location: Left arm, Patient Position: Sitting, BP Cuff Size: Adult)   Pulse 78   Temp 36.6 °C (97.9 °F) (Skin)   Resp 14   Ht 1.626 m (5' 4\")   Wt 81.6 kg (180 lb)   SpO2 97%   BMI 30.90 kg/m²    Physical Exam  Constitutional:       Appearance: Normal appearance.   HENT:      Mouth/Throat:      Mouth: Mucous membranes are moist.   Neck:      Thyroid: No thyroid mass or thyromegaly.     Cardiovascular:      Rate and Rhythm: Normal rate and regular rhythm.      Pulses: Normal pulses.   Pulmonary:      Effort: Pulmonary effort is normal.      Breath sounds: Normal breath sounds.   Chest:      Chest wall: No mass or tenderness.   Breasts:     Right: Normal.      Left: Normal.   Abdominal:      General: Abdomen is flat.      Palpations: Abdomen is soft.      Tenderness: There is no abdominal tenderness.     Musculoskeletal:      Cervical back: Neck supple. No tenderness.   Lymphadenopathy:      Cervical: No cervical adenopathy.      Upper Body:      Right upper body: No supraclavicular or axillary adenopathy.      Left upper body: No supraclavicular or axillary adenopathy.     Skin:     General: Skin is warm.     Neurological:      General: No focal deficit present.      Mental Status: She is alert.     Psychiatric:         Attention and Perception: Attention and perception normal.         Mood and Affect: Mood and affect normal.       Health Maintenance Due "   Topic Date Due    HIB Vaccines (1 of 1 - Risk 1-dose series) Never done    Meningococcal Vaccine (1 - Risk 2-dose series) Never done    COVID-19 Vaccine (1) Never done    Hepatitis B Vaccines (3 of 3 - 19+ 3-dose series) 12/12/2016    Zoster Vaccines (2 of 2) 05/12/2022    Yearly Adult Physical  07/24/2025    Mammogram  07/31/2025          Assessment/Plan   Problem List Items Addressed This Visit       Acquired TTP (Multi) - Primary     Other Visit Diagnoses         Wellness examination        Relevant Orders    Lipid Panel (Completed)      Colon cancer screening        Relevant Orders    Colonoscopy Screening; High Risk Patient; polyps and family hx.      Other fatigue        Relevant Orders    TSH with reflex to Free T4 if abnormal (Completed)      Vitamin B12 deficiency        Relevant Orders    Vitamin B12      Vitamin D deficiency        Relevant Orders    Vitamin D 25-Hydroxy,Total (for eval of Vitamin D levels)      Mixed hyperlipidemia        Relevant Orders    Lipid Panel (Completed)      Primary insomnia        Relevant Medications    traZODone (Desyrel) 50 mg tablet        Can try magnesium glycinate 400 mg daily OTC.  Start trazodone at hs  Check labs.   Colonoscopy.  Call  with any problems or questions.   Follow up in a year.

## 2025-07-29 ENCOUNTER — TELEPHONE (OUTPATIENT)
Dept: GASTROENTEROLOGY | Facility: CLINIC | Age: 55
End: 2025-07-29
Payer: COMMERCIAL

## 2025-07-29 LAB
ADAMTS13 ANTIBODY VALUE: <9 ARBITARY UNITS
SCAN RESULT: ABNORMAL
VWF CP ACT/NOR PPP CHRO: 10 %
VWF CP INHIB PPP-ACNC: <0.4 INHIBITOR UNITS

## 2025-08-01 ENCOUNTER — SPECIALTY PHARMACY (OUTPATIENT)
Dept: PHARMACY | Facility: CLINIC | Age: 55
End: 2025-08-01

## 2025-08-01 LAB
SCAN RESULT: ABNORMAL
VWF CP ACT/NOR PPP CHRO: 17 %
VWF CP INHIB PPP-ACNC: <0.4 INHIBITOR UNITS

## 2025-08-01 PROCEDURE — RXMED WILLOW AMBULATORY MEDICATION CHARGE

## 2025-08-05 LAB
ADAMTS13 ANTIBODY VALUE: <9 ARBITARY UNITS
SCAN RESULT: ABNORMAL
VWF CP ACT/NOR PPP CHRO: 17 %
VWF CP INHIB PPP-ACNC: <0.4 INHIBITOR UNITS

## 2025-08-06 ENCOUNTER — APPOINTMENT (OUTPATIENT)
Dept: SURGICAL ONCOLOGY | Facility: HOSPITAL | Age: 55
End: 2025-08-06
Payer: COMMERCIAL

## 2025-08-06 ENCOUNTER — APPOINTMENT (OUTPATIENT)
Dept: RADIOLOGY | Facility: HOSPITAL | Age: 55
End: 2025-08-06
Payer: COMMERCIAL

## 2025-08-09 ENCOUNTER — PHARMACY VISIT (OUTPATIENT)
Dept: PHARMACY | Facility: CLINIC | Age: 55
End: 2025-08-09
Payer: COMMERCIAL

## 2025-08-11 DIAGNOSIS — Z12.11 SCREENING FOR COLON CANCER: ICD-10-CM

## 2025-08-11 PROCEDURE — RXMED WILLOW AMBULATORY MEDICATION CHARGE

## 2025-08-12 PROCEDURE — RXMED WILLOW AMBULATORY MEDICATION CHARGE

## 2025-08-12 RX ORDER — SODIUM, POTASSIUM,MAG SULFATES 17.5-3.13G
SOLUTION, RECONSTITUTED, ORAL ORAL
Qty: 354 ML | Refills: 0 | Status: SHIPPED | OUTPATIENT
Start: 2025-08-12

## 2025-08-14 ENCOUNTER — OFFICE VISIT (OUTPATIENT)
Dept: HEMATOLOGY/ONCOLOGY | Facility: HOSPITAL | Age: 55
End: 2025-08-14
Payer: COMMERCIAL

## 2025-08-14 ENCOUNTER — LAB (OUTPATIENT)
Dept: LAB | Facility: HOSPITAL | Age: 55
End: 2025-08-14
Payer: COMMERCIAL

## 2025-08-14 VITALS
WEIGHT: 183.3 LBS | BODY MASS INDEX: 31.46 KG/M2 | RESPIRATION RATE: 14 BRPM | HEART RATE: 68 BPM | OXYGEN SATURATION: 100 % | DIASTOLIC BLOOD PRESSURE: 52 MMHG | TEMPERATURE: 96.6 F | SYSTOLIC BLOOD PRESSURE: 110 MMHG

## 2025-08-14 DIAGNOSIS — M31.19 ACQUIRED TTP (MULTI): ICD-10-CM

## 2025-08-14 LAB
ALBUMIN SERPL BCP-MCNC: 4.1 G/DL (ref 3.4–5)
ALP SERPL-CCNC: 45 U/L (ref 33–110)
ALT SERPL W P-5'-P-CCNC: 15 U/L (ref 7–45)
ANION GAP SERPL CALC-SCNC: 11 MMOL/L (ref 10–20)
AST SERPL W P-5'-P-CCNC: 22 U/L (ref 9–39)
BASOPHILS # BLD AUTO: 0.03 X10*3/UL (ref 0–0.1)
BASOPHILS NFR BLD AUTO: 0.5 %
BILIRUB SERPL-MCNC: 0.4 MG/DL (ref 0–1.2)
BUN SERPL-MCNC: 11 MG/DL (ref 6–23)
CALCIUM SERPL-MCNC: 9.3 MG/DL (ref 8.6–10.3)
CHLORIDE SERPL-SCNC: 105 MMOL/L (ref 98–107)
CO2 SERPL-SCNC: 29 MMOL/L (ref 21–32)
CREAT SERPL-MCNC: 0.93 MG/DL (ref 0.5–1.05)
EGFRCR SERPLBLD CKD-EPI 2021: 72 ML/MIN/1.73M*2
EOSINOPHIL # BLD AUTO: 0.11 X10*3/UL (ref 0–0.7)
EOSINOPHIL NFR BLD AUTO: 1.8 %
ERYTHROCYTE [DISTWIDTH] IN BLOOD BY AUTOMATED COUNT: 13.6 % (ref 11.5–14.5)
GLUCOSE SERPL-MCNC: 89 MG/DL (ref 74–99)
HCT VFR BLD AUTO: 29.2 % (ref 36–46)
HGB BLD-MCNC: 9.8 G/DL (ref 12–16)
IMM GRANULOCYTES # BLD AUTO: 0.02 X10*3/UL (ref 0–0.7)
IMM GRANULOCYTES NFR BLD AUTO: 0.3 % (ref 0–0.9)
LYMPHOCYTES # BLD AUTO: 1.24 X10*3/UL (ref 1.2–4.8)
LYMPHOCYTES NFR BLD AUTO: 20.2 %
MCH RBC QN AUTO: 24.6 PG (ref 26–34)
MCHC RBC AUTO-ENTMCNC: 33.6 G/DL (ref 32–36)
MCV RBC AUTO: 73 FL (ref 80–100)
MONOCYTES # BLD AUTO: 0.41 X10*3/UL (ref 0.1–1)
MONOCYTES NFR BLD AUTO: 6.7 %
NEUTROPHILS # BLD AUTO: 4.34 X10*3/UL (ref 1.2–7.7)
NEUTROPHILS NFR BLD AUTO: 70.5 %
NRBC BLD-RTO: 0.5 /100 WBCS (ref 0–0)
PLATELET # BLD AUTO: 332 X10*3/UL (ref 150–450)
POTASSIUM SERPL-SCNC: 4.2 MMOL/L (ref 3.5–5.3)
PROT SERPL-MCNC: 7.6 G/DL (ref 6.4–8.2)
RBC # BLD AUTO: 3.99 X10*6/UL (ref 4–5.2)
SODIUM SERPL-SCNC: 141 MMOL/L (ref 136–145)
WBC # BLD AUTO: 6.2 X10*3/UL (ref 4.4–11.3)

## 2025-08-14 PROCEDURE — 99215 OFFICE O/P EST HI 40 MIN: CPT | Performed by: INTERNAL MEDICINE

## 2025-08-14 PROCEDURE — 85397 CLOTTING FUNCT ACTIVITY: CPT

## 2025-08-14 PROCEDURE — 36415 COLL VENOUS BLD VENIPUNCTURE: CPT

## 2025-08-14 PROCEDURE — 85335 FACTOR INHIBITOR TEST: CPT

## 2025-08-14 PROCEDURE — 83520 IMMUNOASSAY QUANT NOS NONAB: CPT

## 2025-08-14 PROCEDURE — 85025 COMPLETE CBC W/AUTO DIFF WBC: CPT

## 2025-08-14 PROCEDURE — 80053 COMPREHEN METABOLIC PANEL: CPT

## 2025-08-14 ASSESSMENT — PAIN SCALES - GENERAL: PAINLEVEL_OUTOF10: 0-NO PAIN

## 2025-08-18 ENCOUNTER — PHARMACY VISIT (OUTPATIENT)
Dept: PHARMACY | Facility: CLINIC | Age: 55
End: 2025-08-18
Payer: COMMERCIAL

## 2025-08-19 LAB — VWF CP ACT/NOR PPP CHRO: 13 %

## 2025-08-19 PROCEDURE — RXMED WILLOW AMBULATORY MEDICATION CHARGE

## 2025-08-20 ENCOUNTER — OFFICE VISIT (OUTPATIENT)
Dept: SURGICAL ONCOLOGY | Facility: HOSPITAL | Age: 55
End: 2025-08-20
Payer: COMMERCIAL

## 2025-08-20 ENCOUNTER — HOSPITAL ENCOUNTER (OUTPATIENT)
Dept: RADIOLOGY | Facility: HOSPITAL | Age: 55
Discharge: HOME | End: 2025-08-20
Payer: COMMERCIAL

## 2025-08-20 VITALS — WEIGHT: 173 LBS | HEIGHT: 64 IN | BODY MASS INDEX: 29.53 KG/M2

## 2025-08-20 VITALS
DIASTOLIC BLOOD PRESSURE: 60 MMHG | RESPIRATION RATE: 16 BRPM | HEIGHT: 64 IN | BODY MASS INDEX: 29.53 KG/M2 | HEART RATE: 90 BPM | WEIGHT: 173 LBS | SYSTOLIC BLOOD PRESSURE: 120 MMHG

## 2025-08-20 DIAGNOSIS — R92.30 DENSE BREAST TISSUE: Primary | ICD-10-CM

## 2025-08-20 DIAGNOSIS — Z12.39 BREAST CANCER SCREENING, HIGH RISK PATIENT: ICD-10-CM

## 2025-08-20 DIAGNOSIS — Z79.810 USE OF TAMOXIFEN (NOLVADEX): ICD-10-CM

## 2025-08-20 LAB — VWF CP INHIB PPP-ACNC: 0.6 BU

## 2025-08-20 PROCEDURE — 99213 OFFICE O/P EST LOW 20 MIN: CPT | Performed by: NURSE PRACTITIONER

## 2025-08-20 PROCEDURE — 77067 SCR MAMMO BI INCL CAD: CPT

## 2025-08-20 PROCEDURE — 3008F BODY MASS INDEX DOCD: CPT | Performed by: NURSE PRACTITIONER

## 2025-08-20 PROCEDURE — 1036F TOBACCO NON-USER: CPT | Performed by: NURSE PRACTITIONER

## 2025-08-22 LAB — VWF CP INHIB PPP QL: 3 U/ML

## 2025-08-26 ENCOUNTER — PHARMACY VISIT (OUTPATIENT)
Dept: PHARMACY | Facility: CLINIC | Age: 55
End: 2025-08-26
Payer: COMMERCIAL

## 2025-08-26 PROCEDURE — RXMED WILLOW AMBULATORY MEDICATION CHARGE

## 2025-08-29 ENCOUNTER — APPOINTMENT (OUTPATIENT)
Dept: GASTROENTEROLOGY | Facility: HOSPITAL | Age: 55
End: 2025-08-29
Payer: COMMERCIAL

## 2025-09-03 ENCOUNTER — TELEPHONE (OUTPATIENT)
Dept: HEMATOLOGY/ONCOLOGY | Facility: HOSPITAL | Age: 55
End: 2025-09-03
Payer: COMMERCIAL

## 2025-09-03 ENCOUNTER — LAB (OUTPATIENT)
Dept: LAB | Facility: CLINIC | Age: 55
End: 2025-09-03
Payer: COMMERCIAL

## 2025-09-03 ENCOUNTER — PHARMACY VISIT (OUTPATIENT)
Dept: PHARMACY | Facility: CLINIC | Age: 55
End: 2025-09-03
Payer: COMMERCIAL

## 2025-09-03 DIAGNOSIS — M31.19 ACQUIRED TTP (MULTI): Primary | ICD-10-CM

## 2025-09-03 DIAGNOSIS — M31.19 ACQUIRED TTP (MULTI): ICD-10-CM

## 2025-09-03 LAB
ALBUMIN SERPL BCP-MCNC: 4.1 G/DL (ref 3.4–5)
ALP SERPL-CCNC: 43 U/L (ref 33–110)
ALT SERPL W P-5'-P-CCNC: 11 U/L (ref 7–45)
ANION GAP SERPL CALC-SCNC: 11 MMOL/L (ref 10–20)
AST SERPL W P-5'-P-CCNC: 14 U/L (ref 9–39)
BASOPHILS # BLD AUTO: 0.03 X10*3/UL (ref 0–0.1)
BASOPHILS NFR BLD AUTO: 0.6 %
BILIRUB SERPL-MCNC: 0.3 MG/DL (ref 0–1.2)
BUN SERPL-MCNC: 12 MG/DL (ref 6–23)
CALCIUM SERPL-MCNC: 9.3 MG/DL (ref 8.6–10.3)
CHLORIDE SERPL-SCNC: 107 MMOL/L (ref 98–107)
CO2 SERPL-SCNC: 28 MMOL/L (ref 21–32)
CREAT SERPL-MCNC: 0.92 MG/DL (ref 0.5–1.05)
EGFRCR SERPLBLD CKD-EPI 2021: 73 ML/MIN/1.73M*2
EOSINOPHIL # BLD AUTO: 0.12 X10*3/UL (ref 0–0.7)
EOSINOPHIL NFR BLD AUTO: 2.4 %
ERYTHROCYTE [DISTWIDTH] IN BLOOD BY AUTOMATED COUNT: 14.1 % (ref 11.5–14.5)
GLUCOSE SERPL-MCNC: 116 MG/DL (ref 74–99)
HCT VFR BLD AUTO: 30 % (ref 36–46)
HGB BLD-MCNC: 9.9 G/DL (ref 12–16)
IMM GRANULOCYTES # BLD AUTO: 0.01 X10*3/UL (ref 0–0.7)
IMM GRANULOCYTES NFR BLD AUTO: 0.2 % (ref 0–0.9)
LYMPHOCYTES # BLD AUTO: 1.15 X10*3/UL (ref 1.2–4.8)
LYMPHOCYTES NFR BLD AUTO: 23.3 %
MCH RBC QN AUTO: 25.1 PG (ref 26–34)
MCHC RBC AUTO-ENTMCNC: 33 G/DL (ref 32–36)
MCV RBC AUTO: 76 FL (ref 80–100)
MONOCYTES # BLD AUTO: 0.29 X10*3/UL (ref 0.1–1)
MONOCYTES NFR BLD AUTO: 5.9 %
NEUTROPHILS # BLD AUTO: 3.33 X10*3/UL (ref 1.2–7.7)
NEUTROPHILS NFR BLD AUTO: 67.6 %
PLATELET # BLD AUTO: 306 X10*3/UL (ref 150–450)
POTASSIUM SERPL-SCNC: 3.7 MMOL/L (ref 3.5–5.3)
PROT SERPL-MCNC: 7.1 G/DL (ref 6.4–8.2)
RBC # BLD AUTO: 3.95 X10*6/UL (ref 4–5.2)
SODIUM SERPL-SCNC: 142 MMOL/L (ref 136–145)
WBC # BLD AUTO: 4.9 X10*3/UL (ref 4.4–11.3)

## 2025-09-03 PROCEDURE — 80053 COMPREHEN METABOLIC PANEL: CPT

## 2025-09-03 PROCEDURE — 85397 CLOTTING FUNCT ACTIVITY: CPT

## 2025-09-03 PROCEDURE — 36415 COLL VENOUS BLD VENIPUNCTURE: CPT

## 2025-09-03 PROCEDURE — 85025 COMPLETE CBC W/AUTO DIFF WBC: CPT

## 2025-09-04 ENCOUNTER — TELEPHONE (OUTPATIENT)
Dept: GASTROENTEROLOGY | Facility: CLINIC | Age: 55
End: 2025-09-04
Payer: COMMERCIAL

## 2025-09-04 LAB — HOLD SPECIMEN: NORMAL

## 2025-09-06 LAB — VWF CP ACT/NOR PPP CHRO: 23 %

## 2025-09-09 ENCOUNTER — APPOINTMENT (OUTPATIENT)
Dept: GASTROENTEROLOGY | Facility: EXTERNAL LOCATION | Age: 55
End: 2025-09-09
Payer: COMMERCIAL

## 2025-09-09 ENCOUNTER — APPOINTMENT (OUTPATIENT)
Dept: GASTROENTEROLOGY | Facility: HOSPITAL | Age: 55
End: 2025-09-09
Payer: COMMERCIAL

## 2026-07-30 ENCOUNTER — APPOINTMENT (OUTPATIENT)
Dept: PRIMARY CARE | Facility: CLINIC | Age: 56
End: 2026-07-30
Payer: COMMERCIAL